# Patient Record
Sex: FEMALE | Race: WHITE | NOT HISPANIC OR LATINO | Employment: OTHER | URBAN - METROPOLITAN AREA
[De-identification: names, ages, dates, MRNs, and addresses within clinical notes are randomized per-mention and may not be internally consistent; named-entity substitution may affect disease eponyms.]

---

## 2016-09-09 LAB
LEFT EYE DIABETIC RETINOPATHY: NORMAL
RIGHT EYE DIABETIC RETINOPATHY: NORMAL

## 2017-01-25 ENCOUNTER — APPOINTMENT (OUTPATIENT)
Dept: LAB | Facility: CLINIC | Age: 67
End: 2017-01-25
Payer: MEDICARE

## 2017-01-25 DIAGNOSIS — E66.9 OBESITY: ICD-10-CM

## 2017-01-25 DIAGNOSIS — E13.59 OTHER SPECIFIED DIABETES MELLITUS WITH OTHER CIRCULATORY COMPLICATIONS (HCC): ICD-10-CM

## 2017-01-25 DIAGNOSIS — M79.10 MYALGIA: ICD-10-CM

## 2017-01-25 DIAGNOSIS — E11.9 TYPE 2 DIABETES MELLITUS WITHOUT COMPLICATIONS (HCC): ICD-10-CM

## 2017-01-25 LAB
25(OH)D3 SERPL-MCNC: 27.1 NG/ML (ref 30–100)
ALBUMIN SERPL BCP-MCNC: 3.7 G/DL (ref 3.5–5)
ALP SERPL-CCNC: 113 U/L (ref 46–116)
ALT SERPL W P-5'-P-CCNC: 41 U/L (ref 12–78)
ANION GAP SERPL CALCULATED.3IONS-SCNC: 8 MMOL/L (ref 4–13)
AST SERPL W P-5'-P-CCNC: 24 U/L (ref 5–45)
BILIRUB SERPL-MCNC: 0.7 MG/DL (ref 0.2–1)
BUN SERPL-MCNC: 15 MG/DL (ref 5–25)
CALCIUM SERPL-MCNC: 9.8 MG/DL (ref 8.3–10.1)
CHLORIDE SERPL-SCNC: 98 MMOL/L (ref 100–108)
CHOLEST SERPL-MCNC: 138 MG/DL (ref 50–200)
CO2 SERPL-SCNC: 32 MMOL/L (ref 21–32)
CREAT SERPL-MCNC: 0.78 MG/DL (ref 0.6–1.3)
CREAT UR-MCNC: 135 MG/DL
EST. AVERAGE GLUCOSE BLD GHB EST-MCNC: 126 MG/DL
GFR SERPL CREATININE-BSD FRML MDRD: >60 ML/MIN/1.73SQ M
GLUCOSE SERPL-MCNC: 114 MG/DL (ref 65–140)
HBA1C MFR BLD: 6 % (ref 4.2–6.3)
HDLC SERPL-MCNC: 43 MG/DL (ref 40–60)
LDLC SERPL CALC-MCNC: 61 MG/DL (ref 0–100)
MICROALBUMIN UR-MCNC: 19.8 MG/L (ref 0–20)
MICROALBUMIN/CREAT 24H UR: 15 MG/G CREATININE (ref 0–30)
POTASSIUM SERPL-SCNC: 4.3 MMOL/L (ref 3.5–5.3)
PROT SERPL-MCNC: 7.9 G/DL (ref 6.4–8.2)
SODIUM SERPL-SCNC: 138 MMOL/L (ref 136–145)
TRIGL SERPL-MCNC: 169 MG/DL
TSH SERPL DL<=0.05 MIU/L-ACNC: 2.41 UIU/ML (ref 0.36–3.74)

## 2017-01-25 PROCEDURE — 36415 COLL VENOUS BLD VENIPUNCTURE: CPT

## 2017-01-25 PROCEDURE — 82570 ASSAY OF URINE CREATININE: CPT

## 2017-01-25 PROCEDURE — 80061 LIPID PANEL: CPT

## 2017-01-25 PROCEDURE — 80053 COMPREHEN METABOLIC PANEL: CPT

## 2017-01-25 PROCEDURE — 83036 HEMOGLOBIN GLYCOSYLATED A1C: CPT

## 2017-01-25 PROCEDURE — 82306 VITAMIN D 25 HYDROXY: CPT

## 2017-01-25 PROCEDURE — 82043 UR ALBUMIN QUANTITATIVE: CPT

## 2017-01-25 PROCEDURE — 84443 ASSAY THYROID STIM HORMONE: CPT

## 2017-02-14 DIAGNOSIS — M81.0 AGE-RELATED OSTEOPOROSIS WITHOUT CURRENT PATHOLOGICAL FRACTURE: ICD-10-CM

## 2017-02-15 ENCOUNTER — ALLSCRIPTS OFFICE VISIT (OUTPATIENT)
Dept: OTHER | Facility: OTHER | Age: 67
End: 2017-02-15

## 2017-03-08 ENCOUNTER — HOSPITAL ENCOUNTER (OUTPATIENT)
Dept: MAMMOGRAPHY | Facility: CLINIC | Age: 67
Discharge: HOME | End: 2017-03-08
Payer: MEDICARE

## 2017-03-08 DIAGNOSIS — Z12.31 ENCOUNTER FOR SCREENING MAMMOGRAM FOR MALIGNANT NEOPLASM OF BREAST: ICD-10-CM

## 2017-03-08 DIAGNOSIS — M81.0 AGE-RELATED OSTEOPOROSIS WITHOUT CURRENT PATHOLOGICAL FRACTURE: ICD-10-CM

## 2017-03-08 PROCEDURE — 77080 DXA BONE DENSITY AXIAL: CPT

## 2017-03-08 PROCEDURE — G0202 SCR MAMMO BI INCL CAD: HCPCS

## 2017-06-21 ENCOUNTER — ALLSCRIPTS OFFICE VISIT (OUTPATIENT)
Dept: OTHER | Facility: OTHER | Age: 67
End: 2017-06-21

## 2017-06-21 DIAGNOSIS — E11.9 TYPE 2 DIABETES MELLITUS WITHOUT COMPLICATIONS (HCC): ICD-10-CM

## 2017-09-29 ENCOUNTER — ALLSCRIPTS OFFICE VISIT (OUTPATIENT)
Dept: OTHER | Facility: OTHER | Age: 67
End: 2017-09-29

## 2017-09-29 LAB
BILIRUB UR QL STRIP: NORMAL
CLARITY UR: NORMAL
COLOR UR: NORMAL
GLUCOSE (HISTORICAL): NORMAL
HGB UR QL STRIP.AUTO: NORMAL
KETONES UR STRIP-MCNC: NORMAL MG/DL
LEUKOCYTE ESTERASE UR QL STRIP: 125
NITRITE UR QL STRIP: NORMAL
PH UR STRIP.AUTO: 6.5 [PH]
PROT UR STRIP-MCNC: 2000 MG/DL
SP GR UR STRIP.AUTO: 1.01
UROBILINOGEN UR QL STRIP.AUTO: NORMAL

## 2017-10-19 ENCOUNTER — ALLSCRIPTS OFFICE VISIT (OUTPATIENT)
Dept: OTHER | Facility: OTHER | Age: 67
End: 2017-10-19

## 2017-10-20 NOTE — PROGRESS NOTES
Assessment  1  Candidal vulvovaginitis (112 1) (B37 3)    Plan  Candidal vulvovaginitis    · Nystatin-Triamcinolone 314107-3 1 UNIT/GM-% External Ointment; APPLY  SPARINGLY TO AFFECTED AREA TWICE A DAY FOR 7  TO 14 DAYS   Rx By: Isma Dang; Dispense: 7 Days ; #:1 X 30 GM Tube; Refill: 3;For: Candidal vulvovaginitis; CLEO = N; Sent To: [facility]    Discussion/Summary  Discussion Summary:   Treat as vulvar yeast infection with Mycolog II ointmenthave a yearly gyn examfollow up fo her endometrial hyperplasia  Chief Complaint  Chief Complaint Free Text Note Form: Patient presents today for a problem visit  She currently has vaginal itching that she is using clotrimazole for  History of Present Illness  HPI: is known to be diabetic      Active Problems  1  Acute urinary tract infection (599 0) (N39 0)   2  Age related osteoporosis (733 01) (M81 0)   3  Complex endometrial hyperplasia without atypia (621 32) (N85 01)   4  Controlled diabetes mellitus (250 00) (E11 9)   5  Depression screening (V79 0) (Z13 89)   6  Diabetes mellitus type 2 with neurological manifestations (250 60) (E11 49)   7  Encounter for screening mammogram for breast cancer (V76 12) (Z12 31)   8  Hyperlipidemia (272 4) (E78 5)   9  Hypertension (401 9) (I10)   10  Myalgia (729 1) (M79 1)   11  Obesity (278 00) (E66 9)   12  Post-menopausal bleeding (627 1) (N95 0)   13  Post-menopausal osteoporosis (733 01) (M81 0)    Past Medical History  1  History of Carpal tunnel syndrome, unspecified laterality (354 0) (G56 00)   2  History of Diabetes mellitus of other type with circulatory complication, without long-term   current use of insulin (250 70) (E13 59)   3  History of acute sinusitis (V12 69) (Z87 09)   4  Need for pneumococcal vaccination (V03 82) (Z23)   5  Normal delivery (650) (O80,Z37 9)   6  History of Postmenopausal bleeding (627 1) (N95 0)    Surgical History  1   History of Biopsy Endometrial, Without Cervical Dilation   2  History of Biopsy Endometrial, Without Cervical Dilation   3  History of Cholecystectomy   4  History of Complete Colonoscopy   5  History of Dilation And Curettage   6  History of Hysteroscopy   7  History of Neuroplasty Decompression Median Nerve At Carpal Tunnel   8  History of Neuroplasty Decompression Median Nerve At Carpal Tunnel   9  History of Tubal Ligation    Family History  Child    1  Family history of Living and Healthy  Family History    2  Family history of Arthritis (V17 7)   3  Family history of Bladder Cancer (V16 52)   4  Family history of cardiac disorder (V17 49) (Z82 49)   5  Family history of diabetes mellitus (V18 0) (Z83 3)   6  Family history of hypertension (V17 49) (Z82 49)   7  Family history of valvular heart disease (V17 49) (Z82 49)    Social History   · Being A Social Drinker   · Daily Coffee Consumption (2  Cups/Day)   ·    · Never A Smoker   · Occupation:    Current Meds   1  Accu-Chek Soft Touch Lancets Miscellaneous; Therapy: 41XPM0103 to (Last Rx:41Tsu4726)  Requested for: 68Vba6022 Ordered   2  Allegra Allergy 180 MG Oral Tablet; Therapy: (Recorded:13Dwk0925) to Recorded   3  AmLODIPine Besylate 5 MG Oral Tablet; TAKE 1 TABLET DAILY; Therapy: 80KYK1093 to (Evaluate:61Zcp3119)  Requested for: 58Mcs3231; Last   Rx:34Aot6013 Ordered   4  Aspirin 81 MG TABS; Therapy: (Sarabjitie Ni) to Recorded   5  Atorvastatin Calcium 10 MG Oral Tablet; Take 1 tablet daily; Therapy: 05Cwm6391 to (Evaluate:51Ags4000); Last Rx:03Sif5211 Ordered   6  BD Pen Needle Mini U/F 31G X 5 MM Miscellaneous; USE AS DIRECTED; Therapy: 68UZZ9755 to (Evaluate:53Geg1359)  Requested for: 15SNX4626; Last   Rx:52Pwd4346 Ordered   7  Clotrimazole-Betamethasone 1-0 05 % External Cream Recorded   8  Cranberry TABS; Therapy: (Recorded:05Dqx8689) to Recorded   9  Famotidine 20 MG Oral Tablet; TAKE 1 TABLET DAILY AS DIRECTED; Last   Rx:97Fpo3875 Ordered   10   HydroCHLOROthiazide 25 MG Oral Tablet; TAKE 1 TABLET DAILY; Therapy: 03Rkh5674 to (Evaluate:14Lrr3252); Last Rx:39Xjy2717 Ordered   11  MetFORMIN HCl - 500 MG Oral Tablet; take 1 tablet by mouth twice a day; Therapy: 14KPI9544 to (Evaluate:82Yry7242)  Requested for: 03Edf9835; Last    Rx:34Tgc1848 Ordered   12  Metoprolol Succinate ER 50 MG Oral Tablet Extended Release 24 Hour; Take 1 tablet by    mouth  daily; Therapy: 49IPW2358 to (Jimena Quinones)  Requested for: 43Iyg5901; Last    Rx:80Ktg4594 Ordered   13  OneTouch Ultra Blue In Vitro Strip; TEST TWICE DAILY; Therapy: 46WOR8535 to (Evaluate:23Iyc4057)  Requested for: 94JSX7401; Last    Rx:02Bmi9460 Ordered   14  Victoza 18 MG/3ML Subcutaneous Solution Pen-injector; INJECT 1 2 MCG Daily; Therapy: 79Gsr2120 to (Last Rx:86Zpj2939) Ordered    Allergies  1  ACE Inhibitors   2  Codeine Sulfate TABS    Vitals  Vital Signs    Recorded: 29DAH5898 00:40VE   Systolic 673   Diastolic 88   Height 5 ft 6 in   Weight 198 lb 2 08 oz   BMI Calculated 31 98   BSA Calculated 2     Physical Exam    Genitourinary   External genitalia: Normal and no lesions appreciated  Vagina: Normal, no lesions or dryness appreciated  Vagina: no discharge  Urethra: Normal     Urethral meatus: Normal     Bladder: Normal, soft, non-tender and no prolapse or masses appreciated  Cervix: Normal, no palpable masses  Examination of the cervix revealed normal findings  A Pap smear was not performed  Uterus: Normal, non-tender, not enlarged, and no palpable masses  Adnexa/parametria: Normal, non-tender and no fullness or masses appreciated  Future Appointments    Date/Time Provider Specialty Site   11/03/2017 10:00 AM TRICE Ricketts   Internal Medicine [facility]     Signatures   Electronically signed by : TRICE Caputo ; Oct 19 2017 12:15PM EST                       (Author)

## 2017-10-24 ENCOUNTER — APPOINTMENT (OUTPATIENT)
Dept: LAB | Facility: CLINIC | Age: 67
End: 2017-10-24
Payer: MEDICARE

## 2017-10-24 DIAGNOSIS — E11.9 TYPE 2 DIABETES MELLITUS WITHOUT COMPLICATIONS (HCC): ICD-10-CM

## 2017-10-24 LAB
ANION GAP SERPL CALCULATED.3IONS-SCNC: 5 MMOL/L (ref 4–13)
BUN SERPL-MCNC: 15 MG/DL (ref 5–25)
CALCIUM SERPL-MCNC: 9.9 MG/DL (ref 8.3–10.1)
CHLORIDE SERPL-SCNC: 98 MMOL/L (ref 100–108)
CO2 SERPL-SCNC: 34 MMOL/L (ref 21–32)
CREAT SERPL-MCNC: 0.78 MG/DL (ref 0.6–1.3)
GFR SERPL CREATININE-BSD FRML MDRD: 78 ML/MIN/1.73SQ M
GLUCOSE P FAST SERPL-MCNC: 85 MG/DL (ref 65–99)
POTASSIUM SERPL-SCNC: 4.2 MMOL/L (ref 3.5–5.3)
SODIUM SERPL-SCNC: 137 MMOL/L (ref 136–145)

## 2017-10-24 PROCEDURE — 83036 HEMOGLOBIN GLYCOSYLATED A1C: CPT

## 2017-10-24 PROCEDURE — 36415 COLL VENOUS BLD VENIPUNCTURE: CPT

## 2017-10-24 PROCEDURE — 80048 BASIC METABOLIC PNL TOTAL CA: CPT

## 2017-10-25 LAB
EST. AVERAGE GLUCOSE BLD GHB EST-MCNC: 128 MG/DL
HBA1C MFR BLD: 6.1 % (ref 4.2–6.3)

## 2017-11-03 ENCOUNTER — ALLSCRIPTS OFFICE VISIT (OUTPATIENT)
Dept: OTHER | Facility: OTHER | Age: 67
End: 2017-11-03

## 2017-11-03 DIAGNOSIS — E66.9 OBESITY: ICD-10-CM

## 2017-11-03 DIAGNOSIS — E11.9 TYPE 2 DIABETES MELLITUS WITHOUT COMPLICATIONS (HCC): ICD-10-CM

## 2017-11-04 NOTE — PROGRESS NOTES
Assessment  1  Controlled diabetes mellitus (250 00) (E11 9)   2  Hypertension (401 9) (I10)   3  Obesity (278 00) (E66 9)   4  Diabetes mellitus type 2 with neurological manifestations (250 60) (E11 49)    Plan  Controlled diabetes mellitus, Obesity    · (1) HEMOGLOBIN A1C; Status:Active; Requested ION:51QVM0056;   Need for influenza vaccination    · Fluzone High-Dose 0 5 ML Intramuscular Suspension Prefilled Syringe  Obesity    · (1) COMPREHENSIVE METABOLIC PANEL; Status:Active; Requested FQZ:35JWO6088;    · (1) LIPID PANEL, FASTING; Status:Active; Requested for:03Nov2017;    · (1) MICROALBUMIN CREATININE RATIO, RANDOM URINE; Status:Active; Requested TIA:50OVH5119;    · Follow-up visit in 2 months Evaluation and Treatment  Follow-up  Status: Hold For - Scheduling   Requested for: 84CIQ5625   · Diets that are low in carbohydrates and high in protein are very popular for weight loss ;  Status:Complete;   Done: 62OHB8886 12:02PM   · Eat a low fat and low cholesterol diet ; Status:Complete;   Done: 23STE7224 12:02PM   · Keep a diary of when and what you eat ; Status:Complete;   Done: 90AQL2904 12:02PM   · Some eating tips that can help you lose weight ; Status:Complete;   Done: 38VLH6003 12:02PM   · We encourage all of our patients to exercise regularly  30 minutes of exercise or physical activity  five or more days a week is recommended for children and adults ; Status:Complete;   Done:  77WBW4033 12:02PM   · We recommend that you bring your body mass index down to 26 ; Status:Complete;   Done:  50QDX8354 12:02PM    Discussion/Summary  Counseling Documentation With Imm: The patient was counseled regarding diagnostic results,-- instructions for management,-- risk factor reductions,-- prognosis,-- patient and family education,-- impressions,-- risks and benefits of treatment options  Medication SE Review and Pt Understands Tx: Possible side effects of new medications were reviewed with the patient/guardian today  The treatment plan was reviewed with the patient/guardian  The patient/guardian understands and agrees with the treatment plan      Chief Complaint  Chief Complaint Free Text Note Form: Pt here for check up / flu shot      History of Present Illness  HPI: feels OK   Obesity (Follow-Up): The patient is being seen for follow-up of obesity  The patient reports a weight loss of 4 pounds  Interval Events: slow but steady decline in wt   Interval symptoms:  stable poor eating habits,-- denies depressed mood-- and-- stable joint pain  Medications include Victosa  Disease management:  the patient is doing well with her goals  Diet plan: low carbohydrate diet, limited portion sizes and limited junk food  Due for: diabetes screening and lipid profile  Hypertension (Follow-Up): The patient presents for follow-up of essential hypertension  The patient states she has been doing well with her blood pressure control since the last visit  She has no comorbid illnesses  Symptoms: The patient is currently asymptomatic  Home monitoring: The patient checks her blood pressure sporadically  Blood pressure control has been good  Lifestyle: Diet: She consumes a diverse and healthy diet  Weight Issues: She has weight concerns  Exercise: She does not exercise regularly  Smoking: She does not use tobacco Alcohol: She denies alcohol use  Drug Use: She denies drug use  Medications: the patient is adherent with her medication regimen  -- She denies medication side effects  Medication(s): a diuretic,-- a beta blocking agent-- and-- a calcium channel blocker  Disease Management: the patient is doing well with her blood pressure goals  The patient is due for a lipid panel-- and-- a serum creatinine  Diabetes Type II (Follow-Up): The patient states she has been doing well with her Type II Diabetes control since the last visit  Comorbid Illnesses: hypertension,-- hyperlipidemia-- and-- obesity  Complications: peripheral neuropathy  Symptoms: stable numbness of the feet,-- denies a foot ulcer,-- denies foot pain,-- denies vomiting,-- denies visual impairment-- and-- denies   Home monitoring: The patient checks her blood sugars regularly  -- Glycemic control has been good  -- the patient reports no symptomatic hypoglycemic episodes  Medications: the patient is adherent with her medication regimen  -- She denies medication side effects  Medication(s): Metformin HCl-- and-- Victosa  The patient is doing well with her diabetes goals  Review of Systems  Complete-Female:   Constitutional: as noted in HPI  Eyes: No complaints of eye pain, no red eyes, no eyesight problems, no discharge, no dry eyes, no itching of eyes  Cardiovascular: as noted in HPI  Respiratory: No complaints of shortness of breath, no wheezing, no cough, no SOB on exertion, no orthopnea, no PND  Gastrointestinal: No complaints of abdominal pain, no constipation, no nausea or vomiting, no diarrhea, no bloody stools  Genitourinary: No complaints of dysuria, no incontinence, no pelvic pain, no dysmenorrhea, no vaginal discharge or bleeding  Musculoskeletal: No complaints of arthralgias, no myalgias, no joint swelling or stiffness, no limb pain or swelling  Integumentary: No complaints of skin rash or lesions, no itching, no skin wounds, no breast pain or lump  Neurological: No complaints of headache, no confusion, no convulsions, no numbness, no dizziness or fainting, no tingling, no limb weakness, no difficulty walking  Psychiatric: Not suicidal, no sleep disturbance, no anxiety or depression, no change in personality, no emotional problems  Endocrine: as noted in HPI  Active Problems  1  Age related osteoporosis (733 01) (M81 0)   2  Candidal vulvovaginitis (112 1) (B37 3)   3  Controlled diabetes mellitus (250 00) (E11 9)   4  Depression screening (V79 0) (Z13 89)   5  Diabetes mellitus type 2 with neurological manifestations (250 60) (E11 49)   6  Encounter for screening mammogram for breast cancer (V76 12) (Z12 31)   7  Hyperlipidemia (272 4) (E78 5)   8  Hypertension (401 9) (I10)   9  Myalgia (729 1) (M79 1)   10  Obesity (278 00) (E66 9)   11  Post-menopausal osteoporosis (733 01) (M81 0)    Past Medical History  1  History of Carpal tunnel syndrome, unspecified laterality (354 0) (G56 00)   2  History of Complex endometrial hyperplasia without atypia (621 32) (N85 01)   3  History of Diabetes mellitus of other type with circulatory complication, without long-term current   use of insulin (250 70) (E13 59)   4  Normal delivery (650) (O80,Z37 9)   5  History of Postmenopausal bleeding (627 1) (N95 0)  Active Problems And Past Medical History Reviewed: The active problems and past medical history were reviewed and updated today  Surgical History  1  History of Biopsy Endometrial, Without Cervical Dilation   2  History of Cholecystectomy   3  History of Complete Colonoscopy   4  History of Dilation And Curettage   5  History of Hysteroscopy   6  History of Neuroplasty Decompression Median Nerve At Carpal Tunnel   7  History of Neuroplasty Decompression Median Nerve At Carpal Tunnel   8  History of Tubal Ligation    Family History  Child    1  Family history of Living and Healthy  Family History    2  Family history of Arthritis (V17 7)   3  Family history of Bladder Cancer (V16 52)   4  Family history of cardiac disorder (V17 49) (Z82 49)   5  Family history of diabetes mellitus (V18 0) (Z83 3)   6  Family history of hypertension (V17 49) (Z82 49)   7  Family history of valvular heart disease (V17 49) (Z82 49)    Social History   · Being A Social Drinker   · Daily Coffee Consumption (2  Cups/Day)   ·    · Never A Smoker   · Occupation:  Social History Reviewed: The social history was reviewed and updated today  The social history was reviewed and is unchanged  Current Meds   1  Accu-Chek Soft Touch Lancets Miscellaneous;    Therapy: 41ZXS3579 to (Last Rx:68Ssb7164)  Requested for: 03Zll9515 Ordered   2  Allegra Allergy 180 MG Oral Tablet; Therapy: (Recorded:10Ysb0895) to Recorded   3  AmLODIPine Besylate 5 MG Oral Tablet; TAKE 1 TABLET DAILY; Therapy: 41GQG0304 to (Evaluate:40Akk4518)  Requested for: 51Gvv4110; Last Rx:62Rrk5483   Ordered   4  Aspirin 81 MG TABS; Therapy: (Wendy Liriano) to Recorded   5  Atorvastatin Calcium 10 MG Oral Tablet; Take 1 tablet daily; Therapy: 36Sto4290 to (Evaluate:41Hqy4761); Last Rx:78Nrd3733 Ordered   6  BD Pen Needle Mini U/F 31G X 5 MM Miscellaneous; USE AS DIRECTED; Therapy: 97LYS5326 to (Evaluate:60Tro7941)  Requested for: 29XQO0051; Last Rx:54Elx1099   Ordered   7  Clotrimazole-Betamethasone 1-0 05 % External Cream Recorded   8  Cranberry TABS; Therapy: (Recorded:85Kga1578) to Recorded   9  Famotidine 20 MG Oral Tablet; TAKE 1 TABLET DAILY AS DIRECTED; Last Rx:54Omy9292 Ordered   10  HydroCHLOROthiazide 25 MG Oral Tablet; TAKE 1 TABLET DAILY; Therapy: 60Czw1700 to (Evaluate:96Wod3818); Last Rx:14Hif7122 Ordered   11  MetFORMIN HCl - 500 MG Oral Tablet; take 1 tablet by mouth twice a day; Therapy: 15ZFF0693 to (Evaluate:21Bjs7273)  Requested for: 89Kmx9624; Last Rx:73Wam2831    Ordered   12  Metoprolol Succinate ER 50 MG Oral Tablet Extended Release 24 Hour; Take 1 tablet by mouth  daily; Therapy: 68CDH0872 to (Parth Gudinompf)  Requested for: 77Kol6866; Last Rx:33Smx2290    Ordered   13  Nystatin-Triamcinolone 562168-2 1 UNIT/GM-% External Ointment; APPLY SPARINGLY TO AFFECTED    AREA TWICE A DAY FOR 7 TO 14 DAYS; Therapy: 70MKY7752 to (Evaluate:16Nov2017)  Requested for: 24MLM6772; Last Rx:16Jxa4928    Ordered   14  OneTouch Ultra Blue In Vitro Strip; TEST TWICE DAILY; Therapy: 84NWW4716 to (Evaluate:78Nsw0703)  Requested for: 89MYX5077; Last Rx:41Hga3543    Ordered   15  Victoza 18 MG/3ML Subcutaneous Solution Pen-injector; INJECT 1 2 MCG Daily;     Therapy: 71Fqe4976 to (Last Rx:73Irg3696) Ordered    Allergies  1  ACE Inhibitors   2  Codeine Sulfate TABS    Vitals  Vital Signs    Recorded: 42FNX0773 09:50AM   Temperature 98 F, Tympanic   Heart Rate 80   Systolic 970, LUE, Sitting   Diastolic 68, LUE, Sitting   Height 5 ft 6 in   Weight 194 lb    BMI Calculated 31 31   BSA Calculated 1 97     Physical Exam    Constitutional   General appearance: No acute distress, well appearing and well nourished  obese  Eyes   Conjunctiva and lids: No swelling, erythema or discharge  Pupils and irises: Equal, round and reactive to light  Pulmonary   Respiratory effort: No increased work of breathing or signs of respiratory distress  Auscultation of lungs: Clear to auscultation  Cardiovascular   Auscultation of heart: Normal rate and rhythm, normal S1 and S2, without murmurs  Examination of extremities for edema and/or varicosities: Normal     Abdomen   Abdomen: Non-tender, no masses  Liver and spleen: No hepatomegaly or splenomegaly  Lymphatic   Palpation of lymph nodes in neck: No lymphadenopathy  Musculoskeletal   Gait and station: Normal     Skin   Skin and subcutaneous tissue: Normal without rashes or lesions  Neurologic   Cranial nerves: Cranial nerves 2-12 intact  Reflexes: 2+ and symmetric  Sensation: No sensory loss  Psychiatric   Orientation to person, place, and time: Normal     Mood and affect: Normal          Future Appointments    Date/Time Provider Specialty Site   02/23/2018 11:00 AM TRICE Schmidt  Internal Medicine [facility] INTER[facility]IC[facility] [place]   11/09/2017 11:40 AM TRICE Rodríguez   Obstetrics/Gynecology [facility]     Signatures   Electronically signed by : TRICE Adams ; Nov  3 2017 12:03PM EST                       (Author)

## 2017-11-09 ENCOUNTER — GENERIC CONVERSION - ENCOUNTER (OUTPATIENT)
Dept: OTHER | Facility: OTHER | Age: 67
End: 2017-11-09

## 2017-11-09 ENCOUNTER — LAB REQUISITION (OUTPATIENT)
Dept: LAB | Facility: HOSPITAL | Age: 67
End: 2017-11-09
Payer: MEDICARE

## 2017-11-09 DIAGNOSIS — Z01.419 ENCOUNTER FOR GYNECOLOGICAL EXAMINATION WITHOUT ABNORMAL FINDING: ICD-10-CM

## 2017-11-09 PROCEDURE — 87624 HPV HI-RISK TYP POOLED RSLT: CPT | Performed by: OBSTETRICS & GYNECOLOGY

## 2017-11-09 PROCEDURE — G0145 SCR C/V CYTO,THINLAYER,RESCR: HCPCS | Performed by: OBSTETRICS & GYNECOLOGY

## 2017-11-15 LAB — HPV RRNA GENITAL QL NAA+PROBE: NORMAL

## 2017-11-16 LAB
LAB AP GYN PRIMARY INTERPRETATION: NORMAL
Lab: NORMAL

## 2018-01-13 VITALS
DIASTOLIC BLOOD PRESSURE: 64 MMHG | HEART RATE: 80 BPM | TEMPERATURE: 97 F | SYSTOLIC BLOOD PRESSURE: 130 MMHG | BODY MASS INDEX: 31.68 KG/M2 | WEIGHT: 197.13 LBS | HEIGHT: 66 IN

## 2018-01-13 VITALS
SYSTOLIC BLOOD PRESSURE: 130 MMHG | BODY MASS INDEX: 31.92 KG/M2 | WEIGHT: 198.6 LBS | DIASTOLIC BLOOD PRESSURE: 84 MMHG | TEMPERATURE: 98.5 F | HEART RATE: 76 BPM | HEIGHT: 66 IN

## 2018-01-13 VITALS
WEIGHT: 194 LBS | HEIGHT: 66 IN | DIASTOLIC BLOOD PRESSURE: 68 MMHG | SYSTOLIC BLOOD PRESSURE: 130 MMHG | TEMPERATURE: 98 F | BODY MASS INDEX: 31.18 KG/M2 | HEART RATE: 80 BPM

## 2018-01-14 VITALS
SYSTOLIC BLOOD PRESSURE: 162 MMHG | HEIGHT: 66 IN | DIASTOLIC BLOOD PRESSURE: 88 MMHG | BODY MASS INDEX: 31.84 KG/M2 | WEIGHT: 198.13 LBS

## 2018-01-14 VITALS
WEIGHT: 197.13 LBS | SYSTOLIC BLOOD PRESSURE: 138 MMHG | BODY MASS INDEX: 31.68 KG/M2 | TEMPERATURE: 97.2 F | HEART RATE: 76 BPM | DIASTOLIC BLOOD PRESSURE: 64 MMHG | HEIGHT: 66 IN

## 2018-01-22 VITALS
HEIGHT: 65 IN | BODY MASS INDEX: 32.65 KG/M2 | WEIGHT: 196 LBS | DIASTOLIC BLOOD PRESSURE: 72 MMHG | SYSTOLIC BLOOD PRESSURE: 128 MMHG

## 2018-01-30 DIAGNOSIS — E13.9 DIABETES 1.5, MANAGED AS TYPE 2 (HCC): Primary | ICD-10-CM

## 2018-02-15 ENCOUNTER — OFFICE VISIT (OUTPATIENT)
Dept: INTERNAL MEDICINE CLINIC | Age: 68
End: 2018-02-15
Payer: MEDICARE

## 2018-02-15 VITALS
WEIGHT: 191 LBS | RESPIRATION RATE: 16 BRPM | DIASTOLIC BLOOD PRESSURE: 60 MMHG | HEIGHT: 66 IN | TEMPERATURE: 97.7 F | BODY MASS INDEX: 30.7 KG/M2 | OXYGEN SATURATION: 93 % | SYSTOLIC BLOOD PRESSURE: 108 MMHG | HEART RATE: 70 BPM

## 2018-02-15 DIAGNOSIS — B37.3 YEAST VAGINITIS: ICD-10-CM

## 2018-02-15 DIAGNOSIS — E11.49 DIABETES MELLITUS TYPE 2 WITH NEUROLOGICAL MANIFESTATIONS (HCC): ICD-10-CM

## 2018-02-15 DIAGNOSIS — E11.42 CONTROLLED TYPE 2 DIABETES MELLITUS WITH DIABETIC POLYNEUROPATHY, WITHOUT LONG-TERM CURRENT USE OF INSULIN (HCC): ICD-10-CM

## 2018-02-15 DIAGNOSIS — J45.21 MILD INTERMITTENT ASTHMATIC BRONCHITIS WITH ACUTE EXACERBATION: Primary | ICD-10-CM

## 2018-02-15 PROBLEM — J45.901 ASTHMATIC BRONCHITIS WITH ACUTE EXACERBATION: Status: ACTIVE | Noted: 2018-02-15

## 2018-02-15 PROBLEM — B37.31 CANDIDAL VULVOVAGINITIS: Status: ACTIVE | Noted: 2017-10-19

## 2018-02-15 PROBLEM — M81.0 AGE RELATED OSTEOPOROSIS: Status: ACTIVE | Noted: 2017-02-14

## 2018-02-15 PROCEDURE — 99213 OFFICE O/P EST LOW 20 MIN: CPT | Performed by: INTERNAL MEDICINE

## 2018-02-15 RX ORDER — FLUCONAZOLE 150 MG/1
150 TABLET ORAL ONCE
Qty: 1 TABLET | Refills: 0 | Status: SHIPPED | OUTPATIENT
Start: 2018-02-15 | End: 2018-02-15

## 2018-02-15 RX ORDER — HYDROCHLOROTHIAZIDE 25 MG/1
1 TABLET ORAL DAILY
COMMUNITY
Start: 2016-12-21 | End: 2018-03-12 | Stop reason: SDUPTHER

## 2018-02-15 RX ORDER — METOPROLOL SUCCINATE 50 MG/1
1 TABLET, EXTENDED RELEASE ORAL DAILY
COMMUNITY
Start: 2017-01-04 | End: 2018-03-12 | Stop reason: SDUPTHER

## 2018-02-15 RX ORDER — ATORVASTATIN CALCIUM 10 MG/1
1 TABLET, FILM COATED ORAL DAILY
COMMUNITY
Start: 2016-12-21 | End: 2018-03-12 | Stop reason: SDUPTHER

## 2018-02-15 RX ORDER — METHYLPREDNISOLONE 4 MG/1
TABLET ORAL
Qty: 21 TABLET | Refills: 0 | Status: SHIPPED | OUTPATIENT
Start: 2018-02-15 | End: 2018-03-12

## 2018-02-15 RX ORDER — FEXOFENADINE HCL 180 MG/1
TABLET ORAL
COMMUNITY
End: 2019-02-13 | Stop reason: SDUPTHER

## 2018-02-15 RX ORDER — FAMOTIDINE 20 MG/1
1 TABLET, FILM COATED ORAL DAILY
COMMUNITY
End: 2018-03-12 | Stop reason: SDUPTHER

## 2018-02-15 RX ORDER — AMOXICILLIN AND CLAVULANATE POTASSIUM 875; 125 MG/1; MG/1
1 TABLET, FILM COATED ORAL EVERY 12 HOURS SCHEDULED
Qty: 20 TABLET | Refills: 0 | Status: SHIPPED | OUTPATIENT
Start: 2018-02-15 | End: 2018-02-25

## 2018-02-15 RX ORDER — LIRAGLUTIDE 6 MG/ML
INJECTION SUBCUTANEOUS
COMMUNITY
Start: 2018-01-01 | End: 2018-03-12 | Stop reason: SDUPTHER

## 2018-02-15 RX ORDER — AMLODIPINE BESYLATE 5 MG/1
1 TABLET ORAL DAILY
COMMUNITY
Start: 2011-02-07 | End: 2018-03-12 | Stop reason: SDUPTHER

## 2018-02-15 NOTE — ASSESSMENT & PLAN NOTE
Patient gets a yeast infection every time she is on antibiotics so will give her prescription for Diflucan

## 2018-02-15 NOTE — ASSESSMENT & PLAN NOTE
Patient has been sick for over a week with a moist cough and intermittent fevers exam was consistent with an asthmatic bronchitis she was given a nebulizer in the office and sent home with Medrol and Augmentin

## 2018-02-15 NOTE — PROGRESS NOTES
Assessment/Plan:    Controlled diabetes mellitus ([facility] [address] )  Her diabetes is well controlled with the last A1c of 6 1 on metformin and Victoza    Asthmatic bronchitis with acute exacerbation  Patient has been sick for over a week with a moist cough and intermittent fevers exam was consistent with an asthmatic bronchitis she was given a nebulizer in the office and sent home with Medrol and Augmentin    Hyperlipidemia  Patient gets a yeast infection every time she is on antibiotics so will give her prescription for Diflucan       Diagnoses and all orders for this visit:    Mild intermittent asthmatic bronchitis with acute exacerbation  -     amoxicillin-clavulanate (AUGMENTIN) 875-125 mg per tablet; Take 1 tablet by mouth every 12 (twelve) hours for 10 days  -     Methylprednisolone 4 MG TBPK; Use as directed on package  -     dextromethorphan-guaifenesin (MUCINEX DM)  MG per 12 hr tablet; Take 1 tablet by mouth every 12 (twelve) hours  -     Cancel: Mini neb; Future  -     Mini neb    Controlled type 2 diabetes mellitus with diabetic polyneuropathy, without long-term current use of insulin ([facility])    Yeast vaginitis  -     fluconazole (DIFLUCAN) 150 mg tablet; Take 1 tablet (150 mg total) by mouth once for 1 dose    Diabetes mellitus type 2 with neurological manifestations (HCC)  -     metFORMIN (GLUCOPHAGE) 1000 MG tablet; Take 1 tablet (1,000 mg total) by mouth 2 (two) times a day with meals    Other orders  -     Lancets (ACCU-CHEK SOFT TOUCH) lancets; by Does not apply route  -     fexofenadine (ALLEGRA) 180 MG tablet; Take by mouth  -     amLODIPine (NORVASC) 5 mg tablet; Take 1 tablet by mouth daily  -     aspirin 81 MG tablet; Take by mouth  -     atorvastatin (LIPITOR) 10 mg tablet; Take 1 tablet by mouth daily  -     Cranberry 600 MG TABS; Take by mouth  -     famotidine (PEPCID) 20 mg tablet; Take 1 tablet by mouth daily  -     hydrochlorothiazide (HYDRODIURIL) 25 mg tablet;  Take 1 tablet by mouth daily  - VICTOZA 18 MG/3ML SOPN;   -     Discontinue: glucose blood (ONE TOUCH ULTRA TEST) test strip; by In Vitro route 2 (two) times a day  -     metoprolol succinate (TOPROL-XL) 50 mg 24 hr tablet; Take 1 tablet by mouth daily  -     Insulin Pen Needle (B-D UF III MINI PEN NEEDLES) 31G X 5 MM MISC; by Does not apply route          Subjective:      Patient ID: Fabrizio Lawson is a 70 y o  female  Patient is here complaining of a moist cough with the intermittent fevers and shortness of breath for over a week  It is important to note that she gets yeast infection every time she has antibiotics and that her last A1c was 6 1      Cough   This is a new problem  The current episode started in the past 7 days  The problem has been gradually worsening  The problem occurs every few minutes  The cough is productive of sputum  Associated symptoms include chills, ear congestion, a fever, nasal congestion and shortness of breath  Pertinent negatives include no chest pain, ear pain, headaches, myalgias, postnasal drip, rash, sore throat or wheezing  The symptoms are aggravated by cold air, lying down and exercise  She has tried body position changes, OTC cough suppressant and rest for the symptoms  The treatment provided no relief  Diabetes   She presents for her follow-up diabetic visit  She has type 2 diabetes mellitus  Her disease course has been stable  There are no hypoglycemic associated symptoms  Pertinent negatives for hypoglycemia include no confusion, dizziness or headaches  Associated symptoms include fatigue and foot paresthesias  Pertinent negatives for diabetes include no chest pain, no polydipsia, no polyphagia, no polyuria and no weakness  Symptoms are stable  Diabetic complications include peripheral neuropathy  Risk factors for coronary artery disease include dyslipidemia, diabetes mellitus, hypertension, post-menopausal and obesity  Current diabetic treatment includes diet and oral agent (monotherapy)   She is compliant with treatment all of the time  Her weight is stable  She is following a diabetic, generally healthy, low fat/cholesterol and low salt diet  Meal planning includes avoidance of concentrated sweets  She rarely participates in exercise  There is no change in her home blood glucose trend  An ACE inhibitor/angiotensin II receptor blocker is contraindicated  Review of Systems   Constitutional: Positive for chills, diaphoresis, fatigue and fever  Negative for unexpected weight change  HENT: Negative for congestion, ear pain, hearing loss, postnasal drip, sinus pressure, sore throat, trouble swallowing and voice change  Eyes: Negative for visual disturbance  Respiratory: Positive for cough and shortness of breath  Negative for chest tightness and wheezing  Cardiovascular: Negative for chest pain, palpitations and leg swelling  Gastrointestinal: Negative for abdominal distention, abdominal pain, anal bleeding, blood in stool, constipation, diarrhea and nausea  Endocrine: Negative for cold intolerance, polydipsia, polyphagia and polyuria  Genitourinary: Negative for dysuria, flank pain, frequency, hematuria and urgency  Musculoskeletal: Negative for arthralgias, back pain, gait problem, joint swelling, myalgias and neck pain  Skin: Negative for rash  Allergic/Immunologic: Negative for immunocompromised state  Neurological: Positive for numbness  Negative for dizziness, syncope, facial asymmetry, weakness, light-headedness and headaches  Foot paresthesias   Hematological: Negative for adenopathy  Psychiatric/Behavioral: Negative  Negative for confusion, sleep disturbance and suicidal ideas  Objective:    Vitals:    02/15/18 1358   BP:    Pulse:    Resp:    Temp:    SpO2: 93%        Physical Exam   Constitutional: She is oriented to person, place, and time  She appears well-developed and well-nourished  HENT:   Head: Normocephalic     TMs retracted and pink, mild nasal congestion   Eyes: EOM are normal  Pupils are equal, round, and reactive to light  Neck: Neck supple  Cardiovascular: Normal rate and regular rhythm  Pulmonary/Chest:   Marked decreased breath sounds with crackles especially at the right base   Musculoskeletal: Normal range of motion  Lymphadenopathy:     She has no cervical adenopathy  Neurological: She is alert and oriented to person, place, and time  No cranial nerve deficit  Skin: Skin is warm  Psychiatric: She has a normal mood and affect  Her behavior is normal  Judgment and thought content normal           Mini neb     Date/Time 2/15/2018 3:29 PM     Performed by  Татьяна Palma by Charity Peraza       Consent: Verbal consent obtained  Risks and benefits: risks, benefits and alternatives were discussed  Consent given by: patient  Patient understanding: patient states understanding of the procedure being performed  Patient identity confirmed: verbally with patient     Sedation   Patient sedated: no      Patient tolerance: Patient tolerated the procedure well with no immediate complications      Comments: Breath sounds were much better and now you could hear wheezing and rhonchi    Patient feels better

## 2018-02-15 NOTE — PATIENT INSTRUCTIONS
Cold Symptoms, Ambulatory Care   Yariel Ritter JA: Viruses  In: Diseases: A Nursing Process Approach to Excellent Care, 4th ed  [address], [place], 2006, pp [phone]  Nadiya SALAS & Arturo Welch G: Upper Respiratory Infection  In: Toro FJ, ed  The 5-Minute Clinical Consult 2012, 20th ed  [address] [facility] [place], [place], 2012, [place] 3347-7752  © 2014 [address] is for End User's use only and may not be sold, redistributed or otherwise used for commercial purposes  All illustrations and images included in CareNotes® are the copyrighted property of [employer] , [employer]  or Armen Chopra  The above information is an  only  It is not intended as medical advice for individual conditions or treatments  Talk to your doctor, nurse or pharmacist before following any medical regimen to see if it is safe and effective for you    Patient understands her blood sugars will go with steroids

## 2018-02-18 DIAGNOSIS — Z79.4 CONTROLLED TYPE 2 DIABETES MELLITUS WITHOUT COMPLICATION, WITH LONG-TERM CURRENT USE OF INSULIN (HCC): Primary | ICD-10-CM

## 2018-02-18 DIAGNOSIS — E11.9 CONTROLLED TYPE 2 DIABETES MELLITUS WITHOUT COMPLICATION, WITH LONG-TERM CURRENT USE OF INSULIN (HCC): Primary | ICD-10-CM

## 2018-02-19 ENCOUNTER — TRANSCRIBE ORDERS (OUTPATIENT)
Dept: RADIOLOGY | Facility: CLINIC | Age: 68
End: 2018-02-19

## 2018-02-19 ENCOUNTER — APPOINTMENT (OUTPATIENT)
Dept: LAB | Facility: CLINIC | Age: 68
End: 2018-02-19
Payer: MEDICARE

## 2018-02-19 DIAGNOSIS — E66.9 OBESITY: ICD-10-CM

## 2018-02-19 DIAGNOSIS — E11.9 TYPE 2 DIABETES MELLITUS WITHOUT COMPLICATIONS (HCC): ICD-10-CM

## 2018-02-19 LAB
ALBUMIN SERPL BCP-MCNC: 3.6 G/DL (ref 3.5–5)
ALP SERPL-CCNC: 101 U/L (ref 46–116)
ALT SERPL W P-5'-P-CCNC: 62 U/L (ref 12–78)
ANION GAP SERPL CALCULATED.3IONS-SCNC: 9 MMOL/L (ref 4–13)
AST SERPL W P-5'-P-CCNC: 36 U/L (ref 5–45)
BILIRUB SERPL-MCNC: 0.72 MG/DL (ref 0.2–1)
BUN SERPL-MCNC: 18 MG/DL (ref 5–25)
CALCIUM SERPL-MCNC: 9.4 MG/DL (ref 8.3–10.1)
CHLORIDE SERPL-SCNC: 85 MMOL/L (ref 100–108)
CHOLEST SERPL-MCNC: 121 MG/DL (ref 50–200)
CO2 SERPL-SCNC: 35 MMOL/L (ref 21–32)
CREAT SERPL-MCNC: 0.77 MG/DL (ref 0.6–1.3)
CREAT UR-MCNC: 120 MG/DL
EST. AVERAGE GLUCOSE BLD GHB EST-MCNC: 126 MG/DL
GFR SERPL CREATININE-BSD FRML MDRD: 79 ML/MIN/1.73SQ M
GLUCOSE P FAST SERPL-MCNC: 88 MG/DL (ref 65–99)
HBA1C MFR BLD: 6 % (ref 4.2–6.3)
HDLC SERPL-MCNC: 40 MG/DL (ref 40–60)
LDLC SERPL CALC-MCNC: 63 MG/DL (ref 0–100)
MICROALBUMIN UR-MCNC: 12.9 MG/L (ref 0–20)
MICROALBUMIN/CREAT 24H UR: 11 MG/G CREATININE (ref 0–30)
POTASSIUM SERPL-SCNC: 4 MMOL/L (ref 3.5–5.3)
PROT SERPL-MCNC: 7.7 G/DL (ref 6.4–8.2)
SODIUM SERPL-SCNC: 129 MMOL/L (ref 136–145)
TRIGL SERPL-MCNC: 91 MG/DL

## 2018-02-19 PROCEDURE — 82043 UR ALBUMIN QUANTITATIVE: CPT

## 2018-02-19 PROCEDURE — 80061 LIPID PANEL: CPT

## 2018-02-19 PROCEDURE — 36415 COLL VENOUS BLD VENIPUNCTURE: CPT

## 2018-02-19 PROCEDURE — 80053 COMPREHEN METABOLIC PANEL: CPT

## 2018-02-19 PROCEDURE — 82570 ASSAY OF URINE CREATININE: CPT

## 2018-02-19 PROCEDURE — 83036 HEMOGLOBIN GLYCOSYLATED A1C: CPT

## 2018-02-19 RX ORDER — PEN NEEDLE, DIABETIC 31 GX5/16"
NEEDLE, DISPOSABLE MISCELLANEOUS
Qty: 100 EACH | Refills: 0 | Status: SHIPPED | OUTPATIENT
Start: 2018-02-19 | End: 2018-03-12 | Stop reason: SDUPTHER

## 2018-03-08 DIAGNOSIS — Z12.31 ENCOUNTER FOR SCREENING MAMMOGRAM FOR MALIGNANT NEOPLASM OF BREAST: ICD-10-CM

## 2018-03-12 ENCOUNTER — OFFICE VISIT (OUTPATIENT)
Dept: INTERNAL MEDICINE CLINIC | Age: 68
End: 2018-03-12
Payer: MEDICARE

## 2018-03-12 VITALS
OXYGEN SATURATION: 95 % | BODY MASS INDEX: 30.7 KG/M2 | WEIGHT: 191 LBS | SYSTOLIC BLOOD PRESSURE: 128 MMHG | HEIGHT: 66 IN | TEMPERATURE: 98 F | HEART RATE: 76 BPM | DIASTOLIC BLOOD PRESSURE: 64 MMHG

## 2018-03-12 DIAGNOSIS — I10 ESSENTIAL HYPERTENSION: ICD-10-CM

## 2018-03-12 DIAGNOSIS — E66.09 CLASS 1 OBESITY DUE TO EXCESS CALORIES WITH BODY MASS INDEX (BMI) OF 30.0 TO 30.9 IN ADULT, UNSPECIFIED WHETHER SERIOUS COMORBIDITY PRESENT: ICD-10-CM

## 2018-03-12 DIAGNOSIS — E13.9 DIABETES 1.5, MANAGED AS TYPE 2 (HCC): ICD-10-CM

## 2018-03-12 DIAGNOSIS — E11.49 DIABETES MELLITUS TYPE 2 WITH NEUROLOGICAL MANIFESTATIONS (HCC): Primary | ICD-10-CM

## 2018-03-12 DIAGNOSIS — R10.13 DYSPEPSIA: ICD-10-CM

## 2018-03-12 DIAGNOSIS — Z12.11 SCREENING FOR COLON CANCER: ICD-10-CM

## 2018-03-12 DIAGNOSIS — E78.5 HYPERLIPIDEMIA, UNSPECIFIED HYPERLIPIDEMIA TYPE: ICD-10-CM

## 2018-03-12 DIAGNOSIS — E11.40 CONTROLLED TYPE 2 DIABETES MELLITUS WITH DIABETIC NEUROPATHY, WITHOUT LONG-TERM CURRENT USE OF INSULIN (HCC): ICD-10-CM

## 2018-03-12 DIAGNOSIS — Z00.00 MEDICARE ANNUAL WELLNESS VISIT, SUBSEQUENT: ICD-10-CM

## 2018-03-12 PROBLEM — B37.31 YEAST VAGINITIS: Status: RESOLVED | Noted: 2017-10-19 | Resolved: 2018-03-12

## 2018-03-12 PROBLEM — B37.3 YEAST VAGINITIS: Status: RESOLVED | Noted: 2017-10-19 | Resolved: 2018-03-12

## 2018-03-12 PROBLEM — J45.901 ASTHMATIC BRONCHITIS WITH ACUTE EXACERBATION: Status: RESOLVED | Noted: 2018-02-15 | Resolved: 2018-03-12

## 2018-03-12 PROCEDURE — 99214 OFFICE O/P EST MOD 30 MIN: CPT | Performed by: INTERNAL MEDICINE

## 2018-03-12 PROCEDURE — G0439 PPPS, SUBSEQ VISIT: HCPCS | Performed by: INTERNAL MEDICINE

## 2018-03-12 RX ORDER — AMLODIPINE BESYLATE 5 MG/1
5 TABLET ORAL DAILY
Qty: 90 TABLET | Refills: 3 | Status: SHIPPED | OUTPATIENT
Start: 2018-03-12 | End: 2019-02-13 | Stop reason: SDUPTHER

## 2018-03-12 RX ORDER — LIRAGLUTIDE 6 MG/ML
1.2 INJECTION SUBCUTANEOUS DAILY
Qty: 6 PEN | Refills: 3 | Status: SHIPPED | OUTPATIENT
Start: 2018-03-12 | End: 2019-01-25 | Stop reason: SDUPTHER

## 2018-03-12 RX ORDER — FAMOTIDINE 20 MG/1
20 TABLET, FILM COATED ORAL 2 TIMES DAILY
Qty: 180 TABLET | Refills: 3 | Status: SHIPPED | OUTPATIENT
Start: 2018-03-12 | End: 2019-02-13 | Stop reason: SDUPTHER

## 2018-03-12 RX ORDER — HYDROCHLOROTHIAZIDE 25 MG/1
25 TABLET ORAL DAILY
Qty: 90 TABLET | Refills: 3 | Status: SHIPPED | OUTPATIENT
Start: 2018-03-12 | End: 2019-02-13 | Stop reason: SDUPTHER

## 2018-03-12 RX ORDER — METOPROLOL SUCCINATE 50 MG/1
50 TABLET, EXTENDED RELEASE ORAL DAILY
Qty: 90 TABLET | Refills: 3 | Status: SHIPPED | OUTPATIENT
Start: 2018-03-12 | End: 2019-05-06 | Stop reason: SDUPTHER

## 2018-03-12 RX ORDER — ATORVASTATIN CALCIUM 10 MG/1
10 TABLET, FILM COATED ORAL DAILY
Qty: 90 TABLET | Refills: 3 | Status: SHIPPED | OUTPATIENT
Start: 2018-03-12 | End: 2019-02-13 | Stop reason: SDUPTHER

## 2018-03-12 NOTE — ASSESSMENT & PLAN NOTE
Patient's weight is stable but she is hoping to get outside and exercise more with the dogs with improvement in whether

## 2018-03-12 NOTE — PROGRESS NOTES
Assessment/Plan:    Controlled diabetes mellitus ([facility] )  Patient continues with good control of diabetes on metformin and Victoza    Diabetes mellitus type 2 with neurological manifestations [facility])  Patient has minimal neuropathy of both lower extremities but with out ulceration    Hypertension  Blood pressure is well controlled with hydrochlorothiazide beta-blocker and amlodipine    Obesity  Patient's weight is stable but she is hoping to get outside and exercise more with the dogs with improvement in whether       Diagnoses and all orders for this visit:    Diabetes mellitus type 2 with neurological manifestations ([place] [address] )  -     metFORMIN (GLUCOPHAGE) 1000 MG tablet; Take 1 tablet (1,000 mg total) by mouth 2 (two) times a day with meals    Controlled type 2 diabetes mellitus with diabetic neuropathy, without long-term current use of insulin ([facility])  -     Insulin Pen Needle (B-D UF III MINI PEN NEEDLES) 31G X 5 MM MISC; Inject under the skin daily in the early morning Use as directed    Diabetes 1 5, managed as type 2 ([facility])  -     ONE TOUCH ULTRA TEST test strip; 1 each by Other route 2 (two) times a day Test  -     VICTOZA 18 MG/3ML SOPN; Inject 0 2 mL under the skin daily  -     Lancets (ACCU-CHEK SOFT TOUCH) lancets; by Other route 2 (two) times a day    Essential hypertension  -     amLODIPine (NORVASC) 5 mg tablet; Take 1 tablet (5 mg total) by mouth daily  -     hydrochlorothiazide (HYDRODIURIL) 25 mg tablet; Take 1 tablet (25 mg total) by mouth daily  -     metoprolol succinate (TOPROL-XL) 50 mg 24 hr tablet; Take 1 tablet (50 mg total) by mouth daily    Hyperlipidemia, unspecified hyperlipidemia type  -     atorvastatin (LIPITOR) 10 mg tablet; Take 1 tablet (10 mg total) by mouth daily    Dyspepsia  -     famotidine (PEPCID) 20 mg tablet;  Take 1 tablet (20 mg total) by mouth 2 (two) times a day    Medicare annual wellness visit, subsequent    Screening for colon cancer  -     Ambulatory referral to Gastroenterology; Future  -     Hepatitis C antibody    Class 1 obesity due to excess calories with body mass index (BMI) of 30 0 to 30 9 in adult, unspecified whether serious comorbidity present          Subjective:      Patient ID: Sudarshan Watson is a 70 y o  female  Patient's respiratory symptoms have totally resolved and she is here for regular checkup  Her weight remained stable and she does take walks with the daughter state when the weather improves she will try to be more aggressive in that matter to lose more week it control sugar      Diabetes   She presents for her follow-up diabetic visit  She has type 2 diabetes mellitus  Her disease course has been stable  There are no hypoglycemic associated symptoms  Pertinent negatives for hypoglycemia include no confusion, dizziness or headaches  Associated symptoms include foot paresthesias  Pertinent negatives for diabetes include no chest pain, no fatigue, no polydipsia, no polyphagia, no polyuria and no weakness  There are no hypoglycemic complications  Symptoms are stable  Diabetic complications include peripheral neuropathy  Pertinent negatives for diabetic complications include no CVA or retinopathy  Risk factors for coronary artery disease include diabetes mellitus, dyslipidemia, hypertension, obesity and post-menopausal  Current diabetic treatment includes diet and oral agent (monotherapy) (Victoza)  She is compliant with treatment all of the time  Her weight is stable  She is following a diabetic and low salt diet  Meal planning includes avoidance of concentrated sweets  She has had a previous visit with a dietitian  She participates in exercise daily  Her home blood glucose trend is decreasing steadily  An ACE inhibitor/angiotensin II receptor blocker is contraindicated  Eye exam is current  Hypertension   This is a chronic problem  The current episode started more than 1 year ago  The problem is controlled   Pertinent negatives include no chest pain, headaches, neck pain, palpitations, peripheral edema or shortness of breath  Past treatments include beta blockers, calcium channel blockers and diuretics  The current treatment provides significant improvement  There are no compliance problems  There is no history of kidney disease, CVA or retinopathy  Review of Systems   Constitutional: Negative for chills, fatigue, fever and unexpected weight change  HENT: Negative for congestion, ear pain, hearing loss, postnasal drip, sinus pressure, sore throat, trouble swallowing and voice change  Eyes: Negative for visual disturbance  Respiratory: Negative for cough, chest tightness, shortness of breath and wheezing  Cardiovascular: Negative for chest pain, palpitations and leg swelling  Gastrointestinal: Negative for abdominal distention, abdominal pain, anal bleeding, blood in stool, constipation, diarrhea and nausea  Endocrine: Negative for cold intolerance, polydipsia, polyphagia and polyuria  Genitourinary: Negative for dysuria, flank pain, frequency, hematuria and urgency  Musculoskeletal: Negative for arthralgias, back pain, gait problem, joint swelling, myalgias and neck pain  Skin: Negative for rash  Allergic/Immunologic: Negative for immunocompromised state  Neurological: Negative for dizziness, syncope, facial asymmetry, weakness, light-headedness, numbness and headaches  Hematological: Negative for adenopathy  Psychiatric/Behavioral: Negative for confusion, sleep disturbance and suicidal ideas  Objective:      /64 (BP Location: Left arm, Patient Position: Sitting, Cuff Size: Standard)   Pulse 76   Temp 98 °F (36 7 °C) (Tympanic)   Ht 5' 6" (1 676 m)   Wt 86 6 kg (191 lb)   SpO2 95%   BMI 30 83 kg/m²          Physical Exam   Constitutional: She is oriented to person, place, and time  She appears well-developed and well-nourished  No distress     Overweight   HENT:   Right Ear: External ear normal  Left Ear: External ear normal    Nose: Nose normal    Mouth/Throat: Oropharynx is clear and moist  No oropharyngeal exudate  Eyes: EOM are normal  Pupils are equal, round, and reactive to light  Neck: Normal range of motion  Neck supple  No JVD present  No thyromegaly present  Cardiovascular: Normal rate, regular rhythm, normal heart sounds and intact distal pulses  Exam reveals no gallop  No murmur heard  Pulmonary/Chest: Effort normal and breath sounds normal  No respiratory distress  She has no wheezes  She has no rales  Abdominal: Soft  Bowel sounds are normal  She exhibits no distension and no mass  There is no tenderness  Musculoskeletal: Normal range of motion  She exhibits no tenderness  Lymphadenopathy:     She has no cervical adenopathy  Neurological: She is alert and oriented to person, place, and time  No cranial nerve deficit  Coordination normal    Skin: No rash noted  Psychiatric: She has a normal mood and affect   Her behavior is normal  Judgment and thought content normal

## 2018-03-12 NOTE — PROGRESS NOTES
HPI:  Maritza Dahl is a 70 y o  female here for her Subsequent Wellness Visit  Patient Active Problem List   Diagnosis    Age related osteoporosis    Yeast vaginitis    Controlled diabetes mellitus ([facility] [address] )    Diabetes mellitus type 2 with neurological manifestations ([facility] [address] )    Hyperlipidemia    Hypertension    Obesity    Asthmatic bronchitis with acute exacerbation     Past Medical History:   Diagnosis Date    Carpal tunnel syndrome     Unspecified laterality   Resolved: 12/21/2016    Complex endometrial hyperplasia with atypia     Resolved: 11/03/17    Diabetes mellitus ([facility] [address] )     of other type with circulatory complication, without long-term current use of insulin    Normal delivery     1971 and 1973 sons    Post-menopausal bleeding     Resolved: 2011     Past Surgical History:   Procedure Laterality Date    CARPAL TUNNEL RELEASE      CARPAL TUNNEL RELEASE Left     CHOLECYSTECTOMY      COLONOSCOPY      Complete    DILATION AND CURETTAGE OF UTERUS      twice    ENDOMETRIAL BIOPSY      without cervical dilation    HYSTEROSCOPY      TUBAL LIGATION  1974     Family History   Problem Relation Age of Onset    No Known Problems Child     Arthritis Family     Cancer Family      bladder    Other Family      Cardiac disorder    Diabetes Family     Hypertension Family     Valvular heart disease Family      History   Smoking Status    Never Smoker   Smokeless Tobacco    Never Used     History   Alcohol Use    Yes     Comment: Social drinker      History   Drug Use No     /64 (BP Location: Left arm, Patient Position: Sitting, Cuff Size: Standard)   Pulse 76   Temp 98 °F (36 7 °C) (Tympanic)   Ht 5' 6" (1 676 m)   Wt 86 6 kg (191 lb)   SpO2 95%   BMI 30 83 kg/m²       Current Outpatient Prescriptions   Medication Sig Dispense Refill    amLODIPine (NORVASC) 5 mg tablet Take 1 tablet (5 mg total) by mouth daily 90 tablet 3    aspirin 81 MG tablet Take by mouth      atorvastatin (LIPITOR) 10 mg tablet Take 1 tablet (10 mg total) by mouth daily 90 tablet 3    Cranberry 600 MG TABS Take by mouth      famotidine (PEPCID) 20 mg tablet Take 1 tablet (20 mg total) by mouth 2 (two) times a day 180 tablet 3    fexofenadine (ALLEGRA) 180 MG tablet Take by mouth      hydrochlorothiazide (HYDRODIURIL) 25 mg tablet Take 1 tablet (25 mg total) by mouth daily 90 tablet 3    Insulin Pen Needle (B-D UF III MINI PEN NEEDLES) 31G X 5 MM MISC Inject under the skin daily in the early morning Use as directed 100 each 0    Lancets (ACCU-CHEK SOFT TOUCH) lancets by Other route 2 (two) times a day 100 each 3    metFORMIN (GLUCOPHAGE) 1000 MG tablet Take 1 tablet (1,000 mg total) by mouth 2 (two) times a day with meals 180 tablet 0    metoprolol succinate (TOPROL-XL) 50 mg 24 hr tablet Take 1 tablet (50 mg total) by mouth daily 90 tablet 3    ONE TOUCH ULTRA TEST test strip 1 each by Other route 2 (two) times a day Test 100 each 3    VICTOZA 18 MG/3ML SOPN Inject 0 2 mL under the skin daily 6 pen 3     No current facility-administered medications for this visit  Allergies   Allergen Reactions    Ace Inhibitors Shortness Of Breath     Category: Allergy;     Codeine Vomiting     Reaction Date: [id number];      Immunization History   Administered Date(s) Administered    Influenza 11/01/2011, 10/20/2015, 10/26/2016, 11/03/2017    Influenza Split High Dose Preservative Free IM 10/01/2016, 11/03/2017    Pneumococcal Conjugate 13-Valent 02/15/2017    Pneumococcal Polysaccharide PPV23 01/01/2013    Zoster 01/02/2012       Patient Care Team:  Massiel Lopez MD as PCP - General (Internal Medicine)      Medicare Screening Tests and Risk Assessments:  AWV Clinical     ISAR:   Previous hospitalizations?:  Yes       Once in a Lifetime Medicare Screening:       Medicare Screening Tests and Risk Assessment:   AAA Risk Assessment    Age over 72 (males only):  No    Osteoporosis Risk Assessment     Female:   Yes :  Yes :  No   Age over 48:  Yes Low body weight (<127lbs):  No   Tobacco use:  No Alcohol use:  No   Low calcium diet:  No PMHX of fractures:  No   FHX of fractures:  No    HIV Risk Assessment    None indicated:  Yes        Drug and Alcohol Use:   Tobacco use    Cigarettes:  never smoker    Tobacco use duration    Tobacco Cessation Readiness    Alcohol use    Alcohol use:  rare use    Alcohol Treatment Readiness   Illicit Drug Use    Drug use:  never        Diet & Exercise:   Diet   What is your diet?:  Diabetic, No Added Salt   How many servings a day of the following:   Exercise    Do you currently exercise?:  yes    Frequency:  daily    Type of exercise:  walking       Cognitive Impairment Screening:   Depression screening preformed:  No    Cognitive Impairment Screening    Do you have difficulty learning or retaining new information?:  No    Do you have difficulty with reasoning?:  No    Do you have difficulty with language?:  No        Functional Ability/Level of Safety:   Hearing    Hearing difficulties:  No    Hearing Impairment Assessment    Current Activities    Status:  unlimited ADL's, unlimited driving, limited social activities, no IADL's   Help needed with the folllowing:    Using the phone:  No Transportation:  No   Shopping:  No Preparing Meals:  No   Doing Housework:  No Doing Laundry:  No   Managing Medications:  No Managing Money:  No   ADL    Fall Risk   Have you fallen in the last 12 months?:  No    Injury History       Home Safety:   Home Safety Risk Factors       Advanced Directives:   Advanced Directives    Living Will:  No Durable POA for healthcare:  No   Patient's End of Life Decisions     Provider agrees with end of life decisions:  No       Urinary Incontinence:   Do you have urinary incontinence?:  No        Glaucoma:            Provider Screening     Preventative Screening/Counseling:   Cardiovascular Screening/Counseling:   (Labs Q5 years, EKG optional one-time) Diabetes Screening/Counseling:   (2 tests/year if Pre-Diabetes or 1 test/year if no Diabetes)         Colorectal Cancer Screening/Counseling:   (FOBT Q1 yr; Flex Sig Q4 yrs or Q10 yrs after Screening Colonoscopy; Screening Colonoscpy Q2 yrs High Risk or Q10 yrs Low Risk; Barium Enema Q2 yrs High Risk or Q4 yrs Low Risk)         Prostate Cancer Screening/Counseling:   (Annual)          Breast Cancer Screening/Counseling:   (Baseline Age 28 - 43; Annual Age 36+)         Cervical Cancer Screening/Counseling:   (Annual for High Risk or Childbearing Age with Abnormal Pap in Last 3 yrs; Every 2 all others)         Osteoporosis Screening/Counseling:   (Every 2 Yrs if at risk or more if medically necessary)         AAA Screening/Counseling:   (Once per Lifetime with risk factors)     Age over 72 (males only):  No          Glaucoma Screening/Counseling:   (Annual)         HIV Screening/Counseling:   (Voluntary; Once annually for high risk OR 3 times for Pregnancy at diagnosis of IUP; 3rd trimester; and at Labor         Hepatitis C Screening:             Immunizations: Other Preventative Couseling (Non-Medicare Wellness Visit Required):       Referrals (Non-Medicare Wellness Visit Required):       Medical Equipment/Suppliers:           No exam data present  Reviewed Updated [facility] Prior Wellness Visits:   Last Medicare wellness visit information was reviewed, patient interviewed , no change since last AWVyes  Last Medicare wellness visit information was reviewed, patient interviewed and updates made to the record today yes    Assessment and Plan:  1  Essential hypertension  amLODIPine (NORVASC) 5 mg tablet    hydrochlorothiazide (HYDRODIURIL) 25 mg tablet    metoprolol succinate (TOPROL-XL) 50 mg 24 hr tablet   2  Diabetes mellitus type 2 with neurological manifestations ([facility] [address] )  metFORMIN (GLUCOPHAGE) 1000 MG tablet   3   Controlled type 2 diabetes mellitus without complication, with long-term current use of insulin ([facility] [address] )  Insulin Pen Needle (B-D UF III MINI PEN NEEDLES) 31G X 5 MM MISC   4  Diabetes 1 5, managed as type 2 (HCC)  ONE TOUCH ULTRA TEST test strip    VICTOZA 18 MG/3ML SOPN    Lancets (ACCU-CHEK SOFT TOUCH) lancets   5  Hyperlipidemia, unspecified hyperlipidemia type  atorvastatin (LIPITOR) 10 mg tablet   6  Dyspepsia  famotidine (PEPCID) 20 mg tablet   7  Medicare annual wellness visit, subsequent     8   Screening for diabetes mellitus (DM)     9  Screening for colon cancer  Ambulatory referral to Gastroenterology    Hepatitis C antibody       Health Maintenance Due   Topic Date Due    Hepatitis C Screening  1950    COLONOSCOPY  1950    Depression Screening PHQ-9  01/08/1961    DTaP,Tdap,and Td Vaccines (1 - Tdap) 01/08/1970    GLAUCOMA SCREENING 67+ YR  01/08/2016    OPHTHALMOLOGY EXAM  09/09/2017    Diabetic Foot Exam  02/15/2018

## 2018-04-02 ENCOUNTER — HOSPITAL ENCOUNTER (OUTPATIENT)
Dept: MAMMOGRAPHY | Facility: CLINIC | Age: 68
Discharge: HOME | End: 2018-04-02
Payer: MEDICARE

## 2018-04-02 DIAGNOSIS — Z12.31 ENCOUNTER FOR SCREENING MAMMOGRAM FOR MALIGNANT NEOPLASM OF BREAST: ICD-10-CM

## 2018-04-02 PROCEDURE — 77067 SCR MAMMO BI INCL CAD: CPT

## 2018-05-07 DIAGNOSIS — E11.9 CONTROLLED TYPE 2 DIABETES MELLITUS WITHOUT COMPLICATION, WITH LONG-TERM CURRENT USE OF INSULIN (HCC): ICD-10-CM

## 2018-05-07 DIAGNOSIS — Z79.4 CONTROLLED TYPE 2 DIABETES MELLITUS WITHOUT COMPLICATION, WITH LONG-TERM CURRENT USE OF INSULIN (HCC): ICD-10-CM

## 2018-05-07 RX ORDER — PEN NEEDLE, DIABETIC 31 GX5/16"
NEEDLE, DISPOSABLE MISCELLANEOUS
Qty: 100 EACH | Refills: 0 | Status: SHIPPED | OUTPATIENT
Start: 2018-05-07 | End: 2018-06-05 | Stop reason: SDUPTHER

## 2018-06-05 DIAGNOSIS — E11.9 CONTROLLED TYPE 2 DIABETES MELLITUS WITHOUT COMPLICATION, WITH LONG-TERM CURRENT USE OF INSULIN (HCC): ICD-10-CM

## 2018-06-05 DIAGNOSIS — Z79.4 CONTROLLED TYPE 2 DIABETES MELLITUS WITHOUT COMPLICATION, WITH LONG-TERM CURRENT USE OF INSULIN (HCC): ICD-10-CM

## 2018-06-05 RX ORDER — PEN NEEDLE, DIABETIC 31 GX5/16"
NEEDLE, DISPOSABLE MISCELLANEOUS
Qty: 100 EACH | Refills: 0 | Status: SHIPPED | OUTPATIENT
Start: 2018-06-05 | End: 2018-07-03 | Stop reason: SDUPTHER

## 2018-07-03 DIAGNOSIS — E11.9 CONTROLLED TYPE 2 DIABETES MELLITUS WITHOUT COMPLICATION, WITH LONG-TERM CURRENT USE OF INSULIN (HCC): ICD-10-CM

## 2018-07-03 DIAGNOSIS — Z79.4 CONTROLLED TYPE 2 DIABETES MELLITUS WITHOUT COMPLICATION, WITH LONG-TERM CURRENT USE OF INSULIN (HCC): ICD-10-CM

## 2018-07-03 RX ORDER — PEN NEEDLE, DIABETIC 31 GX5/16"
NEEDLE, DISPOSABLE MISCELLANEOUS
Qty: 100 EACH | Refills: 0 | Status: SHIPPED | OUTPATIENT
Start: 2018-07-03 | End: 2018-07-31 | Stop reason: SDUPTHER

## 2018-07-16 ENCOUNTER — OFFICE VISIT (OUTPATIENT)
Dept: INTERNAL MEDICINE CLINIC | Age: 68
End: 2018-07-16
Payer: MEDICARE

## 2018-07-16 VITALS
HEIGHT: 66 IN | WEIGHT: 196.8 LBS | HEART RATE: 75 BPM | BODY MASS INDEX: 31.63 KG/M2 | OXYGEN SATURATION: 95 % | TEMPERATURE: 97.6 F | SYSTOLIC BLOOD PRESSURE: 120 MMHG | DIASTOLIC BLOOD PRESSURE: 70 MMHG

## 2018-07-16 DIAGNOSIS — E11.40 CONTROLLED TYPE 2 DIABETES MELLITUS WITH DIABETIC NEUROPATHY, WITHOUT LONG-TERM CURRENT USE OF INSULIN (HCC): Primary | ICD-10-CM

## 2018-07-16 DIAGNOSIS — I10 ESSENTIAL HYPERTENSION: ICD-10-CM

## 2018-07-16 DIAGNOSIS — M54.32 SCIATIC NERVE PAIN, LEFT: ICD-10-CM

## 2018-07-16 DIAGNOSIS — E66.09 CLASS 1 OBESITY DUE TO EXCESS CALORIES WITH BODY MASS INDEX (BMI) OF 31.0 TO 31.9 IN ADULT, UNSPECIFIED WHETHER SERIOUS COMORBIDITY PRESENT: ICD-10-CM

## 2018-07-16 DIAGNOSIS — E13.9 DIABETES 1.5, MANAGED AS TYPE 2 (HCC): ICD-10-CM

## 2018-07-16 PROCEDURE — 99214 OFFICE O/P EST MOD 30 MIN: CPT | Performed by: INTERNAL MEDICINE

## 2018-07-16 RX ORDER — NYSTATIN AND TRIAMCINOLONE ACETONIDE 100000; 1 [USP'U]/G; MG/G
OINTMENT TOPICAL
Refills: 3 | COMMUNITY
Start: 2018-07-05 | End: 2018-11-12 | Stop reason: SDUPTHER

## 2018-07-16 RX ORDER — BACLOFEN 10 MG/1
TABLET ORAL
Qty: 270 TABLET | Refills: 0 | Status: SHIPPED | OUTPATIENT
Start: 2018-07-16 | End: 2018-10-22

## 2018-07-16 RX ORDER — BACLOFEN 10 MG/1
10 TABLET ORAL 3 TIMES DAILY PRN
Qty: 90 TABLET | Refills: 0 | Status: SHIPPED | OUTPATIENT
Start: 2018-07-16 | End: 2018-07-16 | Stop reason: SDUPTHER

## 2018-07-16 RX ORDER — NAPROXEN 500 MG/1
500 TABLET ORAL 2 TIMES DAILY WITH MEALS
Qty: 69 TABLET | Refills: 0 | Status: SHIPPED | OUTPATIENT
Start: 2018-07-16 | End: 2018-07-16 | Stop reason: SDUPTHER

## 2018-07-16 RX ORDER — NAPROXEN 500 MG/1
TABLET ORAL
Qty: 182 TABLET | Refills: 0 | Status: SHIPPED | OUTPATIENT
Start: 2018-07-16 | End: 2018-10-22

## 2018-07-16 NOTE — ASSESSMENT & PLAN NOTE
She does complain of left-sided sciatic pain over the last several weeks  She has had this in the past and was seen by physical therapy with significant relief    Will reorder and gave prescription for an anti-inflammatory and muscle relaxer

## 2018-07-16 NOTE — ASSESSMENT & PLAN NOTE
Lab Results   Component Value Date    HGBA1C 6 0 02/19/2018       No results for input(s): POCGLU in the last 72 hours      Blood Sugar Average: Last 72 hrs:   patient has not seen a podiatrist recently but has had no ulcerations or significant numbness

## 2018-07-16 NOTE — PATIENT INSTRUCTIONS
Lower Back Exercises   AMBULATORY CARE:   Lower back exercises  help heal and strengthen your back muscles to prevent another injury  Ask your healthcare provider if you need to see a physical therapist for more advanced exercises  Seek care immediately if:   · You have severe pain that prevents you from moving  Contact your healthcare provider if:   · Your pain becomes worse  · You have new pain  · You have questions or concerns about your condition or care  Do lower back exercises safely:   · Do the exercises on a mat or firm surface  (not on a bed) to support your spine and prevent low back pain  · Move slowly and smoothly  Avoid fast or jerky motions  · Breathe normally  Do not hold your breath  · Stop if you feel pain  It is normal to feel some discomfort at first  Regular exercise will help decrease your discomfort over time  Lower back exercises: Your healthcare provider may recommend that you do back exercises 10 to 30 minutes each day  He may also recommend that you do exercises 1 to 3 times each day  Ask your healthcare provider which exercises are best for you and how often to do them  · Ankle pumps:  Lie on your back  Move your foot up (with your toes pointing toward your head)  Then, move your foot down (with your toes pointing away from you)  Repeat this exercise 10 times on each side  · Heel slides:  Lie on your back  Slowly bend one leg and then straighten it  Next, bend the other leg and then straighten it  Repeat 10 times on each side  · Pelvic tilt:  Lie on your back with your knees bent and feet flat on the floor  Place your arms in a relaxed position beside your body  Tighten the muscles of your abdomen and flatten your back against the floor  Hold for 5 seconds  Repeat 5 times  · Back stretch:  Lie on your back with your hands behind your head  Bend your knees and turn the lower half of your body to one side   Hold this position for 10 seconds  Repeat 3 times on each side  · Straight leg raises:  Lie on your back with one leg straight  Bend the other knee  Tighten your abdomen and then slowly lift the straight leg up about 6 to 12 inches off the floor  Hold for 1 to 5 seconds  Lower your leg slowly  Repeat 10 times on each leg  · Knee-to-chest:  Lie on your back with your knees bent and feet flat on the floor  Pull one of your knees toward your chest and hold it there for 5 seconds  Return your leg to the starting position  Lift the other knee toward your chest and hold for 5 seconds  Do this 5 times on each side  · Cat and camel:  Place your hands and knees on the floor  Arch your back upward toward the ceiling and lower your head  Round out your spine as much as you can  Hold for 5 seconds  Lift your head upward and push your chest downward toward the floor  Hold for 5 seconds  Do 3 sets or as directed  · Wall squats:  Stand with your back against a wall  Tighten the muscles of your abdomen  Slowly lower your body until your knees are bent at a 45 degree angle  Hold this position for 5 seconds  Slowly move back up to a standing position  Repeat 10 times  · Curl up:  Lie on your back with your knees bent and feet flat on the floor  Place your hands, palms down, underneath the curve in your lower back  Next, with your elbows on the floor, lift your shoulders and chest 2 to 3 inches  Keep your head in line with your shoulders  Hold this position for 5 seconds  When you can do this exercise without pain for 10 to 15 seconds, you may add a rotation  While your shoulders and chest are lifted off the ground, turn slightly to the left and hold  Repeat on the other side  · Bird dog:  Place your hands and knees on the floor  Keep your wrists directly below your shoulders and your knees directly below your hips  Pull your belly button in toward your spine  Do not flatten or arch your back   Tighten your abdominal muscles  Raise one arm straight out so that it is aligned with your head  Next, raise the leg opposite your arm  Hold this position for 15 seconds  Lower your arm and leg slowly and change sides  Do 5 sets  © 2017 [employer] Information is for End User's use only and may not be sold, redistributed or otherwise used for commercial purposes  All illustrations and images included in CareNotes® are the copyrighted property of [employer]  or [employer] Chopra  The above information is an  only  It is not intended as medical advice for individual conditions or treatments  Talk to your doctor, nurse or pharmacist before following any medical regimen to see if it is safe and effective for you

## 2018-07-16 NOTE — ASSESSMENT & PLAN NOTE
Lab Results   Component Value Date    HGBA1C 6 0 02/19/2018       No results for input(s): POCGLU in the last 72 hours      Blood Sugar Average: Last 72 hrs:   patient brought her blood sugar log in appears to be extremely well controlled she will go for an A1c prior to her next visit

## 2018-07-19 ENCOUNTER — EVALUATION (OUTPATIENT)
Dept: PHYSICAL THERAPY | Facility: CLINIC | Age: 68
End: 2018-07-19
Payer: MEDICARE

## 2018-07-19 DIAGNOSIS — M54.32 SCIATIC NERVE PAIN, LEFT: Primary | ICD-10-CM

## 2018-07-19 PROCEDURE — G8990 OTHER PT/OT CURRENT STATUS: HCPCS | Performed by: PHYSICAL THERAPIST

## 2018-07-19 PROCEDURE — 97162 PT EVAL MOD COMPLEX 30 MIN: CPT | Performed by: PHYSICAL THERAPIST

## 2018-07-19 PROCEDURE — 97112 NEUROMUSCULAR REEDUCATION: CPT | Performed by: PHYSICAL THERAPIST

## 2018-07-19 PROCEDURE — G8991 OTHER PT/OT GOAL STATUS: HCPCS | Performed by: PHYSICAL THERAPIST

## 2018-07-19 NOTE — PROGRESS NOTES
PT Evaluation     Today's date: 2018  Patient name: Cathi Rice  : 1950  MRN: [id number]  Referring provider: Yuniel Fiore MD  Dx:   Encounter Diagnosis     ICD-10-CM    1  Sciatic nerve pain, left M54 32 Ambulatory referral to Physical Therapy                  Assessment  Impairments: abnormal or restricted ROM, activity intolerance, impaired physical strength, lacks appropriate home exercise program and pain with function    Assessment details: Patient was provided a home exercise program and demonstrated an understanding of exercises  Patient was advised to stop performing home exercise program if symptoms increase or new complaints developed  Verbal understanding demonstrated regarding home exercise program instructions  Patient would benefit from skilled physical therapy services for prescribed exercises, manual interventions, neuromuscular re-education, education, and modalities as deemed appropriate to assist patient in achieving their maximum level of function  Understanding of Dx/Px/POC: good   Prognosis: good    Goals  STG  1  Decrease pain by at least two subjective ratings in 4 weeks  2  Increase ROM trunk to full and painfree 4 weeks  3  Improve hip AROM left to full/ painfree 4 weeks  LTG  1  Independent with HEP   2  Return to prior functional and recreational abilities  6 weeks  3  Improve motor function left hip  6 weeks           Plan  Patient would benefit from: skilled PT  Planned modality interventions: thermotherapy: hydrocollator packs  Planned therapy interventions: flexibility, graded exercise, home exercise program, therapeutic exercise, strengthening, stretching, patient education, neuromuscular re-education, manual therapy, joint mobilization, IADL retraining and postural training  Frequency: 2x week  Duration in weeks: 4  Treatment plan discussed with: patient  Plan details: Patient program will be monitored each session and progressed accordingly    Thank you for this referral          Subjective Evaluation    History of Present Illness  Mechanism of injury: Patient reports chronic history of left LS / LE pain - sciatic type pain  Her last episode was 10 years ago and started after 8 hours of sitting  At that time, she went to [place] [address] and had 2 sessions of PT which resolved her symptoms  Now, She notes that she has had left LS/ hip pain x several months  For the past 2 weeks or so, her left LS sciatic type pain returned  It is located left LS / posterior leg - occasionally to left foot, with (+) electrical type shocks  The old exercises did not resolve her symptoms and she went to her primary MD -   Medication issued have helped somewhat but her pain continues  Now, her pain is mild in nature, walking is her worst activity  She notes that her left LE feels weak, especially with ascending steps  She feels that her leg would buckle and she may fall  Pain  Current pain ratin  At best pain ratin  At worst pain ratin  Location: Left LS - le - foot  Quality: radiating, needle-like, dull ache and throbbing  Relieving factors: change in position  Aggravating factors: walking and stair climbing  Progression: improved (improved since last week, but still present)    Social Support  Lives in: multiple-level home (lives mainly on 1st floor)  Lives with: spouse    Hand dominance: right      Diagnostic Tests  No diagnostic tests performed  Treatments  Previous treatment: physical therapy and medication  Current treatment: medication  Patient Goals  Patient goals for therapy: decreased pain, independence with ADLs/IADLs and increased strength          Objective     Special Questions  Positive for disturbed sleep   Negative for bladder dysfunction, bowel dysfunction and saddle (S4) numbness    Additional Special Questions  She notes that she has an area of numbness left lateral thigh x 47 years after birth of her first and receiving an epidural      Postural Observations  Seated posture: fair  Standing posture: fair  Correction of posture: has no consistent effect    Additional Postural Observation Details  Shoulders rounded, flattened t-spine, flattened lordosis, feet in slight ER posturing  Palpation   Left   Tenderness of the erector spinae and lumbar paraspinals  Right Tenderness of the erector spinae and lumbar paraspinals  Tenderness     Lumbar Spine  No tenderness in the spinous process and facet joint  Left Hip   No tenderness in the PSIS  Right Hip   No tenderness in the PSIS  Neurological Testing     Sensation     Lumbar   Left   Intact: light touch    Right   Intact: light touch    Reflexes   Left   Patellar (L4): normal (2+)  Achilles (S1): normal (2+)    Right   Patellar (L4): normal (2+)  Achilles (S1): normal (2+)    Active Range of Motion     Additional Active Range of Motion Details  FF - Full, Painfree,  Mild discomfort produced with return to upright, NE  BB - P, NW  NENA  RLSGIS - P, W  RRSGIS - NE  RFIL - NE  REIL - Better     SLR = 90 degrees bilaterally   HS tightness only  Seated slump   R (-)  L - mild neural tension produced        Strength/Myotome Testing     Lumbar     Right   Normal strength    Left Hip   Planes of Motion   Flexion: 4  Extension: 4  Abduction: 4-  Adduction: 4+  External rotation: 4  Internal rotation: 4-    Left Knee   Flexion: 5  Extension: 5    Left Ankle/Foot   Dorsiflexion: 5    Tests     Lumbar     Left   Negative crossed SLR and passive SLR  Right   Negative crossed SLR and passive SLR       General Comments     Lumbar Comments  GAIT - slowed stephanie pre session - cautious with left le , slight decrease in stride length      Flowsheet Rows      Most Recent Value   PT/OT G-Codes   Current Score  36   Projected Score  58   FOTO information reviewed  Yes   Assessment Type  Evaluation   G code set  Other PT/OT Primary   Other PT Primary Current Status ()  CL   Other PT Primary Goal Status ()  CK          Precautions: Left sciatica     Daily Treatment Diary     Manual              Left hip AA IR             Left hip flexor/quad stretching                                           Exercise Diary  7/19            Slr, s/l abd 20x            hss 20s  x5            Glut sets                           Prone prop on elbow 3'            Prone press up 10x            Loc and sag 10x                                                                                                                                                                                         Modalities  7/19            mhp ls seated 8

## 2018-07-24 ENCOUNTER — OFFICE VISIT (OUTPATIENT)
Dept: PHYSICAL THERAPY | Facility: CLINIC | Age: 68
End: 2018-07-24
Payer: MEDICARE

## 2018-07-24 DIAGNOSIS — M54.32 SCIATIC NERVE PAIN, LEFT: Primary | ICD-10-CM

## 2018-07-24 PROCEDURE — 97140 MANUAL THERAPY 1/> REGIONS: CPT

## 2018-07-24 PROCEDURE — 97110 THERAPEUTIC EXERCISES: CPT

## 2018-07-24 PROCEDURE — 97112 NEUROMUSCULAR REEDUCATION: CPT

## 2018-07-24 NOTE — PROGRESS NOTES
Daily Note     Today's date: 2018  Patient name: Roman Johnson  : 1950  MRN: [id number]  Referring provider: Rosendo Lopez MD  Dx:   Encounter Diagnosis     ICD-10-CM    1  Sciatic nerve pain, left M54 32                   Subjective: Upon presentation SPR =4-5/10 with "pain, tingling and numbness    like an electrical shock" radiating from left lumbar/gluteal into lateral upper leg into left posterior calf  Objective: See treatment diary below    Manual                       Left hip AA IR   Manual stretch and AROM x20                   Left hip flexor/quad stretching   LA  PTA                                                                         Exercise Diary                     Slr, s/l abd 20x                     hss 20s  x5 :30  5x                   Glut sets    Prone  :05  20x                   Prone lying   3'                   Prone prop on elbow 3' 3'                   Prone press up 10x 30x2                   Loc and sag 10x 10x                   Standing back extension   10x6                   Prone B hip extension   Alt 20x each                   Prone B knee flexion    10x2  each                   Standing B hip extension   10x2 each                                                                                                                                                                                                                                                 Modalities                       mhp ls seated 8                                                                             Assessment: Tolerated treatment with reports of decreased radicular and level of intensity of pain  Patient SPR decreased to 1-2/10 post intervention and patient denied any radicular symptoms  Pain was localized to left SI juncture  Plan: Continue per plan of care

## 2018-07-26 ENCOUNTER — OFFICE VISIT (OUTPATIENT)
Dept: PHYSICAL THERAPY | Facility: CLINIC | Age: 68
End: 2018-07-26
Payer: MEDICARE

## 2018-07-26 DIAGNOSIS — M54.32 SCIATIC NERVE PAIN, LEFT: Primary | ICD-10-CM

## 2018-07-26 PROCEDURE — 97112 NEUROMUSCULAR REEDUCATION: CPT | Performed by: PHYSICAL THERAPIST

## 2018-07-26 PROCEDURE — 97140 MANUAL THERAPY 1/> REGIONS: CPT | Performed by: PHYSICAL THERAPIST

## 2018-07-26 PROCEDURE — 97110 THERAPEUTIC EXERCISES: CPT | Performed by: PHYSICAL THERAPIST

## 2018-07-26 NOTE — PROGRESS NOTES
Daily Note     Today's date: 2018  Patient name: Giorgi Brady  : 1950  MRN: [id number]  Referring provider: Estela Pino MD  Dx:   Encounter Diagnosis     ICD-10-CM    1  Sciatic nerve pain, left M54 32                   Subjective: Patient reports that she overall presents better, but has some central LBP and mild left lateral calf pain - notes post session, little - no discomfort , pleased with benefit of exercises    Objective: See treatment diary below    Manual                     Left hip AA IR   Manual stretch and AROM x20  10'                 Left hip flexor/quad stretching   LA  PTA  db                                                                       Exercise Diary                   Slr, s/l abd 20x                     hss 20s  x5 :30  5x                   Glut sets    Prone  :05  20x  prone 5s x 20                 Prone lying   3'  3'                 Prone prop on elbow 3' 3'  3'                 Prone press up 10x 30x2  30 x 2                 Loc and sag 10x 10x  10x 2                 Standing back extension   10x6  10 x 4                 Prone B hip extension   Alt 20x each  alt x 20                 Prone B knee flexion    10x2  each  20 x                 Standing B hip extension   10x2 each  20 x                                           standing L SG     10x 2                                                                                                                                                                                               Modalities                       mhp ls seated 8                                                                             Assessment: nice response to manual work  - able to abolish symptoms in session   Plan: Continue per plan of care

## 2018-07-31 ENCOUNTER — OFFICE VISIT (OUTPATIENT)
Dept: PHYSICAL THERAPY | Facility: CLINIC | Age: 68
End: 2018-07-31
Payer: MEDICARE

## 2018-07-31 DIAGNOSIS — M54.32 SCIATIC NERVE PAIN, LEFT: Primary | ICD-10-CM

## 2018-07-31 DIAGNOSIS — Z79.4 CONTROLLED TYPE 2 DIABETES MELLITUS WITHOUT COMPLICATION, WITH LONG-TERM CURRENT USE OF INSULIN (HCC): ICD-10-CM

## 2018-07-31 DIAGNOSIS — E11.9 CONTROLLED TYPE 2 DIABETES MELLITUS WITHOUT COMPLICATION, WITH LONG-TERM CURRENT USE OF INSULIN (HCC): ICD-10-CM

## 2018-07-31 PROCEDURE — 97112 NEUROMUSCULAR REEDUCATION: CPT

## 2018-07-31 PROCEDURE — 97110 THERAPEUTIC EXERCISES: CPT

## 2018-07-31 PROCEDURE — 97140 MANUAL THERAPY 1/> REGIONS: CPT

## 2018-07-31 RX ORDER — PEN NEEDLE, DIABETIC 31 GX5/16"
NEEDLE, DISPOSABLE MISCELLANEOUS
Qty: 100 EACH | Refills: 0 | Status: SHIPPED | OUTPATIENT
Start: 2018-07-31 | End: 2019-02-13 | Stop reason: SDUPTHER

## 2018-07-31 NOTE — PROGRESS NOTES
Daily Note     Today's date: 2018  Patient name: Johana Ernst  : 1950  MRN: [id number]  Referring provider: Everette Severs, MD  Dx:   Encounter Diagnosis     ICD-10-CM    1  Sciatic nerve pain, left M54 32                   Subjective: Upon presentation SPR =5/10 of lumbar region with radicular "burning" posterior/lateral thigh into lateral calf  Patient reported discomfort and radicular symptoms when walking which dissipates after 30 minutes  Objective: See treatment diary below    Manual                   Left hip AA IR   Manual stretch and AROM x20  10' LA PTA               Left hip flexor/quad stretching   LA  PTA  db LA PTA                                                                     Exercise Diary                 Slr, s/l abd 20x                     hss 20s  x5 :30  5x                   Glut sets    Prone  :05  20x  prone 5s x 20 Prone :05  20x               Prone lying   3'  3'                Prone prop on elbow 3' 3'  3' 3'               Prone press up 10x 30x2  30 x 2 15x4               Loc and sag 10x 10x  10x 2 10x2               Standing back extension   10x6  10 x 4 30x               Prone B hip extension   Alt 20x each  alt x 20 Alt 20x               Prone B knee flexion    10x2  each  20 x 20x               Standing B hip extension   10x2 each  20 x  20x                                        standing L SG     10x 2 10x2               Treadmill       NV                                                                                                                                                                     Modalities                       mhp ls seated 8                                                                             Assessment: Tolerated treatment without limitations in exercise perofrmance and denied lumbar or radicular symptoms post intervention    Patient exhibited good technique with therapeutic exercises  Advised patient to perform extension biased activities prior to ambulation to decrease initial back pain/radicularsymptoms  Plan: Continue per plan of care

## 2018-08-02 ENCOUNTER — OFFICE VISIT (OUTPATIENT)
Dept: PHYSICAL THERAPY | Facility: CLINIC | Age: 68
End: 2018-08-02
Payer: MEDICARE

## 2018-08-02 DIAGNOSIS — M54.32 SCIATIC NERVE PAIN, LEFT: Primary | ICD-10-CM

## 2018-08-02 PROCEDURE — G8991 OTHER PT/OT GOAL STATUS: HCPCS

## 2018-08-02 PROCEDURE — 97110 THERAPEUTIC EXERCISES: CPT

## 2018-08-02 PROCEDURE — 97112 NEUROMUSCULAR REEDUCATION: CPT

## 2018-08-02 PROCEDURE — G8990 OTHER PT/OT CURRENT STATUS: HCPCS

## 2018-08-02 NOTE — PROGRESS NOTES
Daily Note     Today's date: 2018  Patient name: Cindy Tuttle  : 1950  MRN: [id number]  Referring provider: Damir Sorenson MD  Dx:   Encounter Diagnosis     ICD-10-CM    1  Sciatic nerve pain, left M54 32                   Subjective: Patient reported no pain upon presentation and denied any radicular symptoms  Patient stated after performing her HEP in the AM she was able to cook, clean, perform ADL's without pain or limitations  Objective: See treatment diary below    Manual     8/2             Left hip AA IR   Manual stretch and AROM x20  10' LA PTA               Left hip flexor/quad stretching   LA  PTA  db LA PTA                                                                     Exercise Diary   8/2             Slr, s/l abd 20x                     hss 20s  x5 :30  5x                   Glut sets    Prone  :05  20x  prone 5s x 20 Prone :05  20x prone  :05  20x             Prone lying   3'  3'                 Prone prop on elbow 3' 3'  3' 3' 3'             Prone press up 10x 30x2  30 x 2 15x4 30x2             Loc and sag 10x 10x  10x 2 10x2 10x2             Standing back extension   10x6  10 x 4 30x 30x             Prone B hip extension   Alt 20x each  alt x 20 Alt 20x Alt  20x              Prone B knee flexion    10x2  each  20 x 20x 20x each             Standing B hip extension   10x2 each  20 x  20x 20x each             Standing B hip abduction         20x each             Standing L SG     10x 2 10x2 NP             Treadmill       NV Self pace  10'             Standing knee flexion                      Mini squats                                                                                                                              Modalities                       mhp ls seated 8                                                                          Assessment: Tolerated treatment well   Patient exhibited good technique with therapeutic exercises Patient presents with good self management skills with HEP performance to avoid aggravation of symptoms/control pain and radicular symptoms  SPR post intervention=0/10 without radicular symptoms  Plan: Continue per plan of care

## 2018-08-07 ENCOUNTER — OFFICE VISIT (OUTPATIENT)
Dept: PHYSICAL THERAPY | Facility: CLINIC | Age: 68
End: 2018-08-07
Payer: MEDICARE

## 2018-08-07 DIAGNOSIS — M54.32 SCIATIC NERVE PAIN, LEFT: Primary | ICD-10-CM

## 2018-08-07 PROCEDURE — 97112 NEUROMUSCULAR REEDUCATION: CPT

## 2018-08-07 PROCEDURE — 97110 THERAPEUTIC EXERCISES: CPT

## 2018-08-07 NOTE — PROGRESS NOTES
Daily Note     Today's date: 2018  Patient name: Lam Wong  : 1950  MRN: [id number]  Referring provider: Claudia Evans MD  Dx:   Encounter Diagnosis     ICD-10-CM    1  Sciatic nerve pain, left M54 32                   Subjective: Upon presentation, patient noted minimal left SI juncture pain and left calf discomfort that dissipated immediately post extension exercise performance        Objective: See treatment diary below    Manual                 Left hip AA IR   Manual stretch and AROM x20  10' LA PTA               Left hip flexor/quad stretching   LA  PTA  db LA PTA                                                                     Exercise Diary             Slr, s/l abd 20x                    hss 20s  x5 :30  5x                   Glut sets    Prone  :05  20x  prone 5s x 20 Prone :05  20x prone  :05  20x D/C HEP           Prone lying   3'  3'                 Prone prop on elbow 3' 3'  3' 3' 3'             Prone press up 10x 30x2  30 x 2 15x4 30x2 30x          Loc and sag 10x 10x  10x 2 10x2 10x2 10x           Standing back extension   10x6  10 x 4 30x 30x 30x          Prone B hip extension   Alt 20x each  alt x 20 Alt 20x Alt  20x  1#  20x each D/C to HEP         Prone B knee flexion    10x2  each  20 x 20x 20x each 1#  20x each D/C to HEP         Standing B hip extension   10x2 each  20 x  20x 20x each 1#  20x each           Standing B hip abduction         20x each 1#  20x each           Standing L SG     10x 2 10x2 NP             Treadmill       NV Self pace  10' Self pace 10'           Standing knee flexion           1#  20x each           Mini squats            10x           TB latissimus pulls, scapular rows and multifidus press           RTB  20x each           TB sidestepping           RTB  3 laps           Abd bracing with bridge and  orange PB           :05  20x           Supine PB roll out w/ B LE           10x2                 Modalities 7/19                     [facility] seated 8                                                                           Assessment: Tolerated treatment well  Patient exhibited good technique with therapeutic exercises  Patient tolerated progression of program without exacerbation of symptoms or limitations  Plan: Continue per plan of care

## 2018-08-09 ENCOUNTER — OFFICE VISIT (OUTPATIENT)
Dept: PHYSICAL THERAPY | Facility: CLINIC | Age: 68
End: 2018-08-09
Payer: MEDICARE

## 2018-08-09 DIAGNOSIS — M54.32 SCIATIC NERVE PAIN, LEFT: Primary | ICD-10-CM

## 2018-08-09 PROCEDURE — 97112 NEUROMUSCULAR REEDUCATION: CPT

## 2018-08-09 PROCEDURE — 97110 THERAPEUTIC EXERCISES: CPT

## 2018-08-09 NOTE — PROGRESS NOTES
Daily Note     Today's date: 2018  Patient name: Eloisa Dominguez  : 1950  MRN: [id number]  Referring provider: Cristóbal Schofield MD  Dx:   Encounter Diagnosis     ICD-10-CM    1  Sciatic nerve pain, left M54 32        Start Time: 1300  Stop Time: 9047  Total time in clinic (min): 65 minutes    Subjective:  Reports 1/10 pain at this time  She reports that she wakes up with 8/10 pain then as she does her exercises and increases her mobility the pain decreases        Objective: See treatment diary below    Manual                 Left hip AA IR   Manual stretch and AROM x20  10' LA PTA               Left hip flexor/quad stretching   LA  PTA  db LA PTA                                                                     Exercise Diary           Slr, s/l abd 20x                    hss 20s  x5 :30  5x                   Glut sets    Prone  :05  20x  prone 5s x 20 Prone :05  20x prone  :05  20x D/C HEP           Prone lying   3'  3'                 Prone prop on elbow 3' 3'  3' 3' 3'             Prone press up 10x 30x2  30 x 2 15x4 30x2 30x D/C         Loc and sag Prone 10x 10x  10x 2 10x2 10x2 10x  D/C         Standing back extension   10x6  10 x 4 30x 30x 30x 30 x         Prone B hip extension   Alt 20x each  alt x 20 Alt 20x Alt  20x  1#  20x each D/C to HEP         Prone B knee flexion    10x2  each  20 x 20x 20x each 1#  20x each D/C to HEP         Standing B hip extension   10x2 each  20 x  20x 20x each 1#  20x each  1# 30 x ea         Standing B hip abduction         20x each 1#  20x each  1# 30x ea         Standing L SG     10x 2 10x2 NP    -         Treadmill       NV Self pace  10' Self pace 10'  Self pace 10'         Standing knee flexion           1#  20x each  1# 20x ea         Mini squats            10x  10 x         TB latissimus pulls, scapular rows and multifidus press           RTB  20x each  RTB  20 x ea         TB sidestepping           RTB  3 laps  RTB 2 laps 56' ea         Abd bracing with bridge and  orange PB           :05  20x  :05 20 x         Supine PB roll out w/ B LE           10x2 10 x 2               Modalities  7/19                     mhp ls seated 8                                                                       Patient education:  Discussed sleeping posture modification  1  Body pillow, pillow between legs or changing mattress      Assessment:  Continues with new progression of exercises  Tolerated treatment well  Patient exhibited good technique with therapeutic exercises  Plan: Continue per plan of care

## 2018-08-14 ENCOUNTER — OFFICE VISIT (OUTPATIENT)
Dept: PHYSICAL THERAPY | Facility: CLINIC | Age: 68
End: 2018-08-14
Payer: MEDICARE

## 2018-08-14 DIAGNOSIS — M54.32 SCIATIC NERVE PAIN, LEFT: Primary | ICD-10-CM

## 2018-08-14 PROCEDURE — G8990 OTHER PT/OT CURRENT STATUS: HCPCS | Performed by: PHYSICAL THERAPIST

## 2018-08-14 PROCEDURE — G8991 OTHER PT/OT GOAL STATUS: HCPCS | Performed by: PHYSICAL THERAPIST

## 2018-08-14 PROCEDURE — 97110 THERAPEUTIC EXERCISES: CPT | Performed by: PHYSICAL THERAPIST

## 2018-08-14 PROCEDURE — 97112 NEUROMUSCULAR REEDUCATION: CPT | Performed by: PHYSICAL THERAPIST

## 2018-08-14 NOTE — PROGRESS NOTES
Daily Note     Today's date: 2018  Patient name: Cathi Rice  : 1950  MRN: [id number]  Referring provider: Yuniel Fiore MD  Dx:   Encounter Diagnosis     ICD-10-CM    1  Sciatic nerve pain, left M54 32                   Subjective:  Patient pleased with her progress - notes that the regimen of press ups and back extension helps to manage and control her pain and even abolish it if she is flared  She does report that she can still get an intense jolt of discomfort now and again depending on activity         Objective: See treatment diary below    Manual                 Left hip AA IR   Manual stretch and AROM x20  10' LA PTA               Left hip flexor/quad stretching   LA  PTA  db LA PTA                                                                     Exercise Diary         Slr, s/l abd 20x                    hss 20s  x5 :30  5x                   Glut sets    Prone  :05  20x  prone 5s x 20 Prone :05  20x prone  :05  20x D/C HEP           Prone lying   3'  3'                 Prone prop on elbow 3' 3'  3' 3' 3'      3'       Prone press up 10x 30x2  30 x 2 15x4 30x2 30x   30x       Loc and sag Prone 10x 10x  10x 2 10x2 10x2 10x    15x       Standing back extension   10x6  10 x 4 30x 30x 30x 30 x  30x        Prone B hip extension   Alt 20x each  alt x 20 Alt 20x Alt  20x  1#  20x each   1 5 # x 30       Prone B knee flexion    10x2  each  20 x 20x 20x each 1#  20x each  1 5# x 30        Standing B hip extension   10x2 each  20 x  20x 20x each 1#  20x each  1# 30 x ea  1 5# x 30        Standing B hip abduction         20x each 1#  20x each  1# 30x ea  1 5# x 30       Standing L SG     10x 2 10x2 NP    -         Treadmill       NV Self pace  10' Self pace 10'  Self pace 10'  10'       Standing knee flexion           1#  20x each  1# 20x ea  1 5 # x 20       Mini squats            10x  10 x  20x       TB latissimus pulls, scapular rows and multifidus press           RTB  20x each  RTB  20 x ea  gtb x 20       TB sidestepping           RTB  3 laps  RTB 2 laps 56' ea  gtb x 5       Abd bracing with bridge and  orange PB           :05  20x  :05 20 x         Supine PB roll out w/ B LE           10x2 10 x 2               Modalities  7/19                     mhp ls seated 8                                                                           Assessment:  Increased weight tolerated well today  Patient demonstrating good knowledge of program to date - reassess next session  Plan: Continue per plan of care

## 2018-08-16 ENCOUNTER — OFFICE VISIT (OUTPATIENT)
Dept: PHYSICAL THERAPY | Facility: CLINIC | Age: 68
End: 2018-08-16
Payer: MEDICARE

## 2018-08-16 DIAGNOSIS — M54.32 SCIATIC NERVE PAIN, LEFT: Primary | ICD-10-CM

## 2018-08-16 PROCEDURE — 97112 NEUROMUSCULAR REEDUCATION: CPT

## 2018-08-16 PROCEDURE — 97110 THERAPEUTIC EXERCISES: CPT

## 2018-08-16 NOTE — PROGRESS NOTES
Daily Note     Today's date: 2018  Patient name: Suzette Valadez  : 1950  MRN: [id number]  Referring provider: Isma Cook MD  Dx:   Encounter Diagnosis     ICD-10-CM    1  Sciatic nerve pain, left M54 32                   Subjective: Pt states LBP and radiating sx down B LEs  were much worse this morning but she performs her HEP which allows her to loosen up and helps decrease her pain enough that she is able to move and function  Overall, she feels like her symptoms are not better but she is now able to manage her pain with HEP which she is happy about         Objective: See treatment diary below      Manual                 Left hip AA IR   Manual stretch and AROM x20  10' LA PTA               Left hip flexor/quad stretching   LA  PTA  db LA PTA                                                                     ExerciseDiary   8     Slr, s/l abd 20x                     hss 20s  x5 :30  5x                   Glut sets    Prone  :05  20x  prone 5s x 20 Prone :05  20x prone  :05  20x D/C HEP           Prone lying   3'  3'                 Prone prop on elbow 3' 3'  3' 3' 3'      3'  3'     Prone press up 10x 30x2  30 x 2 15x4 30x2 30x    30x  30x     Loc and sag Prone 10x 10x  10x 2 10x2 10x2 10x     15x  15x     Standing back extension   10x6  10 x 4 30x 30x 30x 30 x  30x   30x     Prone B hip extension   Alt 20x each  alt x 20 Alt 20x Alt  20x  1#  20x each    1 5 # x 30  1 5# x30     Prone B knee flexion    10x2  each  20 x 20x 20x each 1#  20x each   1 5# x 30   1 5# x 30     Standing B hip extension   10x2 each  20 x  20x 20x each 1#  20x each  1# 30 x ea  1 5# x 30   1 5# x 30 ea      Standing B hip abduction         20x each 1#  20x each  1# 30x ea  1 5# x 30  1x5# x 30 ea     Standing L SG     10x 2 10x2 NP    -         Treadmill       NV Self pace  10' Self pace 8'  Self pace 10'  10'  TM in use - resume nv     Standing knee flexion           1#  20x each  1# 20x ea  1 5 # x 20 1x5# x 20     Mini squats            10x  10 x  20x  20x     TB latissimus pulls, scapular rows and multifidus press           RTB  20x each  RTB  20 x ea  gtb x 20  GTB x 20     TB sidestepping           RTB  3 laps  RTB 2 laps 56' ea  gtb x 5  GTB x 5 laps     Abd bracing with bridge and  orange PB           :05  20x  :05 20 x         Supine PB roll out w/ B LE           10x2 10 x 2               Modalities  7/19                     mhp ls seated 8                                                                             Assessment: Tolerated treatment well  Pt declined TM later in session stating she already took her dog for a walk prior to PT  No increased LBP noted throughout session  Patient would benefit from continued PT For improved strength, flexibility and overall function  Plan: Continue per plan of care

## 2018-08-20 ENCOUNTER — OFFICE VISIT (OUTPATIENT)
Dept: PHYSICAL THERAPY | Facility: CLINIC | Age: 68
End: 2018-08-20
Payer: MEDICARE

## 2018-08-20 DIAGNOSIS — M54.32 SCIATIC NERVE PAIN, LEFT: Primary | ICD-10-CM

## 2018-08-20 PROCEDURE — 97110 THERAPEUTIC EXERCISES: CPT | Performed by: PHYSICAL THERAPIST

## 2018-08-20 PROCEDURE — 97112 NEUROMUSCULAR REEDUCATION: CPT | Performed by: PHYSICAL THERAPIST

## 2018-08-20 NOTE — PROGRESS NOTES
Daily Note     Today's date: 2018  Patient name: Marguerite Anand  : 1950  MRN: [id number]  Referring provider: Ciara Ag MD  Dx:   Encounter Diagnosis     ICD-10-CM    1  Sciatic nerve pain, left M54 32                   Subjective: patient reports that she is "really do good"   Notes that she is essentially painfree and very happy with her progress  Reports that she got a new extra firm mattress on Friday and has been sleeping very well - not waking with pain          Objective: See treatment diary below      Manual                 Left hip AA IR   Manual stretch and AROM x20  10' LA PTA               Left hip flexor/quad stretching   LA  PTA  db LA PTA                                                                     ExerciseDiary     Slr, s/l abd 20x                     hss 20s  x5 :30  5x                   Glut sets    Prone  :05  20x  prone 5s x 20 Prone :05  20x prone  :05  20x D/C HEP           Prone lying   3'  3'                 Prone prop on elbow 3' 3'  3' 3' 3'      3'  3'  3'   Prone press up 10x 30x2  30 x 2 15x4 30x2 30x    30x  30x  30x   Loc and sag Prone 10x 10x  10x 2 10x2 10x2 10x     15x  15x  20x   Standing back extension   10x6  10 x 4 30x 30x 30x 30 x  30x   30x  30x   Prone B hip extension   Alt 20x each  alt x 20 Alt 20x Alt  20x  1#  20x each    1 5 # x 30  1 5# x30  2# x 30    Prone B knee flexion    10x2  each  20 x 20x 20x each 1#  20x each   1 5# x 30   1 5# x 30  2# x 30   Standing B hip extension   10x2 each  20 x  20x 20x each 1#  20x each  1# 30 x ea  1 5# x 30   1 5# x 30 ea   2# x 30   Standing B hip abduction         20x each 1#  20x each  1# 30x ea  1 5# x 30  1x5# x 30 ea  2# x 30   Standing L SG     10x 2 10x2 NP    -         Treadmill       NV Self pace  10' Self pace 8'  Self pace 10'  10'  TM in use - resume nv  10'   Standing knee flexion           1#  20x each  1# 20x ea  1 5 # x 20 1x5# x 20  2# x 20   Mini squats            10x  10 x  20x  20x  20x   TB latissimus pulls, scapular rows and multifidus press           RTB  20x each  RTB  20 x ea  gtb x 20  GTB x 20  gtb x 20   TB sidestepping           RTB  3 laps  RTB 2 laps 56' ea  gtb x 5  GTB x 5 laps     Abd bracing with bridge and  orange PB           :05  20x  :05 20 x      20x   Supine PB roll out w/ B LE           10x2 10 x 2      2-x         Modalities  7/19                     mhp ls seated 8                                                                             Assessment: Tolerated treatment well  Patient does with program and demonstrates competency with HEP at this time  Increased weight tolerated well today  Plan: Continue per plan of care  Tentative dc planned for end of this week

## 2018-08-23 ENCOUNTER — OFFICE VISIT (OUTPATIENT)
Dept: PHYSICAL THERAPY | Facility: CLINIC | Age: 68
End: 2018-08-23
Payer: MEDICARE

## 2018-08-23 DIAGNOSIS — M54.32 SCIATIC NERVE PAIN, LEFT: Primary | ICD-10-CM

## 2018-08-23 PROCEDURE — 97112 NEUROMUSCULAR REEDUCATION: CPT

## 2018-08-23 PROCEDURE — 97110 THERAPEUTIC EXERCISES: CPT

## 2018-08-23 NOTE — PROGRESS NOTES
Daily Note     Today's date: 2018  Patient name: Eloisa Dominguez  : 1950  MRN: [id number]  Referring provider: Cristóbal Schofield MD  Dx:   Encounter Diagnosis     ICD-10-CM    1  Sciatic nerve pain, left M54 32        Start Time: 1047          Subjective: Patient reports that she is doing good today noting that there are times where she tightens up but notes that she then performs her HEP which helps her with the symptoms         Objective: See treatment diary below      Manual                 Left hip AA IR   Manual stretch and AROM x20  10' LA PTA               Left hip flexor/quad stretching   LA  PTA  db LA PTA                                                                     ExerciseDiary   8   Slr, s/l abd 20x                      hss 20s  x5 :30  5x                    Glut sets    Prone  :05  20x  prone 5s x 20 Prone :05  20x prone  :05  20x D/C HEP            Prone lying   3'  3'                  Prone prop on elbow 3' 3'  3' 3' 3'      3'  3'  3' 3'   Prone press up 10x 30x2  30 x 2 15x4 30x2 30x    30x  30x  30x 30x   Loc and sag Prone 10x 10x  10x 2 10x2 10x2 10x     15x  15x  20x 20x   Standing back extension   10x6  10 x 4 30x 30x 30x 30 x  30x   30x  30x 30x   Prone B hip extension   Alt 20x each  alt x 20 Alt 20x Alt  20x  1#  20x each    1 5 # x 30  1 5# x30  2# x 30  2# x30   Prone B knee flexion    10x2  each  20 x 20x 20x each 1#  20x each   1 5# x 30   1 5# x 30  2# x 30 2# x30   Standing B hip extension   10x2 each  20 x  20x 20x each 1#  20x each  1# 30 x ea  1 5# x 30   1 5# x 30 ea   2# x 30 2# x30   Standing B hip abduction         20x each 1#  20x each  1# 30x ea  1 5# x 30  1x5# x 30 ea  2# x 30 2# x30   Standing L SG     10x 2 10x2 NP    -          Treadmill       NV Self pace  10' Self pace 8'  Self pace 10'  10'  TM in use - resume nv  10' 10'   Standing knee flexion           1#  20x each  1# 20x ea  1 5 # x 20 1x5# x 20  2# x 20 2# x30   Mini squats            10x  10 x  20x  20x  20x 20x   TB latissimus pulls, scapular rows and multifidus press           RTB  20x each  RTB  20 x ea  gtb x 20  GTB x 20  gtb x 20 GTB x20   TB sidestepping           RTB  3 laps  RTB 2 laps 56' ea  gtb x 5  GTB x 5 laps      Abd bracing with bridge and  orange PB           :05  20x  :05 20 x      20x 20x   Supine PB roll out w/ B LE           10x2 10 x 2      2-x10 2x10         Modalities  7/19                     mhp ls seated 8                                                                             Assessment: Tolerated treatment well  Patient continues to favor extension noting that she feels much looser throughout session  Minimal fatigue noted since less frequent rest breaks were taken this visit  Pt felt good at end of session  Plan: Continue per plan of care  Tentative dc planned for end of this week

## 2018-08-28 ENCOUNTER — OFFICE VISIT (OUTPATIENT)
Dept: PHYSICAL THERAPY | Facility: CLINIC | Age: 68
End: 2018-08-28
Payer: MEDICARE

## 2018-08-28 DIAGNOSIS — M54.32 SCIATIC NERVE PAIN, LEFT: Primary | ICD-10-CM

## 2018-08-28 PROCEDURE — 97110 THERAPEUTIC EXERCISES: CPT

## 2018-08-28 PROCEDURE — 97112 NEUROMUSCULAR REEDUCATION: CPT

## 2018-08-28 PROCEDURE — G8991 OTHER PT/OT GOAL STATUS: HCPCS

## 2018-08-28 PROCEDURE — G8992 OTHER PT/OT  D/C STATUS: HCPCS

## 2018-08-28 NOTE — PROGRESS NOTES
Daily Note     Today's date: 2018  Patient name: French Can  : 1950  MRN: [id number]  Referring provider: Shira Verma MD  Dx:   Encounter Diagnosis     ICD-10-CM    1  Sciatic nerve pain, left M54 32                   Subjective: Patient reports today is her last day  States she is doing great as long as she does her home exercises  Objective: See treatment diary below      Assessment: Good understanding of HEP  No pain with activity  Plan: To be d/c by PT      Manual                 Left hip AA IR   Manual stretch and AROM x20  10' LA PTA               Left hip flexor/quad stretching   LA  PTA  db LA PTA                                                                     ExerciseDiary     Slr, s/l abd 20x                        hss 20s  x5 :30  5x                      Glut sets    Prone  :05  20x  prone 5s x 20 Prone :05  20x prone  :05  20x D/C HEP              Prone lying   3'  3'                    Prone prop on elbow 3' 3'  3' 3' 3'      3'  3'  3' 3' 3'   Prone press up 10x 30x2  30 x 2 15x4 30x2 30x    30x  30x  30x 30x 30x   Loc and sag Prone 10x 10x  10x 2 10x2 10x2 10x     15x  15x  20x 20x 20x   Standing back extension   10x6  10 x 4 30x 30x 30x 30 x  30x   30x  30x 30x 30x   Prone B hip extension   Alt 20x each  alt x 20 Alt 20x Alt  20x  1#  20x each    1 5 # x 30  1 5# x30  2# x 30  2# x30 2# x30   Prone B knee flexion    10x2  each  20 x 20x 20x each 1#  20x each   1 5# x 30   1 5# x 30  2# x 30 2# x30 2# x30   Standing B hip extension   10x2 each  20 x  20x 20x each 1#  20x each  1# 30 x ea  1 5# x 30   1 5# x 30 ea   2# x 30 2# x30 2# x30   Standing B hip abduction         20x each 1#  20x each  1# 30x ea  1 5# x 30  1x5# x 30 ea  2# x 30 2# x30 2# x30   Standing L SG     10x 2 10x2 NP    -            Treadmill       NV Self pace  10' Self pace 8'  Self pace 10'  10'  TM in use - resume nv  10' 10' 10'   Standing knee flexion           1#  20x each  1# 20x ea  1 5 # x 20 1x5# x 20  2# x 20 2# x30 2# x30   Mini squats            10x  10 x  20x  20x  20x 20x 20x   TB latissimus pulls, scapular rows and multifidus press           RTB  20x each  RTB  20 x ea  gtb x 20  GTB x 20  gtb x 20 GTB x20 GTB x20   TB sidestepping           RTB  3 laps  RTB 2 laps 56' ea  gtb x 5  GTB x 5 laps     GTB x5   Abd bracing with bridge and  orange PB           :05  20x  :05 20 x      20x 20x 20x   Supine PB roll out w/ B LE           10x2 10 x 2      2-x10 2x10 2x10         Modalities  7/19                     mhp ls seated 8

## 2018-08-29 DIAGNOSIS — E11.49 DIABETES MELLITUS TYPE 2 WITH NEUROLOGICAL MANIFESTATIONS (HCC): ICD-10-CM

## 2018-08-30 ENCOUNTER — APPOINTMENT (OUTPATIENT)
Dept: PHYSICAL THERAPY | Facility: CLINIC | Age: 68
End: 2018-08-30
Payer: MEDICARE

## 2018-09-12 NOTE — PROGRESS NOTES
PT Discharge    Today's date: 2018  Patient name: Rosa Gómez  : 1950  MRN: [id number]  Referring provider: Noa Braden MD  Dx:   Encounter Diagnosis     ICD-10-CM    1  Sciatic nerve pain, left M54 32        Start Time: 382  Stop Time: 1103  Total time in clinic (min): 48 minutes    Assessment  Impairments: abnormal or restricted ROM, activity intolerance, impaired physical strength, lacks appropriate home exercise program and pain with function    Assessment details: Patient did not attend any further sessions after 18 thus final measurements are unavailable  Original goals - partially met  Understanding of Dx/Px/POC: good   Prognosis: good    Goals  STG  Partially MET  1  Decrease pain by at least two subjective ratings in 4 weeks  2  Increase ROM trunk to full and painfree 4 weeks  3  Improve hip AROM left to full/ painfree 4 weeks  LTG   Partially MET  1  Independent with HEP   2  Return to prior functional and recreational abilities  6 weeks  3  Improve motor function left hip  6 weeks  Plan  Planned therapy interventions: home exercise program  Plan details: Discharge skilled PT at this time    Thank you for this referral          Subjective Evaluation    Pain  Current pain ratin  At best pain ratin  At worst pain ratin  Location: Left LS - le - foot     Quality: radiating, needle-like, dull ache and throbbing  Relieving factors: change in position  Aggravating factors: walking and stair climbing  Progression: improved (improved since last week, but still present)    Social Support  Lives in: multiple-level home (lives mainly on 1st floor)  Lives with: spouse    Hand dominance: right      Diagnostic Tests  No diagnostic tests performed  Treatments  Previous treatment: physical therapy and medication  Current treatment: medication  Patient Goals  Patient goals for therapy: decreased pain, independence with ADLs/IADLs and increased strength          Objective Special Questions  Positive for disturbed sleep  Negative for bladder dysfunction, bowel dysfunction and saddle (S4) numbness    Additional Special Questions  She notes that she has an area of numbness left lateral thigh x 47 years after birth of her first and receiving an epidural      Postural Observations  Seated posture: fair  Standing posture: fair  Correction of posture: has no consistent effect    Additional Postural Observation Details  Shoulders rounded, flattened t-spine, flattened lordosis, feet in slight ER posturing  Palpation   Left   Tenderness of the erector spinae and lumbar paraspinals  Right Tenderness of the erector spinae and lumbar paraspinals  Tenderness     Lumbar Spine  No tenderness in the spinous process and facet joint  Left Hip   No tenderness in the PSIS  Right Hip   No tenderness in the PSIS  Neurological Testing     Sensation     Lumbar   Left   Intact: light touch    Right   Intact: light touch    Reflexes   Left   Patellar (L4): normal (2+)  Achilles (S1): normal (2+)    Right   Patellar (L4): normal (2+)  Achilles (S1): normal (2+)    Active Range of Motion     Additional Active Range of Motion Details  FF - Full, Painfree,  Mild discomfort produced with return to upright, NE  BB - P, NW  NENA  RLSGIS - P, W  RRSGIS - NE  RFIL - NE  REIL - Better     SLR = 90 degrees bilaterally   HS tightness only  Seated slump   R (-)  L - mild neural tension produced        Strength/Myotome Testing     Lumbar     Right   Normal strength    Left Hip   Planes of Motion   Flexion: 4  Extension: 4  Abduction: 4-  Adduction: 4+  External rotation: 4  Internal rotation: 4-    Left Knee   Flexion: 5  Extension: 5    Left Ankle/Foot   Dorsiflexion: 5    Tests     Lumbar     Left   Negative crossed SLR and passive SLR  Right   Negative crossed SLR and passive SLR       General Comments     Lumbar Comments  GAIT - slowed stephanie pre session - cautious with left le , slight decrease in stride length      Flowsheet Rows      Most Recent Value   PT/OT G-Codes   Current Score  82   Projected Score  58   Assessment Type  Discharge   G code set  Other PT/OT Primary   Other PT Primary Goal Status ()  CK   Other PT Primary Discharge Status ()  CI

## 2018-09-21 ENCOUNTER — TELEPHONE (OUTPATIENT)
Dept: OBGYN CLINIC | Facility: CLINIC | Age: 68
End: 2018-09-21

## 2018-10-02 ENCOUNTER — TELEPHONE (OUTPATIENT)
Dept: OBGYN CLINIC | Facility: CLINIC | Age: 68
End: 2018-10-02

## 2018-10-08 ENCOUNTER — APPOINTMENT (OUTPATIENT)
Dept: LAB | Facility: CLINIC | Age: 68
End: 2018-10-08
Payer: MEDICARE

## 2018-10-08 DIAGNOSIS — E11.40 CONTROLLED TYPE 2 DIABETES MELLITUS WITH DIABETIC NEUROPATHY, WITHOUT LONG-TERM CURRENT USE OF INSULIN (HCC): ICD-10-CM

## 2018-10-08 LAB
ANION GAP SERPL CALCULATED.3IONS-SCNC: 4 MMOL/L (ref 4–13)
BUN SERPL-MCNC: 12 MG/DL (ref 5–25)
CALCIUM SERPL-MCNC: 9.5 MG/DL (ref 8.3–10.1)
CHLORIDE SERPL-SCNC: 101 MMOL/L (ref 100–108)
CO2 SERPL-SCNC: 33 MMOL/L (ref 21–32)
CREAT SERPL-MCNC: 0.81 MG/DL (ref 0.6–1.3)
EST. AVERAGE GLUCOSE BLD GHB EST-MCNC: 137 MG/DL
GFR SERPL CREATININE-BSD FRML MDRD: 74 ML/MIN/1.73SQ M
GLUCOSE P FAST SERPL-MCNC: 96 MG/DL (ref 65–99)
HBA1C MFR BLD: 6.4 % (ref 4.2–6.3)
POTASSIUM SERPL-SCNC: 3.6 MMOL/L (ref 3.5–5.3)
SODIUM SERPL-SCNC: 138 MMOL/L (ref 136–145)

## 2018-10-08 PROCEDURE — 36415 COLL VENOUS BLD VENIPUNCTURE: CPT | Performed by: INTERNAL MEDICINE

## 2018-10-08 PROCEDURE — 80048 BASIC METABOLIC PNL TOTAL CA: CPT

## 2018-10-08 PROCEDURE — 86803 HEPATITIS C AB TEST: CPT | Performed by: INTERNAL MEDICINE

## 2018-10-08 PROCEDURE — 83036 HEMOGLOBIN GLYCOSYLATED A1C: CPT

## 2018-10-09 LAB — HCV AB SER QL: NORMAL

## 2018-10-22 ENCOUNTER — OFFICE VISIT (OUTPATIENT)
Dept: INTERNAL MEDICINE CLINIC | Age: 68
End: 2018-10-22
Payer: MEDICARE

## 2018-10-22 VITALS
WEIGHT: 196 LBS | BODY MASS INDEX: 31.5 KG/M2 | DIASTOLIC BLOOD PRESSURE: 50 MMHG | HEIGHT: 66 IN | TEMPERATURE: 98.1 F | OXYGEN SATURATION: 97 % | SYSTOLIC BLOOD PRESSURE: 110 MMHG | HEART RATE: 75 BPM

## 2018-10-22 DIAGNOSIS — E78.5 HYPERLIPIDEMIA, UNSPECIFIED HYPERLIPIDEMIA TYPE: ICD-10-CM

## 2018-10-22 DIAGNOSIS — E11.49 DIABETES MELLITUS TYPE 2 WITH NEUROLOGICAL MANIFESTATIONS (HCC): Primary | ICD-10-CM

## 2018-10-22 DIAGNOSIS — Z12.11 SCREEN FOR COLON CANCER: ICD-10-CM

## 2018-10-22 DIAGNOSIS — I10 ESSENTIAL HYPERTENSION: ICD-10-CM

## 2018-10-22 DIAGNOSIS — M54.32 SCIATIC NERVE PAIN, LEFT: ICD-10-CM

## 2018-10-22 DIAGNOSIS — M81.0 AGE-RELATED OSTEOPOROSIS WITHOUT CURRENT PATHOLOGICAL FRACTURE: ICD-10-CM

## 2018-10-22 PROCEDURE — 99214 OFFICE O/P EST MOD 30 MIN: CPT | Performed by: INTERNAL MEDICINE

## 2018-10-22 NOTE — ASSESSMENT & PLAN NOTE
Her blood pressure continues to be well controlled on Norvasc , HydroDIURIL and metformin  She is not on an Ace or an Arb due to some vague allergic  Reaction  Her risk factors for cardiovascular disease include her diabetes hypertension hyperlipidemia

## 2018-10-22 NOTE — PROGRESS NOTES
Assessment/Plan:    Diabetes mellitus type 2 with neurological manifestations ([facility] )  Lab Results   Component Value Date    HGBA1C 6 4 (H) 10/08/2018       No results for input(s): POCGLU in the last 72 hours  Blood Sugar Average: Last 72 hrs:   patient's blood sugar is well controlled with Victoza and metformin  She has no retinopathy or nephropathy but does have mild numbness of her feet  She has no sores    Hypertension   Her blood pressure continues to be well controlled on Norvasc , HydroDIURIL and metformin  She is not on an Ace or an Arb due to some vague allergic  reaction    Sciatic nerve pain, left   Her back pain  with sciatica has totally resolved with physical therapy       Diagnoses and all orders for this visit:    Diabetes mellitus type 2 with neurological manifestations ([facility] )  -     Comprehensive metabolic panel; Future  -     Microalbumin / creatinine urine ratio  -     Hemoglobin A1C; Future    Essential hypertension    Sciatic nerve pain, left    Age-related osteoporosis without current pathological fracture    Screen for colon cancer  -     Occult Blood, Fecal Immunochemical; Future    Hyperlipidemia, unspecified hyperlipidemia type  -     Lipid panel; Future          Subjective:      Patient ID: Sadie Jordan is a 70 y o  female  Patient feels good and both her weight and her blood sugar are stable  Review of Systems   Constitutional: Negative for chills, fatigue, fever and unexpected weight change  HENT: Negative for congestion, ear pain, hearing loss, postnasal drip, sinus pressure, sore throat, trouble swallowing and voice change  Eyes: Negative for visual disturbance  Respiratory: Negative for cough, chest tightness, shortness of breath and wheezing  Cardiovascular: Negative for chest pain, palpitations and leg swelling  Gastrointestinal: Negative for abdominal distention, abdominal pain, anal bleeding, blood in stool, constipation, diarrhea and nausea  Endocrine: Negative for cold intolerance, polydipsia, polyphagia and polyuria  Genitourinary: Negative for dysuria, flank pain, frequency, hematuria and urgency  Musculoskeletal: Negative for arthralgias, back pain, gait problem, joint swelling, myalgias and neck pain  Skin: Negative for rash  Allergic/Immunologic: Negative for immunocompromised state  Neurological: Positive for numbness  Negative for dizziness, syncope, facial asymmetry, weakness, light-headedness and headaches  Hematological: Negative for adenopathy  Psychiatric/Behavioral: Negative for confusion, sleep disturbance and suicidal ideas  Objective:      /50 (BP Location: Left arm, Patient Position: Sitting)   Pulse 75   Temp 98 1 °F (36 7 °C) (Tympanic)   Ht 5' 6" (1 676 m)   Wt 88 9 kg (196 lb)   SpO2 97%   BMI 31 64 kg/m²     Patient's shoes and socks removed  Right Foot/Ankle   Right Foot Inspection  Skin Exam: skin normal and skin intact no dry skin, no warmth, no callus, no erythema, no maceration, no abnormal color, no pre-ulcer, no ulcer and no callus                            Sensory   Vibration: diminished      Vascular    The right DP pulse is 2+  Left Foot/Ankle  Left Foot Inspection  Skin Exam: skin normal and skin intactno dry skin, no warmth, no erythema, no maceration, normal color, no pre-ulcer, no ulcer and no callus                                         Sensory   Vibration: diminished      Vascular    The left DP pulse is 2+  Assign Risk Category:  No deformity present; Loss of protective sensation; No weak pulses       Risk: 1       Physical Exam   Constitutional: She is oriented to person, place, and time  She appears well-developed and well-nourished  No distress  HENT:   Right Ear: External ear normal    Left Ear: External ear normal    Nose: Nose normal    Mouth/Throat: Oropharynx is clear and moist  No oropharyngeal exudate  Eyes: Pupils are equal, round, and reactive to light  EOM are normal    Neck: Normal range of motion  Neck supple  No JVD present  No thyromegaly present  Cardiovascular: Normal rate, regular rhythm, normal heart sounds and intact distal pulses  Exam reveals no gallop  Pulses are no weak pulses  No murmur heard  Pulses:       Dorsalis pedis pulses are 2+ on the right side, and 2+ on the left side  Pulmonary/Chest: Effort normal and breath sounds normal  No respiratory distress  She has no wheezes  She has no rales  Abdominal: Soft  Bowel sounds are normal  She exhibits no distension and no mass  There is no tenderness  Musculoskeletal: Normal range of motion  She exhibits no tenderness  Feet:   Right Foot:   Skin Integrity: Negative for ulcer, skin breakdown, erythema, warmth, callus or dry skin  Left Foot:   Skin Integrity: Negative for ulcer, skin breakdown, erythema, warmth, callus or dry skin  Lymphadenopathy:     She has no cervical adenopathy  Neurological: She is alert and oriented to person, place, and time  No cranial nerve deficit  Coordination normal    Skin: No rash noted  Psychiatric: She has a normal mood and affect   Her behavior is normal  Judgment and thought content normal

## 2018-10-22 NOTE — ASSESSMENT & PLAN NOTE
Her lipid profile has previously been controlled on statin but she is due for recheck with next blood work

## 2018-10-22 NOTE — PATIENT INSTRUCTIONS
Chronic Hypertension   American Heart Association [facility]): Managing stress to control high blood pressure  American Heart Association (AHA)  [place], [address]  Available from URL: Ulysses millan  [id number]  As accessed 2017-01-11  American Heart Association [facility]): Understanding blood pressure readings  American Heart Association (AHA)  [place], [address]  Available from URL: [facility] ee  [email or web address]  As accessed 2017-01-09  [facility] & Kamran CL: Hypertension  In: Neelima LARSON, ed  Neelima's Clinical Advisor 2017, [address] , [place], [place], 2017  American Heart Association [facility]): Health threats from high blood pressure  American Heart Association (AHA)  [place], [address]  Available from URL: Kelli carpio  [email or web address]  As accessed 2017-01-09  American Heart Association (AHA): Shaking the salt habit to lower high blood pressure  American Heart Association (AHA)  [place] [address]  Available from URL: [email or web address]  [email or web address]  As accessed 2017-01-09  American Heart Association (AHA): What is high blood pressure? Chevy Olguin American Heart Association (AHA)  [place] [address]  Available from URL: Shahram conner   [id number]  As accessed 2017-01-09  Rosita VÁSQUEZ: Systemic Hypertension  In: Jono Rodríguez , [address] [facility] JW, et al, [facility], 20th ed   Elsevier, Cris Piercema, 2016  U S  Preventive Services Task Force (USPSTF): Final recommendation statement : statin use for the primary prevention of cardiovascular disease in adults: preventive medication  [place] S  Preventive Services Task Force (USPSTF)  William Darnell MD  2016  Available from URL: [employer] pt  As accessed 2016-12-07  Marciano (FDA): High blood pressure (hypertension)  Marciano (FDA)  MD Domitila Levin  Available from URL: DeskDistributor no  As accessed 2017-01-09  [place] [facility]  [place] ([address]): Description of the DASH eating plan  National Heart, Lung, and [address] [place] [address] ([address])  MD Domitila Jansens  Available from URL: Intelliworks es  As accessed 2017-01-09  [place] [facility] [address] [place] [address] ([address]): Living with high blood pressure  National [facility], [facility], and [address] [place] [address] ([address])  Jono Martinez MD  Domitila Battles  Available from URL: http://Beat My Waste Quote/  As accessed 2017-01-09  National [facility], [facility], and [address] [place] (NHLBI): Prevention of high blood pressure  National [facility], [facility], and [address] [place] [address] ([address])  Jono Martinez MD  Domitila Battles  Available from URL: [email or web address] org/  As accessed 2017-01-09  [place] [address] [place] (NHLBI): How is high blood pressure treated?   [place]  [place] ([address])  MD Domitila Jansen Battles  Available from URL: DestructiveBlog cz  As accessed 2017-01-09  [facility] [address] [place] [address] ([address]): What are the signs, symptoms, and complications of high blood pressure  National [facility], [facility], and [address] [place] [address] ([address])  Sheryle Jungling, MD Citlalee Blower  Available from URL: http://The Bar Method info/  As accessed 2017-01-09  National [facility], [facility] [place] (NHLBI): Risk factors for high blood pressure  National [facility], and [address] [place] [address] ([address])  Sheryle Jungling, MD Citlalee Blower  Available from URL: [email or web address]://Infinite Z/  As accessed 2017-01-09  [place] [facility] and [address] [place] [address] ([address]): Causes of high blood pressure  National [facility], [facility], and [address] [place] [address] ([address])  Sheryle Jungling, MD Citlalee Blower  Available from URL: http://Infinite Z/  As accessed 2017-01-09  [place] [facility] and [address] [place] [address] ([address]): Description of high blood pressure  National [facility], [facility], and [address] [place] [address] ([address])  Sheryle Jungling, MD Citlalee Blower  Available from URL: [email or web address] Z/  As accessed 2017-01-09 © 2017 [facility] Information is for End User's use only and may not be sold, redistributed or otherwise used for commercial purposes  All illustrations and images included in CareNotes® are the copyrighted property of [facility]  or Armen Chopra  The above information is an  only  It is not intended as medical advice for individual conditions or treatments  Talk to your doctor, nurse or pharmacist before following any medical regimen to see if it is safe and effective for you

## 2018-10-22 NOTE — ASSESSMENT & PLAN NOTE
Lab Results   Component Value Date    HGBA1C 6 4 (H) 10/08/2018       No results for input(s): POCGLU in the last 72 hours  Blood Sugar Average: Last 72 hrs:   patient's blood sugar is well controlled with Victoza and metformin  She has no retinopathy or nephropathy but does have mild numbness of her feet    She has no sores

## 2018-11-12 ENCOUNTER — ANNUAL EXAM (OUTPATIENT)
Dept: OBGYN CLINIC | Age: 68
End: 2018-11-12
Payer: MEDICARE

## 2018-11-12 VITALS
BODY MASS INDEX: 31.5 KG/M2 | SYSTOLIC BLOOD PRESSURE: 140 MMHG | DIASTOLIC BLOOD PRESSURE: 72 MMHG | WEIGHT: 196 LBS | HEIGHT: 66 IN

## 2018-11-12 DIAGNOSIS — L30.9 DERMATITIS: ICD-10-CM

## 2018-11-12 DIAGNOSIS — Z12.31 ENCOUNTER FOR SCREENING MAMMOGRAM FOR MALIGNANT NEOPLASM OF BREAST: ICD-10-CM

## 2018-11-12 DIAGNOSIS — Z78.0 ASYMPTOMATIC POSTMENOPAUSAL STATUS: ICD-10-CM

## 2018-11-12 DIAGNOSIS — Z01.419 ENCOUNTER FOR GYNECOLOGICAL EXAMINATION WITHOUT ABNORMAL FINDING: Primary | ICD-10-CM

## 2018-11-12 PROCEDURE — G0101 CA SCREEN;PELVIC/BREAST EXAM: HCPCS | Performed by: NURSE PRACTITIONER

## 2018-11-12 RX ORDER — NYSTATIN AND TRIAMCINOLONE ACETONIDE 100000; 1 [USP'U]/G; MG/G
OINTMENT TOPICAL 2 TIMES DAILY
Qty: 45 G | Refills: 0 | Status: SHIPPED | OUTPATIENT
Start: 2018-11-12 | End: 2019-02-16 | Stop reason: SDUPTHER

## 2018-11-12 NOTE — PROGRESS NOTES
Assessment/Plan:    No problem-specific Assessment & Plan notes found for this encounter  Diagnoses and all orders for this visit:    Encounter for gynecological examination without abnormal finding    Asymptomatic postmenopausal status    Encounter for screening mammogram for malignant neoplasm of breast  -     Mammo screening bilateral w 3d & cad; Future          Subjective:      Patient ID: Krystal Winslow is a 70 y o  female  Pleasant 70 y o  postmenopausal female here for annual exam  She denies postmenopausal bleeding  Denies history of abnormal pap smears, last Pap NIL 2017, no further paps  Denies vaginal issues  Denies pelvic pain  Denies postmenopausal issues  Not sexually active  Colonoscopy UTD  DXA followed by PCP  Patient uses steroid/yeast topically prn for vaginal itch  States sxs are relieved by this regimen, advised office visit for vulvar biopsy if sxs worsen  The following portions of the patient's history were reviewed and updated as appropriate:   She  has a past medical history of Carpal tunnel syndrome; Complex endometrial hyperplasia with atypia; Diabetes mellitus ([facility] ); Normal delivery; and Post-menopausal bleeding  She   Patient Active Problem List    Diagnosis Date Noted    Encounter for gynecological examination without abnormal finding 11/12/2018    Asymptomatic postmenopausal status 11/12/2018    Age related osteoporosis 02/14/2017    Diabetes mellitus type 2 with neurological manifestations ([facility] ) 12/21/2016    Hyperlipidemia 08/14/2014    Obesity 08/14/2014    Controlled diabetes mellitus ([facility] ) 04/26/2013    Hypertension 04/26/2013     She  has a past surgical history that includes Endometrial biopsy; Cholecystectomy; Colonoscopy; Dilation and curettage of uterus; Hysteroscopy; Carpal tunnel release; Carpal tunnel release (Left); and Tubal ligation (1974)  Her family history includes Arthritis in her family;  Cancer in her family; Diabetes in her family; Hypertension in her family; No Known Problems in her child; Other in her family; Valvular heart disease in her family  She  reports that she has never smoked  She has never used smokeless tobacco  She reports that she drinks alcohol  She reports that she does not use drugs  Current Outpatient Prescriptions   Medication Sig Dispense Refill    amLODIPine (NORVASC) 5 mg tablet Take 1 tablet (5 mg total) by mouth daily 90 tablet 3    aspirin 81 MG tablet Take by mouth      atorvastatin (LIPITOR) 10 mg tablet Take 1 tablet (10 mg total) by mouth daily 90 tablet 3    B-D UF III MINI PEN NEEDLES 31G X 5 MM MISC USE AS DIRECTED 100 each 0    Cranberry 600 MG TABS Take by mouth      famotidine (PEPCID) 20 mg tablet Take 1 tablet (20 mg total) by mouth 2 (two) times a day 180 tablet 3    fexofenadine (ALLEGRA) 180 MG tablet Take by mouth      hydrochlorothiazide (HYDRODIURIL) 25 mg tablet Take 1 tablet (25 mg total) by mouth daily 90 tablet 3    Lancets (ACCU-CHEK SOFT TOUCH) lancets by Other route 2 (two) times a day 100 each 3    metFORMIN (GLUCOPHAGE) 1000 MG tablet TAKE 1 TABLET BY MOUTH TWO  TIMES DAILY WITH MEALS 180 tablet 1    metoprolol succinate (TOPROL-XL) 50 mg 24 hr tablet Take 1 tablet (50 mg total) by mouth daily 90 tablet 3    nystatin-triamcinolone (MYCOLOG-II) ointment APPLY SPARINGLY TO THE AFFECTED AREAS BID FOR 7 TO 14 DAYS  3    ONE TOUCH ULTRA TEST test strip 1 each by Other route 2 (two) times a day Test 100 each 3    VICTOZA 18 MG/3ML SOPN Inject 0 2 mL under the skin daily 6 pen 3     No current facility-administered medications for this visit  She is allergic to ace inhibitors and codeine    OB History    Para Term  AB Living   2 2 2     2   SAB TAB Ectopic Multiple Live Births           2      # Outcome Date GA Lbr Sudarshan/2nd Weight Sex Delivery Anes PTL Lv   2 Term            1 Term                 2 sons in their 45s, no grands    Review of Systems   Constitutional: Negative for activity change, chills, fatigue, fever and unexpected weight change  HENT: Negative for mouth sores and trouble swallowing  Respiratory: Negative for shortness of breath  Gastrointestinal: Negative for anal bleeding, blood in stool, constipation and diarrhea  Genitourinary: Negative for difficulty urinating, dysuria, genital sores and hematuria  Neurological: Negative for weakness  Psychiatric/Behavioral: Negative for confusion and self-injury  Objective:      /72 (BP Location: Right arm, Patient Position: Sitting)   Ht 5' 6" (1 676 m)   Wt 88 9 kg (196 lb)   Breastfeeding? No   BMI 31 64 kg/m²          Physical Exam   Constitutional: She appears well-developed and well-nourished  No distress  HENT:   Head: Normocephalic  Neck: Normal range of motion  Pulmonary/Chest: Effort normal  Right breast exhibits no inverted nipple, no mass, no nipple discharge, no skin change and no tenderness  Left breast exhibits no inverted nipple, no mass, no nipple discharge, no skin change and no tenderness  Breasts are symmetrical    Abdominal: Soft  Genitourinary: No breast swelling, tenderness, discharge or bleeding  Pelvic exam was performed with patient supine  There is labial fusion  There is no rash, tenderness, lesion or injury on the right labia  There is no rash, tenderness, lesion or injury on the left labia  Uterus is not deviated, not enlarged, not fixed and not tender  Cervix exhibits no motion tenderness, no discharge and no friability  Right adnexum displays no mass, no tenderness and no fullness  Left adnexum displays no mass, no tenderness and no fullness  No erythema, tenderness or bleeding in the vagina  No foreign body in the vagina  No signs of injury around the vagina  No vaginal discharge found     Genitourinary Comments: Some vaginal atrophy and possible lichenification, hypopigmentation noted bilateral external labia, +fusion at lower introitus Lymphadenopathy:        Right: No inguinal adenopathy present  Left: No inguinal adenopathy present  Vitals reviewed

## 2018-11-12 NOTE — PATIENT INSTRUCTIONS
Dermatitis   AMBULATORY CARE:   Dermatitis  is skin inflammation  You may have an itchy rash, redness, or swelling  You may also have bumps or blisters that crust over or ooze clear fluid  Call 911 if you have any of the following symptoms of anaphylaxis:   · Sudden trouble breathing    · Throat swelling and tightness    · Dizziness, lightheadedness, fainting, or confusion  Seek care immediately if:   · You develop a fever or have red streaks going up your arm or leg  · Your rash gets more swollen, red, or hot  Contact your healthcare provider if:   · Your skin blisters, oozes white or yellow pus, or has a foul-smelling discharge  · Your rash spreads or does not get better, even after treatment  · You have questions or concerns about your condition or care  Treatment for dermatitis  depends on the cause of your rash  You may need medicines to help decrease itching and inflammation or treat a bacterial infection  They may be given as a topical cream, shot, or a pill  Manage dermatitis:   · Apply a cool compress to your rash  This will help soothe your skin  · Keep your skin moist   Rub unscented cream or lotion on your skin to prevent dryness and itching  Do this right after a lukewarm bath or shower when your skin is still damp  · Avoid skin irritants  Do not use skin irritants, such as makeup, hair products, soaps, and cleansers  Use products that do not contain fragrances or dye  Follow up with your healthcare provider as directed:  Write down your questions so you remember to ask them during your visits  © 2017 [address] Information is for End User's use only and may not be sold, redistributed or otherwise used for commercial purposes  All illustrations and images included in CareNotes® are the copyrighted property of [facility] [employer] [facility] [place]  or Armen Chopra  The above information is an  only   It is not intended as medical advice for individual conditions or treatments  Talk to your doctor, nurse or pharmacist before following any medical regimen to see if it is safe and effective for you

## 2018-11-30 LAB — SEVERITY (EYE EXAM): NORMAL

## 2019-01-25 DIAGNOSIS — E13.9 DIABETES 1.5, MANAGED AS TYPE 2 (HCC): ICD-10-CM

## 2019-01-25 RX ORDER — LIRAGLUTIDE 6 MG/ML
1.2 INJECTION SUBCUTANEOUS DAILY
Qty: 2 PEN | Refills: 3 | Status: SHIPPED | OUTPATIENT
Start: 2019-01-25 | End: 2019-02-13

## 2019-01-28 ENCOUNTER — LAB (OUTPATIENT)
Dept: LAB | Facility: HOSPITAL | Age: 69
End: 2019-01-28
Attending: INTERNAL MEDICINE
Payer: MEDICARE

## 2019-01-28 DIAGNOSIS — Z12.11 SCREEN FOR COLON CANCER: ICD-10-CM

## 2019-01-28 LAB — HEMOCCULT STL QL IA: NEGATIVE

## 2019-01-28 PROCEDURE — G0328 FECAL BLOOD SCRN IMMUNOASSAY: HCPCS

## 2019-02-04 ENCOUNTER — APPOINTMENT (OUTPATIENT)
Dept: LAB | Facility: CLINIC | Age: 69
End: 2019-02-04
Payer: MEDICARE

## 2019-02-04 ENCOUNTER — HOSPITAL ENCOUNTER (EMERGENCY)
Facility: HOSPITAL | Age: 69
Discharge: HOME | End: 2019-02-04
Attending: EMERGENCY MEDICINE
Payer: MEDICARE

## 2019-02-04 ENCOUNTER — TELEPHONE (OUTPATIENT)
Dept: OTHER | Facility: OTHER | Age: 69
End: 2019-02-04

## 2019-02-04 VITALS
RESPIRATION RATE: 18 BRPM | HEART RATE: 69 BPM | OXYGEN SATURATION: 98 % | SYSTOLIC BLOOD PRESSURE: 142 MMHG | WEIGHT: 203.04 LBS | BODY MASS INDEX: 32.63 KG/M2 | TEMPERATURE: 98.1 F | DIASTOLIC BLOOD PRESSURE: 66 MMHG | HEIGHT: 66 IN

## 2019-02-04 DIAGNOSIS — E78.5 HYPERLIPIDEMIA, UNSPECIFIED HYPERLIPIDEMIA TYPE: ICD-10-CM

## 2019-02-04 DIAGNOSIS — E11.49 DIABETES MELLITUS TYPE 2 WITH NEUROLOGICAL MANIFESTATIONS (HCC): ICD-10-CM

## 2019-02-04 DIAGNOSIS — R89.9 ABNORMAL LABORATORY TEST: Primary | ICD-10-CM

## 2019-02-04 DIAGNOSIS — E87.5 HYPERKALEMIA: Primary | ICD-10-CM

## 2019-02-04 LAB
ALBUMIN SERPL BCP-MCNC: 3.7 G/DL (ref 3.5–5)
ALP SERPL-CCNC: 107 U/L (ref 46–116)
ALT SERPL W P-5'-P-CCNC: 40 U/L (ref 12–78)
ANION GAP SERPL CALCULATED.3IONS-SCNC: 11 MMOL/L (ref 4–13)
ANION GAP SERPL CALCULATED.3IONS-SCNC: 5 MMOL/L (ref 4–13)
AST SERPL W P-5'-P-CCNC: 27 U/L (ref 5–45)
BACTERIA UR QL AUTO: ABNORMAL /HPF
BASOPHILS # BLD AUTO: 0.05 THOUSANDS/ΜL (ref 0–0.1)
BASOPHILS NFR BLD AUTO: 1 % (ref 0–1)
BILIRUB SERPL-MCNC: 0.64 MG/DL (ref 0.2–1)
BILIRUB UR QL STRIP: NEGATIVE
BUN SERPL-MCNC: 18 MG/DL (ref 5–25)
BUN SERPL-MCNC: 19 MG/DL (ref 5–25)
CALCIUM SERPL-MCNC: 9.5 MG/DL (ref 8.3–10.1)
CALCIUM SERPL-MCNC: 9.6 MG/DL (ref 8.3–10.1)
CHLORIDE SERPL-SCNC: 100 MMOL/L (ref 100–108)
CHLORIDE SERPL-SCNC: 97 MMOL/L (ref 100–108)
CHOLEST SERPL-MCNC: 142 MG/DL (ref 50–200)
CLARITY UR: CLEAR
CO2 SERPL-SCNC: 30 MMOL/L (ref 21–32)
CO2 SERPL-SCNC: 31 MMOL/L (ref 21–32)
COLOR UR: YELLOW
CREAT SERPL-MCNC: 0.77 MG/DL (ref 0.6–1.3)
CREAT SERPL-MCNC: 0.9 MG/DL (ref 0.6–1.3)
CREAT UR-MCNC: 167 MG/DL
EOSINOPHIL # BLD AUTO: 0.28 THOUSAND/ΜL (ref 0–0.61)
EOSINOPHIL NFR BLD AUTO: 3 % (ref 0–6)
ERYTHROCYTE [DISTWIDTH] IN BLOOD BY AUTOMATED COUNT: 12.8 % (ref 11.6–15.1)
EST. AVERAGE GLUCOSE BLD GHB EST-MCNC: 140 MG/DL
GFR SERPL CREATININE-BSD FRML MDRD: 65 ML/MIN/1.73SQ M
GFR SERPL CREATININE-BSD FRML MDRD: 78 ML/MIN/1.73SQ M
GLUCOSE P FAST SERPL-MCNC: 95 MG/DL (ref 65–99)
GLUCOSE SERPL-MCNC: 143 MG/DL (ref 65–140)
GLUCOSE UR STRIP-MCNC: NEGATIVE MG/DL
HBA1C MFR BLD: 6.5 % (ref 4.2–6.3)
HCT VFR BLD AUTO: 37 % (ref 34.8–46.1)
HDLC SERPL-MCNC: 44 MG/DL (ref 40–60)
HGB BLD-MCNC: 12.6 G/DL (ref 11.5–15.4)
HGB UR QL STRIP.AUTO: NEGATIVE
IMM GRANULOCYTES # BLD AUTO: 0.03 THOUSAND/UL (ref 0–0.2)
IMM GRANULOCYTES NFR BLD AUTO: 0 % (ref 0–2)
KETONES UR STRIP-MCNC: NEGATIVE MG/DL
LDLC SERPL CALC-MCNC: 67 MG/DL (ref 0–100)
LEUKOCYTE ESTERASE UR QL STRIP: ABNORMAL
LYMPHOCYTES # BLD AUTO: 2.71 THOUSANDS/ΜL (ref 0.6–4.47)
LYMPHOCYTES NFR BLD AUTO: 30 % (ref 14–44)
MCH RBC QN AUTO: 30.2 PG (ref 26.8–34.3)
MCHC RBC AUTO-ENTMCNC: 34.1 G/DL (ref 31.4–37.4)
MCV RBC AUTO: 89 FL (ref 82–98)
MICROALBUMIN UR-MCNC: 21.4 MG/L (ref 0–20)
MICROALBUMIN/CREAT 24H UR: 13 MG/G CREATININE (ref 0–30)
MONOCYTES # BLD AUTO: 0.62 THOUSAND/ΜL (ref 0.17–1.22)
MONOCYTES NFR BLD AUTO: 7 % (ref 4–12)
NEUTROPHILS # BLD AUTO: 5.48 THOUSANDS/ΜL (ref 1.85–7.62)
NEUTS SEG NFR BLD AUTO: 59 % (ref 43–75)
NITRITE UR QL STRIP: NEGATIVE
NON-SQ EPI CELLS URNS QL MICRO: ABNORMAL /HPF
NONHDLC SERPL-MCNC: 98 MG/DL
NRBC BLD AUTO-RTO: 0 /100 WBCS
PH UR STRIP.AUTO: 6 [PH] (ref 4.5–8)
PLATELET # BLD AUTO: 303 THOUSANDS/UL (ref 149–390)
PMV BLD AUTO: 10.1 FL (ref 8.9–12.7)
POTASSIUM SERPL-SCNC: 3.1 MMOL/L (ref 3.5–5.3)
POTASSIUM SERPL-SCNC: 7.6 MMOL/L (ref 3.5–5.3)
PROT SERPL-MCNC: 7.4 G/DL (ref 6.4–8.2)
PROT UR STRIP-MCNC: NEGATIVE MG/DL
RBC # BLD AUTO: 4.17 MILLION/UL (ref 3.81–5.12)
RBC #/AREA URNS AUTO: ABNORMAL /HPF
SODIUM SERPL-SCNC: 135 MMOL/L (ref 136–145)
SODIUM SERPL-SCNC: 139 MMOL/L (ref 136–145)
SP GR UR STRIP.AUTO: 1.01 (ref 1–1.03)
TRIGL SERPL-MCNC: 153 MG/DL
UROBILINOGEN UR QL STRIP.AUTO: 0.2 E.U./DL
WBC # BLD AUTO: 9.17 THOUSAND/UL (ref 4.31–10.16)
WBC #/AREA URNS AUTO: ABNORMAL /HPF

## 2019-02-04 PROCEDURE — 82043 UR ALBUMIN QUANTITATIVE: CPT | Performed by: INTERNAL MEDICINE

## 2019-02-04 PROCEDURE — 93005 ELECTROCARDIOGRAM TRACING: CPT

## 2019-02-04 PROCEDURE — 83036 HEMOGLOBIN GLYCOSYLATED A1C: CPT

## 2019-02-04 PROCEDURE — 80061 LIPID PANEL: CPT

## 2019-02-04 PROCEDURE — 81001 URINALYSIS AUTO W/SCOPE: CPT | Performed by: EMERGENCY MEDICINE

## 2019-02-04 PROCEDURE — 99283 EMERGENCY DEPT VISIT LOW MDM: CPT

## 2019-02-04 PROCEDURE — 36415 COLL VENOUS BLD VENIPUNCTURE: CPT

## 2019-02-04 PROCEDURE — 80053 COMPREHEN METABOLIC PANEL: CPT

## 2019-02-04 PROCEDURE — 82570 ASSAY OF URINE CREATININE: CPT | Performed by: INTERNAL MEDICINE

## 2019-02-04 PROCEDURE — 80048 BASIC METABOLIC PNL TOTAL CA: CPT | Performed by: EMERGENCY MEDICINE

## 2019-02-04 PROCEDURE — 85025 COMPLETE CBC W/AUTO DIFF WBC: CPT | Performed by: EMERGENCY MEDICINE

## 2019-02-04 NOTE — TELEPHONE ENCOUNTER
rec'd page from [facility] regarding abnl BW results notable for K+ level of 7 6    Lab test is not hemolyzed and was repeated by  lab team to confirm critical value  Called pt's provided home/cell phone #, rec'd VM and LM on VM of cell phone that I will call back in 15 mins and on VM of home phone to go to nearest emergency room    Called pt's emergency contact listed in her chart() Jose F Nath and advised of emergent nature of my call and to contact me immediately via     ----------------------------  Called and was able to reach patient via phone and discussed BW results and need to go to ER Now for evaluation    Pt denies CP/SOB/palpitations or muscle aches/cramps      Has occ palpitations at nighttime but this is ongoing for months or more    Plan - ER now, pt is going to [facility] ER          ADT 21 order entered and called [facility] ER to notify of pt's arrival

## 2019-02-05 LAB
ATRIAL RATE: 72 BPM
P AXIS: 50 DEGREES
PR INTERVAL: 182 MS
QRS AXIS: 34 DEGREES
QRSD INTERVAL: 98 MS
QT INTERVAL: 412 MS
QTC INTERVAL: 451 MS
T WAVE AXIS: 32 DEGREES
VENTRICULAR RATE: 72 BPM

## 2019-02-05 PROCEDURE — 93010 ELECTROCARDIOGRAM REPORT: CPT | Performed by: INTERNAL MEDICINE

## 2019-02-05 NOTE — DISCHARGE INSTRUCTIONS
Serum potassium measurement   GENERAL INFORMATION:  What is this test?  This test measures the amount of potassium in blood  It is used to diagnose and manage disorders that affect the potassium balance in the body  Renal (kidney) disorders are the most common type of disorder affecting the body's potassium balance  What are other names for this test?  · Serum potassium level  Why do I need this test?  Laboratory tests may be done for many reasons  Tests are performed for routine health screenings or if a disease or toxicity is suspected  Lab tests may be used to determine if a medical condition is improving or worsening  Lab tests may also be used to measure the success or failure of a medication or treatment plan  Lab tests may be ordered for professional or legal reasons  You may need this test if you have:   · Acute coronary syndrome  · Atrial fibrillation  · Below normal body temperature  · Burn caused by heat  · CABG - Coronary artery bypass graft  · Decreased blood potassium  · Diabetes with ketoacidosis  · Diabetic hyperosmolar non-ketotic state  · Elevated blood potassium  · Heart failure  · Kidney failure  · Metabolic acidosis  · Metabolic alkalosis  · Rhabdomyolysis  · Ventricular arrhythmia  When and how often should I have this test?  When and how often laboratory tests are done may depend on many factors  The timing of laboratory tests may rely on the results or completion of other tests, procedures, or treatments  Lab tests may be performed immediately in an emergency, or tests may be delayed as a condition is treated or monitored  A test may be suggested or become necessary when certain signs or symptoms appear  Due to changes in the way your body naturally functions through the course of a day, lab tests may need to be performed at a certain time of day  If you have prepared for a test by changing your food or fluid intake, lab tests may be timed in accordance with those changes   Timing of tests may be based on increased and decreased levels of medications, drugs or other substances in the body  The age or gender of the person being tested may affect when and how often a lab test is required  Chronic or progressive conditions may need ongoing monitoring through the use of lab tests  Conditions that worsen and improve may also need frequent monitoring  Certain tests may be repeated to obtain a series of results, or tests may need to be repeated to confirm or disprove results  Timing and frequency of lab tests may vary if they are performed for professional or legal reasons  How should I get ready for the test?  Venous blood:   Before having blood collected, tell the person drawing your blood if you are allergic to latex  Tell the healthcare worker if you have a medical condition or are using a medication or supplement that causes excessive bleeding  Also tell the healthcare worker if you have felt nauseated, lightheaded, or have fainted while having blood drawn in the past   Umbilical cord blood:   Ask the healthcare worker for information about how to prepare for this test    How is the test done? A sample of venous or cord blood may be used for this test   Venous blood:   When a blood sample from a vein is needed, a vein in your arm is usually selected  A tourniquet (large rubber strap) may be secured above the vein  The skin over the vein will be cleaned, and a needle will be inserted  You will be asked to hold very still while your blood is collected  Blood will be collected into one or more tubes, and the tourniquet will be removed  When enough blood has been collected, the healthcare worker will take the needle out  A tourniquet may not be used for this test  Avoid opening and closing your fist while your blood is being collected  Umbilical cord blood:    To collect an umbilical cord blood sample after an infant is born, the healthcare worker may use a needle and syringe to draw blood from the umbilical cord while the cord is still attached to the infant  Blood samples may also be collected from the part of the umbilical cord that has been detached from the infant  After birth, an infant's body does not need the attached umbilical cord stump or its blood vessels, but they may be used temporarily for medical purposes  If the infant has a catheter inserted in a vessel of the umbilical cord, the blood sample may be collected through the existing catheter  How will the test feel? The amount of discomfort you feel will depend on many factors, including your sensitivity to pain  Communicate how you are feeling with the person doing the test  Inform the person doing the test if you feel that you cannot continue with the test   Venous blood:   During a blood draw, you may feel mild discomfort at the location where the blood sample is being collected  Umbilical cord blood: There are several different ways that a cord blood sample may be collected  Depending on the procedure used to obtain the sample, the test may be uncomfortable  Ask the healthcare worker to explain how the test may feel  What should I do after the test?  Venous blood:   After a blood sample is collected from your vein, a bandage, cotton ball, or gauze may be placed on the area where the needle was inserted  You may be asked to apply pressure to the area  Avoid strenuous exercise immediately after your blood draw  Contact your healthcare worker if you feel pain or see redness, swelling, or discharge from the puncture site  Umbilical cord blood:   Depending on the procedure used to obtain a sample of cord blood, there may be special instructions for you to follow  Ask the healthcare worker for any special instructions following this procedure  What are the risks? Blood: During a blood draw, a hematoma (blood-filled bump under the skin) or slight bleeding from the puncture site may occur   After a blood draw, a bruise or infection may occur at the puncture site  The person doing this test may need to perform it more than once  Talk to your healthcare worker if you have any concerns about the risks of this test   Umbilical cord blood: After the baby is born, umbilical cord blood may be collected in two ways  One method is to collect blood from the portion of the umbilical cord that is not attached to the baby  This method carries no risks  Umbilical cord blood may also be collected from a catheter which may be inserted into the babys umbilical artery or vein for medical purposes  Risks of collecting umbilical cord blood using this method include blood loss, infection, and air bubbles in the blood vessels  Additionally, a blood vessel spasm may occur, temporarily decreasing blood flow to a part of the babys body  The person doing this procedure may need to perform it more than once  Talk to your healthcare worker if you have any concerns about the risks of having umbilical cord blood collected using this method  What are normal results for this test?  Laboratory test results may vary depending on your age, gender, health history, the method used for the test, and many other factors  If your results are different from the results suggested below, this may not mean that you have a disease  Contact your healthcare worker if you have any questions  The following are considered to be normal results for this test:  · Adults: 3 5-5 mEq/L (3 5-5 mmol/L)  · Premature neonates, cord blood: 5-10 2 mEq/L (5-10 2 mmol/L)  · Premature neonates, 48 hours: 3-6 mEq/L (3-6 mmol/L)  · Neonates, cord blood: 5 6-12 mEq/L (5 6-12 mmol/L)  · Neonates: 3 7-5 9 mEq/L (3 7-5 9 mmol/L)  · Infants: 4 1-5 3 mEq/L (4 1-5 3 mmol/L)  · Children: 3 4-4 7 mEq/L (3 4-4 7 mmol/L)  What follow up should I do after this test?  Ask your healthcare worker how you will be informed of the test results   You may be asked to call for results, schedule an appointment to discuss results, or notified of results by mail  Follow up care varies depending on many factors related to your test  Sometimes there is no follow up after you have been notified of test results  At other times follow up may be suggested or necessary  Some examples of follow up care include changes to medication or treatment plans, referral to a specialist, more or less frequent monitoring, and additional tests or procedures  Talk with your healthcare worker about any concerns or questions you have regarding follow up care or instructions  Where can I get more information? Related Companies   ? American Heart Association - https://www strong com/  org  ? National Kidney Foundation - [employer] is  org    CARE AGREEMENT:   You have the right to help plan your care  To help with this plan, you must learn about your health condition and how it may be treated  You can then discuss treatment options with your caregivers  Work with them to decide what care may be used to treat you  You always have the right to refuse treatment  © Copyright "[employer] 2018  Information is for End User's use only and may not be sold, redistributed or otherwise used for commercial purposes  The above information is an  only  It is not intended as a substitute for medical advice for your individual conditions or treatments  Talk to your doctor, nurse or pharmacist before following any medical regimen to see if it is safe and effective for you

## 2019-02-05 NOTE — ED PROVIDER NOTES
History  Chief Complaint   Patient presents with    Abnormal Lab     hyperkalema was told by dr Cisneros Found to come to er       History provided by:  Patient  Medical Problem   Location:  Abnormal lab result   Quality:  Got a call that her potassium was high, pt has absolutely no symptoms  Severity:  Mild  Onset quality:  Gradual  Duration:  1 day  Timing:  Unable to specify  Progression:  Unable to specify  Chronicity:  New  Context:  Pt had routine outpatient blood work for her upcoming family doc appointment  Relieved by:  Nothing  Worsened by:  Nothing  Ineffective treatments:  None  Associated symptoms: no abdominal pain, no chest pain, no congestion, no diarrhea, no ear pain, no fatigue, no fever, no headaches, no loss of consciousness, no nausea, no rhinorrhea, no shortness of breath, no vomiting and no wheezing        Prior to Admission Medications   Prescriptions Last Dose Informant Patient Reported? Taking?    B-D UF III MINI PEN NEEDLES 31G X 5 MM MISC   No Yes   Sig: USE AS DIRECTED   Cranberry 600 MG TABS 2019 at Unknown time  Yes Yes   Sig: Take by mouth   Lancets (ACCU-CHEK SOFT TOUCH) lancets   No Yes   Sig: by Other route 2 (two) times a day   ONE TOUCH ULTRA TEST test strip   No Yes   Si each by Other route 2 (two) times a day Test   VICTOZA injection 2019 at Unknown time  No Yes   Sig: Inject 0 2 mL (1 2 mg total) under the skin daily   amLODIPine (NORVASC) 5 mg tablet 2019 at Unknown time  No Yes   Sig: Take 1 tablet (5 mg total) by mouth daily   aspirin 81 MG tablet 2/3/2019 at Unknown time  Yes Yes   Sig: Take by mouth   atorvastatin (LIPITOR) 10 mg tablet 2/3/2019 at Unknown time  No Yes   Sig: Take 1 tablet (10 mg total) by mouth daily   famotidine (PEPCID) 20 mg tablet 2019 at Unknown time  No Yes   Sig: Take 1 tablet (20 mg total) by mouth 2 (two) times a day   fexofenadine (ALLEGRA) 180 MG tablet 2019 at Unknown time  Yes Yes   Sig: Take by mouth   hydrochlorothiazide (HYDRODIURIL) 25 mg tablet 2/4/2019 at Unknown time  No Yes   Sig: Take 1 tablet (25 mg total) by mouth daily   metFORMIN (GLUCOPHAGE) 1000 MG tablet 2/4/2019 at Unknown time  No Yes   Sig: TAKE 1 TABLET BY MOUTH TWO  TIMES DAILY WITH MEALS   metoprolol succinate (TOPROL-XL) 50 mg 24 hr tablet 2/4/2019 at Unknown time  No Yes   Sig: Take 1 tablet (50 mg total) by mouth daily   nystatin-triamcinolone (MYCOLOG-II) ointment   No No   Sig: Apply topically 2 (two) times a day for 30 days prn      Facility-Administered Medications: None       Past Medical History:   Diagnosis Date    Carpal tunnel syndrome     Unspecified laterality  Resolved: 12/21/2016    Complex endometrial hyperplasia with atypia     Resolved: 11/03/17    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, without long-term current use of insulin    Normal delivery     1971 and 1973 sons    Post-menopausal bleeding     Resolved: 2011       Past Surgical History:   Procedure Laterality Date    CARPAL TUNNEL RELEASE      CARPAL TUNNEL RELEASE Left     CHOLECYSTECTOMY      COLONOSCOPY      Complete    DILATION AND CURETTAGE OF UTERUS      twice    ENDOMETRIAL BIOPSY      without cervical dilation    HYSTEROSCOPY      TUBAL LIGATION  1974       Family History   Problem Relation Age of Onset    No Known Problems Child     Arthritis Family     Cancer Family         bladder    Other Family         Cardiac disorder    Diabetes Family     Hypertension Family     Valvular heart disease Family     Breast cancer Neg Hx     Colon cancer Neg Hx     Ovarian cancer Neg Hx     Uterine cancer Neg Hx     Cervical cancer Neg Hx      I have reviewed and agree with the history as documented  Social History   Substance Use Topics    Smoking status: Never Smoker    Smokeless tobacco: Never Used    Alcohol use Yes      Comment: Social drinker        Review of Systems   Constitutional: Negative for fatigue and fever     HENT: Negative for congestion, ear pain and rhinorrhea  Respiratory: Negative for shortness of breath and wheezing  Cardiovascular: Negative for chest pain  Gastrointestinal: Negative for abdominal pain, diarrhea, nausea and vomiting  Neurological: Negative for loss of consciousness and headaches  All other systems reviewed and are negative  Physical Exam  Physical Exam   Constitutional: She is oriented to person, place, and time  She appears well-developed and well-nourished  No distress  HENT:   Head: Normocephalic and atraumatic  Nose: Nose normal    Mouth/Throat: Oropharynx is clear and moist    Eyes: Pupils are equal, round, and reactive to light  Conjunctivae and EOM are normal    Neck: Normal range of motion  Neck supple  Cardiovascular: Normal rate, regular rhythm, normal heart sounds and intact distal pulses  Pulmonary/Chest: Effort normal and breath sounds normal  No respiratory distress  She has no wheezes  Abdominal: Soft  Bowel sounds are normal  She exhibits no distension  There is no tenderness  Musculoskeletal: Normal range of motion  Neurological: She is alert and oriented to person, place, and time  No cranial nerve deficit  She exhibits normal muscle tone  Coordination normal    Skin: Skin is warm and dry  She is not diaphoretic  Psychiatric: She has a normal mood and affect  Nursing note and vitals reviewed        Vital Signs  ED Triage Vitals [02/04/19 1910]   Temperature Pulse Respirations Blood Pressure SpO2   98 °F (36 7 °C) 78 20 133/86 99 %      Temp Source Heart Rate Source Patient Position - Orthostatic VS BP Location FiO2 (%)   Oral Monitor -- Right arm --      Pain Score       --           Vitals:    02/04/19 1910   BP: 133/86   Pulse: 78       Visual Acuity      ED Medications  Medications - No data to display    Diagnostic Studies  Results Reviewed     Procedure Component Value Units Date/Time    Urine Microscopic [[id number]]  (Abnormal) Collected: 02/04/19 2007    Lab Status:  Final result Specimen:  Urine from Urine, Clean Catch Updated:  02/04/19 2025     RBC, UA None Seen /hpf      WBC, UA 2-4 (A) /hpf      Epithelial Cells Occasional /hpf      Bacteria, UA Occasional /hpf     UA w Reflex to Microscopic [[id number]]  (Abnormal) Collected:  02/04/19 2007    Lab Status:  Final result Specimen:  Urine from Urine, Clean Catch Updated:  02/04/19 2016     Color, UA Yellow     Clarity, UA Clear     Specific Gravity, UA 1 010     pH, UA 6 0     Leukocytes, UA Trace (A)     Nitrite, UA Negative     Protein, UA Negative mg/dl      Glucose, UA Negative mg/dl      Ketones, UA Negative mg/dl      Urobilinogen, UA 0 2 E U /dl      Bilirubin, UA Negative     Blood, UA Negative    Basic metabolic panel [[id number]]  (Abnormal) Collected:  02/04/19 1958    Lab Status:  Final result Specimen:  Blood from Arm, Right Updated:  02/04/19 2015     Sodium 139 mmol/L      Potassium 3 1 (L) mmol/L      Chloride 97 (L) mmol/L      CO2 31 mmol/L      ANION GAP 11 mmol/L      BUN 19 mg/dL      Creatinine 0 90 mg/dL      Glucose 143 (H) mg/dL      Calcium 9 5 mg/dL      eGFR 65 ml/min/1 73sq m     Narrative:         National Kidney Disease Education Program recommendations are as follows:  GFR calculation is accurate only with a steady state creatinine  Chronic Kidney disease less than 60 ml/min/1 73 sq  meters  Kidney failure less than 15 ml/min/1 73 sq  meters      CBC and differential [[id number]] Collected:  02/04/19 1958    Lab Status:  Final result Specimen:  Blood from Arm, Right Updated:  02/04/19 2009     WBC 9 17 Thousand/uL      RBC 4 17 Million/uL      Hemoglobin 12 6 g/dL      Hematocrit 37 0 %      MCV 89 fL      MCH 30 2 pg      MCHC 34 1 g/dL      RDW 12 8 %      MPV 10 1 fL      Platelets 942 Thousands/uL      nRBC 0 /100 WBCs      Neutrophils Relative 59 %      Immat GRANS % 0 %      Lymphocytes Relative 30 %      Monocytes Relative 7 %      Eosinophils Relative 3 % Basophils Relative 1 %      Neutrophils Absolute 5 48 Thousands/µL      Immature Grans Absolute 0 03 Thousand/uL      Lymphocytes Absolute 2 71 Thousands/µL      Monocytes Absolute 0 62 Thousand/µL      Eosinophils Absolute 0 28 Thousand/µL      Basophils Absolute 0 05 Thousands/µL                  No orders to display              Procedures  ECG 12 Lead Documentation  Date/Time: 2/4/2019 8:45 PM  Performed by: Edwar Schultz  Authorized by: Edwar Schultz     Indications / Diagnosis:  Palpitations   ECG reviewed by me, the ED Provider: yes    Patient location:  ED  Rate:     ECG rate:  72    ECG rate assessment: normal    Rhythm:     Rhythm: sinus rhythm    Conduction:     Conduction: normal    ST segments:     ST segments:  Normal  T waves:     T waves: non-specific             Phone Contacts  ED Phone Contact    ED Course                               MDM  Number of Diagnoses or Management Options  Abnormal laboratory test: new and requires workup     Amount and/or Complexity of Data Reviewed  Clinical lab tests: ordered and reviewed  Review and summarize past medical records: yes  Independent visualization of images, tracings, or specimens: yes        Disposition  Final diagnoses:   Abnormal laboratory test     Time reflects when diagnosis was documented in both MDM as applicable and the Disposition within this note     Time User Action Codes Description Comment    2/4/2019  8:47 PM Gayle Garcia Add [R89 9] Abnormal laboratory test       ED Disposition     ED Disposition Condition Date/Time Comment    Discharge  Mon Feb 4, 2019  8:47 PM Neel Clarke discharge to home/self care      Condition at discharge: Good        Follow-up Information     Follow up With Specialties Details Why 9[address] Luis Donahue MD Internal Medicine   [address] [place] [address] [phone]            Patient's Medications   Discharge Prescriptions    No medications on file     No discharge procedures on file     ED Provider  Electronically Signed by           Stephanie Morrison MD  02/04/19 0548

## 2019-02-13 ENCOUNTER — OFFICE VISIT (OUTPATIENT)
Dept: INTERNAL MEDICINE CLINIC | Age: 69
End: 2019-02-13
Payer: MEDICARE

## 2019-02-13 VITALS
TEMPERATURE: 97.8 F | HEART RATE: 72 BPM | HEIGHT: 66 IN | BODY MASS INDEX: 32.08 KG/M2 | DIASTOLIC BLOOD PRESSURE: 75 MMHG | SYSTOLIC BLOOD PRESSURE: 130 MMHG | WEIGHT: 199.6 LBS | OXYGEN SATURATION: 96 %

## 2019-02-13 DIAGNOSIS — L50.9 HIVES: ICD-10-CM

## 2019-02-13 DIAGNOSIS — E11.9 CONTROLLED TYPE 2 DIABETES MELLITUS WITHOUT COMPLICATION, WITH LONG-TERM CURRENT USE OF INSULIN (HCC): ICD-10-CM

## 2019-02-13 DIAGNOSIS — E78.5 HYPERLIPIDEMIA, UNSPECIFIED HYPERLIPIDEMIA TYPE: ICD-10-CM

## 2019-02-13 DIAGNOSIS — E66.9 OBESITY (BMI 30.0-34.9): ICD-10-CM

## 2019-02-13 DIAGNOSIS — Z79.4 CONTROLLED TYPE 2 DIABETES MELLITUS WITHOUT COMPLICATION, WITH LONG-TERM CURRENT USE OF INSULIN (HCC): ICD-10-CM

## 2019-02-13 DIAGNOSIS — E11.49 DIABETES MELLITUS TYPE 2 WITH NEUROLOGICAL MANIFESTATIONS (HCC): ICD-10-CM

## 2019-02-13 DIAGNOSIS — H25.9 AGE-RELATED CATARACT OF BOTH EYES, UNSPECIFIED AGE-RELATED CATARACT TYPE: ICD-10-CM

## 2019-02-13 DIAGNOSIS — I10 ESSENTIAL HYPERTENSION: ICD-10-CM

## 2019-02-13 DIAGNOSIS — E13.9 DIABETES 1.5, MANAGED AS TYPE 2 (HCC): ICD-10-CM

## 2019-02-13 DIAGNOSIS — Z01.818 PRE-OPERATIVE EXAMINATION: Primary | ICD-10-CM

## 2019-02-13 DIAGNOSIS — R10.13 DYSPEPSIA: ICD-10-CM

## 2019-02-13 PROCEDURE — 99214 OFFICE O/P EST MOD 30 MIN: CPT | Performed by: INTERNAL MEDICINE

## 2019-02-13 RX ORDER — HYDROCHLOROTHIAZIDE 25 MG/1
25 TABLET ORAL DAILY
Qty: 90 TABLET | Refills: 3 | Status: SHIPPED | OUTPATIENT
Start: 2019-02-13 | End: 2019-12-17 | Stop reason: SDUPTHER

## 2019-02-13 RX ORDER — AMLODIPINE BESYLATE 5 MG/1
5 TABLET ORAL DAILY
Qty: 90 TABLET | Refills: 3 | Status: SHIPPED | OUTPATIENT
Start: 2019-02-13 | End: 2019-12-17 | Stop reason: SDUPTHER

## 2019-02-13 RX ORDER — FAMOTIDINE 20 MG/1
20 TABLET, FILM COATED ORAL 2 TIMES DAILY
Qty: 180 TABLET | Refills: 3 | Status: SHIPPED | OUTPATIENT
Start: 2019-02-13 | End: 2019-12-17 | Stop reason: SDUPTHER

## 2019-02-13 RX ORDER — FEXOFENADINE HCL 180 MG/1
180 TABLET ORAL DAILY
Qty: 1 TABLET | Refills: 3 | Status: SHIPPED | OUTPATIENT
Start: 2019-02-13 | End: 2019-12-17 | Stop reason: SDUPTHER

## 2019-02-13 RX ORDER — ATORVASTATIN CALCIUM 10 MG/1
10 TABLET, FILM COATED ORAL DAILY
Qty: 90 TABLET | Refills: 3 | Status: SHIPPED | OUTPATIENT
Start: 2019-02-13 | End: 2019-12-17 | Stop reason: SDUPTHER

## 2019-02-13 NOTE — ASSESSMENT & PLAN NOTE
Is here for preop exam for cataract surgery on the left to be done 03/06/2019 by [facility]

## 2019-02-13 NOTE — ASSESSMENT & PLAN NOTE
Lab Results   Component Value Date    HGBA1C 6 5 (H) 02/04/2019       No results for input(s): POCGLU in the last 72 hours      Blood Sugar Average: Last 72 hrs:   she continues to have mild numbness of her feet but no sores

## 2019-02-13 NOTE — ASSESSMENT & PLAN NOTE
She has gained some weight since fall and notes that she is not as physically active as she usually is winter    She was counseled on diet and exercise

## 2019-02-13 NOTE — PROGRESS NOTES
Assessment/Plan:    Pre-operative examination   Is here for preop exam for cataract surgery on the left to be done 03/06/2019 by Miguel Garcia    Hypertension  Patient's blood pressure is well controlled with Norvasc 5, hydrochlorothiazide and metoprolol 50    Controlled diabetes mellitus ([facility] [place] [address] )  Lab Results   Component Value Date    HGBA1C 6 5 (H) 02/04/2019       No results for input(s): POCGLU in the last 72 hours  Blood Sugar Average: Last 72 hrs:   her blood sugar also remains well controlled although she has noticed over the winter that it is drifting up a little  And now on her insurance will not pay for Byetta but will pay for trulicity so will start on an equivalent dose    Diabetes mellitus type 2 with neurological manifestations ([facility] [place] [address] )  Lab Results   Component Value Date    HGBA1C 6 5 (H) 02/04/2019       No results for input(s): POCGLU in the last 72 hours  Blood Sugar Average: Last 72 hrs:   she continues to have mild numbness of her feet but no sores    Hyperlipidemia  Her last LDL was 67 on a statin    Class 1 obesity due to excess calories with serious comorbidity and body mass index (BMI) of 32 0 to 32 9 in adult  She has gained some weight since fall and notes that she is not as physically active as she usually is winter  She was counseled on diet and exercise       Diagnoses and all orders for this visit:    Pre-operative examination  -     Pulse oximetry, spot; Future    Age-related cataract of both eyes, unspecified age-related cataract type    Diabetes mellitus type 2 with neurological manifestations (HCC)  -     Dulaglutide 1 5 MG/0 5ML SOPN; Inject 0 5 mL (1 5 mg total) under the skin once a week    Essential hypertension  -     amLODIPine (NORVASC) 5 mg tablet; Take 1 tablet (5 mg total) by mouth daily  -     hydrochlorothiazide (HYDRODIURIL) 25 mg tablet;  Take 1 tablet (25 mg total) by mouth daily    Hyperlipidemia, unspecified hyperlipidemia type  -     atorvastatin (LIPITOR) 10 mg tablet; Take 1 tablet (10 mg total) by mouth daily    Dyspepsia  -     famotidine (PEPCID) 20 mg tablet; Take 1 tablet (20 mg total) by mouth 2 (two) times a day    Diabetes 1 5, managed as type 2 (HCC)  -     ONE TOUCH ULTRA TEST test strip; 1 each by Other route 2 (two) times a day Test    Controlled type 2 diabetes mellitus without complication, with long-term current use of insulin (HCC)  -     Insulin Pen Needle (B-D UF III MINI PEN NEEDLES) 31G X 5 MM MISC; by Other route once a week Use as directed    Hives  -     fexofenadine (ALLEGRA) 180 MG tablet; Take 1 tablet (180 mg total) by mouth daily    Obesity (BMI 30 0-34  9)          Subjective:      Patient ID: Phoebe Salazar is a 78 y o  female  Patient is here for age-related cataract surgery clearance and medically cleared for same  However she is also due for a well checkup    Diabetes   She presents for her follow-up diabetic visit  She has type 2 diabetes mellitus  No MedicAlert identification noted  Her disease course has been stable  There are no hypoglycemic associated symptoms  Pertinent negatives for hypoglycemia include no headaches  Associated symptoms include blurred vision and foot paresthesias  Pertinent negatives for diabetes include no chest pain, no foot ulcerations, no polydipsia, no polyphagia and no weight loss  Symptoms are stable  Pertinent negatives for diabetic complications include no CVA or PVD  Risk factors for coronary artery disease include diabetes mellitus, dyslipidemia, family history, hypertension, obesity and post-menopausal  Current diabetic treatment includes diet and oral agent (monotherapy) (Injectable GP L)  She is compliant with treatment most of the time  Her weight is stable  She is following a diabetic, generally healthy, low salt and low fat/cholesterol diet  Meal planning includes avoidance of concentrated sweets  She has had a previous visit with a dietitian  She participates in exercise intermittently   Her home blood glucose trend is fluctuating minimally  An ACE inhibitor/angiotensin II receptor blocker is not being taken  She sees a podiatrist Eye exam is current  Hypertension   This is a chronic problem  The current episode started more than 1 year ago  The problem is controlled  Associated symptoms include blurred vision  Pertinent negatives include no anxiety, chest pain, headaches, malaise/fatigue, orthopnea, peripheral edema, PND or shortness of breath  There are no associated agents to hypertension  Past treatments include calcium channel blockers, beta blockers, diuretics and lifestyle changes  The current treatment provides significant improvement  Compliance problems include exercise  There is no history of kidney disease, CAD/MI, CVA or PVD  Review of Systems   Constitutional: Negative for malaise/fatigue and weight loss  Eyes: Positive for blurred vision  Respiratory: Negative for shortness of breath  Cardiovascular: Negative for chest pain, orthopnea and PND  Endocrine: Negative for polydipsia and polyphagia  Neurological: Negative for headaches  Objective:      /75   Pulse 72   Temp 97 8 °F (36 6 °C) (Tympanic)   Ht 5' 6" (1 676 m)   Wt 90 5 kg (199 lb 9 6 oz)   SpO2 96%   BMI 32 22 kg/m²          Physical Exam  BMI Counseling: Body mass index is 32 22 kg/m²  Discussed the patient's BMI with her  The BMI is above average  BMI counseling and education was provided to the patient  Exercise recommendations include moderate aerobic physical activity for 150 minutes/week  BMI Counseling: Body mass index is 32 22 kg/m²  Discussed the patient's BMI with her  The BMI is above average  BMI counseling and education was provided to the patient  Exercise recommendations include moderate aerobic physical activity for 150 minutes/week

## 2019-02-13 NOTE — ASSESSMENT & PLAN NOTE
Lab Results   Component Value Date    HGBA1C 6 5 (H) 02/04/2019       No results for input(s): POCGLU in the last 72 hours  Blood Sugar Average: Last 72 hrs:   her blood sugar also remains well controlled although she has noticed over the winter that it is drifting up a little    And now on her insurance will not pay for Byetta but will pay for trulicity so will start on an equivalent dose

## 2019-02-13 NOTE — PATIENT INSTRUCTIONS
Obesity   AMBULATORY CARE:   Obesity  is when your body mass index (BMI) is greater than 30  Your healthcare provider will use your height and weight to measure your BMI  The risks of obesity include  many health problems, such as injuries or physical disability  You may need tests to check for the following:  · Diabetes     · High blood pressure or high cholesterol     · Heart disease     · Gallbladder or liver disease     · Cancer of the colon, breast, prostate, liver, or kidney     · Sleep apnea     · Arthritis or gout  Seek care immediately if:   · You have a severe headache, confusion, or difficulty speaking  · You have weakness on one side of your body  · You have chest pain, sweating, or shortness of breath  Contact your healthcare provider if:   · You have symptoms of gallbladder or liver disease, such as pain in your upper abdomen  · You have knee or hip pain and discomfort while walking  · You have symptoms of diabetes, such as intense hunger and thirst, and frequent urination  · You have symptoms of sleep apnea, such as snoring or daytime sleepiness  · You have questions or concerns about your condition or care  Treatment for obesity  focuses on helping you lose weight to improve your health  Even a small decrease in BMI can reduce the risk for many health problems  Your healthcare provider will help you set a weight-loss goal   · Lifestyle changes  are the first step in treating obesity  These include making healthy food choices and getting regular physical activity  Your healthcare provider may suggest a weight-loss program that involves coaching, education, and therapy  · Medicine  may help you lose weight when it is used with a healthy diet and physical activity  · Surgery  can help you lose weight if you are very obese and have other health problems  There are several types of weight-loss surgery  Ask your healthcare provider for more information    Be successful losing weight:   · Set small, realistic goals  An example of a small goal is to walk for 20 minutes 5 days a week  [place]ther goal is to lose 5% of your body weight  · Tell friends, family members, and coworkers about your goals  and ask for their support  Ask a friend to lose weight with you, or join a weight-loss support group  · Identify foods or triggers that may cause you to overeat , and find ways to avoid them  Remove tempting high-calorie foods from your home and workplace  Place a bowl of fresh fruit on your kitchen counter  If stress causes you to eat, then find other ways to cope with stress  · Keep a diary to track what you eat and drink  Also write down how many minutes of physical activity you do each day  Weigh yourself once a week and record it in your diary  Eating changes: You will need to eat 500 to 1,000 fewer calories each day than you currently eat to lose 1 to 2 pounds a week  The following changes will help you cut calories:  · Eat smaller portions  Use small plates, no larger than 9 inches in diameter  Fill your plate half full of fruits and vegetables  Measure your food using measuring cups until you know what a serving size looks like  · Eat 3 meals and 1 or 2 snacks each day  Plan your meals in advance  Cory Huston and eat at home most of the time  Eat slowly  · Eat fruits and vegetables at every meal   They are low in calories and high in fiber, which makes you feel full  Do not add butter, margarine, or cream sauce to vegetables  Use herbs to season steamed vegetables  · Eat less fat and fewer fried foods  Eat more baked or grilled chicken and fish  These protein sources are lower in calories and fat than red meat  Limit fast food  Dress your salads with olive oil and vinegar instead of bottled dressing  · Limit the amount of sugar you eat  Do not drink sugary beverages  Limit alcohol  Activity changes:  Physical activity is good for your body in many ways   It helps you burn calories and build strong muscles  It decreases stress and depression, and improves your mood  It can also help you sleep better  Talk to your healthcare provider before you begin an exercise program   · Exercise for at least 30 minutes 5 days a week  Start slowly  Set aside time each day for physical activity that you enjoy and that is convenient for you  It is best to do both weight training and an activity that increases your heart rate, such as walking, bicycling, or swimming  · Find ways to be more active  Do yard work and housecleaning  Walk up the stairs instead of using elevators  Spend your leisure time going to events that require walking, such as outdoor festivals or fairs  This extra physical activity can help you lose weight and keep it off  Follow up with your healthcare provider as directed: You may need to meet with a dietitian  Write down your questions so you remember to ask them during your visits  © 2017 [address] Information is for End User's use only and may not be sold, redistributed or otherwise used for commercial purposes  All illustrations and images included in CareNotes® are the copyrighted property of [employer]  or Armen Chopra  The above information is an  only  It is not intended as medical advice for individual conditions or treatments  Talk to your doctor, nurse or pharmacist before following any medical regimen to see if it is safe and effective for you  Heart Healthy Diet   AMBULATORY CARE:   A heart healthy diet  is an eating plan low in total fat, unhealthy fats, and sodium (salt)  A heart healthy diet helps decrease your risk for heart disease and stroke  Limit the amount of fat you eat to 25% to 35% of your total daily calories  Limit sodium to less than 2,300 mg each day  Healthy fats:  Healthy fats can help improve cholesterol levels   The risk for heart disease is decreased when cholesterol levels are normal  Choose healthy fats, such as the following:  · Unsaturated fat  is found in foods such as soybean, canola, olive, corn, and safflower oils  It is also found in soft tub margarine that is made with liquid vegetable oil  · Omega-3 fat  is found in certain fish, such as salmon, tuna, and trout, and in walnuts and flaxseed  Unhealthy fats:  Unhealthy fats can cause unhealthy cholesterol levels in your blood and increase your risk of heart disease  Limit unhealthy fats, such as the following:  · Cholesterol  is found in animal foods, such as eggs and lobster, and in dairy products made from whole milk  Limit cholesterol to less than 300 milligrams (mg) each day  You may need to limit cholesterol to 200 mg each day if you have heart disease  · Saturated fat  is found in meats, such as riggins and hamburger  It is also found in chicken or turkey skin, whole milk, and butter  Limit saturated fat to less than 7% of your total daily calories  Limit saturated fat to less than 6% if you have heart disease or are at increased risk for it  · Trans fat  is found in packaged foods, such as potato chips and cookies  It is also in hard margarine, some fried foods, and shortening  Avoid trans fats as much as possible    Heart healthy foods and drinks to include:  Ask your dietitian or healthcare provider how many servings to have from each of the following food groups:  · Grains:      ¨ Whole-wheat breads, cereals, and pastas, and brown rice    ¨ Low-fat, low-sodium crackers and chips    · Vegetables:      ¨ Broccoli, green beans, green peas, and spinach    ¨ Collards, kale, and lima beans    ¨ Carrots, sweet potatoes, tomatoes, and peppers    ¨ Canned vegetables with no salt added    · Fruits:      ¨ Bananas, peaches, pears, and pineapple    ¨ Grapes, raisins, and dates    ¨ Oranges, tangerines, grapefruit, orange juice, and grapefruit juice    ¨ Apricots, mangoes, melons, and papaya    ¨ Raspberries and strawberries    ¨ Canned fruit with no added sugar    · Low-fat dairy products:      ¨ Nonfat (skim) milk, 1% milk, and low-fat almond, cashew, or soy milks fortified with calcium    ¨ Low-fat cheese, regular or frozen yogurt, and cottage cheese    · Meats and proteins , such as lean cuts of beef and pork (loin, leg, round), skinless chicken and turkey, legumes, soy products, egg whites, and nuts  Foods and drinks to limit or avoid:  Ask your dietitian or healthcare provider about these and other foods that are high in unhealthy fat, sodium, and sugar:  · Snack or packaged foods , such as frozen dinners, cookies, macaroni and cheese, and cereals with more than 300 mg of sodium per serving    · Canned or dry mixes  for cakes, soups, sauces, or gravies    · Vegetables with added sodium , such as instant potatoes, vegetables with added sauces, or regular canned vegetables    · Other foods high in sodium , such as ketchup, barbecue sauce, salad dressing, pickles, olives, soy sauce, and miso    · High-fat dairy foods  such as whole or 2% milk, cream cheese, or sour cream, and cheeses     · High-fat protein foods  such as high-fat cuts of beef (T-bone steaks, ribs), chicken or turkey with skin, and organ meats, such as liver    · Cured or smoked meats , such as hot dogs, riggins, and sausage    · Unhealthy fats and oils , such as butter, stick margarine, shortening, and cooking oils such as coconut or palm oil    · Food and drinks high in sugar , such as soft drinks (soda), sports drinks, sweetened tea, candy, cake, cookies, pies, and doughnuts  Other diet guidelines to follow:   · Eat more foods containing omega-3 fats  Eat fish high in omega-3 fats at least 2 times a week  · Limit alcohol  Too much alcohol can damage your heart and raise your blood pressure  Women should limit alcohol to 1 drink a day  Men should limit alcohol to 2 drinks a day   A drink of alcohol is 12 ounces of beer, 5 ounces of wine, or 1½ ounces of liquor  · Choose low-sodium foods  High-sodium foods can lead to high blood pressure  Add little or no salt to food you prepare  Use herbs and spices in place of salt  · Eat more fiber  to help lower cholesterol levels  Eat at least 5 servings of fruits and vegetables each day  Eat 3 ounces of whole-grain foods each day  Legumes (beans) are also a good source of fiber  Lifestyle guidelines:   · Do not smoke  Nicotine and other chemicals in cigarettes and cigars can cause lung and heart damage  Ask your healthcare provider for information if you currently smoke and need help to quit  E-cigarettes or smokeless tobacco still contain nicotine  Talk to your healthcare provider before you use these products  · Exercise regularly  to help you maintain a healthy weight and improve your blood pressure and cholesterol levels  Ask your healthcare provider about the best exercise plan for you  Do not start an exercise program without asking your healthcare provider  Follow up with your healthcare provider as directed:  Write down your questions so you remember to ask them during your visits  © 2017 [facility] [facility] Information is for End User's use only and may not be sold, redistributed or otherwise used for commercial purposes  All illustrations and images included in CareNotes® are the copyrighted property of A [employer] M [employer]  or Armen Chopra  The above information is an  only  It is not intended as medical advice for individual conditions or treatments  Talk to your doctor, nurse or pharmacist before following any medical regimen to see if it is safe and effective for you  Obesity   AMBULATORY CARE:   Obesity  is when your body mass index (BMI) is greater than 30  Your healthcare provider will use your height and weight to measure your BMI  The risks of obesity include  many health problems, such as injuries or physical disability   You may need tests to check for the following:  · Diabetes     · High blood pressure or high cholesterol     · Heart disease     · Gallbladder or liver disease     · Cancer of the colon, breast, prostate, liver, or kidney     · Sleep apnea     · Arthritis or gout  Seek care immediately if:   · You have a severe headache, confusion, or difficulty speaking  · You have weakness on one side of your body  · You have chest pain, sweating, or shortness of breath  Contact your healthcare provider if:   · You have symptoms of gallbladder or liver disease, such as pain in your upper abdomen  · You have knee or hip pain and discomfort while walking  · You have symptoms of diabetes, such as intense hunger and thirst, and frequent urination  · You have symptoms of sleep apnea, such as snoring or daytime sleepiness  · You have questions or concerns about your condition or care  Treatment for obesity  focuses on helping you lose weight to improve your health  Even a small decrease in BMI can reduce the risk for many health problems  Your healthcare provider will help you set a weight-loss goal   · Lifestyle changes  are the first step in treating obesity  These include making healthy food choices and getting regular physical activity  Your healthcare provider may suggest a weight-loss program that involves coaching, education, and therapy  · Medicine  may help you lose weight when it is used with a healthy diet and physical activity  · Surgery  can help you lose weight if you are very obese and have other health problems  There are several types of weight-loss surgery  Ask your healthcare provider for more information  Be successful losing weight:   · Set small, realistic goals  An example of a small goal is to walk for 20 minutes 5 days a week  [place] goal is to lose 5% of your body weight  · Tell friends, family members, and coworkers about your goals  and ask for their support   Ask a friend to lose weight with you, or join a weight-loss support group  · Identify foods or triggers that may cause you to overeat , and find ways to avoid them  Remove tempting high-calorie foods from your home and workplace  Place a bowl of fresh fruit on your kitchen counter  If stress causes you to eat, then find other ways to cope with stress  · Keep a diary to track what you eat and drink  Also write down how many minutes of physical activity you do each day  Weigh yourself once a week and record it in your diary  Eating changes: You will need to eat 500 to 1,000 fewer calories each day than you currently eat to lose 1 to 2 pounds a week  The following changes will help you cut calories:  · Eat smaller portions  Use small plates, no larger than 9 inches in diameter  Fill your plate half full of fruits and vegetables  Measure your food using measuring cups until you know what a serving size looks like  · Eat 3 meals and 1 or 2 snacks each day  Plan your meals in advance  Karrie Dumont and eat at home most of the time  Eat slowly  · Eat fruits and vegetables at every meal   They are low in calories and high in fiber, which makes you feel full  Do not add butter, margarine, or cream sauce to vegetables  Use herbs to season steamed vegetables  · Eat less fat and fewer fried foods  Eat more baked or grilled chicken and fish  These protein sources are lower in calories and fat than red meat  Limit fast food  Dress your salads with olive oil and vinegar instead of bottled dressing  · Limit the amount of sugar you eat  Do not drink sugary beverages  Limit alcohol  Activity changes:  Physical activity is good for your body in many ways  It helps you burn calories and build strong muscles  It decreases stress and depression, and improves your mood  It can also help you sleep better  Talk to your healthcare provider before you begin an exercise program   · Exercise for at least 30 minutes 5 days a week  Start slowly   Set aside time each day for physical activity that you enjoy and that is convenient for you  It is best to do both weight training and an activity that increases your heart rate, such as walking, bicycling, or swimming  · Find ways to be more active  Do yard work and housecleaning  Walk up the stairs instead of using elevators  Spend your leisure time going to events that require walking, such as outdoor festivals or fairs  This extra physical activity can help you lose weight and keep it off  Follow up with your healthcare provider as directed: You may need to meet with a dietitian  Write down your questions so you remember to ask them during your visits  © 2017 [address] Information is for End User's use only and may not be sold, redistributed or otherwise used for commercial purposes  All illustrations and images included in CareNotes® are the copyrighted property of [employer]  or Armen Chopra  The above information is an  only  It is not intended as medical advice for individual conditions or treatments  Talk to your doctor, nurse or pharmacist before following any medical regimen to see if it is safe and effective for you  Weight Management   AMBULATORY CARE:   Why it is important to manage your weight:  Being overweight increases your risk of health conditions such as heart disease, high blood pressure, type 2 diabetes, and certain types of cancer  It can also increase your risk for osteoarthritis, sleep apnea, and other respiratory problems  Aim for a slow, steady weight loss  Even a small amount of weight loss can lower your risk of health problems  How to lose weight safely:  A safe and healthy way to lose weight is to eat fewer calories and get regular exercise  You can lose up about 1 pound a week by decreasing the number of calories you eat by 500 calories each day   You can decrease calories by eating smaller portion sizes or by cutting out high-calorie foods  Read labels to find out how many calories are in the foods you eat  You can also burn calories with exercise such as walking, swimming, or biking  You will be more likely to keep weight off if you make these changes part of your lifestyle  Healthy meal plan for weight management:  A healthy meal plan includes a variety of foods, contains fewer calories, and helps you stay healthy  A healthy meal plan includes the following:  · Eat whole-grain foods more often  A healthy meal plan should contain fiber  Fiber is the part of grains, fruits, and vegetables that is not broken down by your body  Whole-grain foods are healthy and provide extra fiber in your diet  Some examples of whole-grain foods are whole-wheat breads and pastas, oatmeal, brown rice, and bulgur  · Eat a variety of vegetables every day  Include dark, leafy greens such as spinach, kale, prem greens, and mustard greens  Eat yellow and orange vegetables such as carrots, sweet potatoes, and winter squash  · Eat a variety of fruits every day  Choose fresh or canned fruit (canned in its own juice or light syrup) instead of juice  Fruit juice has very little or no fiber  · Eat low-fat dairy foods  Drink fat-free (skim) milk or 1% milk  Eat fat-free yogurt and low-fat cottage cheese  Try low-fat cheeses such as mozzarella and other reduced-fat cheeses  · Choose meat and other protein foods that are low in fat  Choose beans or other legumes such as split peas or lentils  Choose fish, skinless poultry (chicken or turkey), or lean cuts of red meat (beef or pork)  Before you cook meat or poultry, cut off any visible fat  · Use less fat and oil  Try baking foods instead of frying them  Add less fat, such as margarine, sour cream, regular salad dressing and mayonnaise to foods  Eat fewer high-fat foods  Some examples of high-fat foods include french fries, doughnuts, ice cream, and cakes  · Eat fewer sweets    Limit foods and drinks that are high in sugar  This includes candy, cookies, regular soda, and sweetened drinks  Ways to decrease calories:   · Eat smaller portions  ¨ Use a small plate with smaller servings  ¨ Do not eat second helpings  ¨ When you eat at a restaurant, ask for a box and place half of your meal in the box before you eat  ¨ Share an entrée with someone else  · Replace high-calorie snacks with healthy, low-calorie snacks  ¨ Choose fresh fruit, vegetables, fat-free rice cakes, or air-popped popcorn instead of potato chips, nuts, or chocolate  ¨ Choose water or calorie-free drinks instead of soda or sweetened drinks  · Eat regular meals  Skipping meals can lead to overeating later in the day  Eat a healthy snack in place of a meal if you do not have time to eat a regular meal      · Do not shop for groceries when you are hungry  You may be more likely to make unhealthy food choices  Take a grocery list of healthy foods and shop after you have eaten  Exercise:  Exercise at least 30 minutes per day on most days of the week  Some examples of exercise include walking, biking, dancing, and swimming  You can also fit in more physical activity by taking the stairs instead of the elevator or parking farther away from stores  Ask your healthcare provider about the best exercise plan for you  Other things to consider as you try to lose weight:   · Be aware of situations that may give you the urge to overeat, such as eating while watching television  Find ways to avoid these situations  For example, read a book, go for a walk, or do crafts  · Meet with a weight loss support group or friends who are also trying to lose weight  This may help you stay motivated to continue working on your weight loss goals  © 2017 [address] Information is for End User's use only and may not be sold, redistributed or otherwise used for commercial purposes   All illustrations and images included in CareNotes® are the copyrighted property of [employer]  or Armen Chopra  The above information is an  only  It is not intended as medical advice for individual conditions or treatments  Talk to your doctor, nurse or pharmacist before following any medical regimen to see if it is safe and effective for you  Heart Healthy Diet   AMBULATORY CARE:   A heart healthy diet  is an eating plan low in total fat, unhealthy fats, and sodium (salt)  A heart healthy diet helps decrease your risk for heart disease and stroke  Limit the amount of fat you eat to 25% to 35% of your total daily calories  Limit sodium to less than 2,300 mg each day  Healthy fats:  Healthy fats can help improve cholesterol levels  The risk for heart disease is decreased when cholesterol levels are normal  Choose healthy fats, such as the following:  · Unsaturated fat  is found in foods such as soybean, canola, olive, corn, and safflower oils  It is also found in soft tub margarine that is made with liquid vegetable oil  · Omega-3 fat  is found in certain fish, such as salmon, tuna, and trout, and in walnuts and flaxseed  Unhealthy fats:  Unhealthy fats can cause unhealthy cholesterol levels in your blood and increase your risk of heart disease  Limit unhealthy fats, such as the following:  · Cholesterol  is found in animal foods, such as eggs and lobster, and in dairy products made from whole milk  Limit cholesterol to less than 300 milligrams (mg) each day  You may need to limit cholesterol to 200 mg each day if you have heart disease  · Saturated fat  is found in meats, such as riggins and hamburger  It is also found in chicken or turkey skin, whole milk, and butter  Limit saturated fat to less than 7% of your total daily calories  Limit saturated fat to less than 6% if you have heart disease or are at increased risk for it  · Trans fat  is found in packaged foods, such as potato chips and cookies   It is also in hard margarine, some fried foods, and shortening  Avoid trans fats as much as possible    Heart healthy foods and drinks to include:  Ask your dietitian or healthcare provider how many servings to have from each of the following food groups:  · Grains:      ¨ Whole-wheat breads, cereals, and pastas, and brown rice    ¨ Low-fat, low-sodium crackers and chips    · Vegetables:      ¨ Broccoli, green beans, green peas, and spinach    ¨ Collards, kale, and lima beans    ¨ Carrots, sweet potatoes, tomatoes, and peppers    ¨ Canned vegetables with no salt added    · Fruits:      ¨ Bananas, peaches, pears, and pineapple    ¨ Grapes, raisins, and dates    ¨ Oranges, tangerines, grapefruit, orange juice, and grapefruit juice    ¨ Apricots, mangoes, melons, and papaya    ¨ Raspberries and strawberries    ¨ Canned fruit with no added sugar    · Low-fat dairy products:      ¨ Nonfat (skim) milk, 1% milk, and low-fat almond, cashew, or soy milks fortified with calcium    ¨ Low-fat cheese, regular or frozen yogurt, and cottage cheese    · Meats and proteins , such as lean cuts of beef and pork (loin, leg, round), skinless chicken and turkey, legumes, soy products, egg whites, and nuts  Foods and drinks to limit or avoid:  Ask your dietitian or healthcare provider about these and other foods that are high in unhealthy fat, sodium, and sugar:  · Snack or packaged foods , such as frozen dinners, cookies, macaroni and cheese, and cereals with more than 300 mg of sodium per serving    · Canned or dry mixes  for cakes, soups, sauces, or gravies    · Vegetables with added sodium , such as instant potatoes, vegetables with added sauces, or regular canned vegetables    · Other foods high in sodium , such as ketchup, barbecue sauce, salad dressing, pickles, olives, soy sauce, and miso    · High-fat dairy foods  such as whole or 2% milk, cream cheese, or sour cream, and cheeses     · High-fat protein foods  such as high-fat cuts of beef (T-bone steaks, ribs), chicken or turkey with skin, and organ meats, such as liver    · Cured or smoked meats , such as hot dogs, riggins, and sausage    · Unhealthy fats and oils , such as butter, stick margarine, shortening, and cooking oils such as coconut or palm oil    · Food and drinks high in sugar , such as soft drinks (soda), sports drinks, sweetened tea, candy, cake, cookies, pies, and doughnuts  Other diet guidelines to follow:   · Eat more foods containing omega-3 fats  Eat fish high in omega-3 fats at least 2 times a week  · Limit alcohol  Too much alcohol can damage your heart and raise your blood pressure  Women should limit alcohol to 1 drink a day  Men should limit alcohol to 2 drinks a day  A drink of alcohol is 12 ounces of beer, 5 ounces of wine, or 1½ ounces of liquor  · Choose low-sodium foods  High-sodium foods can lead to high blood pressure  Add little or no salt to food you prepare  Use herbs and spices in place of salt  · Eat more fiber  to help lower cholesterol levels  Eat at least 5 servings of fruits and vegetables each day  Eat 3 ounces of whole-grain foods each day  Legumes (beans) are also a good source of fiber  Lifestyle guidelines:   · Do not smoke  Nicotine and other chemicals in cigarettes and cigars can cause lung and heart damage  Ask your healthcare provider for information if you currently smoke and need help to quit  E-cigarettes or smokeless tobacco still contain nicotine  Talk to your healthcare provider before you use these products  · Exercise regularly  to help you maintain a healthy weight and improve your blood pressure and cholesterol levels  Ask your healthcare provider about the best exercise plan for you  Do not start an exercise program without asking your healthcare provider  Follow up with your healthcare provider as directed:  Write down your questions so you remember to ask them during your visits     © 2017 [employer] Information is for End User's use only and may not be sold, redistributed or otherwise used for commercial purposes  All illustrations and images included in CareNotes® are the copyrighted property of A D A M [employer]  or [employer]  The above information is an  only  It is not intended as medical advice for individual conditions or treatments  Talk to your doctor, nurse or pharmacist before following any medical regimen to see if it is safe and effective for you

## 2019-02-16 DIAGNOSIS — L30.9 DERMATITIS: ICD-10-CM

## 2019-02-20 RX ORDER — NYSTATIN AND TRIAMCINOLONE ACETONIDE 100000; 1 [USP'U]/G; MG/G
OINTMENT TOPICAL
Qty: 45 G | Refills: 1 | Status: SHIPPED | OUTPATIENT
Start: 2019-02-20 | End: 2019-06-20

## 2019-02-22 DIAGNOSIS — E13.9 DIABETES 1.5, MANAGED AS TYPE 2 (HCC): ICD-10-CM

## 2019-04-12 DIAGNOSIS — L30.9 DERMATITIS: ICD-10-CM

## 2019-04-12 RX ORDER — NYSTATIN AND TRIAMCINOLONE ACETONIDE 100000; 1 [USP'U]/G; MG/G
OINTMENT TOPICAL
Qty: 45 G | Refills: 0 | Status: SHIPPED | OUTPATIENT
Start: 2019-04-12 | End: 2020-11-23

## 2019-04-30 DIAGNOSIS — E11.49 DIABETES MELLITUS TYPE 2 WITH NEUROLOGICAL MANIFESTATIONS (HCC): ICD-10-CM

## 2019-05-06 DIAGNOSIS — E11.49 DIABETES MELLITUS TYPE 2 WITH NEUROLOGICAL MANIFESTATIONS (HCC): ICD-10-CM

## 2019-05-06 DIAGNOSIS — I10 ESSENTIAL HYPERTENSION: ICD-10-CM

## 2019-05-06 RX ORDER — METOPROLOL SUCCINATE 50 MG/1
50 TABLET, EXTENDED RELEASE ORAL DAILY
Qty: 90 TABLET | Refills: 3 | Status: SHIPPED | OUTPATIENT
Start: 2019-05-06 | End: 2019-12-17 | Stop reason: SDUPTHER

## 2019-05-20 ENCOUNTER — OFFICE VISIT (OUTPATIENT)
Dept: BARIATRICS | Facility: CLINIC | Age: 69
End: 2019-05-20
Payer: MEDICARE

## 2019-05-20 VITALS
HEART RATE: 80 BPM | WEIGHT: 206.2 LBS | BODY MASS INDEX: 34.35 KG/M2 | DIASTOLIC BLOOD PRESSURE: 80 MMHG | SYSTOLIC BLOOD PRESSURE: 140 MMHG | HEIGHT: 65 IN | TEMPERATURE: 97.9 F

## 2019-05-20 DIAGNOSIS — E78.5 HYPERLIPIDEMIA, UNSPECIFIED HYPERLIPIDEMIA TYPE: ICD-10-CM

## 2019-05-20 DIAGNOSIS — Z91.89 AT RISK FOR SLEEP APNEA: ICD-10-CM

## 2019-05-20 DIAGNOSIS — R63.5 ABNORMAL WEIGHT GAIN: ICD-10-CM

## 2019-05-20 DIAGNOSIS — E11.40 CONTROLLED TYPE 2 DIABETES MELLITUS WITH DIABETIC NEUROPATHY, WITHOUT LONG-TERM CURRENT USE OF INSULIN (HCC): ICD-10-CM

## 2019-05-20 DIAGNOSIS — E66.9 OBESITY (BMI 30.0-34.9): Primary | ICD-10-CM

## 2019-05-20 DIAGNOSIS — I10 ESSENTIAL HYPERTENSION: ICD-10-CM

## 2019-05-20 PROCEDURE — 99204 OFFICE O/P NEW MOD 45 MIN: CPT | Performed by: PHYSICIAN ASSISTANT

## 2019-05-20 RX ORDER — TRIPROLIDINE/PSEUDOEPHEDRINE 2.5MG-60MG
TABLET ORAL
Refills: 2 | COMMUNITY
Start: 2019-03-29 | End: 2019-05-20 | Stop reason: ALTCHOICE

## 2019-05-20 RX ORDER — GATIFLOXACIN 5 MG/ML
SOLUTION/ DROPS OPHTHALMIC
Refills: 0 | COMMUNITY
Start: 2019-04-03 | End: 2019-05-20 | Stop reason: ALTCHOICE

## 2019-05-20 RX ORDER — KETOROLAC TROMETHAMINE 5 MG/ML
SOLUTION OPHTHALMIC
Refills: 0 | COMMUNITY
Start: 2019-04-02 | End: 2019-05-20 | Stop reason: ALTCHOICE

## 2019-05-28 ENCOUNTER — APPOINTMENT (OUTPATIENT)
Dept: LAB | Facility: CLINIC | Age: 69
End: 2019-05-28
Payer: MEDICARE

## 2019-05-28 DIAGNOSIS — E66.9 OBESITY (BMI 30.0-34.9): ICD-10-CM

## 2019-05-28 DIAGNOSIS — R63.5 ABNORMAL WEIGHT GAIN: ICD-10-CM

## 2019-05-28 LAB — TSH SERPL DL<=0.05 MIU/L-ACNC: 2.26 UIU/ML (ref 0.36–3.74)

## 2019-05-28 PROCEDURE — 36415 COLL VENOUS BLD VENIPUNCTURE: CPT

## 2019-05-28 PROCEDURE — 84443 ASSAY THYROID STIM HORMONE: CPT

## 2019-05-29 ENCOUNTER — TELEPHONE (OUTPATIENT)
Dept: BARIATRICS | Facility: CLINIC | Age: 69
End: 2019-05-29

## 2019-05-29 ENCOUNTER — OFFICE VISIT (OUTPATIENT)
Dept: BARIATRICS | Facility: CLINIC | Age: 69
End: 2019-05-29

## 2019-05-29 VITALS — HEIGHT: 65 IN | WEIGHT: 203.8 LBS | BODY MASS INDEX: 33.95 KG/M2

## 2019-05-29 DIAGNOSIS — R63.5 ABNORMAL WEIGHT GAIN: ICD-10-CM

## 2019-05-29 PROCEDURE — WMPRO12

## 2019-05-29 PROCEDURE — RECHECK

## 2019-06-05 ENCOUNTER — CLINICAL SUPPORT (OUTPATIENT)
Dept: BARIATRICS | Facility: CLINIC | Age: 69
End: 2019-06-05

## 2019-06-05 VITALS — HEIGHT: 65 IN | WEIGHT: 201.8 LBS | BODY MASS INDEX: 33.62 KG/M2

## 2019-06-05 DIAGNOSIS — Z12.11 SCREENING FOR COLON CANCER: ICD-10-CM

## 2019-06-05 DIAGNOSIS — R63.5 ABNORMAL WEIGHT GAIN: Primary | ICD-10-CM

## 2019-06-05 PROCEDURE — RECHECK

## 2019-06-12 ENCOUNTER — CLINICAL SUPPORT (OUTPATIENT)
Dept: BARIATRICS | Facility: CLINIC | Age: 69
End: 2019-06-12

## 2019-06-12 ENCOUNTER — OFFICE VISIT (OUTPATIENT)
Dept: BARIATRICS | Facility: CLINIC | Age: 69
End: 2019-06-12

## 2019-06-12 VITALS — BODY MASS INDEX: 33.12 KG/M2 | HEIGHT: 65 IN | WEIGHT: 198.8 LBS

## 2019-06-12 VITALS — HEIGHT: 65 IN | BODY MASS INDEX: 33.62 KG/M2 | WEIGHT: 201.8 LBS

## 2019-06-12 DIAGNOSIS — R63.5 ABNORMAL WEIGHT GAIN: Primary | ICD-10-CM

## 2019-06-12 PROCEDURE — RECHECK

## 2019-06-19 ENCOUNTER — CLINICAL SUPPORT (OUTPATIENT)
Dept: BARIATRICS | Facility: CLINIC | Age: 69
End: 2019-06-19

## 2019-06-19 VITALS — BODY MASS INDEX: 32.99 KG/M2 | HEIGHT: 65 IN | WEIGHT: 198 LBS

## 2019-06-19 DIAGNOSIS — R63.5 ABNORMAL WEIGHT GAIN: Primary | ICD-10-CM

## 2019-06-19 PROCEDURE — RECHECK

## 2019-06-20 ENCOUNTER — OFFICE VISIT (OUTPATIENT)
Dept: INTERNAL MEDICINE CLINIC | Age: 69
End: 2019-06-20
Payer: MEDICARE

## 2019-06-20 VITALS
OXYGEN SATURATION: 96 % | HEIGHT: 65 IN | SYSTOLIC BLOOD PRESSURE: 126 MMHG | TEMPERATURE: 98 F | DIASTOLIC BLOOD PRESSURE: 86 MMHG | WEIGHT: 196.6 LBS | BODY MASS INDEX: 32.76 KG/M2 | HEART RATE: 60 BPM

## 2019-06-20 DIAGNOSIS — Z78.0 POSTMENOPAUSAL: Primary | ICD-10-CM

## 2019-06-20 DIAGNOSIS — Z13.820 SCREENING FOR OSTEOPOROSIS: ICD-10-CM

## 2019-06-20 DIAGNOSIS — Z00.00 MEDICARE ANNUAL WELLNESS VISIT, SUBSEQUENT: ICD-10-CM

## 2019-06-20 DIAGNOSIS — E11.40 CONTROLLED TYPE 2 DIABETES MELLITUS WITH DIABETIC NEUROPATHY, WITHOUT LONG-TERM CURRENT USE OF INSULIN (HCC): ICD-10-CM

## 2019-06-20 PROBLEM — H25.9 AGE-RELATED CATARACT: Status: RESOLVED | Noted: 2019-02-13 | Resolved: 2019-06-20

## 2019-06-20 PROCEDURE — 99214 OFFICE O/P EST MOD 30 MIN: CPT | Performed by: INTERNAL MEDICINE

## 2019-06-20 PROCEDURE — G0439 PPPS, SUBSEQ VISIT: HCPCS | Performed by: INTERNAL MEDICINE

## 2019-06-26 ENCOUNTER — CLINICAL SUPPORT (OUTPATIENT)
Dept: BARIATRICS | Facility: CLINIC | Age: 69
End: 2019-06-26

## 2019-06-26 VITALS — BODY MASS INDEX: 32.59 KG/M2 | HEIGHT: 65 IN | WEIGHT: 195.6 LBS

## 2019-06-26 DIAGNOSIS — R63.5 ABNORMAL WEIGHT GAIN: Primary | ICD-10-CM

## 2019-06-26 PROCEDURE — RECHECK

## 2019-07-01 ENCOUNTER — OFFICE VISIT (OUTPATIENT)
Dept: BARIATRICS | Facility: CLINIC | Age: 69
End: 2019-07-01

## 2019-07-01 DIAGNOSIS — R63.5 ABNORMAL WEIGHT GAIN: Primary | ICD-10-CM

## 2019-07-01 PROCEDURE — RECHECK

## 2019-07-01 NOTE — PROGRESS NOTES
Weight Management Medical Nutrition Assessment  Scott Layne was here today for her 2 month follow-up in the [facility]  Maricarmen Agrawal she weighs 195 4 lbs giving her a loss of 3 4 lbs in the last 4 weeks  She has been tracking her food every day since our last visit and has been limiting her carb portions as well  She has been drinking more water and has been close to 60 oz most days  She has mary ann walking a few days a week  She has been practicing mindful eating and making healthier food choices  Anthropometric Measurements  Start Weight (lbs): 203 8 lbs  Current Weight (lbs): 195 4 lbs  TBW % Change from start weight:5%  Ideal Body Weight (lbs): 125 lbs  Goal Weight (lbs): 145 lbs     Weight Loss History  Previous weight loss attempts: Commercial Programs (Weight Watchers, [employer], etc )     Food and Nutrition Related History     Food Recall  Breakfast: half cup oatmeal          Snack: bar  Lunch:  skipped because she was having dinner at 4pm  Snack:none  Dinner: pasta, meatballs, spumoni (small piece)   Snack:         Beverages: water, crystal lite  Volume of beverage intake: 32 oz of water per day - has been increasing daily     Weekends: Same  Cravings: chips (salty)  Trouble area of day: evening     Frequency of Eating out: since starting program she has been out to eat 2 times  Food restrictions: none  Cooking: self   Food Shopping: self     Physical Activity Intake  Activity: walking   Frequency: 3 -4 times per week  Physical limitations/barriers to exercise: none     Estimated Needs  Energy  [employer] [place] [employer] Needs: BMR : 4633 5-4# loss weekly sedentary:  1207         1-2# loss weekly lightly active: 956-1456  Protein: 68 - 85      (1 2-1 5g/kg IBW)  Fluid: 66 oz     (35mL/kg IBW)     Nutrition Diagnosis  Yes;    Overweight/obesity  related to Excess energy intake as evidenced by  BMI more than normative standard for age and sex (obesity-grade I 30-34  9)     Nutrition Intervention     Nutrition Prescription  Calories: 1000 calories  Protein:75 gr  Fluid: 64 oz        Nutrition Education:    [facility] Manual  Calorie controlled menu  Lean protein food choices  Healthy snack options  Food journaling tips        Nutrition Counseling:  Strategies: meal planning, portion sizes, healthy snack choices, hydration, fiber intake, protein intake, exercise, food journal        Monitoring and Evaluation:  Evaluation criteria:  Energy Intake  Meet protein needs  Maintain adequate hydration  Monitor weekly weight  Meal planning/preparation  Food journal   Decreased portions at mealtimes and snacks  Physical activity      Barriers to learning:none  Readiness to change: Action  Comprehension: good  Expected Compliance: good

## 2019-07-03 ENCOUNTER — CLINICAL SUPPORT (OUTPATIENT)
Dept: BARIATRICS | Facility: CLINIC | Age: 69
End: 2019-07-03

## 2019-07-03 VITALS — HEIGHT: 65 IN | BODY MASS INDEX: 32.69 KG/M2 | WEIGHT: 196.2 LBS

## 2019-07-03 DIAGNOSIS — R63.5 ABNORMAL WEIGHT GAIN: Primary | ICD-10-CM

## 2019-07-03 PROCEDURE — RECHECK

## 2019-07-17 ENCOUNTER — CLINICAL SUPPORT (OUTPATIENT)
Dept: BARIATRICS | Facility: CLINIC | Age: 69
End: 2019-07-17

## 2019-07-17 VITALS — WEIGHT: 192.4 LBS | BODY MASS INDEX: 32.06 KG/M2 | HEIGHT: 65 IN

## 2019-07-17 DIAGNOSIS — R63.5 ABNORMAL WEIGHT GAIN: Primary | ICD-10-CM

## 2019-07-17 PROCEDURE — RECHECK

## 2019-07-24 ENCOUNTER — CLINICAL SUPPORT (OUTPATIENT)
Dept: BARIATRICS | Facility: CLINIC | Age: 69
End: 2019-07-24

## 2019-07-24 VITALS — WEIGHT: 189.8 LBS | HEIGHT: 65 IN | BODY MASS INDEX: 31.62 KG/M2

## 2019-07-24 DIAGNOSIS — R63.5 ABNORMAL WEIGHT GAIN: Primary | ICD-10-CM

## 2019-07-24 PROCEDURE — RECHECK

## 2019-07-29 ENCOUNTER — CONSULT (OUTPATIENT)
Dept: PULMONOLOGY | Facility: CLINIC | Age: 69
End: 2019-07-29
Payer: MEDICARE

## 2019-07-29 VITALS
OXYGEN SATURATION: 99 % | BODY MASS INDEX: 30.05 KG/M2 | WEIGHT: 187 LBS | SYSTOLIC BLOOD PRESSURE: 130 MMHG | HEART RATE: 76 BPM | DIASTOLIC BLOOD PRESSURE: 68 MMHG | HEIGHT: 66 IN

## 2019-07-29 DIAGNOSIS — E66.9 OBESITY (BMI 30.0-34.9): ICD-10-CM

## 2019-07-29 DIAGNOSIS — R63.5 ABNORMAL WEIGHT GAIN: ICD-10-CM

## 2019-07-29 DIAGNOSIS — E78.5 HYPERLIPIDEMIA, UNSPECIFIED HYPERLIPIDEMIA TYPE: ICD-10-CM

## 2019-07-29 DIAGNOSIS — G47.33 OSA (OBSTRUCTIVE SLEEP APNEA): Primary | ICD-10-CM

## 2019-07-29 DIAGNOSIS — I10 ESSENTIAL HYPERTENSION: ICD-10-CM

## 2019-07-29 DIAGNOSIS — Z91.89 AT RISK FOR SLEEP APNEA: ICD-10-CM

## 2019-07-29 DIAGNOSIS — E11.40 CONTROLLED TYPE 2 DIABETES MELLITUS WITH DIABETIC NEUROPATHY, WITHOUT LONG-TERM CURRENT USE OF INSULIN (HCC): ICD-10-CM

## 2019-07-29 PROCEDURE — 99204 OFFICE O/P NEW MOD 45 MIN: CPT | Performed by: INTERNAL MEDICINE

## 2019-07-30 NOTE — PROGRESS NOTES
Assessment/Plan:     Diagnoses and all orders for this visit:    NICKY (obstructive sleep apnea)  -     Diagnostic Sleep Study; Future    At risk for sleep apnea  -     Ambulatory referral to Sleep Medicine  -     Diagnostic Sleep Study; Future    Obesity (BMI 30 0-34 9)  -     Ambulatory referral to Sleep Medicine    Hyperlipidemia, unspecified hyperlipidemia type  -     Ambulatory referral to Sleep Medicine    Controlled type 2 diabetes mellitus with diabetic neuropathy, without long-term current use of insulin ([facility] [address] )  -     Ambulatory referral to Sleep Medicine    Essential hypertension  -     Ambulatory referral to Sleep Medicine    Abnormal weight gain  -     Ambulatory referral to Sleep Medicine        Etiology pathogenesis of obstructive sleep apnea discussed in detail  Testing procedure was discussed in detail  Consequences of untreated sleep apnea including excessive daytime sleepiness, increased risk for myocardial infarction stroke atrial fibrillation discussed  She will undergo an all-night polysomnogram and if there is evidence of abnormal nocturnal breathing she will undergo a CPAP titration study for maximal benefit  Recommend weight loss, she is following up with medical weight loss management program at [facility]'s  Follow-up after the about testing  Or p r n  Earlier as needed  Return in about 3 months (around 10/29/2019)  All questions are answered to the patient's satisfaction and understanding  She verbalizes understanding  She is encouraged to call with any further questions or concerns  Portions of the record may have been created with voice recognition software  Occasional wrong word or "sound a like" substitutions may have occurred due to the inherent limitations of voice recognition software  Read the chart carefully and recognize, using context, where substitutions have occurred  a    Electronically Signed by Miguel Wright MD    ______________________________________________________________________    Chief Complaint: No chief complaint on file  Patient ID: Scott Layne is a 78 y o  y o  female has a past medical history of Carpal tunnel syndrome, Cataract, Complex endometrial hyperplasia with atypia, Diabetes mellitus ([address] ), Hypertension, Normal delivery, and Post-menopausal bleeding  7/29/2019  Patient presents today for initial visit  Scott Layne is here for evaluation for obstructive sleep apnea  Referred by weight loss management program   She does have significant history of snoring, witnessed apneic spells choking and gasping for air at night  No history of any early morning headaches, no symptoms related to restless legs does complain of occasional heartburn  No symptoms related to anxiety with depression, and no symptoms related to lack of concentration or short-term memory loss  She states she does not have any daytime sleepiness, does not take a nap during the daytime  She goes to bed at around 10:30 p m  Falls asleep in less than half an hour, out of bed at 6:00 a m  [facility] Does have 1-2 nocturnal awakenings for nocturia can and can't fall back asleep right away, and when she is out of the bed in the morning she does feel well refreshed  There is history of recent weight gain, history of diabetes mellitus type 2  Occupational/Exposure history:  Pets/Enviroment:  Travel history:  Review of Systems   Constitutional: Positive for fatigue and unexpected weight change  Negative for appetite change, chills, diaphoresis and fever  HENT: Negative for congestion, ear discharge, ear pain, nosebleeds, postnasal drip, rhinorrhea, sinus pain, sore throat and voice change  Eyes: Negative for pain, discharge and visual disturbance  Respiratory: Negative for apnea, cough, choking, chest tightness, shortness of breath, wheezing and stridor  History of snoring, witnessed apneic spells choking and gasping for air at night  Cardiovascular: Negative for chest pain, palpitations and leg swelling  Gastrointestinal: Negative for abdominal pain, blood in stool, constipation, diarrhea and vomiting  Endocrine: Negative for cold intolerance, heat intolerance, polydipsia, polyphagia and polyuria  Genitourinary: Negative for difficulty urinating and dysuria  Musculoskeletal: Negative for arthralgias and neck pain  Skin: Negative for pallor and rash  Allergic/Immunologic: Negative for environmental allergies and food allergies  Neurological: Negative for dizziness, speech difficulty, weakness and light-headedness  Hematological: Negative for adenopathy  Does not bruise/bleed easily  Psychiatric/Behavioral: Negative for agitation, confusion and sleep disturbance  The patient is not nervous/anxious  Social history: She reports that she has never smoked  She has never used smokeless tobacco  She reports that she drinks alcohol  She reports that she does not use drugs      Past surgical history:   Past Surgical History:   Procedure Laterality Date    CARPAL TUNNEL RELEASE      CARPAL TUNNEL RELEASE Left     CATARACT EXTRACTION      CHOLECYSTECTOMY      COLONOSCOPY      Complete    DILATION AND CURETTAGE OF UTERUS      twice    ENDOMETRIAL BIOPSY      without cervical dilation    HYSTEROSCOPY      TUBAL LIGATION  1974     Family history:   Family History   Problem Relation Age of Onset    No Known Problems Child     Arthritis Family     Cancer Family         bladder    Other Family         Cardiac disorder    Diabetes Family     Hypertension Family     Valvular heart disease Family     Hypertension Mother     Heart disease Mother     Heart attack Mother     Hypertension Father     Cancer Father         bladder    Hypertension Brother     Heart disease Brother     Diabetes Maternal Grandmother     Stroke Maternal Grandmother     Diabetes Paternal Grandfather     Breast cancer Neg Hx     Colon cancer Neg Hx     Ovarian cancer Neg Hx     Uterine cancer Neg Hx     Cervical cancer Neg Hx     Thyroid disease Neg Hx        Immunization History   Administered Date(s) Administered    INFLUENZA 11/01/2011, 10/20/2015, 10/26/2016, 11/03/2017, 09/19/2018    Influenza Split High Dose Preservative Free IM 10/01/2016, 11/03/2017    Pneumococcal Conjugate 13-Valent 02/15/2017    Pneumococcal Polysaccharide PPV23 01/01/2013    Zoster 01/02/2012     Current Outpatient Medications   Medication Sig Dispense Refill    amLODIPine (NORVASC) 5 mg tablet Take 1 tablet (5 mg total) by mouth daily 90 tablet 3    aspirin 81 MG tablet Take by mouth      atorvastatin (LIPITOR) 10 mg tablet Take 1 tablet (10 mg total) by mouth daily 90 tablet 3    Cranberry 600 MG TABS Take by mouth      Dulaglutide 1 5 MG/0 5ML SOPN Inject 0 5 mL (1 5 mg total) under the skin once a week 1 pen 0    famotidine (PEPCID) 20 mg tablet Take 1 tablet (20 mg total) by mouth 2 (two) times a day 180 tablet 3    fexofenadine (ALLEGRA) 180 MG tablet Take 1 tablet (180 mg total) by mouth daily 1 tablet 3    hydrochlorothiazide (HYDRODIURIL) 25 mg tablet Take 1 tablet (25 mg total) by mouth daily 90 tablet 3    Insulin Pen Needle (B-D UF III MINI PEN NEEDLES) 31G X 5 MM MISC by Other route once a week Use as directed 100 each 0    Lancets (ACCU-CHEK SOFT TOUCH) lancets by Other route 2 (two) times a day 100 each 3    metFORMIN (GLUCOPHAGE) 1000 MG tablet Take 1 tablet (1,000 mg total) by mouth 2 (two) times a day with meals 180 tablet 3    metoprolol succinate (TOPROL-XL) 50 mg 24 hr tablet Take 1 tablet (50 mg total) by mouth daily 90 tablet 3    ONE TOUCH ULTRA TEST test strip TEST TWICE DAILY 100 each 3    nystatin-triamcinolone (MYCOLOG-II) ointment APPLY TO THE AFFECTED AREAS TWICE DAILY AS NEEDED (Patient not taking: Reported on 5/20/2019) 45 g 0     No current facility-administered medications for this visit        Allergies: Ace inhibitors and Codeine    Objective:  Vitals:    07/29/19 0924   BP: 130/68   Pulse: 76   SpO2: 99%   Weight: 84 8 kg (187 lb)   Height: 5' 6" (1 676 m)   Oxygen Therapy  SpO2: 99 %    Wt Readings from Last 3 Encounters:   07/29/19 84 8 kg (187 lb)   07/24/19 86 1 kg (189 lb 12 8 oz)   07/17/19 87 3 kg (192 lb 6 4 oz)     Body mass index is 30 18 kg/m²  Physical Exam   Constitutional: She is oriented to person, place, and time  She appears well-developed and well-nourished  HENT:   Head: Normocephalic and atraumatic  Crowded oropharyngeal airways, Mallampati score 3   Eyes: Pupils are equal, round, and reactive to light  EOM are normal    Neck: Normal range of motion  Neck supple  Short and wide neck   Cardiovascular: Normal rate, regular rhythm and normal heart sounds  Pulmonary/Chest: Effort normal and breath sounds normal    Abdominal: Soft  Bowel sounds are normal    Musculoskeletal: Normal range of motion  Neurological: She is alert and oriented to person, place, and time  Skin: Skin is warm and dry  Psychiatric: She has a normal mood and affect   Her behavior is normal             ESS: Total score: 0

## 2019-07-31 ENCOUNTER — CLINICAL SUPPORT (OUTPATIENT)
Dept: BARIATRICS | Facility: CLINIC | Age: 69
End: 2019-07-31

## 2019-07-31 VITALS — WEIGHT: 188.6 LBS | BODY MASS INDEX: 37.03 KG/M2 | HEIGHT: 60 IN

## 2019-07-31 DIAGNOSIS — R63.5 ABNORMAL WEIGHT GAIN: Primary | ICD-10-CM

## 2019-07-31 PROCEDURE — RECHECK

## 2019-08-01 ENCOUNTER — OFFICE VISIT (OUTPATIENT)
Dept: BARIATRICS | Facility: CLINIC | Age: 69
End: 2019-08-01

## 2019-08-01 VITALS — WEIGHT: 187.4 LBS | BODY MASS INDEX: 36.79 KG/M2 | HEIGHT: 60 IN

## 2019-08-01 DIAGNOSIS — R63.5 ABNORMAL WEIGHT GAIN: Primary | ICD-10-CM

## 2019-08-01 PROCEDURE — RECHECK

## 2019-08-01 NOTE — PROGRESS NOTES
Weight Management Medical Nutrition Assessment  Musa Richards was here today for her 2 month follow-up in the [facility]  Dayron Batres she weighs 187 4 lbs giving her a loss of 8 lbs in the last 4 weeks  She continues to track and measure her food  She is also walking almost everyday with her dog  She is very conscious of what she eats and is practicing mindful eating  She is keep on track with her weight loss goal and has definitely made some healthy lifestyle changes  Anthropometric Measurements  Start Weight (lbs): 203 8 lbs  Current Weight (lbs): 187 4 lbs  TBW % Change from start weight:8%  Ideal Body Weight (lbs): 125 lbs  Goal Weight (lbs): 145 lbs     Weight Loss History  Previous weight loss attempts: Commercial Programs (Weight Watchers, Elita Hamman, etc )     Food and Nutrition Related History     Food Recall  Breakfast: half cup oatmeal          Snack: bar  Lunch:  fruit and yogurt  Snack:none  Dinner: steamed fish with mixed vegetables  Snack:         Beverages: water, crystal lite  Volume of beverage intake: 32 oz of water per day - has been increasing daily     Weekends: Same  Cravings: chips (salty)  Trouble area of day: evening     Frequency of Eating out: since starting program she has been out to eat 2 times  Food restrictions: none  Cooking: self   Food Shopping: self     Physical Activity Intake  Activity: walking   Frequency: 3 -4 times per week  Physical limitations/barriers to exercise: none     Estimated Needs  Energy  [employer] [place] Energy Needs: BMR : 1292 1# loss weekly sedentary:  1050      1# loss weekly lightly active:  1276  Protein: 68 - 85      (1 2-1 5g/kg IBW)  Fluid: 66 oz     (35mL/kg IBW)     Nutrition Diagnosis  Yes;    Overweight/obesity  related to Excess energy intake as evidenced by  BMI more than normative standard for age and sex (obesity-grade I 30-34  9)     Nutrition Intervention     Nutrition Prescription  Calories: 1000 calories  Protein:75 gr  Fluid: 64 oz        Nutrition Education:    [facility] Manual  Calorie controlled menu  Lean protein food choices  Healthy snack options  Food journaling tips        Nutrition Counseling:  Strategies: meal planning, portion sizes, healthy snack choices, hydration, fiber intake, protein intake, exercise, food journal        Monitoring and Evaluation:  Evaluation criteria:  Energy Intake  Meet protein needs  Maintain adequate hydration  Monitor weekly weight  Meal planning/preparation  Food journal   Decreased portions at mealtimes and snacks  Physical activity      Barriers to learning:none  Readiness to change: Action  Comprehension: good  Expected Compliance: good

## 2019-08-07 ENCOUNTER — CLINICAL SUPPORT (OUTPATIENT)
Dept: BARIATRICS | Facility: CLINIC | Age: 69
End: 2019-08-07

## 2019-08-07 VITALS — BODY MASS INDEX: 31.25 KG/M2 | HEIGHT: 65 IN | WEIGHT: 187.6 LBS

## 2019-08-07 DIAGNOSIS — R63.5 ABNORMAL WEIGHT GAIN: Primary | ICD-10-CM

## 2019-08-07 PROCEDURE — RECHECK

## 2019-08-12 ENCOUNTER — HOSPITAL ENCOUNTER (OUTPATIENT)
Dept: MAMMOGRAPHY | Facility: CLINIC | Age: 69
Discharge: HOME | End: 2019-08-12
Payer: MEDICARE

## 2019-08-12 VITALS — WEIGHT: 187 LBS | BODY MASS INDEX: 31.92 KG/M2 | HEIGHT: 64 IN

## 2019-08-12 DIAGNOSIS — Z12.31 ENCOUNTER FOR SCREENING MAMMOGRAM FOR MALIGNANT NEOPLASM OF BREAST: ICD-10-CM

## 2019-08-12 DIAGNOSIS — E13.9 DIABETES 1.5, MANAGED AS TYPE 2 (HCC): ICD-10-CM

## 2019-08-12 DIAGNOSIS — Z78.0 POSTMENOPAUSAL: ICD-10-CM

## 2019-08-12 DIAGNOSIS — Z13.820 SCREENING FOR OSTEOPOROSIS: ICD-10-CM

## 2019-08-12 PROCEDURE — 77067 SCR MAMMO BI INCL CAD: CPT

## 2019-08-12 PROCEDURE — 77080 DXA BONE DENSITY AXIAL: CPT

## 2019-08-12 PROCEDURE — 77063 BREAST TOMOSYNTHESIS BI: CPT

## 2019-08-14 ENCOUNTER — CLINICAL SUPPORT (OUTPATIENT)
Dept: BARIATRICS | Facility: CLINIC | Age: 69
End: 2019-08-14

## 2019-08-14 VITALS — BODY MASS INDEX: 30.96 KG/M2 | WEIGHT: 185.8 LBS | HEIGHT: 65 IN

## 2019-08-14 DIAGNOSIS — R63.5 ABNORMAL WEIGHT GAIN: Primary | ICD-10-CM

## 2019-08-14 PROCEDURE — RECHECK

## 2019-08-21 ENCOUNTER — CLINICAL SUPPORT (OUTPATIENT)
Dept: BARIATRICS | Facility: CLINIC | Age: 69
End: 2019-08-21

## 2019-08-21 VITALS — WEIGHT: 184.4 LBS | HEIGHT: 65 IN | BODY MASS INDEX: 30.72 KG/M2

## 2019-08-21 DIAGNOSIS — R63.5 ABNORMAL WEIGHT GAIN: Primary | ICD-10-CM

## 2019-08-21 PROCEDURE — RECHECK

## 2019-08-28 ENCOUNTER — CLINICAL SUPPORT (OUTPATIENT)
Dept: BARIATRICS | Facility: CLINIC | Age: 69
End: 2019-08-28

## 2019-08-28 VITALS — WEIGHT: 183 LBS | BODY MASS INDEX: 30.49 KG/M2 | HEIGHT: 65 IN

## 2019-08-28 DIAGNOSIS — R63.5 ABNORMAL WEIGHT GAIN: Primary | ICD-10-CM

## 2019-08-28 PROCEDURE — RECHECK

## 2019-09-04 ENCOUNTER — CLINICAL SUPPORT (OUTPATIENT)
Dept: BARIATRICS | Facility: CLINIC | Age: 69
End: 2019-09-04

## 2019-09-04 VITALS — BODY MASS INDEX: 30.22 KG/M2 | WEIGHT: 181.4 LBS | HEIGHT: 65 IN

## 2019-09-04 DIAGNOSIS — R63.5 ABNORMAL WEIGHT GAIN: Primary | ICD-10-CM

## 2019-09-04 PROCEDURE — RECHECK

## 2019-09-09 NOTE — PROGRESS NOTES
Weight Management Medical Nutrition Assessment  Kelle Guillermo was here today for her 3 month follow-up and REE in the [facility]  Melida Lloyd she weighs 182 6  lbs giving her a loss of 4 8 lbs in the last 4 weeks  Reevue indirect calorimter revealed REE is (+12%)  compared to the predictive normal for someone her same age, height, and gender  She admits that within the last week she has eaten out more than normal and she has not been as consistent with tracking, but she will get back on track again this week  Overall, her blood sugars have improved and now range from 105 - 120 whereas before they would be over 180       Anthropometric Measurements  Start Weight (lbs): 203 8 lbs  Current Weight (lbs): 182 6 lbs  TBW % Change from start weight:10%  Ideal Body Weight (lbs): 125 lbs  Goal Weight (lbs): 160 lbs     Weight Loss History  Previous weight loss attempts: Commercial Programs (Weight Watchers, [employer], etc )     Food and Nutrition Related History     Food Recall  Breakfast: half cup oatmeal          Snack: bar  Lunch:  fruit and yogurt  Snack:none  Dinner: steamed fish with mixed vegetables  Snack:         Beverages: water, crystal lite  Volume of beverage intake: 60oz of water per day - has been increasing daily from 30 oz     Weekends: Same  Cravings: chips (salty)  Trouble area of day: evening     Frequency of Eating out: since starting program she has been out to eat 2 times  Food restrictions: none  Cooking: self   Food Shopping: self     Physical Activity Intake  Activity: walking   Frequency: 3 -4 times per week  Physical limitations/barriers to exercise: none     Estimated Needs  Energy  [employer] [place] Energy Needs:  BMR : 6059 5# loss weekly sedentary:  1116      1# loss weekly lightly active:  1352  Protein: 68 - 85      (1 2-1 5g/kg IBW)  Fluid: 66 oz     (35mL/kg IBW)     Nutrition Diagnosis  Yes;    Overweight/obesity  related to Excess energy intake as evidenced by  BMI more than normative standard for age and sex (obesity-grade I 26-30  9)     Nutrition Intervention     Nutrition Prescription  Calories: 1000 calories  Protein:75 gr  Fluid: 64 oz        Nutrition Education:    [facility] Manual  Calorie controlled menu  Lean protein food choices  Healthy snack options  Food journaling tips        Nutrition Counseling:  Strategies: meal planning, portion sizes, healthy snack choices, hydration, fiber intake, protein intake, exercise, food journal        Monitoring and Evaluation:  Evaluation criteria:  Energy Intake  Meet protein needs  Maintain adequate hydration  Monitor weekly weight  Meal planning/preparation  Food journal   Decreased portions at mealtimes and snacks  Physical activity      Barriers to learning:none  Readiness to change: Action  Comprehension: good  Expected Compliance: good

## 2019-09-10 ENCOUNTER — OFFICE VISIT (OUTPATIENT)
Dept: BARIATRICS | Facility: CLINIC | Age: 69
End: 2019-09-10

## 2019-09-10 VITALS — HEIGHT: 65 IN | WEIGHT: 182.8 LBS | BODY MASS INDEX: 30.46 KG/M2

## 2019-09-10 DIAGNOSIS — R63.5 ABNORMAL WEIGHT GAIN: Primary | ICD-10-CM

## 2019-09-10 PROCEDURE — RECHECK

## 2019-09-18 ENCOUNTER — CLINICAL SUPPORT (OUTPATIENT)
Dept: BARIATRICS | Facility: CLINIC | Age: 69
End: 2019-09-18

## 2019-09-18 VITALS — WEIGHT: 184.4 LBS | BODY MASS INDEX: 30.72 KG/M2 | HEIGHT: 65 IN

## 2019-09-18 DIAGNOSIS — R63.5 ABNORMAL WEIGHT GAIN: Primary | ICD-10-CM

## 2019-09-18 PROCEDURE — RECHECK

## 2019-09-19 RX ORDER — OMEPRAZOLE 20 MG/1
1 CAPSULE, DELAYED RELEASE ORAL DAILY
COMMUNITY
Start: 2013-04-26 | End: 2019-09-25 | Stop reason: ALTCHOICE

## 2019-09-25 ENCOUNTER — OFFICE VISIT (OUTPATIENT)
Dept: BARIATRICS | Facility: CLINIC | Age: 69
End: 2019-09-25
Payer: MEDICARE

## 2019-09-25 VITALS
HEART RATE: 64 BPM | SYSTOLIC BLOOD PRESSURE: 130 MMHG | HEIGHT: 65 IN | DIASTOLIC BLOOD PRESSURE: 84 MMHG | BODY MASS INDEX: 30.04 KG/M2 | TEMPERATURE: 98 F | WEIGHT: 180.3 LBS

## 2019-09-25 DIAGNOSIS — E66.9 OBESITY (BMI 30.0-34.9): Primary | ICD-10-CM

## 2019-09-25 DIAGNOSIS — E11.40 CONTROLLED TYPE 2 DIABETES MELLITUS WITH DIABETIC NEUROPATHY, WITHOUT LONG-TERM CURRENT USE OF INSULIN (HCC): ICD-10-CM

## 2019-09-25 DIAGNOSIS — I10 ESSENTIAL HYPERTENSION: ICD-10-CM

## 2019-09-25 DIAGNOSIS — Z91.89 AT RISK FOR SLEEP APNEA: ICD-10-CM

## 2019-09-25 DIAGNOSIS — E78.5 HYPERLIPIDEMIA, UNSPECIFIED HYPERLIPIDEMIA TYPE: ICD-10-CM

## 2019-09-25 PROCEDURE — 99213 OFFICE O/P EST LOW 20 MIN: CPT | Performed by: PHYSICIAN ASSISTANT

## 2019-09-25 NOTE — ASSESSMENT & PLAN NOTE
-Patient is pursuing [facility] Intervention Program  -Initial weight loss goal of 5-10% weight loss for improved health    Initial: 206 2 lbs   Current: 180 3 lbs   Change:-25 9 lbs

## 2019-09-25 NOTE — ASSESSMENT & PLAN NOTE
Lab Results   Component Value Date    HGBA1C 6 5 (H) 02/04/2019       Taking metformin and dulaglutide  -should improve with weight loss, dietary, and lifestyle changes  -continue management with prescribing provider

## 2019-09-25 NOTE — PROGRESS NOTES
Assessment/Plan:    Obesity (BMI 30 0-34 9)  -Patient is pursuing [facility] Program  -Initial weight loss goal of 5-10% weight loss for improved health    Initial: 206 2 lbs   Current: 180 3 lbs   Change:-25 9 lbs     At risk for sleep apnea  Has appt on 11/13/19 for sleep study    Hyperlipidemia  Taking lipitor  -should improve with weight loss, dietary, and lifestyle changes  -continue management with prescribing provider    Hypertension  Taking norvasc, hctz,toprol-xl  -should improve with weight loss, dietary, and lifestyle changes  -continue management with prescribing provider    Controlled diabetes mellitus ([facility] )  Lab Results   Component Value Date    HGBA1C 6 5 (H) 02/04/2019       Taking metformin and dulaglutide  -should improve with weight loss, dietary, and lifestyle changes  -continue management with prescribing provider        Follow up for healthy ways    Diagnoses and all orders for this visit:    Obesity (BMI 30 0-34  9)    At risk for sleep apnea    Hyperlipidemia, unspecified hyperlipidemia type    Essential hypertension    Controlled type 2 diabetes mellitus with diabetic neuropathy, without long-term current use of insulin (HCC)          Subjective:   Chief Complaint   Patient presents with    Follow-up     Patient here for [facility] wrap-up with PA  Patient ID: Roman Johnson  is a 78 y o  female with excess weight/obesity here to pursue weight managment  Patient is pursuing [facility] Program      HPI  Patient enjoyed the classes     Food logging: yes and staying around 1000   Increased appetite/cravings: denies but on dulaglutide   Fruit/Vegetable servings: 4-5 servings  Exercise: walk daily for 3 -1 mile walks per day   Hydration: 48-64 oz of water     Wants to continue with healthy ways     The following portions of the patient's history were reviewed and updated as appropriate: allergies, current medications, past family history, past medical history, past social history, past surgical history and problem list     Review of Systems   HENT: Negative for sore throat  Respiratory: Negative for cough and shortness of breath  Cardiovascular: Negative for chest pain and palpitations  Gastrointestinal: Positive for constipation  Negative for abdominal pain, diarrhea, nausea and vomiting  Denies GERD   Skin: Negative for rash  Psychiatric/Behavioral: Negative for suicidal ideas (denies HI)  Denies depression and anxiety       Objective:    /84 (BP Location: Left arm, Patient Position: Sitting, Cuff Size: Large)   Pulse 64   Temp 98 °F (36 7 °C) (Tympanic)   Ht 5' 4 8" (1 646 m)   Wt 81 8 kg (180 lb 4 8 oz)   BMI 30 19 kg/m²      Physical Exam   Nursing note and vitals reviewed  Constitutional   General appearance: Abnormal   well developed and obese  Eyes No conjunctival pallor  Ears, Nose, Mouth, and Throat Oral mucosa moist    Pulmonary   Respiratory effort: No increased work of breathing or signs of respiratory distress  Auscultation of lungs: Clear to auscultation, equal breath sounds bilaterally, no wheezes, no rales, no rhonci  Cardiovascular   Auscultation of heart: Normal rate and rhythm, normal S1 and S2, without murmurs  Examination of extremities for edema and/or varicosities: Normal   no edema  Abdomen   Abdomen: Abnormal   The abdomen was obese  Bowel sounds were normal  The abdomen was soft and nontender     Musculoskeletal   Gait and station: Normal     Psychiatric   Orientation to person, place and time: Normal     Affect: appropriate

## 2019-09-25 NOTE — ASSESSMENT & PLAN NOTE
Taking norvasc, hctz,toprol-xl  -should improve with weight loss, dietary, and lifestyle changes  -continue management with prescribing provider

## 2019-09-25 NOTE — ASSESSMENT & PLAN NOTE
Taking lipitor  -should improve with weight loss, dietary, and lifestyle changes  -continue management with prescribing provider

## 2019-10-02 ENCOUNTER — CLINICAL SUPPORT (OUTPATIENT)
Dept: BARIATRICS | Facility: CLINIC | Age: 69
End: 2019-10-02

## 2019-10-02 VITALS — WEIGHT: 180.8 LBS | HEIGHT: 65 IN | BODY MASS INDEX: 30.12 KG/M2

## 2019-10-02 DIAGNOSIS — R63.5 ABNORMAL WEIGHT GAIN: Primary | ICD-10-CM

## 2019-10-02 PROCEDURE — RECHECK

## 2019-10-07 ENCOUNTER — OFFICE VISIT (OUTPATIENT)
Dept: BARIATRICS | Facility: CLINIC | Age: 69
End: 2019-10-07

## 2019-10-07 VITALS — WEIGHT: 181.6 LBS | HEIGHT: 65 IN | BODY MASS INDEX: 30.26 KG/M2

## 2019-10-07 DIAGNOSIS — R63.5 ABNORMAL WEIGHT GAIN: Primary | ICD-10-CM

## 2019-10-07 PROCEDURE — RECHECK

## 2019-10-07 NOTE — PROGRESS NOTES
Weight Management Medical Nutrition Assessment  Marlee Penaloza was here today for her RD follow-up in the [employer] [facility]  Abelardo Thomas she weighs 181 6 lbs giving her a loss of 1 lbs in the last 4 weeks  She admits that she have gotten a little off track and has gone 200 - 400 calories over several times in the last 2 weeks and realizes through tracking that she has not been eating enough during the day,and then has started grazing in the afternoon and evening  Suggested eating a little more at lunch, and always having a snack mid afternoon so that she is ravenous around 4 pm  She has gotten away from walking every afternoon as well She is very aware of what she needs to do to get back on track  Anthropometric Measurements  Start Weight (lbs): 203 8 lbs  Current Weight (lbs): 181 6 lbs  TBW % Change from start weight:10%  Ideal Body Weight (lbs): 125 lbs  Goal Weight (lbs): 160 lbs     Weight Loss History  Previous weight loss attempts: Commercial Programs (Weight Watchers, [facility], etc )     Food and Nutrition Related History     Food Recall  Breakfast: shake/pudding         Snack:   Lunch:  apple with PB2, and bar or tuna sandwich  Snack: chips/nuts (graizes sometimes)  Dinner: steamed fish with mixed vegetables  Snack: popcorn (movie theatre )        Beverages: water, crystal lite  Volume of beverage intake: 60oz of water per day - has been increasing daily from 30 oz     Weekends: Same  Cravings: chips (salty)  Trouble area of day: evening     Frequency of Eating out: since starting program she has been out to eat 2 times  Food restrictions: none  Cooking: self   Food Shopping: self     Physical Activity Intake  Activity: walking   Frequency: 3 -4 times per week  Physical limitations/barriers to exercise: none     Estimated Needs  Energy  [employer] [place] Energy Needs:  BMR : 5476 6# loss weekly sedentary:  1110     1# loss weekly lightly active:  9328  Protein: 68 - 85      (1 2-1 5g/kg IBW)  Fluid: 66 oz     (35mL/kg IBW)     Nutrition Diagnosis  Yes;    Overweight/obesity  related to Excess energy intake as evidenced by  BMI more than normative standard for age and sex (obesity-grade I 26-30  9)     Nutrition Intervention     Nutrition Prescription  Calories: 1000 calories  Protein:75 gr  Fluid: 64 oz        Nutrition Education:    Healthy Core Manual  Calorie controlled menu  Lean protein food choices  Healthy snack options  Food journaling tips        Nutrition Counseling:  Strategies: meal planning, portion sizes, healthy snack choices, hydration, fiber intake, protein intake, exercise, food journal        Monitoring and Evaluation:  Evaluation criteria:  Energy Intake  Meet protein needs  Maintain adequate hydration  Monitor weekly weight  Meal planning/preparation  Food journal   Decreased portions at mealtimes and snacks  Physical activity      Barriers to learning:none  Readiness to change: Action  Comprehension: good  Expected Compliance: good

## 2019-10-08 ENCOUNTER — APPOINTMENT (OUTPATIENT)
Dept: LAB | Facility: CLINIC | Age: 69
End: 2019-10-08
Payer: MEDICARE

## 2019-10-08 DIAGNOSIS — E11.40 CONTROLLED TYPE 2 DIABETES MELLITUS WITH DIABETIC NEUROPATHY, WITHOUT LONG-TERM CURRENT USE OF INSULIN (HCC): ICD-10-CM

## 2019-10-08 LAB
ALBUMIN SERPL BCP-MCNC: 3.9 G/DL (ref 3.5–5)
ALP SERPL-CCNC: 122 U/L (ref 46–116)
ALT SERPL W P-5'-P-CCNC: 31 U/L (ref 12–78)
ANION GAP SERPL CALCULATED.3IONS-SCNC: 8 MMOL/L (ref 4–13)
AST SERPL W P-5'-P-CCNC: 20 U/L (ref 5–45)
BILIRUB SERPL-MCNC: 0.71 MG/DL (ref 0.2–1)
BUN SERPL-MCNC: 14 MG/DL (ref 5–25)
CALCIUM SERPL-MCNC: 10.1 MG/DL (ref 8.3–10.1)
CHLORIDE SERPL-SCNC: 98 MMOL/L (ref 100–108)
CO2 SERPL-SCNC: 32 MMOL/L (ref 21–32)
CREAT SERPL-MCNC: 0.84 MG/DL (ref 0.6–1.3)
EST. AVERAGE GLUCOSE BLD GHB EST-MCNC: 114 MG/DL
GFR SERPL CREATININE-BSD FRML MDRD: 71 ML/MIN/1.73SQ M
GLUCOSE P FAST SERPL-MCNC: 89 MG/DL (ref 65–99)
HBA1C MFR BLD: 5.6 % (ref 4.2–6.3)
POTASSIUM SERPL-SCNC: 3.5 MMOL/L (ref 3.5–5.3)
PROT SERPL-MCNC: 7.5 G/DL (ref 6.4–8.2)
SODIUM SERPL-SCNC: 138 MMOL/L (ref 136–145)

## 2019-10-08 PROCEDURE — 83036 HEMOGLOBIN GLYCOSYLATED A1C: CPT

## 2019-10-08 PROCEDURE — 36415 COLL VENOUS BLD VENIPUNCTURE: CPT

## 2019-10-08 PROCEDURE — 80053 COMPREHEN METABOLIC PANEL: CPT

## 2019-10-16 ENCOUNTER — CLINICAL SUPPORT (OUTPATIENT)
Dept: BARIATRICS | Facility: CLINIC | Age: 69
End: 2019-10-16

## 2019-10-16 VITALS — WEIGHT: 181 LBS | HEIGHT: 65 IN | BODY MASS INDEX: 30.16 KG/M2

## 2019-10-16 DIAGNOSIS — R63.5 ABNORMAL WEIGHT GAIN: Primary | ICD-10-CM

## 2019-10-16 PROCEDURE — RECHECK

## 2019-10-18 ENCOUNTER — OFFICE VISIT (OUTPATIENT)
Dept: INTERNAL MEDICINE CLINIC | Age: 69
End: 2019-10-18
Payer: MEDICARE

## 2019-10-18 VITALS
DIASTOLIC BLOOD PRESSURE: 62 MMHG | WEIGHT: 180.8 LBS | BODY MASS INDEX: 30.12 KG/M2 | HEIGHT: 65 IN | HEART RATE: 64 BPM | SYSTOLIC BLOOD PRESSURE: 120 MMHG | OXYGEN SATURATION: 95 % | TEMPERATURE: 98.3 F

## 2019-10-18 DIAGNOSIS — I10 ESSENTIAL HYPERTENSION: ICD-10-CM

## 2019-10-18 DIAGNOSIS — E11.49 DIABETES MELLITUS TYPE 2 WITH NEUROLOGICAL MANIFESTATIONS (HCC): ICD-10-CM

## 2019-10-18 DIAGNOSIS — R80.9 PROTEINURIA, UNSPECIFIED TYPE: ICD-10-CM

## 2019-10-18 DIAGNOSIS — Z23 NEED FOR INFLUENZA VACCINATION: ICD-10-CM

## 2019-10-18 DIAGNOSIS — E11.40 CONTROLLED TYPE 2 DIABETES MELLITUS WITH DIABETIC NEUROPATHY, WITHOUT LONG-TERM CURRENT USE OF INSULIN (HCC): ICD-10-CM

## 2019-10-18 DIAGNOSIS — E66.9 OBESITY (BMI 30.0-34.9): ICD-10-CM

## 2019-10-18 DIAGNOSIS — E78.5 HYPERLIPIDEMIA, UNSPECIFIED HYPERLIPIDEMIA TYPE: ICD-10-CM

## 2019-10-18 DIAGNOSIS — Z12.11 SCREEN FOR COLON CANCER: Primary | ICD-10-CM

## 2019-10-18 PROBLEM — R63.5 ABNORMAL WEIGHT GAIN: Status: RESOLVED | Noted: 2019-05-20 | Resolved: 2019-10-18

## 2019-10-18 PROBLEM — M25.569 PAIN IN JOINT, LOWER LEG: Status: ACTIVE | Noted: 2019-10-18

## 2019-10-18 PROCEDURE — 99214 OFFICE O/P EST MOD 30 MIN: CPT | Performed by: INTERNAL MEDICINE

## 2019-10-18 PROCEDURE — 90662 IIV NO PRSV INCREASED AG IM: CPT

## 2019-10-18 PROCEDURE — G0008 ADMIN INFLUENZA VIRUS VAC: HCPCS

## 2019-10-18 NOTE — ASSESSMENT & PLAN NOTE
Her blood pressure continues to be under good control with Norvasc 5 mg, HydroDIURIL and metoprolol 50    If she continues with weight loss and starts having dizzy spells will get rid of 1 of them

## 2019-10-18 NOTE — ASSESSMENT & PLAN NOTE
She has lost 16 lb since June    She is continuing with [facility]'s weight management program and would like to lose another 20

## 2019-10-18 NOTE — ASSESSMENT & PLAN NOTE
Lab Results   Component Value Date    HGBA1C 5 6 10/08/2019    With weight loss her A1c has gone down into the normal range  Will continue the metformin for now    She is a decrease though in sensation to monofilament but no sores or abnormalities otherwise of the feet

## 2019-10-18 NOTE — PROGRESS NOTES
Assessment/Plan:    Proteinuria  She had trace micro albuminuria on last urine so will recheck now    Obesity (BMI 30 0-34  9)  She has lost 16 lb since June  She is continuing with [facility] weight management program and would like to lose another 20    Hypertension  Her blood pressure continues to be under good control with Norvasc 5 mg, HydroDIURIL and metoprolol 50  If she continues with weight loss and starts having dizzy spells will get rid of 1 of them    Hyperlipidemia  Her her lipid profile is good on Lipitor and probably will be even better with her weight loss    Diabetes mellitus type 2 with neurological manifestations ([facility] )    Lab Results   Component Value Date    HGBA1C 5 6 10/08/2019    With weight loss her A1c has gone down into the normal range  Will continue the metformin for now  She is a decrease though in sensation to monofilament but no sores or abnormalities otherwise of the feet       Diagnoses and all orders for this visit:    Screen for colon cancer  -     Occult Blood, Fecal Immunochemical; Future    Controlled type 2 diabetes mellitus with diabetic neuropathy, without long-term current use of insulin ([facility])  -     Microalbumin / creatinine urine ratio    Obesity (BMI 30 0-34  9)    Hyperlipidemia, unspecified hyperlipidemia type  -     Lipid panel; Future    Proteinuria, unspecified type    Essential hypertension    Diabetes mellitus type 2 with neurological manifestations ([facility] )    Need for influenza vaccination  -     influenza vaccine, [id number], high-dose, PF 0 5 mL (FLUZONE HIGH-DOSE)          Subjective:      Patient ID: Zane Valdivia is a 78 y o  female  She is here for a checkup after attending [address] [place] [address] weight program for several months  Her blood sugars have gone down dramatically if she has lost 16 lb and she feels well    Diabetes   She presents for her follow-up diabetic visit  She has type 2 diabetes mellitus  Her disease course has been improving   There are no hypoglycemic associated symptoms  Pertinent negatives for hypoglycemia include no confusion, dizziness or headaches  Associated symptoms include foot paresthesias and weight loss  Pertinent negatives for diabetes include no blurred vision, no chest pain, no fatigue, no foot ulcerations, no polydipsia, no polyphagia, no polyuria and no weakness  There are no hypoglycemic complications  Symptoms are improving  Diabetic complications include peripheral neuropathy  Pertinent negatives for diabetic complications include no CVA, heart disease, PVD or retinopathy  Risk factors for coronary artery disease include diabetes mellitus, dyslipidemia, post-menopausal, obesity, hypertension and family history  Current diabetic treatment includes oral agent (monotherapy) and diet  She is compliant with treatment all of the time  Her weight is decreasing rapidly  She is following a diabetic diet  Meal planning includes ADA exchanges and calorie counting  She has had a previous visit with a dietitian  She participates in exercise intermittently  Her home blood glucose trend is decreasing rapidly  An ACE inhibitor/angiotensin II receptor blocker is not being taken  She does not see a podiatrist Eye exam is current  Review of Systems   Constitutional: Positive for weight loss  Negative for chills, fatigue, fever and unexpected weight change  HENT: Negative for congestion, ear pain, hearing loss, postnasal drip, sinus pressure, sore throat, trouble swallowing and voice change  Eyes: Negative for blurred vision and visual disturbance  Respiratory: Negative for cough, chest tightness, shortness of breath and wheezing  Cardiovascular: Negative for chest pain, palpitations and leg swelling  Gastrointestinal: Negative for abdominal distention, abdominal pain, anal bleeding, blood in stool, constipation, diarrhea and nausea  Endocrine: Negative for cold intolerance, polydipsia, polyphagia and polyuria  Genitourinary: Negative for dysuria, flank pain, frequency, hematuria and urgency  Musculoskeletal: Negative for arthralgias, back pain, gait problem, joint swelling, myalgias and neck pain  Mild knee pain   Skin: Negative for rash  Allergic/Immunologic: Negative for immunocompromised state  Neurological: Negative for dizziness, syncope, facial asymmetry, weakness, light-headedness, numbness and headaches  Hematological: Negative for adenopathy  Psychiatric/Behavioral: Negative for confusion, sleep disturbance and suicidal ideas  Objective:      /62 (BP Location: Left arm, Patient Position: Sitting)   Pulse 64   Temp 98 3 °F (36 8 °C) (Tympanic)   Ht 5' 4 8" (1 646 m)   Wt 82 kg (180 lb 12 8 oz)   SpO2 95%   BMI 30 27 kg/m²          Physical Exam   Constitutional: She is oriented to person, place, and time  She appears well-developed and well-nourished  No distress  Minimally obese   HENT:   Right Ear: External ear normal    Left Ear: External ear normal    Nose: Nose normal    Mouth/Throat: Oropharynx is clear and moist  No oropharyngeal exudate  Eyes: Pupils are equal, round, and reactive to light  EOM are normal    Neck: Normal range of motion  Neck supple  No JVD present  No thyromegaly present  Cardiovascular: Normal rate, regular rhythm, normal heart sounds and intact distal pulses  Exam reveals no gallop  Pulses are no weak pulses  No murmur heard  Pulses:       Dorsalis pedis pulses are 1+ on the right side, and 1+ on the left side  Pulmonary/Chest: Effort normal and breath sounds normal  No respiratory distress  She has no wheezes  She has no rales  Abdominal: Soft  Bowel sounds are normal  She exhibits no distension and no mass  There is no tenderness  Musculoskeletal: Normal range of motion  She exhibits no tenderness  Feet:   Right Foot:   Skin Integrity: Negative for ulcer, skin breakdown, erythema, warmth, callus or dry skin     Left Foot:   Skin Integrity: Negative for ulcer, skin breakdown, erythema, warmth, callus or dry skin  Lymphadenopathy:     She has no cervical adenopathy  Neurological: She is alert and oriented to person, place, and time  A sensory deficit is present  No cranial nerve deficit  Coordination normal    Skin: No rash noted  Psychiatric: She has a normal mood and affect  Her behavior is normal  Judgment and thought content normal    Vitals reviewed  Patient's shoes and socks removed  Right Foot/Ankle   Right Foot Inspection  Skin Exam: skin normal and skin intact no dry skin, no warmth, no callus, no erythema, no maceration, no abnormal color, no pre-ulcer, no ulcer and no callus                          Toe Exam: ROM and strength within normal limits  Sensory   Vibration: intact  Proprioception: intact   Monofilament testing: diminished  Vascular  Capillary refills: < 3 seconds  The right DP pulse is 1+  Left Foot/Ankle  Left Foot Inspection  Skin Exam: skin normal and skin intactno dry skin, no warmth, no erythema, no maceration, normal color, no pre-ulcer, no ulcer and no callus                         Toe Exam: ROM and strength within normal limits                   Sensory   Vibration: intact  Proprioception: intact  Monofilament: diminished  Vascular  Capillary refills: < 3 seconds  The left DP pulse is 1+  Assign Risk Category:  ; Loss of protective sensation;  No weak pulses       Risk: 1

## 2019-10-18 NOTE — PATIENT INSTRUCTIONS
Foot Care for People with Diabetes   AMBULATORY CARE:   What you need to know about foot care:   · Foot care helps protect your feet and prevent foot ulcers or sores  Long-term high blood sugar levels can damage the blood vessels and nerves in your legs and feet  This damage makes it hard to feel pressure, pain, temperature, and touch  You may not be able to feel a cut or sore, or shoes that are too tight  Foot care is needed to prevent serious problems, such as an infection or amputation  · Diabetes may cause your toes to become crooked or curved under  These changes may affect the way you walk and can lead to increased pressure on your foot  The pressure can decrease blood flow to your feet  Lack of blood flow increases your risk for a foot ulcer  Do not ignore small problems, such as dry skin or small wounds  These can become life-threatening over time without proper care  Contact your healthcare provider if:   · Your feet become numb, weak, or hard to move  · You have pus draining from a sore on your foot  · You have a wound on your foot that gets bigger, deeper, or does not heal      · You see blisters, cuts, scratches, calluses, or sores on your foot  · You have a fever, and your feet become red, warm, and swollen  · Your toenails become thick, curled, or yellow  · You find it hard to check your feet because your vision is poor  · You have questions or concerns about your condition or care  How to care for your feet:   · Check your feet each day  Look at your whole foot, including the bottom, and between and under your toes  Check for wounds, corns, and calluses  Use a mirror to see the bottom of your feet  The skin on your feet may be shiny, tight, or darker than normal  Your feet may also be cold and pale  Feel your feet by running your hands along the tops, bottoms, sides, and between your toes   Redness, swelling, and warmth are signs of blood flow problems that can lead to a foot ulcer  Do not try to remove corns or calluses yourself  · Wash your feet each day with soap and warm water  Do not use hot water, because this can injure your foot  Dry your feet gently with a towel after you wash them  Dry between and under your toes  · Apply lotion or a moisturizer on your dry feet  Ask your healthcare provider what lotions are best to use  Do not put lotion or moisturizer between your toes  · Cut your toenails correctly  File or cut your toenails straight across  Use a soft brush to clean around your toenails  If your toenails are very thick, you may need to have a healthcare provider or specialist cut them  · Protect your feet  Do not walk barefoot or wear your shoes without socks  Check your shoes for rocks or other objects that can hurt your feet  Wear cotton socks to help keep your feet dry  Wear socks without toe seams, or wear them with the seams inside out  Change your socks each day  Do not wear socks that are dirty or damp  · Wear shoes that fit well  Wear shoes that do not rub against any area of your feet  Your shoes should be ½ to ¾ inch (1 to 2 centimeters) longer than your feet  Your shoes should also have extra space around the widest part of your feet  Walking or athletic shoes with laces or straps that adjust are best  Ask your healthcare provider for help to choose shoes that fit you best  Ask him if you need to wear an insert, orthotic, or bandage on your feet  · Go to your follow-up visits  Your healthcare provider will do a foot exam at least once a year  You may need a foot exam more often if you have nerve damage, foot deformities, or ulcers  He will check for nerve damage and how well you can feel your feet  He will check your shoes to see if they fit well  Follow up with your healthcare provider or foot specialist as directed: You will need to have your feet checked at least once a year   You may need a foot exam more often if you have nerve damage, foot deformities, or ulcers  Write down your questions so you remember to ask them during your visits  © 2017 [employer] Information is for End User's use only and may not be sold, redistributed or otherwise used for commercial purposes  All illustrations and images included in CareNotes® are the copyrighted property of A [employer]  or Armen Chopra  The above information is an  only  It is not intended as medical advice for individual conditions or treatments  Talk to your doctor, nurse or pharmacist before following any medical regimen to see if it is safe and effective for you

## 2019-10-23 ENCOUNTER — CLINICAL SUPPORT (OUTPATIENT)
Dept: BARIATRICS | Facility: CLINIC | Age: 69
End: 2019-10-23

## 2019-10-23 VITALS — BODY MASS INDEX: 29.96 KG/M2 | HEIGHT: 65 IN | WEIGHT: 179.8 LBS

## 2019-10-23 DIAGNOSIS — R63.5 ABNORMAL WEIGHT GAIN: Primary | ICD-10-CM

## 2019-10-23 PROCEDURE — RECHECK

## 2019-11-06 ENCOUNTER — CLINICAL SUPPORT (OUTPATIENT)
Dept: BARIATRICS | Facility: CLINIC | Age: 69
End: 2019-11-06

## 2019-11-06 VITALS — HEIGHT: 65 IN | WEIGHT: 177.4 LBS | BODY MASS INDEX: 29.56 KG/M2

## 2019-11-06 DIAGNOSIS — R63.5 ABNORMAL WEIGHT GAIN: Primary | ICD-10-CM

## 2019-11-06 PROCEDURE — RECHECK

## 2019-11-13 ENCOUNTER — HOSPITAL ENCOUNTER (OUTPATIENT)
Dept: SLEEP CENTER | Facility: CLINIC | Age: 69
Discharge: HOME | End: 2019-11-13
Payer: MEDICARE

## 2019-11-13 ENCOUNTER — OFFICE VISIT (OUTPATIENT)
Dept: BARIATRICS | Facility: CLINIC | Age: 69
End: 2019-11-13

## 2019-11-13 DIAGNOSIS — R63.5 ABNORMAL WEIGHT GAIN: Primary | ICD-10-CM

## 2019-11-13 DIAGNOSIS — Z91.89 AT RISK FOR SLEEP APNEA: ICD-10-CM

## 2019-11-13 DIAGNOSIS — G47.33 OSA (OBSTRUCTIVE SLEEP APNEA): ICD-10-CM

## 2019-11-13 PROCEDURE — 95810 POLYSOM 6/> YRS 4/> PARAM: CPT | Performed by: INTERNAL MEDICINE

## 2019-11-13 PROCEDURE — 95810 POLYSOM 6/> YRS 4/> PARAM: CPT

## 2019-11-13 PROCEDURE — RECHECK

## 2019-11-13 NOTE — PROGRESS NOTES
Weight Management Medical Nutrition Assessment  Mack Correa was here today for her RD follow-up in the [facility]  [place] Marleen she weighs 176 2 lbs giving her a loss of 5 4 lbs in the last 4 weeks  She is back to tracking every thing she eats and drinks  She is also weighing and measuring her food  She is back to walking most days, and on off days she has been raking leaves  She is practicing mindful eating especially when she goes to a restaurant       Anthropometric Measurements  Start Weight (lbs): 203 8 lbs  Current Weight (lbs): 176 2 lbs  TBW % Change from start weight:14%  Ideal Body Weight (lbs): 125 lbs  Goal Weight (lbs): 160 lbs     Weight Loss History  Previous weight loss attempts: Commercial Programs (Weight Watchers, [facility] Crisayne, etc )     Food and Nutrition Related History     Food Recall  Breakfast: fiber oatmeal, 1 egg  or egg beaters    Snack:   Lunch:  apple with PB2 with cheddar stick  Snack: nuts (measured)  Dinner: chicken breast with vegetables, 1/2 cup rice (measured)  Snack: 1 cup popcorn        Beverages: water, crystal lite  Volume of beverage intake: 60oz of water per day - has been increasing daily from 30 oz     Weekends: Same  Cravings: chips (salty)  Trouble area of day: evening     Frequency of Eating out: since starting program she has been out to eat 2 times  Food restrictions: none  Cooking: self   Food Shopping: self     Physical Activity Intake  Activity: walking and raking leaves  Frequency: 3 -4 times per week  Physical limitations/barriers to exercise: none     Estimated Needs  Energy  [employer] [place] Energy Needs: BMR : 8863  1# loss weekly sedentary:  1080     1# loss weekly lightly active:  1311  Protein: 68 - 85      (1 2-1 5g/kg IBW)  Fluid: 66 oz     (35mL/kg IBW)     Nutrition Diagnosis  Yes;    Overweight/obesity  related to Excess energy intake as evidenced by  BMI more than normative standard for age and sex (obesity-grade I 30-34  9)     Nutrition Intervention     Nutrition Prescription  Calories: 1000 calories  Protein:75 gr  Fluid: 64 oz        Nutrition Education:    Healthy Core Manual  Calorie controlled menu  Lean protein food choices  Healthy snack options  Food journaling tips        Nutrition Counseling:  Strategies: meal planning, portion sizes, healthy snack choices, hydration, fiber intake, protein intake, exercise, food journal        Monitoring and Evaluation:  Evaluation criteria:  Energy Intake  Meet protein needs  Maintain adequate hydration  Monitor weekly weight  Meal planning/preparation  Food journal   Decreased portions at mealtimes and snacks  Physical activity      Barriers to learning:none  Readiness to change: Action  Comprehension: good  Expected Compliance: good

## 2019-11-14 NOTE — PROGRESS NOTES
Sleep Study Documentation    Pre-Sleep Study       Sleep testing procedure explained to patient:YES    Patient napped prior to study:NO    [address] [place] worker after 12PM   Caffeine use:NO    Alcohol:Dayshift workers after 5PM: Alcohol use:NO    Typical day for patient:YES       Study Documentation    Sleep Study Indications: snoring and nocturnal choking    Sleep Study: Diagnostic   Snore: Moderate  Supplemental O2: no    O2 flow rate (L/min) range n/a  O2 flow rate (L/min) final n/a  Minimum SaO2   Baseline SaO2 96%        Mode of Therapy: n/a    EKG abnormalities: no    EEG abnormalities: no    Sleep Study Recorded < 2 hours: N/A    Sleep Study Recorded > 2 hours but incomplete study: N/A    Sleep Study Recorded 6 hours but no sleep obtained: NO    Patient classification: retired       Post-Sleep Study    Medication used at bedtime or during sleep study:NO    Patient reports time it took to fall asleep:greater than 60 minutes    Patient reports waking up during study:1 to 2 times  Patient reports returning to sleep in greater than 30 minutes  Patient reports sleeping 2 to 4 hours without dreaming  Patient reports sleep during study:typical    Patient rated sleepiness: Not sleepy or tired    PAP treatment:no

## 2019-11-19 ENCOUNTER — OFFICE VISIT (OUTPATIENT)
Dept: PULMONOLOGY | Facility: CLINIC | Age: 69
End: 2019-11-19
Payer: MEDICARE

## 2019-11-19 VITALS
OXYGEN SATURATION: 95 % | DIASTOLIC BLOOD PRESSURE: 80 MMHG | HEART RATE: 65 BPM | SYSTOLIC BLOOD PRESSURE: 124 MMHG | HEIGHT: 65 IN | BODY MASS INDEX: 30.16 KG/M2 | WEIGHT: 181 LBS

## 2019-11-19 DIAGNOSIS — E66.9 OBESITY (BMI 30.0-34.9): ICD-10-CM

## 2019-11-19 DIAGNOSIS — G47.33 OSA (OBSTRUCTIVE SLEEP APNEA): Primary | ICD-10-CM

## 2019-11-19 PROCEDURE — 99214 OFFICE O/P EST MOD 30 MIN: CPT | Performed by: INTERNAL MEDICINE

## 2019-11-19 NOTE — PROGRESS NOTES
Assessment/Plan:   Diagnoses and all orders for this visit:    NICKY (obstructive sleep apnea)  -     Mandible Advancing Appliance    Obesity (BMI 30 0-34 9)      moderate obstructive sleep apnea with an AHI of 19 1 associated with events related hypoxemia  Etiology pathogenesis of obstructive sleep apnea discussed in detail  Various treatment options discussed with weight loss, mandibular advancing appliance uvula pharyngoplasty and CPAP titration discussed  She would like to go in for a mandible advancing appliance she states  Also discussed she needs to get a repeat diagnostic study with the mandible advancing appliance understands and verbalizes  Also consequences of untreated sleep apnea discussed with excessive daytime sleepiness, increased risk for myocardial infarction stroke atrial fibrillation  Recommend weight loss  Follow-up in 3 months p r n  earlier as needed  Return in about 3 months (around 2/19/2020)  All questions are answered to the patient's satisfaction and understanding  She verbalizes understanding  She is encouraged to call with any further questions or concerns  Portions of the record may have been created with voice recognition software  Occasional wrong word or "sound a like" substitutions may have occurred due to the inherent limitations of voice recognition software  Read the chart carefully and recognize, using context, where substitutions have occurred  Electronically Signed by Sarbjit Aviles MD    ______________________________________________________________________    Chief Complaint:   Chief Complaint   Patient presents with    Follow-up       Patient ID: Calvin Strauss is a 78 y o  y o  female has a past medical history of Carpal tunnel syndrome, Cataract, Complex endometrial hyperplasia with atypia, Diabetes mellitus ([address] ), Hypertension, Normal delivery, Obesity, and Post-menopausal bleeding  11/19/2019  Patient presents today for follow-up visit    Patient is here for follow-up of the sleep study results  Her daily sleep schedule has not changed since her last visit  No recent hospitalizations or change of list of medications since last visit  Review of Systems   Constitutional: Positive for fatigue  Negative for appetite change, chills, diaphoresis, fever and unexpected weight change  HENT: Negative for congestion, ear discharge, ear pain, nosebleeds, postnasal drip, rhinorrhea, sinus pain, sore throat and voice change  Eyes: Negative for pain, discharge and visual disturbance  Respiratory: Negative for apnea, cough, choking, chest tightness, shortness of breath, wheezing and stridor  Cardiovascular: Negative for chest pain, palpitations and leg swelling  Gastrointestinal: Negative for abdominal pain, blood in stool, constipation, diarrhea and vomiting  Endocrine: Negative for cold intolerance, heat intolerance, polydipsia, polyphagia and polyuria  Genitourinary: Negative for difficulty urinating and dysuria  Musculoskeletal: Negative for arthralgias and neck pain  Skin: Negative for pallor and rash  Allergic/Immunologic: Negative for environmental allergies and food allergies  Neurological: Negative for dizziness, speech difficulty, weakness and light-headedness  Hematological: Negative for adenopathy  Does not bruise/bleed easily  Psychiatric/Behavioral: Negative for agitation, confusion and sleep disturbance  The patient is not nervous/anxious  Smoking history: She reports that she has never smoked   She has never used smokeless tobacco     The following portions of the patient's history were reviewed and updated as appropriate: allergies, current medications, past family history, past medical history, past social history, past surgical history and problem list     Immunization History   Administered Date(s) Administered    INFLUENZA 11/01/2011, 10/20/2015, 10/26/2016, 11/03/2017, 09/19/2018    Influenza Split High Dose Preservative Free IM 10/01/2016, 11/03/2017    Influenza, high dose seasonal 0 5 mL 10/18/2019    Pneumococcal Conjugate 13-Valent 02/15/2017    Pneumococcal Polysaccharide PPV23 01/01/2013    Zoster 01/02/2012     Current Outpatient Medications   Medication Sig Dispense Refill    amLODIPine (NORVASC) 5 mg tablet Take 1 tablet (5 mg total) by mouth daily 90 tablet 3    aspirin 81 MG tablet Take by mouth      atorvastatin (LIPITOR) 10 mg tablet Take 1 tablet (10 mg total) by mouth daily 90 tablet 3    Cranberry 600 MG TABS Take by mouth      Dulaglutide 1 5 MG/0 5ML SOPN Inject 0 5 mL (1 5 mg total) under the skin once a week 1 pen 0    famotidine (PEPCID) 20 mg tablet Take 1 tablet (20 mg total) by mouth 2 (two) times a day 180 tablet 3    fexofenadine (ALLEGRA) 180 MG tablet Take 1 tablet (180 mg total) by mouth daily 1 tablet 3    hydrochlorothiazide (HYDRODIURIL) 25 mg tablet Take 1 tablet (25 mg total) by mouth daily 90 tablet 3    Insulin Pen Needle (B-D UF III MINI PEN NEEDLES) 31G X 5 MM MISC by Other route once a week Use as directed 100 each 0    Lancets (ACCU-CHEK SOFT TOUCH) lancets by Other route 2 (two) times a day 100 each 3    metFORMIN (GLUCOPHAGE) 1000 MG tablet Take 1 tablet (1,000 mg total) by mouth 2 (two) times a day with meals 180 tablet 3    metoprolol succinate (TOPROL-XL) 50 mg 24 hr tablet Take 1 tablet (50 mg total) by mouth daily 90 tablet 3    nystatin-triamcinolone (MYCOLOG-II) ointment APPLY TO THE AFFECTED AREAS TWICE DAILY AS NEEDED 45 g 0    ONE TOUCH ULTRA TEST test strip 1 each by Other route 2 (two) times a day Test Blood Sugar 2X daily E11 9 100 each 3     No current facility-administered medications for this visit  Allergies: Ace inhibitors; Codeine; Hydrochlorothiazide; and Irbesartan    Objective:  Vitals:    11/19/19 1103   BP: 124/80   Pulse: 65   SpO2: 95%   Weight: 82 1 kg (181 lb)   Height: 5' 4 8" (1 646 m)   Oxygen Therapy  SpO2: 95 %      Wt Readings from Last 3 Encounters:   11/19/19 82 1 kg (181 lb)   11/06/19 80 5 kg (177 lb 6 4 oz)   10/23/19 81 6 kg (179 lb 12 8 oz)     Body mass index is 30 31 kg/m²  Physical Exam   Constitutional: She is oriented to person, place, and time  She appears well-developed and well-nourished  HENT:   Head: Normocephalic and atraumatic  Crowded oropharyngeal airways, Mallampati score 3   Eyes: Pupils are equal, round, and reactive to light  EOM are normal    Neck: Normal range of motion  Neck supple  Short and wide neck   Cardiovascular: Normal rate, regular rhythm and normal heart sounds  Pulmonary/Chest: Effort normal and breath sounds normal    Abdominal: Soft  Bowel sounds are normal    Musculoskeletal: Normal range of motion  Neurological: She is alert and oriented to person, place, and time  Skin: Skin is warm and dry  Psychiatric: She has a normal mood and affect   Her behavior is normal

## 2019-11-20 ENCOUNTER — CLINICAL SUPPORT (OUTPATIENT)
Dept: BARIATRICS | Facility: CLINIC | Age: 69
End: 2019-11-20

## 2019-11-20 VITALS — HEIGHT: 65 IN | BODY MASS INDEX: 29.59 KG/M2 | WEIGHT: 177.6 LBS

## 2019-11-20 DIAGNOSIS — R63.5 ABNORMAL WEIGHT GAIN: Primary | ICD-10-CM

## 2019-11-20 PROCEDURE — RECHECK

## 2019-11-27 ENCOUNTER — CLINICAL SUPPORT (OUTPATIENT)
Dept: BARIATRICS | Facility: CLINIC | Age: 69
End: 2019-11-27

## 2019-11-27 VITALS — WEIGHT: 178.4 LBS | BODY MASS INDEX: 29.72 KG/M2 | HEIGHT: 65 IN

## 2019-11-27 DIAGNOSIS — R63.5 ABNORMAL WEIGHT GAIN: Primary | ICD-10-CM

## 2019-11-27 PROCEDURE — RECHECK

## 2019-12-11 ENCOUNTER — CLINICAL SUPPORT (OUTPATIENT)
Dept: BARIATRICS | Facility: CLINIC | Age: 69
End: 2019-12-11

## 2019-12-11 VITALS — BODY MASS INDEX: 29.62 KG/M2 | HEIGHT: 65 IN | WEIGHT: 177.8 LBS

## 2019-12-11 DIAGNOSIS — R63.5 ABNORMAL WEIGHT GAIN: Primary | ICD-10-CM

## 2019-12-11 PROCEDURE — RECHECK

## 2019-12-17 DIAGNOSIS — E78.5 HYPERLIPIDEMIA, UNSPECIFIED HYPERLIPIDEMIA TYPE: ICD-10-CM

## 2019-12-17 DIAGNOSIS — I10 ESSENTIAL HYPERTENSION: ICD-10-CM

## 2019-12-17 DIAGNOSIS — L50.9 HIVES: ICD-10-CM

## 2019-12-17 DIAGNOSIS — E11.49 DIABETES MELLITUS TYPE 2 WITH NEUROLOGICAL MANIFESTATIONS (HCC): ICD-10-CM

## 2019-12-17 DIAGNOSIS — R10.13 DYSPEPSIA: ICD-10-CM

## 2019-12-18 ENCOUNTER — CLINICAL SUPPORT (OUTPATIENT)
Dept: BARIATRICS | Facility: CLINIC | Age: 69
End: 2019-12-18

## 2019-12-18 VITALS — BODY MASS INDEX: 29.39 KG/M2 | HEIGHT: 65 IN | WEIGHT: 176.4 LBS

## 2019-12-18 DIAGNOSIS — R63.5 ABNORMAL WEIGHT GAIN: Primary | ICD-10-CM

## 2019-12-18 PROCEDURE — RECHECK

## 2019-12-19 RX ORDER — AMLODIPINE BESYLATE 5 MG/1
5 TABLET ORAL DAILY
Qty: 90 TABLET | Refills: 3 | Status: SHIPPED | OUTPATIENT
Start: 2019-12-19 | End: 2020-10-27

## 2019-12-19 RX ORDER — METOPROLOL SUCCINATE 50 MG/1
50 TABLET, EXTENDED RELEASE ORAL DAILY
Qty: 90 TABLET | Refills: 3 | Status: SHIPPED | OUTPATIENT
Start: 2019-12-19 | End: 2020-10-27

## 2019-12-19 RX ORDER — ATORVASTATIN CALCIUM 10 MG/1
10 TABLET, FILM COATED ORAL DAILY
Qty: 90 TABLET | Refills: 3 | Status: SHIPPED | OUTPATIENT
Start: 2019-12-19 | End: 2020-10-27

## 2019-12-19 RX ORDER — FAMOTIDINE 20 MG/1
20 TABLET, FILM COATED ORAL 2 TIMES DAILY
Qty: 180 TABLET | Refills: 3 | Status: SHIPPED | OUTPATIENT
Start: 2019-12-19 | End: 2020-10-27

## 2019-12-19 RX ORDER — HYDROCHLOROTHIAZIDE 25 MG/1
25 TABLET ORAL DAILY
Qty: 90 TABLET | Refills: 3 | Status: SHIPPED | OUTPATIENT
Start: 2019-12-19 | End: 2020-10-27

## 2019-12-19 RX ORDER — FEXOFENADINE HCL 180 MG/1
180 TABLET ORAL DAILY
Qty: 90 TABLET | Refills: 3 | Status: SHIPPED | OUTPATIENT
Start: 2019-12-19

## 2019-12-30 ENCOUNTER — OFFICE VISIT (OUTPATIENT)
Dept: BARIATRICS | Facility: CLINIC | Age: 69
End: 2019-12-30
Payer: MEDICARE

## 2019-12-30 VITALS
SYSTOLIC BLOOD PRESSURE: 128 MMHG | HEART RATE: 89 BPM | TEMPERATURE: 97.2 F | BODY MASS INDEX: 29.69 KG/M2 | HEIGHT: 65 IN | DIASTOLIC BLOOD PRESSURE: 76 MMHG | WEIGHT: 178.2 LBS

## 2019-12-30 DIAGNOSIS — E11.49 DIABETES MELLITUS TYPE 2 WITH NEUROLOGICAL MANIFESTATIONS (HCC): ICD-10-CM

## 2019-12-30 DIAGNOSIS — G47.33 OSA (OBSTRUCTIVE SLEEP APNEA): ICD-10-CM

## 2019-12-30 DIAGNOSIS — E66.3 OVERWEIGHT (BMI 25.0-29.9): Primary | ICD-10-CM

## 2019-12-30 DIAGNOSIS — I10 ESSENTIAL HYPERTENSION: ICD-10-CM

## 2019-12-30 PROCEDURE — 99213 OFFICE O/P EST LOW 20 MIN: CPT | Performed by: PHYSICIAN ASSISTANT

## 2019-12-30 NOTE — ASSESSMENT & PLAN NOTE
Lab Results   Component Value Date    HGBA1C 5 6 10/08/2019     6 5%---> 5 6%  Taking metformin and dulaglutide  -should improve with weight loss, dietary, and lifestyle changes  -continue management with prescribing provider

## 2019-12-30 NOTE — PROGRESS NOTES
Assessment/Plan:    Overweight (BMI 25 0-29 9)  -Patient is pursuing [facility] after completing [facility]   -Initial weight loss goal of 5-10% weight loss for improved health    Initial: 206 2 lbs   Current: 178 2 lbs   Change:-28 lbs     Hyperlipidemia  Taking lipitor  -should improve with weight loss, dietary, and lifestyle changes  -continue management with prescribing provider    Diabetes mellitus type 2 with neurological manifestations ([facility] )  Lab Results   Component Value Date    HGBA1C 5 6 10/08/2019     6 5%---> 5 6%  Taking metformin and dulaglutide  -should improve with weight loss, dietary, and lifestyle changes  -continue management with prescribing provider    NICKY (obstructive sleep apnea)  -encouraged continued use of CPAP machine  -may improve with 20-30% weight loss      Hypertension  Taking norvasc, hctz,toprol-xl  -should improve with weight loss, dietary, and lifestyle changes  -continue management with prescribing provider        Follow up in approximately 3 months with Non-Surgical Physician/Advanced Practitioner  Diagnoses and all orders for this visit:    Overweight (BMI 25 0-29  9)    Diabetes mellitus type 2 with neurological manifestations (HCC)    NICKY (obstructive sleep apnea)    Essential hypertension          Subjective:   Chief Complaint   Patient presents with    Follow-up     Patient here for three month MWM follow-up with PA  Patient ID: Fernanda Nguyen  is a 78 y o  female with excess weight/obesity here to pursue weight managment  Patient is pursuing [facility] after completing healthy [facility]  HPI  Patient is enjoying healthy ways     Food logging:yes and staying around 1000 calories -on lose it   Increased appetite/cravings:around holidays there is more temptation-able to control    Fruit/Vegetable servings: 4-5 servings   Exercise: walks dog few times per day   Hydration: 2-3 bottles of water       The following portions of the patient's history were reviewed and updated as appropriate: allergies, current medications, past family history, past medical history, past social history, past surgical history and problem list     Review of Systems   HENT: Negative for sore throat  Respiratory: Negative for cough and shortness of breath  Cardiovascular: Negative for chest pain and palpitations  Gastrointestinal: Negative for abdominal pain, constipation, diarrhea, nausea and vomiting  Denies GERD   Skin: Negative for rash  Psychiatric/Behavioral: Negative for suicidal ideas (denies HI)  Denies depression and anxiety       Objective:    /76 (BP Location: Left arm, Patient Position: Sitting, Cuff Size: Large)   Pulse 89   Temp (!) 97 2 °F (36 2 °C) (Tympanic)   Ht 5' 4 8" (1 646 m)   Wt 80 8 kg (178 lb 3 2 oz)   BMI 29 84 kg/m²      Physical Exam   Nursing note and vitals reviewed  Constitutional   General appearance: Abnormal   well developed and overweight  Eyes No conjunctival pallor  Ears, Nose, Mouth, and Throat Oral mucosa moist    Pulmonary   Respiratory effort: No increased work of breathing or signs of respiratory distress  Auscultation of lungs: Clear to auscultation, equal breath sounds bilaterally, no wheezes, no rales, no rhonci  Cardiovascular   Auscultation of heart: Normal rate and rhythm, normal S1 and S2, without murmurs  Examination of extremities for edema and/or varicosities: Normal   no edema  Abdomen   Abdomen: Abnormal   Bowel sounds were normal  The abdomen was soft and nontender     Musculoskeletal   Gait and station: Normal     Psychiatric   Orientation to person, place and time: Normal     Affect: appropriate

## 2019-12-30 NOTE — ASSESSMENT & PLAN NOTE
-Patient is pursuing [facility] after completing [facility]   -Initial weight loss goal of 5-10% weight loss for improved health    Initial: 206 2 lbs   Current: 178 2 lbs   Change:-28 lbs

## 2020-01-08 ENCOUNTER — CLINICAL SUPPORT (OUTPATIENT)
Dept: BARIATRICS | Facility: CLINIC | Age: 70
End: 2020-01-08

## 2020-01-08 VITALS — HEIGHT: 65 IN | BODY MASS INDEX: 29.56 KG/M2 | WEIGHT: 177.4 LBS

## 2020-01-08 DIAGNOSIS — R63.5 ABNORMAL WEIGHT GAIN: Primary | ICD-10-CM

## 2020-01-08 PROCEDURE — RECHECK

## 2020-01-22 ENCOUNTER — CLINICAL SUPPORT (OUTPATIENT)
Dept: BARIATRICS | Facility: CLINIC | Age: 70
End: 2020-01-22

## 2020-01-22 VITALS — WEIGHT: 177.4 LBS | HEIGHT: 65 IN | BODY MASS INDEX: 29.56 KG/M2

## 2020-01-22 DIAGNOSIS — R63.5 ABNORMAL WEIGHT GAIN: Primary | ICD-10-CM

## 2020-01-22 PROCEDURE — RECHECK

## 2020-01-29 ENCOUNTER — CLINICAL SUPPORT (OUTPATIENT)
Dept: BARIATRICS | Facility: CLINIC | Age: 70
End: 2020-01-29

## 2020-01-29 VITALS — BODY MASS INDEX: 29.59 KG/M2 | WEIGHT: 177.6 LBS | HEIGHT: 65 IN

## 2020-01-29 DIAGNOSIS — R63.5 ABNORMAL WEIGHT GAIN: Primary | ICD-10-CM

## 2020-01-29 PROCEDURE — RECHECK

## 2020-02-12 ENCOUNTER — CLINICAL SUPPORT (OUTPATIENT)
Dept: BARIATRICS | Facility: CLINIC | Age: 70
End: 2020-02-12

## 2020-02-12 VITALS — WEIGHT: 179.2 LBS | HEIGHT: 65 IN | BODY MASS INDEX: 29.85 KG/M2

## 2020-02-12 DIAGNOSIS — R63.5 ABNORMAL WEIGHT GAIN: Primary | ICD-10-CM

## 2020-02-12 PROCEDURE — RECHECK

## 2020-02-19 ENCOUNTER — CLINICAL SUPPORT (OUTPATIENT)
Dept: BARIATRICS | Facility: CLINIC | Age: 70
End: 2020-02-19
Payer: MEDICARE

## 2020-02-19 VITALS — BODY MASS INDEX: 29.62 KG/M2 | HEIGHT: 65 IN | WEIGHT: 177.8 LBS

## 2020-02-19 DIAGNOSIS — R63.5 ABNORMAL WEIGHT GAIN: Primary | ICD-10-CM

## 2020-02-19 PROCEDURE — RECHECK

## 2020-02-20 ENCOUNTER — OFFICE VISIT (OUTPATIENT)
Dept: PULMONOLOGY | Facility: CLINIC | Age: 70
End: 2020-02-20
Payer: MEDICARE

## 2020-02-20 VITALS
DIASTOLIC BLOOD PRESSURE: 70 MMHG | WEIGHT: 180 LBS | OXYGEN SATURATION: 96 % | HEART RATE: 66 BPM | BODY MASS INDEX: 29.99 KG/M2 | HEIGHT: 65 IN | SYSTOLIC BLOOD PRESSURE: 120 MMHG

## 2020-02-20 DIAGNOSIS — E66.9 OBESITY (BMI 30.0-34.9): ICD-10-CM

## 2020-02-20 DIAGNOSIS — G47.33 OSA (OBSTRUCTIVE SLEEP APNEA): Primary | ICD-10-CM

## 2020-02-20 PROCEDURE — 4040F PNEUMOC VAC/ADMIN/RCVD: CPT | Performed by: INTERNAL MEDICINE

## 2020-02-20 PROCEDURE — 3078F DIAST BP <80 MM HG: CPT | Performed by: INTERNAL MEDICINE

## 2020-02-20 PROCEDURE — 99214 OFFICE O/P EST MOD 30 MIN: CPT | Performed by: INTERNAL MEDICINE

## 2020-02-20 PROCEDURE — 1160F RVW MEDS BY RX/DR IN RCRD: CPT | Performed by: INTERNAL MEDICINE

## 2020-02-20 PROCEDURE — 1036F TOBACCO NON-USER: CPT | Performed by: INTERNAL MEDICINE

## 2020-02-20 PROCEDURE — 3066F NEPHROPATHY DOC TX: CPT | Performed by: INTERNAL MEDICINE

## 2020-02-20 PROCEDURE — 3074F SYST BP LT 130 MM HG: CPT | Performed by: INTERNAL MEDICINE

## 2020-02-20 NOTE — PROGRESS NOTES
Assessment/Plan:   Diagnoses and all orders for this visit:    NICKY (obstructive sleep apnea)  -     Diagnostic Sleep Study; Future    Obesity (BMI 30 0-34 9)      moderate obstructive sleep apnea with an AHI of 19 1 currently on a mandible advancing appliance titrated up  Discussed with the patient regarding getting a repeat diagnostic study to evaluate for resolution of the apneas and hypopneas and to get the diagnostic sleep study with the mandible advancing appliance understands and verbalizes  Recommend weight loss   Follow-up in 3 months or p r n  earlier as needed basis  Return in about 3 months (around 5/20/2020)  All questions are answered to the patient's satisfaction and understanding  She verbalizes understanding  She is encouraged to call with any further questions or concerns  Portions of the record may have been created with voice recognition software  Occasional wrong word or "sound a like" substitutions may have occurred due to the inherent limitations of voice recognition software  Read the chart carefully and recognize, using context, where substitutions have occurred  Electronically Signed by Lorenza Mercado MD    ______________________________________________________________________    Chief Complaint:   Chief Complaint   Patient presents with    Sleep Apnea     fup       Patient ID: Emiliano Agrawal is a 69 y o  y o  female has a past medical history of Carpal tunnel syndrome, Cataract, Complex endometrial hyperplasia with atypia, Diabetes mellitus ([address] ), Hypertension, Normal delivery, Obesity, and Post-menopausal bleeding  2/20/2020  Patient presents today for follow-up visit  Patient presents today for follow-up visit  Patient is here for follow-up of the sleep study results  Her daily sleep schedule has not changed since her last visit  No recent hospitalizations or change of list of medications   Using mandible advancing appliance for her obstructive sleep apnea        Review of Systems Constitutional: Positive for fatigue  Negative for appetite change, chills, diaphoresis, fever and unexpected weight change  HENT: Positive for postnasal drip  Negative for congestion, ear discharge, ear pain, nosebleeds, rhinorrhea, sinus pain, sore throat and voice change  Eyes: Negative for pain, discharge and visual disturbance  Respiratory: Positive for shortness of breath  Negative for apnea, cough, choking, chest tightness, wheezing and stridor  Cardiovascular: Negative for chest pain, palpitations and leg swelling  Gastrointestinal: Negative for abdominal pain, blood in stool, constipation, diarrhea and vomiting  Endocrine: Negative for cold intolerance, heat intolerance, polydipsia, polyphagia and polyuria  Genitourinary: Negative for difficulty urinating and dysuria  Musculoskeletal: Negative for arthralgias and neck pain  Skin: Negative for pallor and rash  Allergic/Immunologic: Negative for environmental allergies and food allergies  Neurological: Negative for dizziness, speech difficulty, weakness and light-headedness  Hematological: Negative for adenopathy  Does not bruise/bleed easily  Psychiatric/Behavioral: Negative for agitation, confusion and sleep disturbance  The patient is not nervous/anxious  Smoking history: She reports that she has never smoked   She has never used smokeless tobacco     The following portions of the patient's history were reviewed and updated as appropriate: allergies, current medications, past family history, past medical history, past social history, past surgical history and problem list     Immunization History   Administered Date(s) Administered    INFLUENZA 11/01/2011, 10/20/2015, 10/26/2016, 11/03/2017, 09/19/2018    Influenza Split High Dose Preservative Free IM 10/01/2016, 11/03/2017    Influenza, high dose seasonal 0 5 mL 10/18/2019    Pneumococcal Conjugate 13-Valent 02/15/2017    Pneumococcal Polysaccharide PPV23 01/01/2013    Zoster 01/02/2012     Current Outpatient Medications   Medication Sig Dispense Refill    amLODIPine (NORVASC) 5 mg tablet Take 1 tablet (5 mg total) by mouth daily 90 tablet 3    aspirin 81 MG tablet Take by mouth      atorvastatin (LIPITOR) 10 mg tablet Take 1 tablet (10 mg total) by mouth daily 90 tablet 3    Cranberry 600 MG TABS Take by mouth      Dulaglutide 1 5 MG/0 5ML SOPN Inject 0 5 mL (1 5 mg total) under the skin once a week 12 pen 3    famotidine (PEPCID) 20 mg tablet Take 1 tablet (20 mg total) by mouth 2 (two) times a day 180 tablet 3    fexofenadine (ALLEGRA) 180 MG tablet Take 1 tablet (180 mg total) by mouth daily 90 tablet 3    hydrochlorothiazide (HYDRODIURIL) 25 mg tablet Take 1 tablet (25 mg total) by mouth daily 90 tablet 3    Insulin Pen Needle (B-D UF III MINI PEN NEEDLES) 31G X 5 MM MISC by Other route once a week Use as directed 100 each 0    Lancets (ACCU-CHEK SOFT TOUCH) lancets by Other route 2 (two) times a day 100 each 3    metFORMIN (GLUCOPHAGE) 1000 MG tablet Take 1 tablet (1,000 mg total) by mouth 2 (two) times a day with meals 180 tablet 3    metoprolol succinate (TOPROL-XL) 50 mg 24 hr tablet Take 1 tablet (50 mg total) by mouth daily 90 tablet 3    nystatin-triamcinolone (MYCOLOG-II) ointment APPLY TO THE AFFECTED AREAS TWICE DAILY AS NEEDED 45 g 0    ONE TOUCH ULTRA TEST test strip 1 each by Other route 2 (two) times a day Test Blood Sugar 2X daily E11 9 100 each 3     No current facility-administered medications for this visit  Allergies: Ace inhibitors; Codeine; Hydrochlorothiazide; and Irbesartan    Objective:  Vitals:    02/20/20 1047   BP: 120/70   Pulse: 66   SpO2: 96%   Weight: 81 6 kg (180 lb)   Height: 5' 4 8" (1 646 m)   Oxygen Therapy  SpO2: 96 %    Wt Readings from Last 3 Encounters:   02/26/20 81 4 kg (179 lb 6 4 oz)   02/20/20 81 6 kg (180 lb)   02/19/20 80 6 kg (177 lb 12 8 oz)     Body mass index is 30 14 kg/m²      Physical Exam Constitutional: She is oriented to person, place, and time  She appears well-developed and well-nourished  HENT:   Head: Normocephalic and atraumatic  Crowded oropharyngeal airways, Mallampati score 3   Eyes: Pupils are equal, round, and reactive to light  EOM are normal    Neck: Normal range of motion  Neck supple  Short and wide neck   Cardiovascular: Normal rate, regular rhythm and normal heart sounds  Pulmonary/Chest: Effort normal and breath sounds normal    Musculoskeletal: Normal range of motion  Neurological: She is alert and oriented to person, place, and time  Skin: Skin is warm and dry  Psychiatric: She has a normal mood and affect  Her behavior is normal            Diagnostics:  I have personally reviewed pertinent reports      ESS: Total score: 0

## 2020-02-26 ENCOUNTER — CLINICAL SUPPORT (OUTPATIENT)
Dept: BARIATRICS | Facility: CLINIC | Age: 70
End: 2020-02-26

## 2020-02-26 VITALS — HEIGHT: 65 IN | WEIGHT: 179.4 LBS | BODY MASS INDEX: 29.89 KG/M2

## 2020-02-26 DIAGNOSIS — R63.5 ABNORMAL WEIGHT GAIN: Primary | ICD-10-CM

## 2020-02-26 PROCEDURE — RECHECK

## 2020-03-04 ENCOUNTER — CLINICAL SUPPORT (OUTPATIENT)
Dept: BARIATRICS | Facility: CLINIC | Age: 70
End: 2020-03-04

## 2020-03-04 VITALS — BODY MASS INDEX: 29.85 KG/M2 | WEIGHT: 179.2 LBS | HEIGHT: 65 IN

## 2020-03-04 DIAGNOSIS — R63.5 ABNORMAL WEIGHT GAIN: Primary | ICD-10-CM

## 2020-03-04 PROCEDURE — RECHECK

## 2020-03-11 ENCOUNTER — CLINICAL SUPPORT (OUTPATIENT)
Dept: BARIATRICS | Facility: CLINIC | Age: 70
End: 2020-03-11

## 2020-03-11 VITALS — WEIGHT: 178 LBS | HEIGHT: 65 IN | BODY MASS INDEX: 29.66 KG/M2

## 2020-03-11 DIAGNOSIS — R63.5 ABNORMAL WEIGHT GAIN: Primary | ICD-10-CM

## 2020-03-11 PROCEDURE — RECHECK

## 2020-03-16 ENCOUNTER — OFFICE VISIT (OUTPATIENT)
Dept: BARIATRICS | Facility: CLINIC | Age: 70
End: 2020-03-16

## 2020-03-16 DIAGNOSIS — R63.5 ABNORMAL WEIGHT GAIN: Primary | ICD-10-CM

## 2020-03-16 PROCEDURE — RECHECK

## 2020-03-16 NOTE — PROGRESS NOTES
Weight Management Medical Nutrition Assessment  Thierry Abarca was here today for her RD follow-up in the [employer] Program   Today she weighs 178 2 lbs giving her a gain of 2 lbs in the last 4 weeks  She admits that she has gotten off track and is struggling to get back  She has not been food logging and has been snacking more  Discussed the importance to meal planning and food logging  She has not been walking or doing any type of physical activity like she was before         Anthropometric Measurements  Start Weight (lbs): 203 8 lbs  Current Weight (lbs): 178 2 lbs  TBW % Change from start weight:12%  Ideal Body Weight (lbs): 125 lbs  Goal Weight (lbs): 160 lbs     Weight Loss History  Previous weight loss attempts: Commercial Programs (Weight Watchers, Kaylie Morales, etc )     Food and Nutrition Related History     Food Recall  Breakfast: fiber oatmeal, 1 egg  or egg beaters    Snack:   Lunch:  apple with PB2 with cheddar stick  Snack: nuts (measured)  Dinner: chicken breast with vegetables, 1/2 cup rice (measured)  Snack: 1 cup popcorn        Beverages: water, crystal lite  Volume of beverage intake: 60oz of water per day - has been increasing daily from 30 oz     Weekends: Same  Cravings: chips (salty)  Trouble area of day: evening     Frequency of Eating out: since starting program she has been out to eat 2 times  Food restrictions: none  Cooking: self   Food Shopping: self     Physical Activity Intake  Activity: walking and raking leaves  Frequency: 3 -4 times per week  Physical limitations/barriers to exercise: none     Estimated Needs  Energy  Bear Rossy Energy Needs:  BMR : 1021  1# loss weekly sedentary:  1080     1# loss weekly lightly active:  1311  Protein: 68 - 85      (1 2-1 5g/kg IBW)  Fluid: 66 oz     (35mL/kg IBW)     Nutrition Diagnosis  Yes;    Overweight/obesity  related to Excess energy intake as evidenced by  BMI more than normative standard for age and sex (obesity-grade I 30-34  9)     Nutrition Intervention     Nutrition Prescription  Calories: 1000 calories  Protein:75 gr  Fluid: 64 oz        Nutrition Education:    Healthy Core Manual  Calorie controlled menu  Lean protein food choices  Healthy snack options  Food journaling tips        Nutrition Counseling:  Strategies: meal planning, portion sizes, healthy snack choices, hydration, fiber intake, protein intake, exercise, food journal        Monitoring and Evaluation:  Evaluation criteria:  Energy Intake  Meet protein needs  Maintain adequate hydration  Monitor weekly weight  Meal planning/preparation  Food journal   Decreased portions at mealtimes and snacks  Physical activity      Barriers to learning:none  Readiness to change: Action  Comprehension: good  Expected Compliance: good

## 2020-03-31 ENCOUNTER — OFFICE VISIT (OUTPATIENT)
Dept: BARIATRICS | Facility: CLINIC | Age: 70
End: 2020-03-31

## 2020-03-31 DIAGNOSIS — R63.5 ABNORMAL WEIGHT GAIN: Primary | ICD-10-CM

## 2020-03-31 PROCEDURE — RECHECK

## 2020-03-31 NOTE — PROGRESS NOTES
Name was verified by patient stating name? Yes   verified by patient stating? Yes  Verified the patient is alone? Yes  Offered patient a live visit but are now consenting to this telephone visit? Yes  Verified with the patient this visit will go towards their pre paid charges? Yes     Spoke to Jose David Lobato via phone for her [facility] follow-up/check in call  She reports that she is currently maintaining her weight  She is limiting her snacking and continues to meal plan and prep weekly  She has not been walking as much because of the rainy weather, but plans to walk more often weather permitting

## 2020-04-14 ENCOUNTER — APPOINTMENT (OUTPATIENT)
Dept: LAB | Facility: CLINIC | Age: 70
End: 2020-04-14
Payer: MEDICARE

## 2020-04-14 DIAGNOSIS — E78.5 HYPERLIPIDEMIA, UNSPECIFIED HYPERLIPIDEMIA TYPE: ICD-10-CM

## 2020-04-14 LAB
CHOLEST SERPL-MCNC: 153 MG/DL (ref 50–200)
CREAT UR-MCNC: 96.6 MG/DL
HDLC SERPL-MCNC: 49 MG/DL
LDLC SERPL CALC-MCNC: 75 MG/DL (ref 0–100)
MICROALBUMIN UR-MCNC: 7.8 MG/L (ref 0–20)
MICROALBUMIN/CREAT 24H UR: 8 MG/G CREATININE (ref 0–30)
NONHDLC SERPL-MCNC: 104 MG/DL
TRIGL SERPL-MCNC: 143 MG/DL

## 2020-04-14 PROCEDURE — 82043 UR ALBUMIN QUANTITATIVE: CPT | Performed by: INTERNAL MEDICINE

## 2020-04-14 PROCEDURE — 80061 LIPID PANEL: CPT

## 2020-04-14 PROCEDURE — 36415 COLL VENOUS BLD VENIPUNCTURE: CPT

## 2020-04-14 PROCEDURE — 82570 ASSAY OF URINE CREATININE: CPT | Performed by: INTERNAL MEDICINE

## 2020-04-15 ENCOUNTER — HOSPITAL ENCOUNTER (OUTPATIENT)
Dept: SLEEP CENTER | Facility: CLINIC | Age: 70
Discharge: HOME | End: 2020-04-15
Payer: MEDICARE

## 2020-04-15 DIAGNOSIS — G47.33 OSA (OBSTRUCTIVE SLEEP APNEA): ICD-10-CM

## 2020-04-15 PROCEDURE — 95810 POLYSOM 6/> YRS 4/> PARAM: CPT | Performed by: INTERNAL MEDICINE

## 2020-04-15 PROCEDURE — 95810 POLYSOM 6/> YRS 4/> PARAM: CPT

## 2020-04-16 DIAGNOSIS — G47.33 OSA (OBSTRUCTIVE SLEEP APNEA): Primary | ICD-10-CM

## 2020-04-22 ENCOUNTER — TELEMEDICINE (OUTPATIENT)
Dept: INTERNAL MEDICINE CLINIC | Age: 70
End: 2020-04-22
Payer: MEDICARE

## 2020-04-22 VITALS — WEIGHT: 178 LBS | BODY MASS INDEX: 29.8 KG/M2

## 2020-04-22 DIAGNOSIS — E78.5 HYPERLIPIDEMIA, UNSPECIFIED HYPERLIPIDEMIA TYPE: ICD-10-CM

## 2020-04-22 DIAGNOSIS — E11.49 DIABETES MELLITUS TYPE 2 WITH NEUROLOGICAL MANIFESTATIONS (HCC): Primary | ICD-10-CM

## 2020-04-22 DIAGNOSIS — I10 ESSENTIAL HYPERTENSION: ICD-10-CM

## 2020-04-22 DIAGNOSIS — G47.33 OSA ON CPAP: ICD-10-CM

## 2020-04-22 DIAGNOSIS — Z99.89 OSA ON CPAP: ICD-10-CM

## 2020-04-22 PROCEDURE — 99214 OFFICE O/P EST MOD 30 MIN: CPT | Performed by: INTERNAL MEDICINE

## 2020-04-28 ENCOUNTER — OFFICE VISIT (OUTPATIENT)
Dept: BARIATRICS | Facility: CLINIC | Age: 70
End: 2020-04-28

## 2020-04-28 DIAGNOSIS — R63.5 ABNORMAL WEIGHT GAIN: Primary | ICD-10-CM

## 2020-04-28 PROCEDURE — RECHECK

## 2020-04-30 ENCOUNTER — TELEPHONE (OUTPATIENT)
Dept: SLEEP CENTER | Facility: CLINIC | Age: 70
End: 2020-04-30

## 2020-05-07 ENCOUNTER — TELEMEDICINE (OUTPATIENT)
Dept: PULMONOLOGY | Facility: CLINIC | Age: 70
End: 2020-05-07
Payer: MEDICARE

## 2020-05-07 DIAGNOSIS — G47.33 OSA (OBSTRUCTIVE SLEEP APNEA): Primary | ICD-10-CM

## 2020-05-07 DIAGNOSIS — E66.9 OBESITY (BMI 30.0-34.9): ICD-10-CM

## 2020-05-07 PROCEDURE — 99213 OFFICE O/P EST LOW 20 MIN: CPT | Performed by: INTERNAL MEDICINE

## 2020-05-11 ENCOUNTER — TELEPHONE (OUTPATIENT)
Dept: OTHER | Facility: OTHER | Age: 70
End: 2020-05-11

## 2020-05-12 ENCOUNTER — OFFICE VISIT (OUTPATIENT)
Dept: BARIATRICS | Facility: CLINIC | Age: 70
End: 2020-05-12
Payer: MEDICARE

## 2020-05-12 VITALS — WEIGHT: 179 LBS | HEIGHT: 65 IN | BODY MASS INDEX: 29.82 KG/M2

## 2020-05-12 DIAGNOSIS — E78.5 HYPERLIPIDEMIA: ICD-10-CM

## 2020-05-12 DIAGNOSIS — I10 ESSENTIAL HYPERTENSION: ICD-10-CM

## 2020-05-12 DIAGNOSIS — Z99.89 OSA ON CPAP: ICD-10-CM

## 2020-05-12 DIAGNOSIS — G47.33 OSA ON CPAP: ICD-10-CM

## 2020-05-12 DIAGNOSIS — E11.9 CONTROLLED DIABETES MELLITUS (HCC): ICD-10-CM

## 2020-05-12 PROCEDURE — 99214 OFFICE O/P EST MOD 30 MIN: CPT | Performed by: PHYSICIAN ASSISTANT

## 2020-05-28 ENCOUNTER — OFFICE VISIT (OUTPATIENT)
Dept: BARIATRICS | Facility: CLINIC | Age: 70
End: 2020-05-28

## 2020-05-28 DIAGNOSIS — R63.5 ABNORMAL WEIGHT GAIN: Primary | ICD-10-CM

## 2020-05-28 PROCEDURE — RECHECK

## 2020-06-16 ENCOUNTER — APPOINTMENT (OUTPATIENT)
Dept: LAB | Facility: CLINIC | Age: 70
End: 2020-06-16
Payer: MEDICARE

## 2020-06-16 DIAGNOSIS — E11.49 DIABETES MELLITUS TYPE 2 WITH NEUROLOGICAL MANIFESTATIONS (HCC): ICD-10-CM

## 2020-06-16 LAB
ALBUMIN SERPL BCP-MCNC: 3.7 G/DL (ref 3.5–5)
ALP SERPL-CCNC: 103 U/L (ref 46–116)
ALT SERPL W P-5'-P-CCNC: 23 U/L (ref 12–78)
ANION GAP SERPL CALCULATED.3IONS-SCNC: 4 MMOL/L (ref 4–13)
AST SERPL W P-5'-P-CCNC: 18 U/L (ref 5–45)
BILIRUB SERPL-MCNC: 0.93 MG/DL (ref 0.2–1)
BUN SERPL-MCNC: 17 MG/DL (ref 5–25)
CALCIUM SERPL-MCNC: 9.7 MG/DL (ref 8.3–10.1)
CHLORIDE SERPL-SCNC: 98 MMOL/L (ref 100–108)
CO2 SERPL-SCNC: 33 MMOL/L (ref 21–32)
CREAT SERPL-MCNC: 0.8 MG/DL (ref 0.6–1.3)
EST. AVERAGE GLUCOSE BLD GHB EST-MCNC: 108 MG/DL
GFR SERPL CREATININE-BSD FRML MDRD: 74 ML/MIN/1.73SQ M
GLUCOSE P FAST SERPL-MCNC: 92 MG/DL (ref 65–99)
HBA1C MFR BLD: 5.4 %
POTASSIUM SERPL-SCNC: 4.6 MMOL/L (ref 3.5–5.3)
PROT SERPL-MCNC: 7.5 G/DL (ref 6.4–8.2)
SODIUM SERPL-SCNC: 135 MMOL/L (ref 136–145)

## 2020-06-16 PROCEDURE — 36415 COLL VENOUS BLD VENIPUNCTURE: CPT

## 2020-06-16 PROCEDURE — 80053 COMPREHEN METABOLIC PANEL: CPT

## 2020-06-16 PROCEDURE — 83036 HEMOGLOBIN GLYCOSYLATED A1C: CPT

## 2020-06-17 LAB
LEFT EYE DIABETIC RETINOPATHY: NORMAL
RIGHT EYE DIABETIC RETINOPATHY: NORMAL

## 2020-06-24 ENCOUNTER — OFFICE VISIT (OUTPATIENT)
Dept: BARIATRICS | Facility: CLINIC | Age: 70
End: 2020-06-24

## 2020-06-24 DIAGNOSIS — R63.5 ABNORMAL WEIGHT GAIN: Primary | ICD-10-CM

## 2020-06-24 PROCEDURE — RECHECK

## 2020-06-25 ENCOUNTER — OFFICE VISIT (OUTPATIENT)
Dept: INTERNAL MEDICINE CLINIC | Age: 70
End: 2020-06-25
Payer: MEDICARE

## 2020-06-25 VITALS
BODY MASS INDEX: 30.75 KG/M2 | OXYGEN SATURATION: 98 % | HEIGHT: 65 IN | SYSTOLIC BLOOD PRESSURE: 132 MMHG | HEART RATE: 74 BPM | DIASTOLIC BLOOD PRESSURE: 70 MMHG | TEMPERATURE: 98.3 F | WEIGHT: 184.6 LBS

## 2020-06-25 DIAGNOSIS — G47.33 OSA ON CPAP: ICD-10-CM

## 2020-06-25 DIAGNOSIS — Z99.89 OSA ON CPAP: ICD-10-CM

## 2020-06-25 DIAGNOSIS — I10 ESSENTIAL HYPERTENSION: ICD-10-CM

## 2020-06-25 DIAGNOSIS — M25.561 ACUTE PAIN OF RIGHT KNEE: ICD-10-CM

## 2020-06-25 DIAGNOSIS — E78.5 HYPERLIPIDEMIA, UNSPECIFIED HYPERLIPIDEMIA TYPE: ICD-10-CM

## 2020-06-25 DIAGNOSIS — Z00.00 MEDICARE ANNUAL WELLNESS VISIT, SUBSEQUENT: ICD-10-CM

## 2020-06-25 PROCEDURE — 3066F NEPHROPATHY DOC TX: CPT | Performed by: INTERNAL MEDICINE

## 2020-06-25 PROCEDURE — 1125F AMNT PAIN NOTED PAIN PRSNT: CPT | Performed by: INTERNAL MEDICINE

## 2020-06-25 PROCEDURE — 3075F SYST BP GE 130 - 139MM HG: CPT | Performed by: INTERNAL MEDICINE

## 2020-06-25 PROCEDURE — 99214 OFFICE O/P EST MOD 30 MIN: CPT | Performed by: INTERNAL MEDICINE

## 2020-06-25 PROCEDURE — 3008F BODY MASS INDEX DOCD: CPT | Performed by: INTERNAL MEDICINE

## 2020-06-25 PROCEDURE — 1036F TOBACCO NON-USER: CPT | Performed by: INTERNAL MEDICINE

## 2020-06-25 PROCEDURE — 4040F PNEUMOC VAC/ADMIN/RCVD: CPT | Performed by: INTERNAL MEDICINE

## 2020-06-25 PROCEDURE — 2022F DILAT RTA XM EVC RTNOPTHY: CPT | Performed by: INTERNAL MEDICINE

## 2020-06-25 PROCEDURE — G0439 PPPS, SUBSEQ VISIT: HCPCS | Performed by: INTERNAL MEDICINE

## 2020-06-25 PROCEDURE — 3078F DIAST BP <80 MM HG: CPT | Performed by: INTERNAL MEDICINE

## 2020-06-25 PROCEDURE — 3044F HG A1C LEVEL LT 7.0%: CPT | Performed by: INTERNAL MEDICINE

## 2020-06-25 PROCEDURE — 1170F FXNL STATUS ASSESSED: CPT | Performed by: INTERNAL MEDICINE

## 2020-06-25 PROCEDURE — 1160F RVW MEDS BY RX/DR IN RCRD: CPT | Performed by: INTERNAL MEDICINE

## 2020-06-25 RX ORDER — MELOXICAM 7.5 MG/1
TABLET ORAL
Qty: 90 TABLET | Refills: 1 | Status: SHIPPED | OUTPATIENT
Start: 2020-06-25 | End: 2020-09-16 | Stop reason: SDUPTHER

## 2020-06-25 RX ORDER — MELOXICAM 7.5 MG/1
7.5 TABLET ORAL DAILY
Qty: 30 TABLET | Refills: 0 | Status: SHIPPED | OUTPATIENT
Start: 2020-06-25 | End: 2020-06-25

## 2020-07-29 ENCOUNTER — TELEPHONE (OUTPATIENT)
Dept: BARIATRICS | Facility: CLINIC | Age: 70
End: 2020-07-29

## 2020-07-29 NOTE — TELEPHONE ENCOUNTER
Spoke to patient about coming back to [facility] in person classes starting Aug 5  Also offered virtual classes as well  She would like to be on hold at this time  She will let us know when she is ready to come back

## 2020-08-24 ENCOUNTER — TELEPHONE (OUTPATIENT)
Dept: BARIATRICS | Facility: CLINIC | Age: 70
End: 2020-08-24

## 2020-08-24 NOTE — TELEPHONE ENCOUNTER
COVID Pre-Visit Screening     1  Is this a family member screening? no  2  Have you traveled outside of your state in the past 2 weeks? no  3  Do you presently have a fever or flu-like symptoms?no  4  Do you have symptoms of an upper respiratory infection like runny nose, sore throat, or cough? no  5  Are you suffering from new headache that you have not had in the past?  no  6  Do you have/have you experienced any new shortness of breath recently? no  7  Do you have any new diarrhea, nausea or vomiting? no  8  Have you been in contact with anyone who has been sick or diagnosed with COVID-19? no  9  Do you have any new loss of taste or smell? no  10  Are you able to wear a mask without a valve for the entire visit? yes

## 2020-08-26 ENCOUNTER — CLINICAL SUPPORT (OUTPATIENT)
Dept: BARIATRICS | Facility: CLINIC | Age: 70
End: 2020-08-26

## 2020-08-26 VITALS — WEIGHT: 186 LBS | HEIGHT: 65 IN | TEMPERATURE: 97.7 F | BODY MASS INDEX: 30.99 KG/M2

## 2020-08-26 DIAGNOSIS — R63.5 ABNORMAL WEIGHT GAIN: Primary | ICD-10-CM

## 2020-08-26 PROCEDURE — RECHECK: Performed by: DIETITIAN, REGISTERED

## 2020-08-31 ENCOUNTER — TELEPHONE (OUTPATIENT)
Dept: BARIATRICS | Facility: CLINIC | Age: 70
End: 2020-08-31

## 2020-08-31 NOTE — TELEPHONE ENCOUNTER
COVID Pre-Visit Screening     1  Is this a family member screening? no  2  Have you traveled outside of your state in the past 2 weeks?no  3  Do you presently have a fever or flu-like symptoms? no  4  Do you have symptoms of an upper respiratory infection like runny nose, sore throat, or cough? no  5  Are you suffering from new headache that you have not had in the past?  no  6  Do you have/have you experienced any new shortness of breath recently? no  7  Do you have any new diarrhea, nausea or vomiting? no  8  Have you been in contact with anyone who has been sick or diagnosed with COVID-19? no  9  Do you have any new loss of taste or smell? no  10  Are you able to wear a mask without a valve for the entire visit?  yes

## 2020-09-02 ENCOUNTER — CLINICAL SUPPORT (OUTPATIENT)
Dept: BARIATRICS | Facility: CLINIC | Age: 70
End: 2020-09-02

## 2020-09-02 VITALS — HEIGHT: 65 IN | WEIGHT: 183.2 LBS | BODY MASS INDEX: 30.52 KG/M2 | TEMPERATURE: 97.6 F

## 2020-09-02 DIAGNOSIS — R63.5 ABNORMAL WEIGHT GAIN: Primary | ICD-10-CM

## 2020-09-02 PROCEDURE — RECHECK

## 2020-09-04 ENCOUNTER — TELEPHONE (OUTPATIENT)
Dept: BARIATRICS | Facility: CLINIC | Age: 70
End: 2020-09-04

## 2020-09-04 NOTE — TELEPHONE ENCOUNTER
COVID Pre-Visit Screening     1  Is this a family member screening? no  2  Have you traveled outside of your state in the past 2 weeks? no  3  Do you presently have a fever or flu-like symptoms? no  4  Do you have symptoms of an upper respiratory infection like runny nose, sore throat, or cough? no  5  Are you suffering from new headache that you have not had in the past?  no  6  Do you have/have you experienced any new shortness of breath recently? no  7  Do you have any new diarrhea, nausea or vomiting?no  8  Have you been in contact with anyone who has been sick or diagnosed with COVID-19? no  9  Do you have any new loss of taste or smell? no  10  Are you able to wear a mask without a valve for the entire visit?  yes

## 2020-09-08 ENCOUNTER — OFFICE VISIT (OUTPATIENT)
Dept: BARIATRICS | Facility: CLINIC | Age: 70
End: 2020-09-08

## 2020-09-08 DIAGNOSIS — R63.5 ABNORMAL WEIGHT GAIN: Primary | ICD-10-CM

## 2020-09-08 PROCEDURE — RECHECK

## 2020-09-08 NOTE — PROGRESS NOTES
Weight Management Medical Nutrition Assessment  Axel Frivitaliy was here today for her RD follow-up in the [employer] Program   Today she weighs 178 2 lbs giving her a gain of 2 lbs in the last 4 weeks  She admits that she has gotten off track and is struggling to get back  She has not been food logging and has been snacking more  Discussed the importance to meal planning and food logging  She has not been walking or doing any type of physical activity like she was before  Had fries and hamburger for holiday  Homemade icecream   Walking  Still struggles at night sometimes with pretizes       Anthropometric Measurements  Start Weight (lbs): 203 8 lbs  Current Weight (lbs): 178 2 lbs  TBW % Change from start weight:12%  Ideal Body Weight (lbs): 125 lbs  Goal Weight (lbs): 160 lbs     Weight Loss History  Previous weight loss attempts: Commercial Programs (Weight Watchers, [employer] Turk, etc )     Food and Nutrition Related History     Food Recall  Breakfast: pure protein bar or egg beaters with 2 slices of toast  Snack:   Lunch:  apple with PB2 with cheddar stick or cottage cheese with fruit  Snack: new direction bar  Dinner: chicken breast or shrimp with vegetables, 1/2 cup rice (measured)  Snack: 1 cup popcorn        Beverages: water, crystal lite  Volume of beverage intake: 60oz of water per day - has been increasing daily from 30 oz     Weekends: Same  Cravings: chips (salty)  Trouble area of day: evening     Frequency of Eating out: since starting program she has been out to eat 2 times  Food restrictions: none  Cooking: self   Food Shopping: self     Physical Activity Intake  Activity: walking and raking leaves  Frequency: 3 -4 times per week  Physical limitations/barriers to exercise: none     Estimated Needs  Energy  [employer] [place] Energy Needs:  BMR : 2871  7# loss weekly sedentary:  1080     1# loss weekly lightly active:  1311  Protein: 68 - 85      (1 2-1 5g/kg IBW)  Fluid: 66 oz     (35mL/kg IBW)     Nutrition Diagnosis  Yes;    Overweight/obesity  related to Excess energy intake as evidenced by  BMI more than normative standard for age and sex (obesity-grade I 26-30  9)     Nutrition Intervention     Nutrition Prescription  Calories: 1000 calories  Protein:75 gr  Fluid: 64 oz        Nutrition Education:    Calorie controlled menu  Lean protein food choices  Healthy snack options  Food journaling tips        Nutrition Counseling:  Strategies: meal planning, portion sizes, healthy snack choices, hydration, fiber intake, protein intake, exercise, food journal        Monitoring and Evaluation:  Evaluation criteria:  Energy Intake  Meet protein needs  Maintain adequate hydration  Monitor weekly weight  Meal planning/preparation  Food journal   Decreased portions at mealtimes and snacks  Physical activity      Barriers to learning:none  Readiness to change: Action  Comprehension: good  Expected Compliance: good

## 2020-09-15 ENCOUNTER — TELEPHONE (OUTPATIENT)
Dept: BARIATRICS | Facility: CLINIC | Age: 70
End: 2020-09-15

## 2020-09-16 DIAGNOSIS — M25.561 ACUTE PAIN OF RIGHT KNEE: ICD-10-CM

## 2020-09-16 RX ORDER — MELOXICAM 7.5 MG/1
7.5 TABLET ORAL DAILY
Qty: 90 TABLET | Refills: 1 | Status: SHIPPED | OUTPATIENT
Start: 2020-09-16 | End: 2021-01-27

## 2020-09-21 ENCOUNTER — TELEPHONE (OUTPATIENT)
Dept: BARIATRICS | Facility: CLINIC | Age: 70
End: 2020-09-21

## 2020-09-21 NOTE — TELEPHONE ENCOUNTER
COVID Pre-Visit Screening     1  Is this a family member screening? NO:[id number]}  2  Have you traveled outside of your state in the past 2 weeks? RJ:15048}  3  Do you presently have a fever or flu-like symptoms? NO:66721}  4  Do you have symptoms of an upper respiratory infection like runny nose, sore throat, or cough? NO:11596}  5  Are you suffering from new headache that you have not had in the past?  NO:19959}  6  Do you have/have you experienced any new shortness of breath recently? NO:43848}  7  Do you have any new diarrhea, nausea or vomiting? NO:35065}  8  Have you been in contact with anyone who has been sick or diagnosed with COVID-19? NO:38091}  9  Do you have any new loss of taste or smell? NO:66642}  10  Are you able to wear a mask without a valve for the entire visit?  YES

## 2020-09-23 ENCOUNTER — CLINICAL SUPPORT (OUTPATIENT)
Dept: BARIATRICS | Facility: CLINIC | Age: 70
End: 2020-09-23

## 2020-09-23 VITALS — BODY MASS INDEX: 30.52 KG/M2 | HEIGHT: 65 IN | WEIGHT: 183.2 LBS

## 2020-09-23 DIAGNOSIS — R63.5 ABNORMAL WEIGHT GAIN: Primary | ICD-10-CM

## 2020-09-23 PROCEDURE — RECHECK

## 2020-09-28 ENCOUNTER — TELEPHONE (OUTPATIENT)
Dept: BARIATRICS | Facility: CLINIC | Age: 70
End: 2020-09-28

## 2020-09-28 NOTE — TELEPHONE ENCOUNTER
COVID Pre-Visit Screening     1  Is this a family member screening? NO:36710}  2  Have you traveled outside of your state in the past 2 weeks? VN:63315}  3  Do you presently have a fever or flu-like symptoms? NO:60836}  4  Do you have symptoms of an upper respiratory infection like runny nose, sore throat, or cough? NO:88053}  5  Are you suffering from new headache that you have not had in the past?  NO:20028}  6  Do you have/have you experienced any new shortness of breath recently? NO:26692}  7  Do you have any new diarrhea, nausea or vomiting? NO:99751}  8  Have you been in contact with anyone who has been sick or diagnosed with COVID-19? NO:60886}  9  Do you have any new loss of taste or smell? NO:11179}  10  Are you able to wear a mask without a valve for the entire visit?  YES

## 2020-09-29 ENCOUNTER — OFFICE VISIT (OUTPATIENT)
Dept: BARIATRICS | Facility: CLINIC | Age: 70
End: 2020-09-29

## 2020-09-29 DIAGNOSIS — R63.5 ABNORMAL WEIGHT GAIN: Primary | ICD-10-CM

## 2020-09-29 PROCEDURE — RECHECK

## 2020-09-29 NOTE — PROGRESS NOTES
Weight Management Medical Nutrition Assessment  Ashutosh Ferguson was here today for her RD follow-up in the [employer] [facility]   Today she weighs 178 2 lbs giving her a gain of 2 lbs in the last [place] Roberto admits that she has gotten off track and is struggling to get back  Ally Zaldivar has not been food logging and has been snacking more   Discussed the importance to meal planning and food logging  Ally Zaldivar has not been walking or doing any type of physical activity like she was before  Had fries and hamburger for holiday  Homemade icecream   Walking  Still struggles at night sometimes with pretizes       Anthropometric Measurements  Start Weight (lbs): 203 8 lbs  Current Weight (lbs): 178 2 lbs  TBW % Change from start weight:12%  Ideal Body Weight (lbs): 125 lbs  Goal Weight (lbs): 160 lbs     Weight Loss History  Previous weight loss attempts: Commercial Programs (Weight Watchers, Jessenia Mini, etc )     Food and Nutrition Related History     Food Recall  Breakfast: pure protein bar or egg beaters with 2 slices of toast  Snack:   Lunch:  apple with PB2 with cheddar stick or cottage cheese with fruit  Snack: new direction bar  Dinner: chicken breast or shrimp with vegetables, 1/2 cup rice (measured)  Snack: 1 cup popcorn        Beverages: water, crystal lite  Volume of beverage intake: 60oz of water per day - has been increasing daily from 30 oz     Weekends: Same  Cravings: chips (salty)  Trouble area of day: evening     Frequency of Eating out: since starting program she has been out to eat 2 times  Food restrictions: none  Cooking: self   Food Shopping: self     Physical Activity Intake  Activity: walking and raking leaves  Frequency: 3 -4 times per week  Physical limitations/barriers to exercise: none     Estimated Needs  Energy  [employer] [place] Energy Needs:  BMR : 6526  4# loss weekly sedentary:  1080     1# loss weekly lightly active:  1311  Protein: 68 - 85      (1 2-1 5g/kg IBW)  Fluid: 66 oz     (35mL/kg IBW)     Nutrition Diagnosis  Yes;    Overweight/obesity  related to Excess energy intake as evidenced by  BMI more than normative standard for age and sex (obesity-grade I 26-30  9)     Nutrition Intervention     Nutrition Prescription  Calories: 1000 calories  Protein:75 gr  Fluid: 64 oz        Nutrition Education:    Calorie controlled menu  Lean protein food choices  Healthy snack options  Food journaling tips        Nutrition Counseling:  Strategies: meal planning, portion sizes, healthy snack choices, hydration, fiber intake, protein intake, exercise, food journal        Monitoring and Evaluation:  Evaluation criteria:  Energy Intake  Meet protein needs  Maintain adequate hydration  Monitor weekly weight  Meal planning/preparation  Food journal   Decreased portions at mealtimes and snacks  Physical activity      Barriers to learning:none  Readiness to change: Action  Comprehension: good  Expected Compliance: good

## 2020-09-30 ENCOUNTER — OFFICE VISIT (OUTPATIENT)
Dept: INTERNAL MEDICINE CLINIC | Age: 70
End: 2020-09-30
Payer: MEDICARE

## 2020-09-30 VITALS
HEART RATE: 68 BPM | HEIGHT: 65 IN | DIASTOLIC BLOOD PRESSURE: 70 MMHG | SYSTOLIC BLOOD PRESSURE: 132 MMHG | BODY MASS INDEX: 30.69 KG/M2 | OXYGEN SATURATION: 96 % | WEIGHT: 184.2 LBS | TEMPERATURE: 97.5 F

## 2020-09-30 DIAGNOSIS — M25.561 ACUTE PAIN OF RIGHT KNEE: ICD-10-CM

## 2020-09-30 DIAGNOSIS — I10 ESSENTIAL HYPERTENSION: ICD-10-CM

## 2020-09-30 DIAGNOSIS — Z12.11 SCREEN FOR COLON CANCER: Primary | ICD-10-CM

## 2020-09-30 DIAGNOSIS — E11.40 CONTROLLED TYPE 2 DIABETES MELLITUS WITH DIABETIC NEUROPATHY, WITHOUT LONG-TERM CURRENT USE OF INSULIN (HCC): ICD-10-CM

## 2020-09-30 PROBLEM — R80.9 PROTEINURIA: Status: RESOLVED | Noted: 2019-10-18 | Resolved: 2020-09-30

## 2020-09-30 PROCEDURE — 99214 OFFICE O/P EST MOD 30 MIN: CPT | Performed by: INTERNAL MEDICINE

## 2020-09-30 NOTE — PROGRESS NOTES
Assessment/Plan:    Controlled diabetes mellitus ([facility] [address] )    Lab Results   Component Value Date    HGBA1C 5 4 06/16/2020   She has great control of her diabetes with Trulicity    Acute pain of right knee  Over the last several months patient has complained of increasing pain in her right knee after starting a walking program   She did try meloxicam and originally helped but not anymore  Will check an x-ray and refer to Ortho since now it it also is occasionally feel like it will give out    BMI 29 0-29 9,adult  Despite increasingly exercising with walking she has actually gained a lb    Essential hypertension  She also has great control of her blood pressure with amlodipine metoprolol and hydrochlorothiazide       Diagnoses and all orders for this visit:    Screen for colon cancer  -     Cologuard; Future    Acute pain of right knee  -     XR knee 3 vw right non injury; Future  -     Ambulatory referral to Orthopedic Surgery; Future    Controlled type 2 diabetes mellitus with diabetic neuropathy, without long-term current use of insulin ([facility])  -     Comprehensive metabolic panel; Future  -     Hemoglobin A1C; Future    Essential hypertension    BMI 29 0-29 9,adult          Subjective:      Patient ID: Angelina Alan is a 69 y o  female  Patient started a walking program and then started to notice that her knee was hurting and gradually got worse  She now also notices that occasionally it feels like it will give out    Knee Pain    The incident occurred more than 1 week ago  The incident occurred at home  There was no injury mechanism  The pain is present in the right knee  The quality of the pain is described as aching  The pain is at a severity of 6/10  The pain is moderate  The pain has been worsening since onset  Pertinent negatives include no numbness  The symptoms are aggravated by weight bearing and movement  She has tried ice, acetaminophen, NSAIDs and rest for the symptoms   The treatment provided mild relief  Review of Systems   Constitutional: Negative for chills, fatigue, fever and unexpected weight change  HENT: Negative for congestion, ear pain, hearing loss, postnasal drip, sinus pressure, sore throat, trouble swallowing and voice change  Eyes: Negative for visual disturbance  Respiratory: Negative for cough, chest tightness, shortness of breath and wheezing  Cardiovascular: Negative for chest pain, palpitations and leg swelling  Gastrointestinal: Negative for abdominal distention, abdominal pain, anal bleeding, blood in stool, constipation, diarrhea and nausea  Endocrine: Negative for cold intolerance, polydipsia, polyphagia and polyuria  Genitourinary: Negative for dysuria, flank pain, frequency, hematuria and urgency  Musculoskeletal: Negative for arthralgias, back pain, gait problem, joint swelling, myalgias and neck pain  Right knee pain   Skin: Negative for rash  Allergic/Immunologic: Negative for immunocompromised state  Neurological: Negative for dizziness, syncope, facial asymmetry, weakness, light-headedness, numbness and headaches  Hematological: Negative for adenopathy  Psychiatric/Behavioral: Negative for confusion, sleep disturbance and suicidal ideas  Objective:      /70 (BP Location: Left arm, Patient Position: Sitting)   Pulse 68   Temp 97 5 °F (36 4 °C) (Tympanic)   Ht 5' 4 8" (1 646 m)   Wt 83 6 kg (184 lb 3 2 oz)   SpO2 96%   BMI 30 84 kg/m²          Physical Exam  Vitals signs reviewed  Constitutional:       General: She is not in acute distress  Appearance: She is well-developed  HENT:      Right Ear: External ear normal       Left Ear: External ear normal       Nose: Nose normal       Mouth/Throat:      Pharynx: No oropharyngeal exudate  Eyes:      Pupils: Pupils are equal, round, and reactive to light  Neck:      Musculoskeletal: Normal range of motion and neck supple  Thyroid: No thyromegaly  Vascular: No JVD  Cardiovascular:      Rate and Rhythm: Normal rate and regular rhythm  Heart sounds: Normal heart sounds  No murmur  No gallop  Pulmonary:      Effort: Pulmonary effort is normal  No respiratory distress  Breath sounds: Normal breath sounds  No wheezing or rales  Abdominal:      General: Bowel sounds are normal  There is no distension  Palpations: Abdomen is soft  There is no mass  Tenderness: There is no abdominal tenderness  Musculoskeletal: Normal range of motion  General: No tenderness  Comments: Mild crepitation right knee   Lymphadenopathy:      Cervical: No cervical adenopathy  Skin:     Findings: No rash  Neurological:      Mental Status: She is alert and oriented to person, place, and time  Cranial Nerves: No cranial nerve deficit  Coordination: Coordination normal    Psychiatric:         Behavior: Behavior normal          Thought Content:  Thought content normal          Judgment: Judgment normal

## 2020-09-30 NOTE — ASSESSMENT & PLAN NOTE
Lab Results   Component Value Date    HGBA1C 5 4 06/16/2020   She has great control of her diabetes with Trulicity

## 2020-09-30 NOTE — PATIENT INSTRUCTIONS
Knee Exercises   WHAT YOU NEED TO KNOW:   What do I need to know about knee exercises? Knee exercises help strengthen the muscles around your knee  Strong muscles can help reduce pain and decrease your risk of future injury  Knee exercises also help you heal after an injury or surgery  · Start slow  These are beginning exercises  Ask your healthcare provider if you need to see a physical therapist for more advanced exercises  As you get stronger, you may be able to do more sets of each exercise or add weights  · Stop if you feel pain  It is normal to feel some discomfort at first  Regular exercise will help decrease your discomfort over time  · Do the exercises on both legs  Do this so both knees remain strong  · Warm up before you do knee exercises  Walk or ride a stationary bike for 5 or 10 minutes to warm your muscles  How do I perform knee stretches safely? Always stretch before you do strengthening exercises  Do these stretching exercises again after you do the strengthening exercises  Do these stretches 4 or 5 days a week, or as directed  · Standing calf stretch: Face a wall and place both palms flat on the wall, or hold the back of a chair for balance  Keep a slight bend in your knees  Take a big step backward with one leg  Keep your other leg directly under you  Keep both heels flat and press your hips forward  Hold the stretch for 30 seconds, and then relax for 30 seconds  Switch legs  Repeat 2 or 3 times on each leg  · Standing quadriceps stretch:  Stand and place one hand against a wall or hold the back of a chair for balance  With your weight on one leg, bend your other leg and grab your ankle  Bring your heel toward your buttocks  Hold the stretch for 30 to 60 seconds  Switch legs  Repeat 2 or 3 times on each leg  · Sitting hamstring stretch:  Sit with both legs straight in front of you  Do not point or flex your toes   Place your palms on the floor and slide your hands forward until you feel the stretch  Do not round your back  Hold the stretch for 30 seconds  Repeat 2 or 3 times  How do I perform knee strengthening exercises safely? Do these exercises 4 or 5 days a week, or as directed  · Standing half squats:  Stand with your feet shoulder-width apart  Lean your back against a wall or hold the back of a chair for balance, if needed  Slowly sit down about 10 inches, as if you are going to sit in a chair  Your body weight should be mostly over your heels  Hold the squat for 5 seconds, then rise to a standing position  Do 3 sets of 10 squats to strengthen your buttocks and thighs  · Standing hamstring curls: Face a wall and place both palms flat on the wall, or hold the back of a chair for balance  With your weight on one leg, lift your other foot as close to your buttocks as you can  Hold for 5 seconds and then lower your leg  Do 2 sets of 10 curls on each leg  This exercise strengthens the muscles in the back of your thigh  · Standing calf raises:  Face a wall and place both palms flat on the wall, or hold the back of a chair for balance  Stand up straight, and do not lean  Place all your weight on one leg by lifting the other foot off the floor  Raise the heel of the foot that is on the floor as high as you can and then lower it  Do 2 sets of 10 calf raises on each leg to strengthen your calf muscles  · Straight leg lifts:  Lie on your stomach with straight legs  Fold your arms in front of you and rest your head in your arms  Tighten your leg muscles and raise one leg as high as you can  Hold for 5 seconds, then lower your leg  Do 2 sets of 10 lifts on each leg to strengthen your buttocks  · Sitting leg lifts:  Sit in a chair  Slowly straighten and raise one leg  Squeeze your thigh muscles and hold for 5 seconds  Relax and return your foot to the floor  Do 2 sets of 10 lifts on each leg   This helps strengthen the muscles in the front of your thigh  When should I contact my healthcare provider? · You have new pain or your pain becomes worse  · You have questions or concerns about your condition or care  CARE AGREEMENT:   You have the right to help plan your care  Learn about your health condition and how it may be treated  Discuss treatment options with your caregivers to decide what care you want to receive  You always have the right to refuse treatment  The above information is an  only  It is not intended as medical advice for individual conditions or treatments  Talk to your doctor, nurse or pharmacist before following any medical regimen to see if it is safe and effective for you  © 2017 [address] [facility] Information is for End User's use only and may not be sold, redistributed or otherwise used for commercial purposes  All illustrations and images included in CareNotes® are the copyrighted property of A D A M , Inc  or Armen Chopra

## 2020-09-30 NOTE — ASSESSMENT & PLAN NOTE
Over the last several months patient has complained of increasing pain in her right knee after starting a walking program   She did try meloxicam and originally helped but not anymore    Will check an x-ray and refer to Ortho since now it it also is occasionally feel like it will give out

## 2020-10-02 ENCOUNTER — APPOINTMENT (OUTPATIENT)
Dept: RADIOLOGY | Facility: CLINIC | Age: 70
End: 2020-10-02
Payer: MEDICARE

## 2020-10-02 DIAGNOSIS — M25.561 ACUTE PAIN OF RIGHT KNEE: ICD-10-CM

## 2020-10-02 PROCEDURE — 73562 X-RAY EXAM OF KNEE 3: CPT

## 2020-10-05 ENCOUNTER — TELEPHONE (OUTPATIENT)
Dept: BARIATRICS | Facility: CLINIC | Age: 70
End: 2020-10-05

## 2020-10-07 ENCOUNTER — CLINICAL SUPPORT (OUTPATIENT)
Dept: BARIATRICS | Facility: CLINIC | Age: 70
End: 2020-10-07

## 2020-10-07 VITALS — WEIGHT: 183.6 LBS | TEMPERATURE: 97.5 F | HEIGHT: 65 IN | BODY MASS INDEX: 30.59 KG/M2

## 2020-10-07 DIAGNOSIS — R63.5 ABNORMAL WEIGHT GAIN: Primary | ICD-10-CM

## 2020-10-07 PROCEDURE — RECHECK

## 2020-10-13 ENCOUNTER — TELEPHONE (OUTPATIENT)
Dept: BARIATRICS | Facility: CLINIC | Age: 70
End: 2020-10-13

## 2020-10-14 ENCOUNTER — CLINICAL SUPPORT (OUTPATIENT)
Dept: BARIATRICS | Facility: CLINIC | Age: 70
End: 2020-10-14

## 2020-10-14 VITALS — BODY MASS INDEX: 30.26 KG/M2 | TEMPERATURE: 97.3 F | HEIGHT: 65 IN | WEIGHT: 181.6 LBS

## 2020-10-14 DIAGNOSIS — R63.5 ABNORMAL WEIGHT GAIN: Primary | ICD-10-CM

## 2020-10-14 PROCEDURE — RECHECK

## 2020-10-23 DIAGNOSIS — E11.49 DIABETES MELLITUS TYPE 2 WITH NEUROLOGICAL MANIFESTATIONS (HCC): ICD-10-CM

## 2020-10-23 RX ORDER — DULAGLUTIDE 1.5 MG/.5ML
INJECTION, SOLUTION SUBCUTANEOUS
Qty: 6 ML | Refills: 5 | Status: SHIPPED | OUTPATIENT
Start: 2020-10-23 | End: 2021-10-15

## 2020-10-27 DIAGNOSIS — E11.49 DIABETES MELLITUS TYPE 2 WITH NEUROLOGICAL MANIFESTATIONS (HCC): ICD-10-CM

## 2020-10-27 DIAGNOSIS — R10.13 DYSPEPSIA: ICD-10-CM

## 2020-10-27 DIAGNOSIS — E78.5 HYPERLIPIDEMIA, UNSPECIFIED HYPERLIPIDEMIA TYPE: ICD-10-CM

## 2020-10-27 DIAGNOSIS — I10 ESSENTIAL HYPERTENSION: ICD-10-CM

## 2020-10-27 RX ORDER — METOPROLOL SUCCINATE 50 MG/1
TABLET, EXTENDED RELEASE ORAL
Qty: 90 TABLET | Refills: 3 | Status: SHIPPED | OUTPATIENT
Start: 2020-10-27 | End: 2021-12-20 | Stop reason: SDUPTHER

## 2020-10-27 RX ORDER — ATORVASTATIN CALCIUM 10 MG/1
TABLET, FILM COATED ORAL
Qty: 90 TABLET | Refills: 3 | Status: SHIPPED | OUTPATIENT
Start: 2020-10-27 | End: 2021-12-20 | Stop reason: SDUPTHER

## 2020-10-27 RX ORDER — AMLODIPINE BESYLATE 5 MG/1
TABLET ORAL
Qty: 90 TABLET | Refills: 3 | Status: SHIPPED | OUTPATIENT
Start: 2020-10-27 | End: 2021-12-20 | Stop reason: SDUPTHER

## 2020-10-27 RX ORDER — HYDROCHLOROTHIAZIDE 25 MG/1
TABLET ORAL
Qty: 90 TABLET | Refills: 3 | Status: SHIPPED | OUTPATIENT
Start: 2020-10-27 | End: 2021-01-07 | Stop reason: ALTCHOICE

## 2020-10-27 RX ORDER — FAMOTIDINE 20 MG/1
TABLET, FILM COATED ORAL
Qty: 180 TABLET | Refills: 3 | Status: SHIPPED | OUTPATIENT
Start: 2020-10-27 | End: 2022-01-17

## 2020-10-30 ENCOUNTER — APPOINTMENT (OUTPATIENT)
Dept: RADIOLOGY | Facility: MEDICAL CENTER | Age: 70
End: 2020-10-30
Payer: MEDICARE

## 2020-10-30 ENCOUNTER — CONSULT (OUTPATIENT)
Dept: OBGYN CLINIC | Facility: MEDICAL CENTER | Age: 70
End: 2020-10-30
Payer: MEDICARE

## 2020-10-30 VITALS
HEIGHT: 65 IN | SYSTOLIC BLOOD PRESSURE: 122 MMHG | WEIGHT: 179 LBS | DIASTOLIC BLOOD PRESSURE: 80 MMHG | TEMPERATURE: 97.8 F | BODY MASS INDEX: 29.82 KG/M2 | HEART RATE: 77 BPM

## 2020-10-30 DIAGNOSIS — M25.552 PAIN IN LEFT HIP: Primary | ICD-10-CM

## 2020-10-30 DIAGNOSIS — M16.12 PRIMARY OSTEOARTHRITIS OF ONE HIP, LEFT: ICD-10-CM

## 2020-10-30 DIAGNOSIS — M25.552 PAIN IN LEFT HIP: ICD-10-CM

## 2020-10-30 DIAGNOSIS — M25.561 ACUTE PAIN OF RIGHT KNEE: ICD-10-CM

## 2020-10-30 DIAGNOSIS — M17.11 PRIMARY OSTEOARTHRITIS OF RIGHT KNEE: ICD-10-CM

## 2020-10-30 DIAGNOSIS — M70.62 TROCHANTERIC BURSITIS OF LEFT HIP: ICD-10-CM

## 2020-10-30 DIAGNOSIS — M54.16 LUMBAR RADICULOPATHY: ICD-10-CM

## 2020-10-30 PROCEDURE — 20610 DRAIN/INJ JOINT/BURSA W/O US: CPT | Performed by: ORTHOPAEDIC SURGERY

## 2020-10-30 PROCEDURE — 99214 OFFICE O/P EST MOD 30 MIN: CPT | Performed by: ORTHOPAEDIC SURGERY

## 2020-10-30 PROCEDURE — 73502 X-RAY EXAM HIP UNI 2-3 VIEWS: CPT

## 2020-10-30 RX ORDER — BUPIVACAINE HYDROCHLORIDE 2.5 MG/ML
2 INJECTION, SOLUTION INFILTRATION; PERINEURAL
Status: COMPLETED | OUTPATIENT
Start: 2020-10-30 | End: 2020-10-30

## 2020-10-30 RX ORDER — BETAMETHASONE SODIUM PHOSPHATE AND BETAMETHASONE ACETATE 3; 3 MG/ML; MG/ML
6 INJECTION, SUSPENSION INTRA-ARTICULAR; INTRALESIONAL; INTRAMUSCULAR; SOFT TISSUE
Status: COMPLETED | OUTPATIENT
Start: 2020-10-30 | End: 2020-10-30

## 2020-10-30 RX ORDER — LIDOCAINE HYDROCHLORIDE 5 MG/ML
2 INJECTION, SOLUTION INFILTRATION; PERINEURAL
Status: COMPLETED | OUTPATIENT
Start: 2020-10-30 | End: 2020-10-30

## 2020-10-30 RX ADMIN — BETAMETHASONE SODIUM PHOSPHATE AND BETAMETHASONE ACETATE 6 MG: 3; 3 INJECTION, SUSPENSION INTRA-ARTICULAR; INTRALESIONAL; INTRAMUSCULAR; SOFT TISSUE at 10:20

## 2020-10-30 RX ADMIN — BUPIVACAINE HYDROCHLORIDE 2 ML: 2.5 INJECTION, SOLUTION INFILTRATION; PERINEURAL at 10:20

## 2020-10-30 RX ADMIN — LIDOCAINE HYDROCHLORIDE 2 ML: 5 INJECTION, SOLUTION INFILTRATION; PERINEURAL at 10:20

## 2020-11-09 ENCOUNTER — OFFICE VISIT (OUTPATIENT)
Dept: INTERNAL MEDICINE CLINIC | Age: 70
End: 2020-11-09
Payer: MEDICARE

## 2020-11-09 VITALS
HEART RATE: 84 BPM | BODY MASS INDEX: 29.46 KG/M2 | SYSTOLIC BLOOD PRESSURE: 150 MMHG | DIASTOLIC BLOOD PRESSURE: 80 MMHG | WEIGHT: 176.8 LBS | HEIGHT: 65 IN | TEMPERATURE: 97.7 F | OXYGEN SATURATION: 98 %

## 2020-11-09 DIAGNOSIS — M54.32 BACK PAIN WITH LEFT-SIDED SCIATICA: Primary | ICD-10-CM

## 2020-11-09 PROCEDURE — 99213 OFFICE O/P EST LOW 20 MIN: CPT | Performed by: INTERNAL MEDICINE

## 2020-11-09 RX ORDER — CYCLOBENZAPRINE HCL 10 MG
10 TABLET ORAL 3 TIMES DAILY PRN
Qty: 30 TABLET | Refills: 0 | Status: SHIPPED | OUTPATIENT
Start: 2020-11-09 | End: 2020-11-23 | Stop reason: SDUPTHER

## 2020-11-09 RX ORDER — NAPROXEN 500 MG/1
500 TABLET ORAL 2 TIMES DAILY WITH MEALS
Qty: 60 TABLET | Refills: 5 | Status: SHIPPED | OUTPATIENT
Start: 2020-11-09 | End: 2021-10-15

## 2020-11-09 RX ORDER — METHYLPREDNISOLONE 4 MG/1
TABLET ORAL
Qty: 21 EACH | Refills: 0 | Status: SHIPPED | OUTPATIENT
Start: 2020-11-09 | End: 2020-11-23

## 2020-11-11 ENCOUNTER — APPOINTMENT (OUTPATIENT)
Dept: RADIOLOGY | Facility: CLINIC | Age: 70
End: 2020-11-11
Payer: MEDICARE

## 2020-11-11 DIAGNOSIS — M54.32 BACK PAIN WITH LEFT-SIDED SCIATICA: ICD-10-CM

## 2020-11-11 PROCEDURE — 72110 X-RAY EXAM L-2 SPINE 4/>VWS: CPT

## 2020-11-23 ENCOUNTER — TELEPHONE (OUTPATIENT)
Dept: INTERNAL MEDICINE CLINIC | Age: 70
End: 2020-11-23

## 2020-11-23 DIAGNOSIS — M54.32 BACK PAIN WITH LEFT-SIDED SCIATICA: Primary | ICD-10-CM

## 2020-11-23 DIAGNOSIS — M54.32 BACK PAIN WITH LEFT-SIDED SCIATICA: ICD-10-CM

## 2020-11-23 RX ORDER — CYCLOBENZAPRINE HCL 10 MG
10 TABLET ORAL 3 TIMES DAILY PRN
Qty: 30 TABLET | Refills: 0 | Status: SHIPPED | OUTPATIENT
Start: 2020-11-23 | End: 2021-02-26 | Stop reason: SDUPTHER

## 2020-11-23 RX ORDER — PREDNISONE 20 MG/1
TABLET ORAL
Qty: 18 TABLET | Refills: 0 | Status: SHIPPED | OUTPATIENT
Start: 2020-11-23 | End: 2020-11-23

## 2020-11-23 RX ORDER — PREDNISONE 20 MG/1
TABLET ORAL
Qty: 18 TABLET | Refills: 0 | Status: SHIPPED | OUTPATIENT
Start: 2020-11-23 | End: 2020-12-14

## 2020-11-23 RX ORDER — CYCLOBENZAPRINE HCL 10 MG
10 TABLET ORAL 3 TIMES DAILY PRN
Qty: 30 TABLET | Refills: 0 | Status: CANCELLED | OUTPATIENT
Start: 2020-11-23

## 2020-11-25 DIAGNOSIS — R19.5 POSITIVE COLORECTAL CANCER SCREENING USING COLOGUARD TEST: Primary | ICD-10-CM

## 2020-12-07 ENCOUNTER — OFFICE VISIT (OUTPATIENT)
Dept: INTERNAL MEDICINE CLINIC | Age: 70
End: 2020-12-07

## 2020-12-07 VITALS — TEMPERATURE: 99.7 F

## 2020-12-07 DIAGNOSIS — R53.83 FATIGUE, UNSPECIFIED TYPE: ICD-10-CM

## 2020-12-07 DIAGNOSIS — R50.9 FEVER, UNSPECIFIED FEVER CAUSE: Primary | ICD-10-CM

## 2020-12-07 DIAGNOSIS — B34.9 VIRAL INFECTION, UNSPECIFIED: ICD-10-CM

## 2020-12-07 DIAGNOSIS — R21 RASH: ICD-10-CM

## 2020-12-07 DIAGNOSIS — R50.9 FEVER, UNSPECIFIED FEVER CAUSE: ICD-10-CM

## 2020-12-07 PROCEDURE — NC001 PR NO CHARGE: Performed by: INTERNAL MEDICINE

## 2020-12-07 PROCEDURE — U0003 INFECTIOUS AGENT DETECTION BY NUCLEIC ACID (DNA OR RNA); SEVERE ACUTE RESPIRATORY SYNDROME CORONAVIRUS 2 (SARS-COV-2) (CORONAVIRUS DISEASE [COVID-19]), AMPLIFIED PROBE TECHNIQUE, MAKING USE OF HIGH THROUGHPUT TECHNOLOGIES AS DESCRIBED BY CMS-2020-01-R: HCPCS | Performed by: INTERNAL MEDICINE

## 2020-12-08 LAB — SARS-COV-2 RNA SPEC QL NAA+PROBE: NOT DETECTED

## 2020-12-10 ENCOUNTER — TELEPHONE (OUTPATIENT)
Dept: INTERNAL MEDICINE CLINIC | Age: 70
End: 2020-12-10

## 2020-12-10 ENCOUNTER — HOSPITAL ENCOUNTER (EMERGENCY)
Facility: HOSPITAL | Age: 70
Discharge: HOME | End: 2020-12-10
Attending: EMERGENCY MEDICINE | Admitting: EMERGENCY MEDICINE
Payer: MEDICARE

## 2020-12-10 ENCOUNTER — APPOINTMENT (EMERGENCY)
Dept: RADIOLOGY | Facility: HOSPITAL | Age: 70
End: 2020-12-10
Payer: MEDICARE

## 2020-12-10 VITALS
SYSTOLIC BLOOD PRESSURE: 112 MMHG | OXYGEN SATURATION: 98 % | DIASTOLIC BLOOD PRESSURE: 64 MMHG | RESPIRATION RATE: 18 BRPM | TEMPERATURE: 97.5 F | HEART RATE: 73 BPM

## 2020-12-10 DIAGNOSIS — N39.0 URINARY TRACT INFECTION WITHOUT HEMATURIA, SITE UNSPECIFIED: ICD-10-CM

## 2020-12-10 DIAGNOSIS — N17.9 ACUTE KIDNEY INJURY (HCC): Primary | ICD-10-CM

## 2020-12-10 DIAGNOSIS — E87.1 HYPONATREMIA: ICD-10-CM

## 2020-12-10 DIAGNOSIS — E87.6 HYPOKALEMIA: ICD-10-CM

## 2020-12-10 LAB
ALBUMIN SERPL BCP-MCNC: 3 G/DL (ref 3.5–5)
ALP SERPL-CCNC: 216 U/L (ref 46–116)
ALT SERPL W P-5'-P-CCNC: 56 U/L (ref 12–78)
ANION GAP SERPL CALCULATED.3IONS-SCNC: 7 MMOL/L (ref 4–13)
AST SERPL W P-5'-P-CCNC: 27 U/L (ref 5–45)
ATRIAL RATE: 74 BPM
BACTERIA UR QL AUTO: ABNORMAL /HPF
BASOPHILS # BLD AUTO: 0.03 THOUSANDS/ΜL (ref 0–0.1)
BASOPHILS NFR BLD AUTO: 0 % (ref 0–1)
BILIRUB SERPL-MCNC: 1.4 MG/DL (ref 0.2–1)
BILIRUB UR QL STRIP: ABNORMAL
BUN SERPL-MCNC: 30 MG/DL (ref 5–25)
CALCIUM ALBUM COR SERPL-MCNC: 10 MG/DL (ref 8.3–10.1)
CALCIUM SERPL-MCNC: 9.2 MG/DL (ref 8.3–10.1)
CHLORIDE SERPL-SCNC: 88 MMOL/L (ref 100–108)
CLARITY UR: ABNORMAL
CO2 SERPL-SCNC: 32 MMOL/L (ref 21–32)
COLOR UR: YELLOW
CREAT SERPL-MCNC: 1.77 MG/DL (ref 0.6–1.3)
EOSINOPHIL # BLD AUTO: 0.34 THOUSAND/ΜL (ref 0–0.61)
EOSINOPHIL NFR BLD AUTO: 4 % (ref 0–6)
ERYTHROCYTE [DISTWIDTH] IN BLOOD BY AUTOMATED COUNT: 13.3 % (ref 11.6–15.1)
GFR SERPL CREATININE-BSD FRML MDRD: 28 ML/MIN/1.73SQ M
GLUCOSE SERPL-MCNC: 128 MG/DL (ref 65–140)
GLUCOSE UR STRIP-MCNC: NEGATIVE MG/DL
HCT VFR BLD AUTO: 34.1 % (ref 34.8–46.1)
HGB BLD-MCNC: 11.8 G/DL (ref 11.5–15.4)
HGB UR QL STRIP.AUTO: NEGATIVE
IMM GRANULOCYTES # BLD AUTO: 0.03 THOUSAND/UL (ref 0–0.2)
IMM GRANULOCYTES NFR BLD AUTO: 0 % (ref 0–2)
KETONES UR STRIP-MCNC: ABNORMAL MG/DL
LEUKOCYTE ESTERASE UR QL STRIP: ABNORMAL
LYMPHOCYTES # BLD AUTO: 0.86 THOUSANDS/ΜL (ref 0.6–4.47)
LYMPHOCYTES NFR BLD AUTO: 11 % (ref 14–44)
MAGNESIUM SERPL-MCNC: 1.6 MG/DL (ref 1.6–2.6)
MCH RBC QN AUTO: 30.2 PG (ref 26.8–34.3)
MCHC RBC AUTO-ENTMCNC: 34.6 G/DL (ref 31.4–37.4)
MCV RBC AUTO: 87 FL (ref 82–98)
MONOCYTES # BLD AUTO: 0.55 THOUSAND/ΜL (ref 0.17–1.22)
MONOCYTES NFR BLD AUTO: 7 % (ref 4–12)
NEUTROPHILS # BLD AUTO: 6.09 THOUSANDS/ΜL (ref 1.85–7.62)
NEUTS SEG NFR BLD AUTO: 78 % (ref 43–75)
NITRITE UR QL STRIP: NEGATIVE
NON-SQ EPI CELLS URNS QL MICRO: ABNORMAL /HPF
NRBC BLD AUTO-RTO: 0 /100 WBCS
P AXIS: 52 DEGREES
PH UR STRIP.AUTO: 5 [PH]
PLATELET # BLD AUTO: 289 THOUSANDS/UL (ref 149–390)
PMV BLD AUTO: 9.6 FL (ref 8.9–12.7)
POTASSIUM SERPL-SCNC: 3 MMOL/L (ref 3.5–5.3)
PR INTERVAL: 172 MS
PROT SERPL-MCNC: 7.3 G/DL (ref 6.4–8.2)
PROT UR STRIP-MCNC: NEGATIVE MG/DL
QRS AXIS: 57 DEGREES
QRSD INTERVAL: 94 MS
QT INTERVAL: 432 MS
QTC INTERVAL: 479 MS
RBC # BLD AUTO: 3.91 MILLION/UL (ref 3.81–5.12)
RBC #/AREA URNS AUTO: ABNORMAL /HPF
SODIUM SERPL-SCNC: 127 MMOL/L (ref 136–145)
SP GR UR STRIP.AUTO: 1.02 (ref 1–1.03)
T WAVE AXIS: 93 DEGREES
TROPONIN I SERPL-MCNC: <0.02 NG/ML
UROBILINOGEN UR QL STRIP.AUTO: 0.2 E.U./DL
VENTRICULAR RATE: 74 BPM
WBC # BLD AUTO: 7.9 THOUSAND/UL (ref 4.31–10.16)
WBC #/AREA URNS AUTO: ABNORMAL /HPF

## 2020-12-10 PROCEDURE — 93005 ELECTROCARDIOGRAM TRACING: CPT

## 2020-12-10 PROCEDURE — 93010 ELECTROCARDIOGRAM REPORT: CPT | Performed by: INTERNAL MEDICINE

## 2020-12-10 PROCEDURE — 87086 URINE CULTURE/COLONY COUNT: CPT | Performed by: EMERGENCY MEDICINE

## 2020-12-10 PROCEDURE — 87077 CULTURE AEROBIC IDENTIFY: CPT | Performed by: EMERGENCY MEDICINE

## 2020-12-10 PROCEDURE — 96367 TX/PROPH/DG ADDL SEQ IV INF: CPT

## 2020-12-10 PROCEDURE — 81001 URINALYSIS AUTO W/SCOPE: CPT | Performed by: EMERGENCY MEDICINE

## 2020-12-10 PROCEDURE — 80053 COMPREHEN METABOLIC PANEL: CPT | Performed by: EMERGENCY MEDICINE

## 2020-12-10 PROCEDURE — 96361 HYDRATE IV INFUSION ADD-ON: CPT

## 2020-12-10 PROCEDURE — 71045 X-RAY EXAM CHEST 1 VIEW: CPT

## 2020-12-10 PROCEDURE — 99285 EMERGENCY DEPT VISIT HI MDM: CPT

## 2020-12-10 PROCEDURE — 85025 COMPLETE CBC W/AUTO DIFF WBC: CPT | Performed by: EMERGENCY MEDICINE

## 2020-12-10 PROCEDURE — 99285 EMERGENCY DEPT VISIT HI MDM: CPT | Performed by: EMERGENCY MEDICINE

## 2020-12-10 PROCEDURE — 84484 ASSAY OF TROPONIN QUANT: CPT | Performed by: EMERGENCY MEDICINE

## 2020-12-10 PROCEDURE — 96365 THER/PROPH/DIAG IV INF INIT: CPT

## 2020-12-10 PROCEDURE — 87186 SC STD MICRODIL/AGAR DIL: CPT | Performed by: EMERGENCY MEDICINE

## 2020-12-10 PROCEDURE — 36415 COLL VENOUS BLD VENIPUNCTURE: CPT

## 2020-12-10 PROCEDURE — 83735 ASSAY OF MAGNESIUM: CPT | Performed by: EMERGENCY MEDICINE

## 2020-12-10 PROCEDURE — 87147 CULTURE TYPE IMMUNOLOGIC: CPT | Performed by: EMERGENCY MEDICINE

## 2020-12-10 RX ORDER — POTASSIUM CHLORIDE 20 MEQ/1
40 TABLET, EXTENDED RELEASE ORAL ONCE
Status: COMPLETED | OUTPATIENT
Start: 2020-12-10 | End: 2020-12-10

## 2020-12-10 RX ORDER — CEPHALEXIN 500 MG/1
500 CAPSULE ORAL EVERY 12 HOURS SCHEDULED
Qty: 12 CAPSULE | Refills: 0 | Status: SHIPPED | OUTPATIENT
Start: 2020-12-10 | End: 2020-12-16

## 2020-12-10 RX ADMIN — CEFTRIAXONE SODIUM 1000 MG: 10 INJECTION, POWDER, FOR SOLUTION INTRAVENOUS at 19:19

## 2020-12-10 RX ADMIN — SODIUM CHLORIDE 1000 ML: 0.9 INJECTION, SOLUTION INTRAVENOUS at 19:40

## 2020-12-10 RX ADMIN — POTASSIUM CHLORIDE 10 MEQ: 2 INJECTION, SOLUTION, CONCENTRATE INTRAVENOUS at 19:41

## 2020-12-10 RX ADMIN — POTASSIUM CHLORIDE 40 MEQ: 1500 TABLET, EXTENDED RELEASE ORAL at 18:19

## 2020-12-13 LAB
BACTERIA UR CULT: ABNORMAL

## 2020-12-14 ENCOUNTER — OFFICE VISIT (OUTPATIENT)
Dept: INTERNAL MEDICINE CLINIC | Age: 70
End: 2020-12-14
Payer: MEDICARE

## 2020-12-14 VITALS
OXYGEN SATURATION: 97 % | TEMPERATURE: 97.5 F | SYSTOLIC BLOOD PRESSURE: 122 MMHG | BODY MASS INDEX: 30.56 KG/M2 | HEIGHT: 65 IN | HEART RATE: 72 BPM | DIASTOLIC BLOOD PRESSURE: 60 MMHG | WEIGHT: 183.4 LBS

## 2020-12-14 DIAGNOSIS — M70.62 TROCHANTERIC BURSITIS OF LEFT HIP: ICD-10-CM

## 2020-12-14 DIAGNOSIS — B34.9 VIRAL SYNDROME: ICD-10-CM

## 2020-12-14 DIAGNOSIS — N17.9 ACUTE RENAL FAILURE, UNSPECIFIED ACUTE RENAL FAILURE TYPE (HCC): Primary | ICD-10-CM

## 2020-12-14 PROBLEM — N30.00 ACUTE CYSTITIS WITHOUT HEMATURIA: Status: ACTIVE | Noted: 2020-12-14

## 2020-12-14 PROCEDURE — 99214 OFFICE O/P EST MOD 30 MIN: CPT | Performed by: INTERNAL MEDICINE

## 2020-12-22 ENCOUNTER — LAB (OUTPATIENT)
Dept: LAB | Facility: CLINIC | Age: 70
End: 2020-12-22
Payer: MEDICARE

## 2020-12-22 DIAGNOSIS — E11.40 CONTROLLED TYPE 2 DIABETES MELLITUS WITH DIABETIC NEUROPATHY, WITHOUT LONG-TERM CURRENT USE OF INSULIN (HCC): ICD-10-CM

## 2020-12-22 DIAGNOSIS — N17.9 ACUTE RENAL FAILURE, UNSPECIFIED ACUTE RENAL FAILURE TYPE (HCC): ICD-10-CM

## 2020-12-22 LAB
ALBUMIN SERPL BCP-MCNC: 3.7 G/DL (ref 3.5–5)
ALP SERPL-CCNC: 116 U/L (ref 46–116)
ALT SERPL W P-5'-P-CCNC: 25 U/L (ref 12–78)
ANION GAP SERPL CALCULATED.3IONS-SCNC: 6 MMOL/L (ref 4–13)
AST SERPL W P-5'-P-CCNC: 16 U/L (ref 5–45)
BILIRUB SERPL-MCNC: 0.66 MG/DL (ref 0.2–1)
BUN SERPL-MCNC: 13 MG/DL (ref 5–25)
CALCIUM SERPL-MCNC: 9.8 MG/DL (ref 8.3–10.1)
CHLORIDE SERPL-SCNC: 104 MMOL/L (ref 100–108)
CO2 SERPL-SCNC: 30 MMOL/L (ref 21–32)
CREAT SERPL-MCNC: 0.8 MG/DL (ref 0.6–1.3)
EST. AVERAGE GLUCOSE BLD GHB EST-MCNC: 126 MG/DL
GFR SERPL CREATININE-BSD FRML MDRD: 74 ML/MIN/1.73SQ M
GLUCOSE P FAST SERPL-MCNC: 116 MG/DL (ref 65–99)
HBA1C MFR BLD: 6 %
POTASSIUM SERPL-SCNC: 4.7 MMOL/L (ref 3.5–5.3)
PROT SERPL-MCNC: 7.4 G/DL (ref 6.4–8.2)
SODIUM SERPL-SCNC: 140 MMOL/L (ref 136–145)

## 2020-12-22 PROCEDURE — 80053 COMPREHEN METABOLIC PANEL: CPT

## 2020-12-22 PROCEDURE — 36415 COLL VENOUS BLD VENIPUNCTURE: CPT

## 2020-12-22 PROCEDURE — 83036 HEMOGLOBIN GLYCOSYLATED A1C: CPT

## 2020-12-24 ENCOUNTER — ANESTHESIA EVENT (OUTPATIENT)
Dept: GASTROENTEROLOGY | Facility: AMBULARY SURGERY CENTER | Age: 70
End: 2020-12-24

## 2020-12-30 ENCOUNTER — OFFICE VISIT (OUTPATIENT)
Dept: OBGYN CLINIC | Facility: HOSPITAL | Age: 70
End: 2020-12-30
Payer: MEDICARE

## 2020-12-30 VITALS
BODY MASS INDEX: 30.32 KG/M2 | HEART RATE: 85 BPM | HEIGHT: 65 IN | SYSTOLIC BLOOD PRESSURE: 121 MMHG | WEIGHT: 182 LBS | DIASTOLIC BLOOD PRESSURE: 68 MMHG

## 2020-12-30 DIAGNOSIS — M70.62 TROCHANTERIC BURSITIS OF LEFT HIP: ICD-10-CM

## 2020-12-30 PROCEDURE — 20610 DRAIN/INJ JOINT/BURSA W/O US: CPT | Performed by: ORTHOPAEDIC SURGERY

## 2020-12-30 PROCEDURE — 99213 OFFICE O/P EST LOW 20 MIN: CPT | Performed by: ORTHOPAEDIC SURGERY

## 2020-12-30 RX ORDER — METHYLPREDNISOLONE ACETATE 40 MG/ML
2 INJECTION, SUSPENSION INTRA-ARTICULAR; INTRALESIONAL; INTRAMUSCULAR; SOFT TISSUE
Status: COMPLETED | OUTPATIENT
Start: 2020-12-30 | End: 2020-12-30

## 2020-12-30 RX ORDER — BUPIVACAINE HYDROCHLORIDE 2.5 MG/ML
2 INJECTION, SOLUTION INFILTRATION; PERINEURAL
Status: COMPLETED | OUTPATIENT
Start: 2020-12-30 | End: 2020-12-30

## 2020-12-30 RX ORDER — LIDOCAINE HYDROCHLORIDE 10 MG/ML
2 INJECTION, SOLUTION INFILTRATION; PERINEURAL
Status: COMPLETED | OUTPATIENT
Start: 2020-12-30 | End: 2020-12-30

## 2020-12-30 RX ADMIN — METHYLPREDNISOLONE ACETATE 2 ML: 40 INJECTION, SUSPENSION INTRA-ARTICULAR; INTRALESIONAL; INTRAMUSCULAR; SOFT TISSUE at 15:30

## 2020-12-30 RX ADMIN — BUPIVACAINE HYDROCHLORIDE 2 ML: 2.5 INJECTION, SOLUTION INFILTRATION; PERINEURAL at 15:30

## 2020-12-30 RX ADMIN — LIDOCAINE HYDROCHLORIDE 2 ML: 10 INJECTION, SOLUTION INFILTRATION; PERINEURAL at 15:30

## 2021-01-07 ENCOUNTER — HOSPITAL ENCOUNTER (OUTPATIENT)
Dept: GASTROENTEROLOGY | Facility: AMBULARY SURGERY CENTER | Age: 71
Setting detail: OUTPATIENT SURGERY
Discharge: HOME | End: 2021-01-07
Attending: SURGERY
Payer: MEDICARE

## 2021-01-07 ENCOUNTER — ANESTHESIA (OUTPATIENT)
Dept: GASTROENTEROLOGY | Facility: AMBULARY SURGERY CENTER | Age: 71
End: 2021-01-07

## 2021-01-07 VITALS
RESPIRATION RATE: 20 BRPM | OXYGEN SATURATION: 98 % | SYSTOLIC BLOOD PRESSURE: 126 MMHG | HEART RATE: 73 BPM | BODY MASS INDEX: 29.66 KG/M2 | TEMPERATURE: 97.3 F | HEIGHT: 65 IN | WEIGHT: 178 LBS | DIASTOLIC BLOOD PRESSURE: 64 MMHG

## 2021-01-07 VITALS — HEART RATE: 74 BPM

## 2021-01-07 DIAGNOSIS — Z12.11 ENCOUNTER FOR SCREENING FOR MALIGNANT NEOPLASM OF COLON: ICD-10-CM

## 2021-01-07 RX ORDER — LIDOCAINE HYDROCHLORIDE 10 MG/ML
INJECTION, SOLUTION EPIDURAL; INFILTRATION; INTRACAUDAL; PERINEURAL AS NEEDED
Status: DISCONTINUED | OUTPATIENT
Start: 2021-01-07 | End: 2021-01-07

## 2021-01-07 RX ORDER — PROPOFOL 10 MG/ML
INJECTION, EMULSION INTRAVENOUS AS NEEDED
Status: DISCONTINUED | OUTPATIENT
Start: 2021-01-07 | End: 2021-01-07

## 2021-01-07 RX ORDER — SODIUM CHLORIDE, SODIUM LACTATE, POTASSIUM CHLORIDE, CALCIUM CHLORIDE 600; 310; 30; 20 MG/100ML; MG/100ML; MG/100ML; MG/100ML
INJECTION, SOLUTION INTRAVENOUS CONTINUOUS PRN
Status: DISCONTINUED | OUTPATIENT
Start: 2021-01-07 | End: 2021-01-07

## 2021-01-07 RX ADMIN — SODIUM CHLORIDE, SODIUM LACTATE, POTASSIUM CHLORIDE, AND CALCIUM CHLORIDE: .6; .31; .03; .02 INJECTION, SOLUTION INTRAVENOUS at 14:20

## 2021-01-07 RX ADMIN — LIDOCAINE HYDROCHLORIDE 50 MG: 10 INJECTION, SOLUTION EPIDURAL; INFILTRATION; INTRACAUDAL at 14:33

## 2021-01-07 RX ADMIN — PROPOFOL 100 MG: 10 INJECTION, EMULSION INTRAVENOUS at 14:33

## 2021-01-07 RX ADMIN — PROPOFOL 50 MG: 10 INJECTION, EMULSION INTRAVENOUS at 14:39

## 2021-01-07 RX ADMIN — PROPOFOL 50 MG: 10 INJECTION, EMULSION INTRAVENOUS at 14:35

## 2021-01-07 NOTE — H&P
H&P EXAM - Outpatient Endoscopy   Rocky Choi 69 y o  female MRN: [id number]    [address]   Encounter: [id number]        Impression: colonoscopy  Plan: colonoscopy    Chief Complaint:  screening    Physical Exam: normal Chest: cta   Heart: s1s2

## 2021-01-07 NOTE — INTERVAL H&P NOTE
H&P reviewed  After examining the patient I find no changes in the patients condition since the H&P had been written      Vitals:    01/07/21 1311   BP: 141/78   Pulse: 85   Resp: 18   Temp: (!) 96 9 °F (36 1 °C)   SpO2: 99%

## 2021-01-07 NOTE — ANESTHESIA POSTPROCEDURE EVALUATION
Post-Op Assessment Note    CV Status:  Stable  Pain Score: 0    Pain management: adequate     Mental Status:  Alert and awake   Hydration Status:  Euvolemic and stable   PONV Controlled:  None   Airway Patency:  Patent      Post Op Vitals Reviewed: Yes      Staff: CRNA         No complications documented  /64 (01/07/21 1446)    Temp (!) 97 3 °F (36 3 °C) (01/07/21 1446)    Pulse 78(Simultaneous filing  User may not have seen previous data ) (01/07/21 1446)   Resp 19(Simultaneous filing  User may not have seen previous data ) (01/07/21 1446)    SpO2 98 %(Simultaneous filing   User may not have seen previous data ) (01/07/21 1446)

## 2021-01-08 ENCOUNTER — OFFICE VISIT (OUTPATIENT)
Dept: INTERNAL MEDICINE CLINIC | Age: 71
End: 2021-01-08
Payer: MEDICARE

## 2021-01-08 VITALS
WEIGHT: 183.2 LBS | TEMPERATURE: 97.7 F | HEIGHT: 65 IN | SYSTOLIC BLOOD PRESSURE: 136 MMHG | BODY MASS INDEX: 30.52 KG/M2 | DIASTOLIC BLOOD PRESSURE: 64 MMHG | OXYGEN SATURATION: 98 % | HEART RATE: 80 BPM

## 2021-01-08 DIAGNOSIS — N39.0 RECURRENT UTI: ICD-10-CM

## 2021-01-08 DIAGNOSIS — R30.0 BURNING WITH URINATION: Primary | ICD-10-CM

## 2021-01-08 DIAGNOSIS — N30.00 ACUTE CYSTITIS WITHOUT HEMATURIA: ICD-10-CM

## 2021-01-08 PROBLEM — B34.9 VIRAL SYNDROME: Status: RESOLVED | Noted: 2020-12-07 | Resolved: 2021-01-08

## 2021-01-08 PROCEDURE — 87186 SC STD MICRODIL/AGAR DIL: CPT | Performed by: INTERNAL MEDICINE

## 2021-01-08 PROCEDURE — 99214 OFFICE O/P EST MOD 30 MIN: CPT | Performed by: INTERNAL MEDICINE

## 2021-01-08 PROCEDURE — 87086 URINE CULTURE/COLONY COUNT: CPT | Performed by: INTERNAL MEDICINE

## 2021-01-08 PROCEDURE — 87077 CULTURE AEROBIC IDENTIFY: CPT | Performed by: INTERNAL MEDICINE

## 2021-01-08 RX ORDER — CIPROFLOXACIN 500 MG/1
500 TABLET, FILM COATED ORAL EVERY 12 HOURS SCHEDULED
Qty: 14 TABLET | Refills: 0 | Status: SHIPPED | OUTPATIENT
Start: 2021-01-08 | End: 2021-01-15

## 2021-01-08 NOTE — ASSESSMENT & PLAN NOTE
Lab Results   Component Value Date    HGBA1C 6 0 (H) 12/22/2020   Her most recent A1c continues to show good control despite having elevated blood sugars in the hospital

## 2021-01-08 NOTE — ASSESSMENT & PLAN NOTE
A month ago she was admitted to the hospital with acute renal failure secondary to urosepsis  She was treated with Keflex and appear to improve however after the last several days she has had increasing symptoms again  She once again has hematuria and dysuria  Her urine was sent for culture and she was given Cipro    In view of recurrent nature I will send her also to Urology

## 2021-01-08 NOTE — PROGRESS NOTES
Assessment/Plan:    No problem-specific Assessment & Plan notes found for this encounter  Diagnoses and all orders for this visit:    Burning with urination  -     Urine culture  -     ciprofloxacin (CIPRO) 500 mg tablet; Take 1 tablet (500 mg total) by mouth every 12 (twelve) hours for 7 days    Recurrent UTI  -     ciprofloxacin (CIPRO) 500 mg tablet; Take 1 tablet (500 mg total) by mouth every 12 (twelve) hours for 7 days  -     Ambulatory referral to Urology; Future    Acute cystitis without hematuria          Subjective:      Patient ID: Brian Dias is a 66 y o  female  Urinary Tract Infection   This is a recurrent problem  The current episode started yesterday  The problem occurs every urination  The problem has been gradually worsening  The pain is moderate  There has been no fever  She is sexually active  There is no history of pyelonephritis  Associated symptoms include frequency, hematuria and urgency  Pertinent negatives include no chills, flank pain or nausea  She has tried increased fluids and home medications for the symptoms  The treatment provided no relief  Her past medical history is significant for recurrent UTIs  Review of Systems   Constitutional: Negative for chills, fatigue, fever and unexpected weight change  HENT: Negative for congestion, ear pain, hearing loss, postnasal drip, sinus pressure, sore throat, trouble swallowing and voice change  Eyes: Negative for visual disturbance  Respiratory: Negative for cough, chest tightness, shortness of breath and wheezing  Cardiovascular: Negative for chest pain, palpitations and leg swelling  Gastrointestinal: Negative for abdominal distention, abdominal pain, anal bleeding, blood in stool, constipation, diarrhea and nausea  Endocrine: Negative for cold intolerance, polydipsia, polyphagia and polyuria  Genitourinary: Positive for frequency, hematuria and urgency  Negative for dysuria and flank pain  Musculoskeletal: Negative for arthralgias, back pain, gait problem, joint swelling, myalgias and neck pain  Skin: Negative for rash  Allergic/Immunologic: Negative for immunocompromised state  Neurological: Negative for dizziness, syncope, facial asymmetry, weakness, light-headedness, numbness and headaches  Hematological: Negative for adenopathy  Psychiatric/Behavioral: Negative for confusion, sleep disturbance and suicidal ideas  Objective:      /64 (BP Location: Left arm, Patient Position: Sitting, Cuff Size: Standard)   Pulse 80   Temp 97 7 °F (36 5 °C) (Tympanic)   Ht 5' 5" (1 651 m)   Wt 83 1 kg (183 lb 3 2 oz)   SpO2 98%   BMI 30 49 kg/m²          Physical Exam  Constitutional:       General: She is not in acute distress  Appearance: She is well-developed  She is obese  HENT:      Right Ear: External ear normal       Left Ear: External ear normal       Nose: Nose normal       Mouth/Throat:      Pharynx: No oropharyngeal exudate  Eyes:      Pupils: Pupils are equal, round, and reactive to light  Neck:      Musculoskeletal: Normal range of motion and neck supple  Thyroid: No thyromegaly  Vascular: No JVD  Cardiovascular:      Rate and Rhythm: Normal rate and regular rhythm  Heart sounds: Normal heart sounds  No murmur  No gallop  Pulmonary:      Effort: Pulmonary effort is normal  No respiratory distress  Breath sounds: Normal breath sounds  No wheezing or rales  Abdominal:      General: Bowel sounds are normal  There is no distension  Palpations: Abdomen is soft  There is no mass  Tenderness: There is no abdominal tenderness  Musculoskeletal: Normal range of motion  General: No tenderness  Lymphadenopathy:      Cervical: No cervical adenopathy  Skin:     Findings: No rash  Neurological:      Mental Status: She is alert and oriented to person, place, and time  Cranial Nerves: No cranial nerve deficit  Coordination: Coordination normal    Psychiatric:         Behavior: Behavior normal          Thought Content:  Thought content normal          Judgment: Judgment normal

## 2021-01-08 NOTE — PATIENT INSTRUCTIONS
Urinary Tract Infection in Older Adults   AMBULATORY CARE:   A urinary tract infection  (UTI) is caused by bacteria that get inside your urinary tract  Your urinary tract includes your kidneys, ureters, bladder, and urethra  Urine is made in your kidneys, and it flows from the ureters to the bladder  Urine leaves the bladder through the urethra  A UTI is more common in your lower urinary tract, which includes your bladder and urethra  Common signs and symptoms include the following:   · Fever and chills    · Pain or burning when you urinate    · Urine that smells bad or looks cloudy, or blood in your urine    · Urinating more often or waking from sleep to urinate    · Sudden, strong need to urinate    · Pain or pressure in your lower abdomen     · Leaking urine    · Confusion or agitation    · Fatigue, shakiness, and weakness    Seek care immediately if:   · You are urinating very little or not at all  · You are vomiting  · You have a high fever with shaking chills  · You have side or back pain that gets worse  Call your doctor if:   · You have a fever  · You are a woman and you have increased white or yellow discharge from your vagina  · You do not feel better after 2 days of taking antibiotics  · You have questions or concerns about your condition or care  Treatment:  Medicines treat the bacterial infection or decrease pain and burning when you urinate  You may also need medicines to decrease the urge to urinate often  If you have UTIs often (called recurrent UTIs), you may be given antibiotics to take regularly  You will be given directions for when and how to use antibiotics  The goal is to prevent UTIs but not cause antibiotic resistance by using antibiotics too often  Self-care:   · Drink liquids as directed  Liquids can help flush bacteria from your urinary tract  Ask how much liquid to drink each day and which liquids are best for you   You may need to drink more liquids than usual to help flush out the bacteria  Do not drink alcohol, caffeine, and citrus juices  These can irritate your bladder and increase your symptoms  · Apply heat  on your abdomen for 20 to 30 minutes every 2 hours for as many days as directed  Heat helps decrease discomfort and pressure in your bladder  Prevent a UTI:   · Urinate when you feel the urge  Do not hold your urine  Bacteria can grow if urine stays in the bladder too long  It may be helpful to urinate at least every 3 to 4 hours  · Urinate after you have sex  to flush away bacteria that can enter your urinary tract during sex  · Wear cotton underwear and clothes that are loose  Tight pants and nylon underwear can trap moisture and cause bacteria to grow  · Cranberry juice or cranberry supplements  may help prevent UTIs  Your healthcare provider can recommend the right juice or supplement for you  · Women should wipe front to back  after urinating or having a bowel movement  This may prevent germs from getting into the urinary tract  Do not douche or use feminine deodorants  These can change the chemical balance in your vagina  You may also be given vaginal estrogen medicine  This medicine helps prevent recurrent UTIs in women who have gone through menopause or are in [place]-menopause  Follow up with your healthcare provider as directed:  Write down your questions so you remember to ask them during your visits  © Copyright [address] Information is for End User's use only and may not be sold, redistributed or otherwise used for commercial purposes  All illustrations and images included in CareNotes® are the copyrighted property of [employer]  or [address] [place] [address] Cindy   The above information is an  only  It is not intended as medical advice for individual conditions or treatments  Talk to your doctor, nurse or pharmacist before following any medical regimen to see if it is safe and effective for you

## 2021-01-10 LAB — BACTERIA UR CULT: ABNORMAL

## 2021-01-14 ENCOUNTER — OFFICE VISIT (OUTPATIENT)
Dept: OBGYN CLINIC | Facility: HOSPITAL | Age: 71
End: 2021-01-14
Payer: MEDICARE

## 2021-01-14 VITALS
BODY MASS INDEX: 30.32 KG/M2 | DIASTOLIC BLOOD PRESSURE: 72 MMHG | WEIGHT: 182 LBS | HEIGHT: 65 IN | HEART RATE: 80 BPM | SYSTOLIC BLOOD PRESSURE: 146 MMHG

## 2021-01-14 DIAGNOSIS — M54.32 BACK PAIN WITH LEFT-SIDED SCIATICA: ICD-10-CM

## 2021-01-14 DIAGNOSIS — M70.62 TROCHANTERIC BURSITIS OF LEFT HIP: ICD-10-CM

## 2021-01-14 DIAGNOSIS — M54.16 LUMBAR RADICULOPATHY: Primary | ICD-10-CM

## 2021-01-14 PROCEDURE — 99214 OFFICE O/P EST MOD 30 MIN: CPT | Performed by: ORTHOPAEDIC SURGERY

## 2021-01-14 RX ORDER — METHYLPREDNISOLONE 4 MG/1
TABLET ORAL
Qty: 1 EACH | Refills: 0 | Status: SHIPPED | OUTPATIENT
Start: 2021-01-14 | End: 2021-01-27

## 2021-01-14 NOTE — PROGRESS NOTES
Assessment  Diagnoses and all orders for this visit:    Left lumbar radiculopathy    Back pain with left-sided sciatica    Trochanteric bursitis of left hip      Discussion and Plan: Activities as tolerated  Weightbearing as tolerated bilateral lower extremities  A Toradol injection provided to the left greater trochanteric bursa today in the office (as detailed below)  A referral was placed for spine comprehensive PT program  A referral was placed for pain management  Medrol Dosepak prescription was sent to her pharmacy    Subjective:   Patient ID: Deny Tee is a 66 y o  female      41-year-old femur presents for follow-up regarding her left lower back and left hip pain  Patient was seen in the office 2 weeks ago for this complains and she received a left greater trochanteric bursa injection  She states that she has had good relief in the area where she was injected  But she continues to complain of left buttock pain that radiates down to her leg as well as pain in the anterior aspect of the thigh and pain that radiates from her left hip to her left groin  She is currently taking only Tylenol, did try Mobic yesterday without much relief need to presents today for further workup for her complaints  She has been using a cane recently secondary to her pain and ambulatory dysfunction  The following portions of the patient's history were reviewed and updated as appropriate: allergies, current medications, past family history, past medical history, past social history, past surgical history and problem list     Review of Systems   Constitutional: Negative for appetite change and fever  HENT: Negative for sore throat  Eyes: Negative for visual disturbance  Respiratory: Negative for shortness of breath  Cardiovascular: Negative for chest pain  Gastrointestinal: Negative for nausea and vomiting  Musculoskeletal: Positive for arthralgias and myalgias     Skin: Negative for rash and wound        Objective:  /72   Pulse 80   Ht 5' 5" (1 651 m)   Wt 82 6 kg (182 lb)   BMI 30 29 kg/m²       Left Hip Exam     Comments:  Tender to palpation over left SI joint  No erythema or redness around the hip area  Positive Stinchfield test  GERSON test reproduces pain in her SI joint  Pain shoots down her leg when she is supine and her hip is flexed greater than 45°  Moderate TTP greater trochanter  4+/5 strength Left knee extension  +2 DTRs            Physical Exam  Vitals signs and nursing note reviewed  Constitutional:       Appearance: Normal appearance  HENT:      Head: Normocephalic and atraumatic  Nose: Nose normal       Mouth/Throat:      Mouth: Mucous membranes are moist    Eyes:      Extraocular Movements: Extraocular movements intact  Conjunctiva/sclera: Conjunctivae normal    Neck:      Musculoskeletal: Normal range of motion  Cardiovascular:      Rate and Rhythm: Normal rate  Pulses: Normal pulses  Pulmonary:      Effort: Pulmonary effort is normal       Breath sounds: Normal breath sounds  Abdominal:      Palpations: Abdomen is soft  Musculoskeletal:      Comments: See Ortho Exam   Skin:     General: Skin is warm  Capillary Refill: Capillary refill takes less than 2 seconds  Neurological:      General: No focal deficit present  Mental Status: She is alert and oriented to person, place, and time  Mental status is at baseline  Psychiatric:         Mood and Affect: Mood normal          Behavior: Behavior normal        Procedure: left hip bursa injection    After sterile preparation of the skin overlying left greater trochanter, a 22g 3inch spinal needle was introduced down to bone  Needle was then pulled back 2mm and a mixture of 2cc 1% lidocaine, 2cc   5% Marcaine, 2cc Toradol was introduced into the greater trochanter bursa  Sterile dressing was then applied  Pt tolerated procedure well        I have personally reviewed pertinent films in PACS and my interpretation is as follows      X-rays of the left hip from 10/30/2020 show moderate degenerative changes in the hip joint since it by joint space narrowing and osteophyte formation  X-rays of the lumbar spine from 11/11/2020 show moderate disc space narrowing with osteophyte formation in the lumbar spine mostly pronounced on the L5-S1 level

## 2021-01-25 ENCOUNTER — TELEPHONE (OUTPATIENT)
Dept: PHYSICAL THERAPY | Facility: OTHER | Age: 71
End: 2021-01-25

## 2021-01-25 NOTE — TELEPHONE ENCOUNTER
Call placed to the patient per [facility] referral     Voice message left for patient to call back  Phone number and hours of business provided  Patient informed that we are currently working remotely and that calls from us will be coming through as [address] phone numbers  Referral closed per protocol

## 2021-01-27 ENCOUNTER — CONSULT (OUTPATIENT)
Dept: PAIN MEDICINE | Facility: CLINIC | Age: 71
End: 2021-01-27
Payer: MEDICARE

## 2021-01-27 ENCOUNTER — EVALUATION (OUTPATIENT)
Dept: PHYSICAL THERAPY | Facility: CLINIC | Age: 71
End: 2021-01-27
Payer: MEDICARE

## 2021-01-27 VITALS
BODY MASS INDEX: 30.12 KG/M2 | SYSTOLIC BLOOD PRESSURE: 155 MMHG | HEIGHT: 65 IN | RESPIRATION RATE: 18 BRPM | WEIGHT: 180.8 LBS | HEART RATE: 113 BPM | DIASTOLIC BLOOD PRESSURE: 92 MMHG

## 2021-01-27 DIAGNOSIS — M54.32 BACK PAIN WITH LEFT-SIDED SCIATICA: Primary | ICD-10-CM

## 2021-01-27 DIAGNOSIS — M54.16 LUMBAR RADICULOPATHY: ICD-10-CM

## 2021-01-27 DIAGNOSIS — G89.29 CHRONIC BILATERAL LOW BACK PAIN WITH LEFT-SIDED SCIATICA: Primary | ICD-10-CM

## 2021-01-27 DIAGNOSIS — M54.32 BACK PAIN WITH LEFT-SIDED SCIATICA: ICD-10-CM

## 2021-01-27 DIAGNOSIS — M46.1 SACROILIITIS (HCC): ICD-10-CM

## 2021-01-27 DIAGNOSIS — M54.42 CHRONIC BILATERAL LOW BACK PAIN WITH LEFT-SIDED SCIATICA: Primary | ICD-10-CM

## 2021-01-27 DIAGNOSIS — M70.62 TROCHANTERIC BURSITIS OF LEFT HIP: ICD-10-CM

## 2021-01-27 PROCEDURE — 97110 THERAPEUTIC EXERCISES: CPT | Performed by: PHYSICAL THERAPIST

## 2021-01-27 PROCEDURE — 97162 PT EVAL MOD COMPLEX 30 MIN: CPT | Performed by: PHYSICAL THERAPIST

## 2021-01-27 PROCEDURE — 99204 OFFICE O/P NEW MOD 45 MIN: CPT | Performed by: STUDENT IN AN ORGANIZED HEALTH CARE EDUCATION/TRAINING PROGRAM

## 2021-01-27 RX ORDER — GABAPENTIN 300 MG/1
300 CAPSULE ORAL 3 TIMES DAILY
Qty: 90 CAPSULE | Refills: 0 | Status: SHIPPED | OUTPATIENT
Start: 2021-01-27 | End: 2021-02-26 | Stop reason: SDUPTHER

## 2021-01-27 NOTE — PROGRESS NOTES
PT Evaluation     Today's date: 2021  Patient name: Myrna Coffey  : 1950  MRN: [id number]  Referring provider: Chet Brownlee MD  Dx:   Encounter Diagnosis     ICD-10-CM    1  Back pain with left-sided sciatica  M54 [id number] Ambulatory referral to Comprehensive [facility]       Start Time: 7670  Stop Time: 1415  Total time in clinic (min): 36 minutes    Assessment  Assessment details: Pt is a 67 y/o female presenting to physical therapy with chief complaint of L sided LBP with LLE radicular symptoms  In standing, pt presents with a R lateral shift which is why extension and L SB were most limited and painful  Pt unable to tolerate manual shift correct in standing secondary to pain  Improvement in symptoms with L S/L and then immediate shift correction upon standing but shift returned when standing and walking  Pt given L S/L and standing L SG coupled with flexion to help with shift correct while keeping pain levels low  Pt would benefit from physical therapy in order to correct shift and subsequently improve pain and function to return to PLOF  Impairments: abnormal gait, abnormal or restricted ROM, activity intolerance, impaired balance, impaired physical strength, lacks appropriate home exercise program, pain with function and poor posture   Functional limitations: standing, walking  Symptom irritability: highUnderstanding of Dx/Px/POC: good   Prognosis: good    Goals  STG: 3 weeks  1  Pt will demonstrate independence with HEP  2  Pt will improve lumbar AROM by 10%  3  Pt will improve BLE strength by at least 1/2 grade  4  Pt will report pain no more than 5/10  5  Pt will generate proper TA contraction without cuing to show improved NM control    LT weeks  1  Pt will improve lumbar AROM to at least 80% in all directions  2  Pt will report pain no more than 2/10  3  Pt will be able to stand for at least 30 minutes without pain to return to PLOF    4  Pt will be able to lift at least 10lbs from floor to waist without pain  Plan  Patient would benefit from: skilled physical therapy  Planned modality interventions: cryotherapy and thermotherapy: hydrocollator packs  Planned therapy interventions: therapeutic exercise, therapeutic activities, stretching, strengthening, patient education, neuromuscular re-education, massage, manual therapy, balance, gait training and home exercise program  Frequency: 2x week  Duration in weeks: 6  Treatment plan discussed with: patient        Subjective Evaluation    History of Present Illness  Mechanism of injury: Pt reports she developed R knee pain over this past summer (approx 6-7 months ago), when she was doing a lot of walking  Because of this she took a break from walking and took up quilting, where she was sitting 5-6 hours a day  She got a shot in the R knee which really help, but did develop L sided LBP, and also got a shot in the L hip  This worked for maybe 2 weeks  After this wore off, her PCP put her on a steroid, which did help but then it wore off  About 2 months ago the LBP was "really bad", but the naproxen seemed to help  But since then it has gotten worse  She reports she is sitting more now  She reports as soon as she stands her LBP comes back and she gets N/T all the way down her leg  She reports as she is standing/walking it gets worse             Recurrent probem    Quality of life: fair    Pain  Current pain ratin  At best pain ratin  At worst pain rating: 10  Quality: radiating, burning and sharp  Relieving factors: medications and heat  Aggravating factors: standing, walking, running and lifting  Progression: worsening    Treatments  Previous treatment: injection treatment  Patient Goals  Patient goals for therapy: decreased pain, increased motion, independence with ADLs/IADLs, return to sport/leisure activities and increased strength          Objective     Postural Observations    Additional Postural Observation Details  Standing: R lateral shift  Unable to tolerate manual lateral shift in standing secondary to pain    Active Range of Motion     Lumbar   Flexion:  Restriction level: minimal  Extension:  with pain Restriction level: moderate  Left lateral flexion:  with pain Restriction level: maximal  Right lateral flexion:  Restriction level: moderate    General Comments:      Lumbar Comments  Radicular symptoms still present in prone lying with 1 pillow under abdomen, no change when pillow taken away and pt positioned into prone lying   Improvement in radicular symptoms in L S/L with pillow under hips   Upon standing from this position, lateral shift was significantly decreased, but it returned during gait  Pain with L SG in standing, but decreased when flexion added to this      Flowsheet Rows      Most Recent Value   PT/OT G-Codes   Current Score  32   Projected Score  51             Precautions: HTN, HLD, DM      Manuals 1/27                                                                Neuro Re-Ed             TA cont             DLS ball press                                                                              Ther Ex             L S/L HEP            L sideglides stand c flexion HEP            Prone c HOC to R                                                                              Ther Activity                                       Gait Training                                       Modalities

## 2021-01-27 NOTE — PATIENT INSTRUCTIONS
Gabapentin (By mouth)   Gabapentin (pooja-a-PEN-tin)  Treats seizures and pain caused by shingles  Brand Name(s): FusePaq Fanatrex, Gralise, Neurontin   There may be other brand names for this medicine  When This Medicine Should Not Be Used: This medicine is not right for everyone  Do not use it if you had an allergic reaction to gabapentin  How to Use This Medicine:   Capsule, Liquid, Tablet  · Take your medicine as directed  Your dose may need to be changed several times to find what works best for you  If you have epilepsy, do not allow more than 12 hours to pass between doses  · Capsule: Swallow the capsule whole with plenty of water  Do not open, crush, or chew it  · Gralise® tablet: Swallow the tablet whole   Do not crush, break, or chew it  · Neurontin® tablet: If you break a tablet into 2 pieces, use the second half as your next dose  Do not use the half-tablet if the whole tablet has been cut or broken after 28 days  · Oral liquid: Measure the oral liquid medicine with a marked measuring spoon, oral syringe, or medicine cup  · This medicine should come with a Medication Guide  Ask your pharmacist for a copy if you do not have one  · Missed dose: Take a dose as soon as you remember  If it is almost time for your next dose, wait until then and take a regular dose  Do not take extra medicine to make up for a missed dose  · Store the medicine in a closed container at room temperature, away from heat, moisture, and direct light  Store the Neurontin® oral liquid in the refrigerator  Do not freeze  Drugs and Foods to Avoid:   Ask your doctor or pharmacist before using any other medicine, including over-the-counter medicines, vitamins, and herbal products  · Some medicines can affect how gabapentin works  Tell your doctor if you also using hydrocodone or morphine  · If you take an antacid, wait at least 2 hours before you take gabapentin    · Do not drink alcohol while you are using this medicine  · Tell your doctor if you use anything else that makes you sleepy  Some examples are allergy medicine, narcotic pain medicine, and alcohol  Tell your doctor if you are also using lorazepam, oxycodone, or zolpidem  Warnings While Using This Medicine:   · Tell your doctor if you are pregnant or breastfeeding, or if you have kidney problems (including patients receiving dialysis) or lung problems  Tell your doctor if you have a history of depression or mental health problems  · This medicine may cause the following problems:  ? Drug reaction with eosinophilia and systemic symptoms (DRESS) or multiorgan hypersensitivity, which may damage the liver, kidney, blood, heart, or muscles  ? Changes in mood or behavior, including suicidal thoughts or behavior  ? Respiratory depression (serious breathing problem that can be life-threatening), when used with narcotic pain medicines  · Do not stop using this medicine suddenly  Your doctor will need to slowly decrease your dose before you stop it completely  · This medicine may make you dizzy or drowsy  Do not drive or do anything else that could be dangerous until you know how this medicine affects you  · Tell any doctor or dentist who treats you that you are using this medicine  This medicine may affect certain medical test results  · Your doctor will check your progress and the effects of this medicine at regular visits  Keep all appointments  · Keep all medicine out of the reach of children  Never share your medicine with anyone    Possible Side Effects While Using This Medicine:   Call your doctor right away if you notice any of these side effects:  · Allergic reaction: Itching or hives, swelling in your face or hands, swelling or tingling in your mouth or throat, chest tightness, trouble breathing  · Behavior problems, aggression, restlessness, trouble concentrating, moodiness (especially in children)  · Blistering, peeling, red skin rash  · Blue lips, fingernails, or skin, chest pain, fast heartbeat, trouble breathing  · Change in how much or how often you urinate, bloody or cloudy urine  · Dark urine or pale stools, nausea, vomiting, loss of appetite, stomach pain, yellow skin or eyes  · Fever, chills, cough, sore throat, body aches  · Problems with coordination, shakiness, unsteadiness, unusual eye movement  · Rapid weight gain, swelling in your hands, ankles, or feet  · Rash, swollen or tender glands in the neck, armpit, or groin  · Unusual moods or behaviors, thoughts of hurting yourself, feeling depressed  If you notice these less serious side effects, talk with your doctor:   · Dizziness, drowsiness, sleepiness, tiredness  If you notice other side effects that you think are caused by this medicine, tell your doctor  Call your doctor for medical advice about side effects  You may report side effects to FDA at 3-084-FDA-2447  © Copyright [facility] [address] Information is for End User's use only and may not be sold, redistributed or otherwise used for commercial purposes  The above information is an  only  It is not intended as medical advice for individual conditions or treatments  Talk to your doctor, nurse or pharmacist before following any medical regimen to see if it is safe and effective for you

## 2021-01-27 NOTE — PROGRESS NOTES
Assessment  1  Chronic bilateral low back pain with left-sided sciatica    2  Lumbar radiculopathy    3  Trochanteric bursitis of left hip    4  Back pain with left-sided sciatica    5  Sacroiliitis ([address] )        Plan  This is a 65-year-old female who presents to our office with chief complaint of bilateral low back pain, left greater than right with left lower extremity radiculopathy in what appears to be in L5/S1 dermatomal distribution  Notable findings on physical exam include slight weakness with left toe dorsiflexion  She also has pain limited weakness with left knee flexion/extension and left hip flexion  She has significant pain with lumbar facet loading on the left and has reproduction of radiculopathy symptoms with this maneuver, which makes me consider possible foraminal stenosis  She does also have tenderness to palpation over the left sacroiliac joint with pain on provocative maneuvers suggestive of that is a source of her symptoms as well  Regards to pharmacological therapy, I will start her on gabapentin 300 mg t i d  The patient was cautioned about possible side effects of gabapentin to include sedation, swelling, mood alterations and dizziness  Start Gabapentin 300 mg at bedtime for 3-5 days, then take 300 mg in the afternoon and 300 mg at bedtime for 3-5 days, then take 300 mg in morning, afternoon, and bedtime and stay at dose  She also continues on Flexeril 10 mg t i d  p r n  and naproxen 500 mg b i d  P r n  With limited relief  Given patient's radiculopathy symptoms and weakness noted on physical exam, I will order MRI of the lumbar spine to evaluate for possible disc herniation or stenosis causing her radiculopathy symptoms  Also discussed was physical therapy and patient will be undergoing initial consultation today  Discussed with patient that possible future procedures include LESI and/or Left SI injection   Will reach out to patient with results from imaging  [place] Prescription Drug Monitoring Program report was reviewed and was appropriate     My impressions and treatment recommendations were discussed in detail with the patient who verbalized understanding and had no further questions  Discharge instructions were provided  I personally saw and examined the patient and I agree with the above discussed plan of care  Orders Placed This Encounter   Procedures    MRI lumbar spine without contrast     Standing Status:   Future     Standing Expiration Date:   1/27/2025     Scheduling Instructions: There is no preparation for this test  Please leave your jewelry and valuables at home, wedding rings are the exception  Magnetic nail polish must be removed prior to arrival for your test  Please bring your insurance cards, a form of photo ID and a list of your medications with you  Arrive 15 minutes prior to your appointment time in order to register  Please bring any prior CT or MRI studies of this area that were not performed at a [facility]  To schedule this appointment, please contact Central Scheduling at [phone]  Prior to your appointment, please make sure you complete the MRI Screening Form when you e-Check in for your appointment  This will be available starting 7 days before your appointment in [address]  You may receive an e-mail with an activation code if you do not have a [employer] account  If you do not have access to a device, we will complete your screening at your appointment  Order Specific Question:   What is the patient's sedation requirement? Answer:   No Sedation     Order Specific Question:   Release to patient through [employer]     Answer:   Immediate     Order Specific Question:   Is order priority selected as STAT?      Answer:   No     Order Specific Question:   Reason for Exam (FREE TEXT)     Answer:   Lumbar radiculopathy symptoms on exam and history     New Medications Ordered This Visit   Medications    gabapentin (NEURONTIN) 300 mg capsule     Sig: Take 1 capsule (300 mg total) by mouth 3 (three) times a day Start Gabapentin 300 mg at bedtime for 3-5 days, then take 300 mg in the afternoon and 300 mg at bedtime for 3-5 days, then take 300 mg in morning, afternoon, and bedtime and stay at dose  Dispense:  90 capsule     Refill:  0       History of Present Illness    Referring Provider: Johana Sevilla MD    Rocky Choi is a 66 y o  female who presents with a chief complaint of bilateral low back pain, left greater than right  Patient reports having back pain for years which has traditionally done well with physical therapy, however since September she has been noticing more left leg symptoms  The pain starts in the left side of her low back and radiates down the left lower extremity what appears to be in L5/S1 dermatomal distribution  In addition to this she complains of left hip and groin pain at times as well  She does see Dr Usha Edwards and has gotten periodic left greater trochanteric bursa injections with short duration of relief  She also has mild-to-moderate degenerative joint disease of the left hip as well  Patient reports the intensity of the pain is severe  Current pain is 10/10  Pain is constant  The pain is most notable in the morning  Pain is burning, cramping, sharp, and pins and needles in nature  She also endorses subjective weakness of the left lower extremity but denies bowel bladder incontinence or saddle anesthesia  She has recently started ambulating with a cane  Symptoms are increased with exercising and walking, going from sitting to standing  There is no change in coughing, sneezing, bending, sitting  There is decrease in pain with lying down  She does have recent x-ray of the lumbar spine demonstrating bilateral facet degenerative changes throughout the lumbar spine as well as disc space narrowing      Past medical history includes diabetes, GERD, hypertension, and osteoarthritis  Previous treatments include physical therapy with moderate relief in the past   She reports no relief with heat/ice therapy  She does not smoke tobacco or marijuana  She drinks alcohol infrequently  She is not on blood thinners  She has tried multiple NSAIDs is currently on naproxen with mild-to-moderate relief  She is also on Flexeril with mild-to-moderate relief  She has also been prescribed oral steroids which have provided relief in the past     I have personally reviewed and/or updated the patient's past medical history, past surgical history, family history, social history, current medications, allergies, and vital signs today  Review of Systems   Constitutional: Negative for fever and unexpected weight change  HENT: Positive for hearing loss  Negative for trouble swallowing  Eyes: Negative for visual disturbance  Respiratory: Negative for shortness of breath and wheezing  Cardiovascular: Negative for chest pain and palpitations  Gastrointestinal: Positive for abdominal pain  Negative for constipation, diarrhea, nausea and vomiting  Endocrine: Negative for cold intolerance, heat intolerance and polydipsia  Genitourinary: Negative for difficulty urinating and frequency  Musculoskeletal: Positive for arthralgias and myalgias  Negative for gait problem and joint swelling  Skin: Negative for rash  Neurological: Negative for dizziness, seizures, syncope, weakness and headaches  Hematological: Does not bruise/bleed easily  Psychiatric/Behavioral: Negative for dysphoric mood  All other systems reviewed and are negative        Patient Active Problem List   Diagnosis    Age related osteoporosis    Controlled diabetes mellitus [facility] [address] [facility]    Hyperlipidemia    Essential hypertension    BMI 29 0-29 9,adult    Encounter for gynecological examination without abnormal finding    Asymptomatic postmenopausal status    Acute pain of right knee    NICKY on CPAP    PLMD (periodic limb movement disorder)    Pain in left hip    Primary osteoarthritis of right knee    Primary osteoarthritis of one hip, left    Trochanteric bursitis of left hip    Lumbar radiculopathy    Back pain with left-sided sciatica    Acute cystitis without hematuria    [facility]acute renal failure) [facility])       Past Medical History:   Diagnosis Date    Arthritis 2018    PT 2018    Carpal tunnel syndrome     Unspecified laterality   Resolved: 2016    Cataract     Chronic kidney disease     Complex endometrial hyperplasia with atypia     Resolved: 17    Diabetes mellitus ([facility] [address] )     of other type with circulatory complication, without long-term current use of insulin    Hyperlipidemia     Hypertension     Normal delivery      and  sons    Obesity     NICKY (obstructive sleep apnea)     Post-menopausal bleeding     Resolved:     Seasonal allergies     UTI (urinary tract infection)        Past Surgical History:   Procedure Laterality Date    CARPAL TUNNEL RELEASE      CARPAL TUNNEL RELEASE Left     CATARACT EXTRACTION      CHOLECYSTECTOMY      COLONOSCOPY      Complete    DILATION AND CURETTAGE OF UTERUS      twice    ENDOMETRIAL BIOPSY      without cervical dilation    HYSTEROSCOPY      TUBAL LIGATION         Family History   Problem Relation Age of Onset    No Known Problems Child     Arthritis Family     Cancer Family         bladder    Other Family         Cardiac disorder    Diabetes Family     Hypertension Family     Valvular heart disease Family     Hypertension Mother     Heart disease Mother     Heart attack Mother     Hypertension Father     Cancer Father              Prostate cancer Father 80    Arthritis Father              Hypertension Brother     Heart disease Brother     Prostate cancer Brother 61    Diabetes Maternal Grandmother     Stroke Maternal Grandmother     Diabetes Paternal Grandfather     Breast cancer Neg Hx     Colon cancer Neg Hx     Ovarian cancer Neg Hx     Uterine cancer Neg Hx     Cervical cancer Neg Hx     Thyroid disease Neg Hx        Social History     Occupational History    Occupation: High School Adminstrator      Employer: [employer]   Tobacco Use    Smoking status: Never Smoker    Smokeless tobacco: Never Used   Substance and Sexual Activity    Alcohol use:  Yes     Alcohol/week: 2 0 standard drinks     Types: 2 Glasses of wine per week     Frequency: Monthly or less     Drinks per session: 1 or 2     Binge frequency: Never     Comment: less than one glass of wine per week    Drug use: No    Sexual activity: Not Currently     Partners: Male     Birth control/protection: Post-menopausal       Current Outpatient Medications on File Prior to Visit   Medication Sig    amLODIPine (NORVASC) 5 mg tablet TAKE 1 TABLET BY MOUTH  DAILY    aspirin 81 MG tablet Take by mouth    atorvastatin (LIPITOR) 10 mg tablet TAKE 1 TABLET BY MOUTH  DAILY    Cranberry 600 MG TABS Take by mouth    cyclobenzaprine (FLEXERIL) 10 mg tablet Take 1 tablet (10 mg total) by mouth 3 (three) times a day as needed for muscle spasms    famotidine (PEPCID) 20 mg tablet TAKE 1 TABLET BY MOUTH TWO  TIMES DAILY    fexofenadine (ALLEGRA) 180 MG tablet Take 1 tablet (180 mg total) by mouth daily    Lancets (ACCU-CHEK SOFT TOUCH) lancets by Other route 2 (two) times a day    metFORMIN (GLUCOPHAGE) 1000 MG tablet TAKE 1 TABLET BY MOUTH TWO  TIMES DAILY WITH MEALS    metoprolol succinate (TOPROL-XL) 50 mg 24 hr tablet TAKE 1 TABLET BY MOUTH  DAILY    naproxen (NAPROSYN) 500 mg tablet Take 1 tablet (500 mg total) by mouth 2 (two) times a day with meals    ONE TOUCH ULTRA TEST test strip 1 each by Other route 2 (two) times a day Test Blood Sugar 2X daily I28 8    Trulicity 1 5 WD/3 9HD SOPN INJECT 0 5 ML (1 5 MG  TOTAL) SUBCUTANEOUSLY ONCE  A WEEK    [DISCONTINUED] meloxicam (MOBIC) 7 5 mg tablet Take 1 tablet (7 5 mg total) by mouth daily    [DISCONTINUED] methylPREDNISolone 4 MG tablet therapy pack Use as directed on package    [DISCONTINUED] Insulin Pen Needle (B-D UF III MINI PEN NEEDLES) 31G X 5 MM MISC by Other route once a week Use as directed     No current facility-administered medications on file prior to visit  Allergies   Allergen Reactions    Ace Inhibitors Shortness Of Breath     Category: Allergy;     Codeine Vomiting     Reaction Date: [id number];     Irbesartan      Other reaction(s): Unknown       Physical Exam    /92   Pulse (!) 113   Resp 18   Ht 5' 5" (1 651 m)   Wt 82 kg (180 lb 12 8 oz)   BMI 30 09 kg/m²     Constitutional: normal, well developed, well nourished, alert, in no distress and non-toxic and no overt pain behavior    Eyes: anicteric  HEENT: grossly intact  Neck: supple, symmetric, trachea midline and no masses   Pulmonary:even and unlabored  Cardiovascular:No edema or pitting edema present  Skin:Normal without rashes or lesions and well hydrated  Psychiatric:Mood and affect appropriate  Neurologic:Cranial Nerves II-XII grossly intact  Musculoskeletal:normal     Lumbar Spine Exam    Appearance:  Normal lordosis  Palpation/Tenderness:  left sacroiliac joint tenderness  Sensory:  no sensory deficits noted except: decreased sensation to light touch over lateral left thigh  Range of Motion:  Flexion:  No limitation  without pain  Extension:  Moderately limited  without pain  Lateral Flexion - Left:  Severely limited  with pain  Lateral Flexion - Right:  No limitation  without pain  Rotation - Left:  No limitation  without pain  Rotation - Right:  No limitation  without pain  Motor Strength:  Left hip flexion:  4/5  Left hip extension:  4/5  Right hip flexion:  5/5  Right hip extension:  5/5  Left knee flexion:  4/5  Left knee extension:  5/5  Right knee flexion:  5/5  Right knee extension:  5/5  Left foot dorsiflexion:  5/5  Left foot plantar flexion:  5/5  Right foot dorsiflexion:  5/5  Right foot plantar flexion:  5/5   Left toe dorsiflexion: 4/5  Left hip and knee exams with weakness secondayr to pain  Reflexes:  Left Patellar:  1+   Right Patellar:  2+   Left Achilles:  2+   Right Achilles:  2+   Special Tests:  Left Straight Leg Test:  positive  Right Straight Leg Test:  negative  Left Azeem's Maneuver:  positive  Right Azeem's Maneuver:  negative    DIAGNOSTIC IMAGING AND TEST RESULTS:  LUMBAR SPINE     INDICATION:   M54 32: Sciatica, left side      COMPARISON:  None     VIEWS:  XR SPINE LUMBAR MINIMUM 4 VIEWS NON INJURY        FINDINGS:     There are 5 non rib bearing lumbar vertebral bodies       There is no evidence of acute fracture or destructive osseous lesion      Alignment is anatomic  Thoracolumbar slight levoscoliosis     Bilateral posterior facet mild degenerative sclerosis  L1-L2 through L5-S1 mild/moderate disc space narrowing with mildly prominent osteophytes     The pedicles appear intact      Soft tissues are unremarkable      IMPRESSION:     Degenerative changes    LEFT HIP     INDICATION:   M25 552: Pain in left hip      COMPARISON:  None     VIEWS:  XR HIP/PELV 2-3 VWS LEFT  W PELVIS IF PERFORMED   Images: 3     FINDINGS:     There is no acute fracture or dislocation      There are bilateral mild to moderate degenerative changes in the hips      No lytic or blastic osseous lesion      Soft tissues are unremarkable      Degenerative changes visualized lower lumbar spine      IMPRESSION:     No acute osseous abnormality      Degenerative changes as described

## 2021-02-01 ENCOUNTER — APPOINTMENT (OUTPATIENT)
Dept: PHYSICAL THERAPY | Facility: CLINIC | Age: 71
End: 2021-02-01
Payer: MEDICARE

## 2021-02-03 ENCOUNTER — APPOINTMENT (OUTPATIENT)
Dept: PHYSICAL THERAPY | Facility: CLINIC | Age: 71
End: 2021-02-03
Payer: MEDICARE

## 2021-02-05 ENCOUNTER — OFFICE VISIT (OUTPATIENT)
Dept: PHYSICAL THERAPY | Facility: CLINIC | Age: 71
End: 2021-02-05
Payer: MEDICARE

## 2021-02-05 DIAGNOSIS — M54.32 BACK PAIN WITH LEFT-SIDED SCIATICA: Primary | ICD-10-CM

## 2021-02-05 PROCEDURE — 97110 THERAPEUTIC EXERCISES: CPT | Performed by: PHYSICAL THERAPIST

## 2021-02-05 PROCEDURE — 97140 MANUAL THERAPY 1/> REGIONS: CPT | Performed by: PHYSICAL THERAPIST

## 2021-02-05 NOTE — PROGRESS NOTES
Daily Note     Today's date: 2021  Patient name: Isai Ortega  : 1950  MRN: [id number]  Referring provider: No Michaud MD  Dx:   Encounter Diagnosis     ICD-10-CM    1  Back pain with left-sided sciatica  M54 32        Start Time: 3608  Stop Time: 1052  Total time in clinic (min): 40 minutes    Subjective: Pt reports her symptoms are the same as at last visit  Objective: See treatment diary below      Assessment: Pt had R lateral shift upon arrival, but improved tolerance to manual lateral shift in standing but it did not improve pain  Following progression of prone lying - DILLON - DILLON press ups - PPU all with HOC to the R helped to significantly improve pain, as it alleviated pain in the LLE and decreased it in the buttock  Symptoms were further improved and near eliminated following L5 P-A mobs with HOC to the R       Plan: Progress as tolerated       Precautions: HTN, HLD, DM      Manuals            Manual lateral shift correct  8'           P-A lumbar mobs  8'                                     Neuro Re-Ed             TA cont             DLS ball press                                                                              Ther Ex             L S/L HEP            L sideglides stand c flexion HEP            Prone c HOC to R  1', 3'           DILLON HOC to R  3', 1', 1'           PPU HOC to R on elbows  2x10           PPU HOC to R  3x10                                     Ther Activity                                       Gait Training                                       Modalities

## 2021-02-08 ENCOUNTER — OFFICE VISIT (OUTPATIENT)
Dept: PHYSICAL THERAPY | Facility: CLINIC | Age: 71
End: 2021-02-08
Payer: MEDICARE

## 2021-02-08 DIAGNOSIS — M54.32 BACK PAIN WITH LEFT-SIDED SCIATICA: Primary | ICD-10-CM

## 2021-02-08 PROCEDURE — 97112 NEUROMUSCULAR REEDUCATION: CPT | Performed by: PHYSICAL THERAPIST

## 2021-02-08 PROCEDURE — 97110 THERAPEUTIC EXERCISES: CPT | Performed by: PHYSICAL THERAPIST

## 2021-02-08 PROCEDURE — 97140 MANUAL THERAPY 1/> REGIONS: CPT | Performed by: PHYSICAL THERAPIST

## 2021-02-08 NOTE — PROGRESS NOTES
Daily Note     Today's date: 2021  Patient name: Kita Rossi  : 1950  MRN: [id number]  Referring provider: Sudarshan Rojo MD  Dx:   Encounter Diagnosis     ICD-10-CM    1  Back pain with left-sided sciatica  M54 32        Start Time: 1016  Stop Time: 1055  Total time in clinic (min): 39 minutes    Subjective: Pt reports she felt pretty good the rest of the day after therapy last week and the next day she was able to do more  But then yesterday she woke up with the pain and she did the exercises, but her pain was increased following them  Objective: See treatment diary below      Assessment: Pt able to progress through prone progression faster than previous visit  Lumbar P-A mobs alleviated radicular symptoms, after PPU with HOC to the R helped to decrease the symptoms  Pt able to tolerate core stabilization exercises without an increase in LBP or radicular symptoms  Plan: Progress as tolerated        Precautions: HTN, HLD, DM      Manuals           Manual lateral shift correct  8'           P-A lumbar mobs  8' 10'                                    Neuro Re-Ed             TA cont   c cuff 10x          TA cont c breathing   c cuff 10x          DLS ball press   7"X82                                                                           Ther Ex             L S/L HEP            L sideglides stand c flexion HEP            Prone c HOC to R  1', 3' 1'          DILLON HOC to R  3', 1', 1' 3', 3'          PPU HOC to R on elbows  2x10           PPU HOC to R  3x10 10x          PPU HOC to R with PT OP   10x          PPU HOC to R c pt OP   10x          Ther Activity                                       Gait Training                                       Modalities

## 2021-02-09 ENCOUNTER — HOSPITAL ENCOUNTER (OUTPATIENT)
Dept: MRI IMAGING | Facility: CLINIC | Age: 71
Discharge: HOME | End: 2021-02-09
Payer: MEDICARE

## 2021-02-09 DIAGNOSIS — M54.16 LUMBAR RADICULOPATHY: ICD-10-CM

## 2021-02-09 DIAGNOSIS — M54.42 CHRONIC BILATERAL LOW BACK PAIN WITH LEFT-SIDED SCIATICA: ICD-10-CM

## 2021-02-09 DIAGNOSIS — G89.29 CHRONIC BILATERAL LOW BACK PAIN WITH LEFT-SIDED SCIATICA: ICD-10-CM

## 2021-02-09 PROCEDURE — 72148 MRI LUMBAR SPINE W/O DYE: CPT

## 2021-02-09 PROCEDURE — G1004 CDSM NDSC: HCPCS

## 2021-02-10 ENCOUNTER — OFFICE VISIT (OUTPATIENT)
Dept: PHYSICAL THERAPY | Facility: CLINIC | Age: 71
End: 2021-02-10
Payer: MEDICARE

## 2021-02-10 DIAGNOSIS — M54.32 BACK PAIN WITH LEFT-SIDED SCIATICA: Primary | ICD-10-CM

## 2021-02-10 PROCEDURE — 97110 THERAPEUTIC EXERCISES: CPT

## 2021-02-10 PROCEDURE — 97112 NEUROMUSCULAR REEDUCATION: CPT

## 2021-02-10 NOTE — PROGRESS NOTES
Daily Note     Today's date: 2/10/2021  Patient name: Tracie Olguin  : 1950  MRN: [id number]  Referring provider: Manolo Sandoval MD  Dx:   Encounter Diagnosis     ICD-10-CM    1  Back pain with left-sided sciatica  M54 32                   Subjective: Patient reports she felt much better following last session for approximately 1 day when sx returned yesterday  Patient states she performed HEP at home but has not experienced significant results  Upon presentation, pt c/o L calf pain, "burning" in thigh, and glut/LBP  Objective: See treatment diary below      Assessment: No change in sx with prone progression until lumbar P-A mobs performed  Abolished sx with manual therapy  Patient was able to tolerate 1/4 prone press up without discomfort  Patient was able to perform core stab today without return of sx  Patient instructed in PPU c belt fixation at home  Plan: Continue per plan of care        Precautions: HTN, HLD, DM      Manuals 1/27 2/5 2/8 2/10         Manual lateral shift correct  8'           P-A lumbar mobs  8' 10' EN, PT                                   Neuro Re-Ed             TA cont   c cuff 10x Cuff 3"x10         TA cont c breathing   c cuff 10x Cuff x10         DLS ball press   9"G47 5"x20                                                                          Ther Ex             L S/L HEP            L sideglides stand c flexion HEP            Prone c HOC to R  1', 3' 1' 2'         DILLON HOC to R  3', 1', 1' 3', 3' 3'         PPU HOC to R on elbows  2x10           PPU HOC to R  3x10 10x 2x10         PPU HOC to R with PT OP   10x          PPU HOC to R c pt OP   10x          PPU HOC to R c belt fixation    4x10         Ther Activity                                       Gait Training                                       Modalities

## 2021-02-15 ENCOUNTER — OFFICE VISIT (OUTPATIENT)
Dept: PHYSICAL THERAPY | Facility: CLINIC | Age: 71
End: 2021-02-15
Payer: MEDICARE

## 2021-02-15 DIAGNOSIS — M54.32 BACK PAIN WITH LEFT-SIDED SCIATICA: Primary | ICD-10-CM

## 2021-02-15 PROCEDURE — 97110 THERAPEUTIC EXERCISES: CPT

## 2021-02-15 NOTE — PROGRESS NOTES
Daily Note     Today's date: 2/15/2021  Patient name: Tal Zapata  : 1950  MRN: [id number]  Referring provider: Adolfo Hayward MD  Dx:   Encounter Diagnosis     ICD-10-CM    1  Back pain with left-sided sciatica  M54 32                   Subjective: Patient reports she did not have long lasting relief following last session  Pt reports she is currently experiencing L thigh, glut, and low back pain  Objective: See treatment diary below      Assessment: No significant resolution of sx with prone progression with HOC to R  Patient did report an improvement in sx following lumbar mobilizations and only reported sx in L glut and lumbar spine only  No core stab performed today and pt instructed to stick with PPU at home  Plan: Continue per plan of care        Precautions: HTN, HLD, DM      Manuals 1/27 2/5 2/8 2/10 2/15        Manual lateral shift correct  8'           P-A lumbar mobs  8' 10' EN, PT KB, PT                                  Neuro Re-Ed             TA cont   c cuff 10x Cuff 3"x10         TA cont c breathing   c cuff 10x Cuff x10         DLS ball press   2"N86 5"x20                                                                          Ther Ex             L S/L HEP            L sideglides stand c flexion HEP            Prone c HOC to R  1', 3' 1' 2'         DILLON HOC to R  3', 1', 1' 3', 3' 3' 5' x2        PPU HOC to R on elbows  2x10           PPU HOC to R  3x10 10x 2x10 2x10        PPU HOC to R with PT OP   10x          PPU HOC to R c pt OP   10x          PPU HOC to R c belt fixation    4x10         PPU     4x10        L SG     x10        Ther Activity                                       Gait Training                                       Modalities

## 2021-02-17 ENCOUNTER — OFFICE VISIT (OUTPATIENT)
Dept: PHYSICAL THERAPY | Facility: CLINIC | Age: 71
End: 2021-02-17
Payer: MEDICARE

## 2021-02-17 DIAGNOSIS — M54.32 BACK PAIN WITH LEFT-SIDED SCIATICA: Primary | ICD-10-CM

## 2021-02-17 PROCEDURE — 97112 NEUROMUSCULAR REEDUCATION: CPT | Performed by: PHYSICAL THERAPIST

## 2021-02-17 PROCEDURE — 97110 THERAPEUTIC EXERCISES: CPT | Performed by: PHYSICAL THERAPIST

## 2021-02-17 PROCEDURE — 97140 MANUAL THERAPY 1/> REGIONS: CPT | Performed by: PHYSICAL THERAPIST

## 2021-02-17 NOTE — PROGRESS NOTES
Daily Note     Today's date: 2021  Patient name: Brian Dias  : 1950  MRN: [id number]  Referring provider: Nichelle Green MD  Dx:   Encounter Diagnosis     ICD-10-CM    1  Back pain with left-sided sciatica  M54 32        Start Time: 1494  Stop Time: 1248  Total time in clinic (min): 43 minutes    Subjective: Pt reports she got an appointment with the doctor next Friday, it was the earliest she could get in        Objective: See treatment diary below      Assessment: Pt arrived with lateral shift and ambulating with SPC  Pt had pain and N/T into the anterior L thigh, which was improved with prone femoral nerve glides  These NG added to HEP  Pt had increased pain and symptoms with standing sideglides, improved with prone on elbows with HOC to the R        Plan: Progress as tolerated  Precautions: HTN, HLD, DM      Manuals 1/27 2/5 2/8 2/10 2/15 2/17       Manual lateral shift correct  8'           P-A lumbar mobs  8' 10' EN, PT KB, PT 8'                                 Neuro Re-Ed             TA cont   c cuff 10x Cuff 3"x10  Cuff 20x       TA cont c breathing   c cuff 10x Cuff x10  Cuff 10x       DLS ball press   8"N79 5"x20  5"x20       Prone fem NG      3x20, 20x       Prone hip ext c knee bent      10x ea       TA bridge      20x                                 Ther Ex             L S/L HEP            L sideglides stand c flexion HEP            Prone c HOC to R  1', 3' 1' 2'         DILLON HOC to R  3', 1', 1' 3', 3' 3' 5' x2 3', 3'       PPU HOC to R on elbows  2x10    2x10       PPU HOC to R  3x10 10x 2x10 2x10 2x10       PPU HOC to R with PT OP   10x          PPU HOC to R c pt OP   10x          PPU HOC to R c belt fixation    4x10         PPU     4x10        L SG     x10 10x p!        Ther Activity                                       Gait Training                                       Modalities

## 2021-02-22 ENCOUNTER — APPOINTMENT (OUTPATIENT)
Dept: PHYSICAL THERAPY | Facility: CLINIC | Age: 71
End: 2021-02-22
Payer: MEDICARE

## 2021-02-23 ENCOUNTER — OFFICE VISIT (OUTPATIENT)
Dept: PHYSICAL THERAPY | Facility: CLINIC | Age: 71
End: 2021-02-23
Payer: MEDICARE

## 2021-02-23 DIAGNOSIS — M54.32 BACK PAIN WITH LEFT-SIDED SCIATICA: Primary | ICD-10-CM

## 2021-02-23 PROCEDURE — 97110 THERAPEUTIC EXERCISES: CPT

## 2021-02-23 PROCEDURE — 97112 NEUROMUSCULAR REEDUCATION: CPT

## 2021-02-23 NOTE — PROGRESS NOTES
Daily Note     Today's date: 2021  Patient name: Pedro Ramsay  : 1950  MRN: [id number]  Referring provider: Nubia Patricio MD  Dx:   Encounter Diagnosis     ICD-10-CM    1  Back pain with left-sided sciatica  M54 [id number]                   Subjective: Pt c/o pain into L low back, glut, anterior thigh and shin  Patient states she had a "good day" the other day but today is worse  Objective: See treatment diary below      Assessment: Patient reported increased pain with prone on elbows without hips off center into L thigh  Patient experienced minimally decreased sx following prone nerve glides, but increased L thigh and hip pain with femoral nerve tensioners  Able to perform TA contractions with fair contraction  Plan: Continue per plan of care  Precautions: HTN, HLD, DM      Manuals 1/27 2/5 2/8 2/10 2/15 2/17 2/23      Manual lateral shift correct  8'           P-A lumbar mobs  8' 10' EN, PT KB, PT 8'                                 Neuro Re-Ed             TA cont   c cuff 10x Cuff 3"x10  Cuff 20x Cuff x20      TA cont c breathing   c cuff 10x Cuff x10  Cuff 10x Cuff x10      DLS ball press   5"O36 5"x20  5"x20 5"x30      Prone fem NG      3x20, 20x x20 manual      Prone hip ext c knee bent      10x ea       TA bridge      20x       Prone fem tensioners       x15 manual                   Ther Ex             L S/L HEP            L sideglides stand c flexion HEP            Prone c HOC to R  1', 3' 1' 2'         DILLON HOC to R  3', 1', 1' 3', 3' 3' 5' x2 3', 3' 6'      PPU HOC to R on elbows  2x10    2x10       PPU HOC to R  3x10 10x 2x10 2x10 2x10       PPU HOC to R with PT OP   10x          PPU HOC to R c pt OP   10x          PPU HOC to R c belt fixation    4x10         PPU     4x10        L SG     x10 10x p!        Ther Activity                                       Gait Training                                       Modalities

## 2021-02-24 ENCOUNTER — APPOINTMENT (OUTPATIENT)
Dept: PHYSICAL THERAPY | Facility: CLINIC | Age: 71
End: 2021-02-24
Payer: MEDICARE

## 2021-02-25 ENCOUNTER — OFFICE VISIT (OUTPATIENT)
Dept: INTERNAL MEDICINE CLINIC | Age: 71
End: 2021-02-25
Payer: MEDICARE

## 2021-02-25 VITALS
BODY MASS INDEX: 30.16 KG/M2 | TEMPERATURE: 97.4 F | HEART RATE: 82 BPM | RESPIRATION RATE: 18 BRPM | SYSTOLIC BLOOD PRESSURE: 136 MMHG | WEIGHT: 181 LBS | OXYGEN SATURATION: 97 % | HEIGHT: 65 IN | DIASTOLIC BLOOD PRESSURE: 68 MMHG

## 2021-02-25 DIAGNOSIS — M54.32 BACK PAIN WITH LEFT-SIDED SCIATICA: ICD-10-CM

## 2021-02-25 DIAGNOSIS — E78.5 HYPERLIPIDEMIA, UNSPECIFIED HYPERLIPIDEMIA TYPE: ICD-10-CM

## 2021-02-25 DIAGNOSIS — I10 ESSENTIAL HYPERTENSION: Primary | ICD-10-CM

## 2021-02-25 DIAGNOSIS — E11.40 CONTROLLED TYPE 2 DIABETES MELLITUS WITH DIABETIC NEUROPATHY, WITHOUT LONG-TERM CURRENT USE OF INSULIN (HCC): ICD-10-CM

## 2021-02-25 DIAGNOSIS — Z12.31 ENCOUNTER FOR SCREENING MAMMOGRAM FOR MALIGNANT NEOPLASM OF BREAST: Primary | ICD-10-CM

## 2021-02-25 PROBLEM — N30.00 ACUTE CYSTITIS WITHOUT HEMATURIA: Status: RESOLVED | Noted: 2020-12-14 | Resolved: 2021-02-25

## 2021-02-25 PROBLEM — M54.16 LUMBAR RADICULOPATHY: Status: RESOLVED | Noted: 2020-10-30 | Resolved: 2021-02-25

## 2021-02-25 PROBLEM — M25.552 PAIN IN LEFT HIP: Status: RESOLVED | Noted: 2020-10-30 | Resolved: 2021-02-25

## 2021-02-25 PROBLEM — N17.9 ARF (ACUTE RENAL FAILURE) (HCC): Status: RESOLVED | Noted: 2020-12-14 | Resolved: 2021-02-25

## 2021-02-25 PROCEDURE — 99214 OFFICE O/P EST MOD 30 MIN: CPT | Performed by: INTERNAL MEDICINE

## 2021-02-25 RX ORDER — LISINOPRIL 10 MG/1
10 TABLET ORAL DAILY
Qty: 90 TABLET | Refills: 3 | Status: SHIPPED | OUTPATIENT
Start: 2021-02-25 | End: 2021-09-09 | Stop reason: SDUPTHER

## 2021-02-25 NOTE — ASSESSMENT & PLAN NOTE
She continues to have mild fluctuation in her weight and remains mildly obese  She was again counseled on diet and exercise    However her back pain is preventing her from doing much

## 2021-02-25 NOTE — ASSESSMENT & PLAN NOTE
Her back pain is not improving despite medications and physical therapy    She has an appointment with spine and pain tomorrow

## 2021-02-25 NOTE — PROGRESS NOTES
Assessment/Plan:    NICKY on CPAP   She continues to use her CPAP faithfully    Essential hypertension   Her blood pressure remains under good control with lisinopril and metoprolol    Back pain with left-sided sciatica   Her back pain is not improving despite medications and physical therapy  She has an appointment with spine and pain tomorrow    BMI 29 0-29 9,adult   She continues to have mild fluctuation in her weight and remains mildly obese  She was again counseled on diet and exercise  However her back pain is preventing her from doing much    Hyperlipidemia   She is due for look profile but continues on atorvastatin       Diagnoses and all orders for this visit:    Essential hypertension  -     lisinopril (ZESTRIL) 10 mg tablet; Take 1 tablet (10 mg total) by mouth daily    Controlled type 2 diabetes mellitus with diabetic neuropathy, without long-term current use of insulin (HCC)  -     lisinopril (ZESTRIL) 10 mg tablet; Take 1 tablet (10 mg total) by mouth daily  -     Glucometer test strips    Back pain with left-sided sciatica    Hyperlipidemia, unspecified hyperlipidemia type  -     Lipid panel; Future    BMI 29 0-29 9,adult          Subjective:      Patient ID: Katelynn Giraldo is a 66 y o  female  Diabetes  She presents for her follow-up diabetic visit  She has type 2 diabetes mellitus  Her disease course has been stable  There are no hypoglycemic associated symptoms  Pertinent negatives for hypoglycemia include no confusion, dizziness or headaches  There are no diabetic associated symptoms  Pertinent negatives for diabetes include no chest pain, no fatigue, no polydipsia, no polyphagia, no polyuria and no weakness  Symptoms are stable  There are no diabetic complications  Risk factors for coronary artery disease include diabetes mellitus, dyslipidemia, hypertension, obesity, post-menopausal, sedentary lifestyle and family history   Current diabetic treatment includes diet and oral agent (monotherapy) (Trulicity)  She is compliant with treatment most of the time  Her weight is stable  She is following a diabetic, low salt and low fat/cholesterol diet  Meal planning includes avoidance of concentrated sweets  She has had a previous visit with a dietitian  She rarely participates in exercise  Her home blood glucose trend is fluctuating minimally  An ACE inhibitor/angiotensin II receptor blocker is being taken  She sees a podiatrist Eye exam is current  Review of Systems   Constitutional: Negative for chills, fatigue, fever and unexpected weight change  HENT: Negative for congestion, ear pain, hearing loss, postnasal drip, sinus pressure, sore throat, trouble swallowing and voice change  Eyes: Negative for visual disturbance  Respiratory: Negative for cough, chest tightness, shortness of breath and wheezing  Cardiovascular: Negative for chest pain, palpitations and leg swelling  Gastrointestinal: Negative for abdominal distention, abdominal pain, anal bleeding, blood in stool, constipation, diarrhea and nausea  Endocrine: Negative for cold intolerance, polydipsia, polyphagia and polyuria  Genitourinary: Negative for dysuria, flank pain, frequency, hematuria and urgency  Musculoskeletal: Positive for back pain  Negative for arthralgias, gait problem, joint swelling, myalgias and neck pain  Skin: Negative for rash  Allergic/Immunologic: Negative for immunocompromised state  Neurological: Negative for dizziness, syncope, facial asymmetry, weakness, light-headedness, numbness and headaches  Hematological: Negative for adenopathy  Psychiatric/Behavioral: Negative for confusion, sleep disturbance and suicidal ideas  Objective:      /68   Pulse 82   Temp (!) 97 4 °F (36 3 °C)   Resp 18   Ht 5' 5" (1 651 m)   Wt 82 1 kg (181 lb)   SpO2 97%   BMI 30 12 kg/m²          Physical Exam  Vitals signs reviewed     Constitutional:       General: She is not in acute distress  Appearance: She is well-developed  She is obese  HENT:      Right Ear: External ear normal       Left Ear: External ear normal       Nose: Nose normal       Mouth/Throat:      Pharynx: No oropharyngeal exudate  Eyes:      Pupils: Pupils are equal, round, and reactive to light  Neck:      Musculoskeletal: Normal range of motion and neck supple  Thyroid: No thyromegaly  Vascular: No JVD  Cardiovascular:      Rate and Rhythm: Normal rate and regular rhythm  Heart sounds: Normal heart sounds  No murmur  No gallop  Pulmonary:      Effort: Pulmonary effort is normal  No respiratory distress  Breath sounds: Normal breath sounds  No wheezing or rales  Abdominal:      General: Bowel sounds are normal  There is no distension  Palpations: Abdomen is soft  There is no mass  Tenderness: There is no abdominal tenderness  Musculoskeletal: Normal range of motion  General: No tenderness  Right lower leg: No edema  Left lower leg: No edema  Lymphadenopathy:      Cervical: No cervical adenopathy  Skin:     General: Skin is warm and dry  Findings: No rash  Neurological:      General: No focal deficit present  Mental Status: She is alert and oriented to person, place, and time  Cranial Nerves: No cranial nerve deficit  Coordination: Coordination normal       Gait: Gait normal    Psychiatric:         Behavior: Behavior normal          Thought Content: Thought content normal          Judgment: Judgment normal          BMI Counseling: Body mass index is 30 12 kg/m²  The BMI is above normal  Nutrition recommendations include decreasing overall calorie intake

## 2021-02-25 NOTE — PATIENT INSTRUCTIONS
Obesity   AMBULATORY CARE:   Obesity  is when your body mass index (BMI) is greater than 30  Your healthcare provider will use your height and weight to measure your BMI  The risks of obesity include  many health problems, such as injuries or physical disability  You may need tests to check for the following:  · Diabetes    · High blood pressure or high cholesterol    · Heart disease    · Gallbladder or liver disease    · Cancer of the colon, breast, prostate, liver, or kidney    · Sleep apnea    · Arthritis or gout    Seek care immediately if:   · You have a severe headache, confusion, or difficulty speaking  · You have weakness on one side of your body  · You have chest pain, sweating, or shortness of breath  Contact your healthcare provider if:   · You have symptoms of gallbladder or liver disease, such as pain in your upper abdomen  · You have knee or hip pain and discomfort while walking  · You have symptoms of diabetes, such as intense hunger and thirst, and frequent urination  · You have symptoms of sleep apnea, such as snoring or daytime sleepiness  · You have questions or concerns about your condition or care  Treatment for obesity  focuses on helping you lose weight to improve your health  Even a small decrease in BMI can reduce the risk for many health problems  Your healthcare provider will help you set a weight-loss goal   · Lifestyle changes  are the first step in treating obesity  These include making healthy food choices and getting regular physical activity  Your healthcare provider may suggest a weight-loss program that involves coaching, education, and therapy  · Medicine  may help you lose weight when it is used with a healthy diet and physical activity  · Surgery  can help you lose weight if you are very obese and have other health problems  There are several types of weight-loss surgery  Ask your healthcare provider for more information      Be successful losing weight:   · Set small, realistic goals  An example of a small goal is to walk for 20 minutes 5 days a week  Anther goal is to lose 5% of your body weight  · Tell friends, family members, and coworkers about your goals  and ask for their support  Ask a friend to lose weight with you, or join a weight-loss support group  · Identify foods or triggers that may cause you to overeat , and find ways to avoid them  Remove tempting high-calorie foods from your home and workplace  Place a bowl of fresh fruit on your kitchen counter  If stress causes you to eat, then find other ways to cope with stress  · Keep a diary to track what you eat and drink  Also write down how many minutes of physical activity you do each day  Weigh yourself once a week and record it in your diary  Eating changes: You will need to eat 500 to 1,000 fewer calories each day than you currently eat to lose 1 to 2 pounds a week  The following changes will help you cut calories:  · Eat smaller portions  Use small plates, no larger than 9 inches in diameter  Fill your plate half full of fruits and vegetables  Measure your food using measuring cups until you know what a serving size looks like  · Eat 3 meals and 1 or 2 snacks each day  Plan your meals in advance  [facility] and eat at home most of the time  Eat slowly  Do not skip meals  Skipping meals can lead to overeating later in the day  This can make it harder for you to lose weight  Talk with a dietitian to help you make a meal plan and schedule that is right for you  · Eat fruits and vegetables at every meal   They are low in calories and high in fiber, which makes you feel full  Do not add butter, margarine, or cream sauce to vegetables  Use herbs to season steamed vegetables  · Eat less fat and fewer fried foods  Eat more baked or grilled chicken and fish  These protein sources are lower in calories and fat than red meat  Limit fast food   Dress your salads with olive oil and vinegar instead of bottled dressing  · Limit the amount of sugar you eat  Do not drink sugary beverages  Limit alcohol  Activity changes:  Physical activity is good for your body in many ways  It helps you burn calories and build strong muscles  It decreases stress and depression, and improves your mood  It can also help you sleep better  Talk to your healthcare provider before you begin an exercise program   · Exercise for at least 30 minutes 5 days a week  Start slowly  Set aside time each day for physical activity that you enjoy and that is convenient for you  It is best to do both weight training and an activity that increases your heart rate, such as walking, bicycling, or swimming  · Find ways to be more active  Do yard work and housecleaning  Walk up the stairs instead of using elevators  Spend your leisure time going to events that require walking, such as outdoor festivals or fairs  This extra physical activity can help you lose weight and keep it off  Follow up with your healthcare provider as directed: You may need to meet with a dietitian  Write down your questions so you remember to ask them during your visits  © Copyright [facility] [address] Information is for End User's use only and may not be sold, redistributed or otherwise used for commercial purposes  All illustrations and images included in CareNotes® are the copyrighted property of [employer]  or [facility] [employer]   The above information is an  only  It is not intended as medical advice for individual conditions or treatments  Talk to your doctor, nurse or pharmacist before following any medical regimen to see if it is safe and effective for you

## 2021-02-25 NOTE — ASSESSMENT & PLAN NOTE
Last A1c was great with Trulicity and metformin  Lab Results   Component Value Date    HGBA1C 6 0 (H) 12/22/2020

## 2021-02-26 ENCOUNTER — OFFICE VISIT (OUTPATIENT)
Dept: PAIN MEDICINE | Facility: CLINIC | Age: 71
End: 2021-02-26
Payer: MEDICARE

## 2021-02-26 VITALS
BODY MASS INDEX: 30.52 KG/M2 | RESPIRATION RATE: 16 BRPM | DIASTOLIC BLOOD PRESSURE: 80 MMHG | SYSTOLIC BLOOD PRESSURE: 143 MMHG | HEART RATE: 82 BPM | WEIGHT: 183.2 LBS | HEIGHT: 65 IN

## 2021-02-26 DIAGNOSIS — M54.32 BACK PAIN WITH LEFT-SIDED SCIATICA: ICD-10-CM

## 2021-02-26 DIAGNOSIS — M54.42 CHRONIC BILATERAL LOW BACK PAIN WITH LEFT-SIDED SCIATICA: ICD-10-CM

## 2021-02-26 DIAGNOSIS — M54.16 LUMBAR RADICULOPATHY: ICD-10-CM

## 2021-02-26 DIAGNOSIS — G89.29 CHRONIC BILATERAL LOW BACK PAIN WITH LEFT-SIDED SCIATICA: ICD-10-CM

## 2021-02-26 PROCEDURE — 99214 OFFICE O/P EST MOD 30 MIN: CPT | Performed by: STUDENT IN AN ORGANIZED HEALTH CARE EDUCATION/TRAINING PROGRAM

## 2021-02-26 RX ORDER — CYCLOBENZAPRINE HCL 10 MG
10 TABLET ORAL
Qty: 30 TABLET | Refills: 0 | Status: SHIPPED | OUTPATIENT
Start: 2021-02-26 | End: 2021-10-15

## 2021-02-26 RX ORDER — GABAPENTIN 300 MG/1
300 CAPSULE ORAL 3 TIMES DAILY
Qty: 180 CAPSULE | Refills: 0 | Status: SHIPPED | OUTPATIENT
Start: 2021-02-26 | End: 2021-06-11

## 2021-02-26 NOTE — PROGRESS NOTES
Pain Medicine Follow-Up Note    Assessment:  1  Lumbar radiculopathy    2  Chronic bilateral low back pain with left-sided sciatica    3  Back pain with left-sided sciatica        Plan:  Orders Placed This Encounter   Procedures    FL spine and pain procedure     Standing Status:   Future     Standing Expiration Date:   2/26/2025     Order Specific Question:   Reason for Exam:     Answer:   left L4 and L5 TFESI, possible L4-5 LESI     Order Specific Question:   Anticoagulant hold needed? Answer:   No       New Medications Ordered This Visit   Medications    gabapentin (NEURONTIN) 300 mg capsule     Sig: Take 1 capsule (300 mg total) by mouth 3 (three) times a day     Dispense:  180 capsule     Refill:  0    cyclobenzaprine (FLEXERIL) 10 mg tablet     Sig: Take 1 tablet (10 mg total) by mouth daily at bedtime as needed for muscle spasms     Dispense:  30 tablet     Refill:  0       My impressions and treatment recommendations were discussed in detail with the patient who verbalized understanding and had no further questions  This is again a 50-year-old female who returns to our office with chief complaint of low back pain and left lower extremity radiculopathy which appears to be in an L4 and L5 dermatomal distribution  We discussed her MRI results today  They are notable for  Lateral recess narrowing at L4-5 with moderate to severe left foraminal narrowing as well as lateral recess stenosis at L5-S1  She also has notable  Facet arthropathy at multiple levels  Discussed multimodal treatment plan  She continues gabapentin 300 mg t i d  without any significant side effects  Patient does not report any meaningful relief right now, however discussed that I would like her to continue taking this until after epidural injection  Additionally, she continues to take Flexeril 10 mg nightly without any significant side effects        Regards interventional treatments, discussed left L4 and L5 transforaminal epidural steroid injection  She does notably have severe facet arthropathy at L4-5 along with moderate to severe left foraminal narrowing at that level  Discussed with patient the possible need for converting to left parasagittal L4-5 LESI if there is difficulty accessing the space  Patient agrees and understands  future treatments include possible MBB/RFA should back pain considerably persist after epidural     Complete risks and benefits including bleeding, infection, tissue reaction, nerve injury and allergic reaction were discussed  The approach was demonstrated using models and literature was provided  Verbal and written consent was obtained  [place] Prescription Drug Monitoring Program report was reviewed and was appropriate     Discharge instructions were provided  I personally saw and examined the patient and I agree with the above discussed plan of care  History of Present Illness:    Mignon Henderson is a 66 y o  female who presents to [facility] for interval re-evaluation of the above stated pain complaints  The patient has a past medical and chronic pain history as outlined in the assessment section  She was last seen on  01/27/2021 at which time she was started on gabapentin 300 mg t i d  and continued on Flexeril 10 mg q h s  as prescribed by her PCP  She underwent MRI of the lumbar spine which is to be discussed today  MRI notably showed lateral  Recess stenosis at L4-5 along with moderate to severe left foraminal narrowing at that level  She also has lateral recess stenosis at L5-S1 as well  Patient continues to have chief complaint of only left lower extremity radiculopathy in what appears to be in L4/L5 dermatomal distribution  Pain score is currently 8/10       she does not report significant relief from gabapentin thus far but does note some benefit from Flexeril 10 mg q h s   Denies side effects from either medication      Other than as stated above, the patient denies any interval changes in medications, medical condition, mental condition, symptoms, or allergies since the last office visit  Review of Systems:    Review of Systems   Respiratory: Negative for shortness of breath  Cardiovascular: Negative for chest pain  Gastrointestinal: Negative for constipation, diarrhea, nausea and vomiting  Musculoskeletal: Positive for gait problem  Negative for arthralgias, joint swelling and myalgias  Decreased range of motion, muscle weakness and pain in extremity  Skin: Negative for rash  Neurological: Negative for dizziness, seizures and weakness  All other systems reviewed and are negative  Patient Active Problem List   Diagnosis    Age related osteoporosis    Controlled diabetes mellitus ([facility] [address] )    Hyperlipidemia    Essential hypertension    BMI 29 0-29 9,adult    Encounter for gynecological examination without abnormal finding    Asymptomatic postmenopausal status    Acute pain of right knee    NICKY on CPAP    PLMD (periodic limb movement disorder)    Primary osteoarthritis of right knee    Primary osteoarthritis of one hip, left    Trochanteric bursitis of left hip    Back pain with left-sided sciatica       Past Medical History:   Diagnosis Date    Acute cystitis without hematuria 12/14/2020    Arthritis 7/2018    PT 7/2018    Carpal tunnel syndrome     Unspecified laterality   Resolved: 12/21/2016    Cataract     Chronic kidney disease     Complex endometrial hyperplasia with atypia     Resolved: 11/03/17    Diabetes mellitus ([facility] [address] )     of other type with circulatory complication, without long-term current use of insulin    Hyperlipidemia     Hypertension     Normal delivery     1971 and 1973 sons    Obesity     NICKY (obstructive sleep apnea)     Post-menopausal bleeding     Resolved: 2011    Seasonal allergies     UTI (urinary tract infection)        Past Surgical History:   Procedure Laterality Date    CARPAL TUNNEL RELEASE      CARPAL TUNNEL RELEASE Left     CATARACT EXTRACTION      CHOLECYSTECTOMY      COLONOSCOPY      Complete    DILATION AND CURETTAGE OF UTERUS      twice    ENDOMETRIAL BIOPSY      without cervical dilation    HYSTEROSCOPY      TUBAL LIGATION         Family History   Problem Relation Age of Onset    No Known Problems Child     Arthritis Family     Cancer Family         bladder    Other Family         Cardiac disorder    Diabetes Family     Hypertension Family     Valvular heart disease Family     Hypertension Mother     Heart disease Mother     Heart attack Mother     Hypertension Father     Cancer Father              Prostate cancer Father 80    Arthritis Father              Hypertension Brother     Heart disease Brother     Prostate cancer Brother 61    Diabetes Maternal Grandmother     Stroke Maternal Grandmother     Diabetes Paternal Grandfather     Breast cancer Neg Hx     Colon cancer Neg Hx     Ovarian cancer Neg Hx     Uterine cancer Neg Hx     Cervical cancer Neg Hx     Thyroid disease Neg Hx        Social History     Occupational History    Occupation: High School Adminstrator      Employer: [employer]   Tobacco Use    Smoking status: Never Smoker    Smokeless tobacco: Never Used   Substance and Sexual Activity    Alcohol use:  Yes     Alcohol/week: 2 0 standard drinks     Types: 2 Glasses of wine per week     Frequency: Monthly or less     Drinks per session: 1 or 2     Binge frequency: Never     Comment: less than one glass of wine per week    Drug use: Never    Sexual activity: Not Currently     Partners: Male     Birth control/protection: Post-menopausal         Current Outpatient Medications:     amLODIPine (NORVASC) 5 mg tablet, TAKE 1 TABLET BY MOUTH  DAILY, Disp: 90 tablet, Rfl: 3    aspirin 81 MG tablet, Take by mouth, Disp: , Rfl:     atorvastatin (LIPITOR) 10 mg tablet, TAKE 1 TABLET BY MOUTH  DAILY, Disp: 90 tablet, Rfl: 3    Cranberry 600 MG TABS, Take by mouth, Disp: , Rfl:     famotidine (PEPCID) 20 mg tablet, TAKE 1 TABLET BY MOUTH TWO  TIMES DAILY, Disp: 180 tablet, Rfl: 3    fexofenadine (ALLEGRA) 180 MG tablet, Take 1 tablet (180 mg total) by mouth daily, Disp: 90 tablet, Rfl: 3    gabapentin (NEURONTIN) 300 mg capsule, Take 1 capsule (300 mg total) by mouth 3 (three) times a day, Disp: 180 capsule, Rfl: 0    Lancets (ACCU-CHEK SOFT TOUCH) lancets, by Other route 2 (two) times a day, Disp: 100 each, Rfl: 3    lisinopril (ZESTRIL) 10 mg tablet, Take 1 tablet (10 mg total) by mouth daily, Disp: 90 tablet, Rfl: 3    metFORMIN (GLUCOPHAGE) 1000 MG tablet, TAKE 1 TABLET BY MOUTH TWO  TIMES DAILY WITH MEALS, Disp: 180 tablet, Rfl: 3    metoprolol succinate (TOPROL-XL) 50 mg 24 hr tablet, TAKE 1 TABLET BY MOUTH  DAILY, Disp: 90 tablet, Rfl: 3    naproxen (NAPROSYN) 500 mg tablet, Take 1 tablet (500 mg total) by mouth 2 (two) times a day with meals, Disp: 60 tablet, Rfl: 5    ONE TOUCH ULTRA TEST test strip, 1 each by Other route 2 (two) times a day Test Blood Sugar 2X daily E11 9, Disp: 100 each, Rfl: 3    Trulicity 1 5 DD/2 2LZ SOPN, INJECT 0 5 ML (1 5 MG  TOTAL) SUBCUTANEOUSLY ONCE  A WEEK, Disp: 6 mL, Rfl: 5    cyclobenzaprine (FLEXERIL) 10 mg tablet, Take 1 tablet (10 mg total) by mouth daily at bedtime as needed for muscle spasms, Disp: 30 tablet, Rfl: 0    Allergies   Allergen Reactions    Ace Inhibitors Shortness Of Breath     Category: Allergy; Physical Exam:    /80   Pulse 82   Resp 16   Ht 5' 5" (1 651 m)   Wt 83 1 kg (183 lb 3 2 oz)   BMI 30 49 kg/m²     Constitutional:normal, well developed, well nourished, alert, in no distress and non-toxic and no overt pain behavior    Eyes:anicteric  HEENT:grossly intact  Neck:supple, symmetric, trachea midline and no masses   Pulmonary:even and unlabored  Cardiovascular:No edema or pitting edema present  Skin:Normal without rashes or lesions and well hydrated  Psychiatric:Mood and affect appropriate  Neurologic:Cranial Nerves II-XII grossly intact  Musculoskeletal:normal      Imaging    MRI LUMBAR SPINE WITHOUT CONTRAST     INDICATION: M54 16: Radiculopathy, lumbar region  M54 42: Lumbago with sciatica, left side  G89 29: Other chronic pain      COMPARISON:  X-ray lumbar spine 11/11/2020     TECHNIQUE:  Sagittal T1, sagittal T2, sagittal inversion recovery, axial T1 and axial T2, coronal T2     IMAGE QUALITY:  Diagnostic     FINDINGS:     VERTEBRAL BODIES:  There are 5 lumbar type vertebral bodies  There is preserved normal lumbar lordosis  No significant spondylolisthesis  Multilevel endplates Modic type II degenerative signal changes  Scattered endplate Schmorl's nodes are noted      SACRUM:  Normal signal within the sacrum  No evidence of insufficiency or stress fracture      DISTAL CORD AND CONUS:  Normal size and signal within the distal cord and conus      PARASPINAL SOFT TISSUES:  Paraspinal soft tissues are unremarkable      LOWER THORACIC DISC SPACES:  Normal disc height and signal   No disc herniation, canal stenosis or foraminal narrowing      LUMBAR DISC SPACES:     L1-L2:  There is mild disc bulge  Mild facet arthropathy  There is minimal canal narrowing  No significant foraminal stenosis      L2-L3:  No significant disc bulge  There is mild facet arthropathy  No canal stenosis  Minimal bilateral foraminal narrowing      L3-L4:  Right eccentric disc bulge  Mild bilateral facet arthropathy  There is mild canal and mild bilateral foraminal stenosis      L4-L5:  There is disc bulge with small annular fissure  Severe left and moderate right facet arthropathy  There is mild bilateral ligamentum flavum thickening  There is lateral recesses narrowing with abutment of L5 nerve roots without apparent nerve   compression  Mild canal narrowing    There is moderate to severe left and mild right foraminal narrowing      L5-S1:  Right eccentric disc bulge  Moderate bilateral facet arthropathy  There is right greater than left lateral recesses narrowing with abutment of right S1 nerve root without apparent nerve compression  There is mild canal narrowing  Mild to   moderate right and minimal left foraminal stenosis      IMPRESSION:     1  Lateral recesses narrowing at L4-5 abutting L5 nerve roots without apparent nerve compression  No significant canal stenosis  Correlate for possible L5 radiculopathy      2  Right greater than left lateral recesses narrowing at L5-S1 with abutment of right S1 nerve root without apparent nerve compression  Correlate for right S1 radiculopathy      3    Moderate to severe left foraminal stenosis at L4-5      FL spine and pain procedure    (Results Pending)         Orders Placed This Encounter   Procedures    FL spine and pain procedure

## 2021-03-02 ENCOUNTER — TELEPHONE (OUTPATIENT)
Dept: FAMILY MEDICINE CLINIC | Facility: CLINIC | Age: 71
End: 2021-03-02

## 2021-03-05 DIAGNOSIS — Z23 ENCOUNTER FOR IMMUNIZATION: ICD-10-CM

## 2021-03-22 NOTE — PROGRESS NOTES
PT Discharge    Today's date: 3/22/2021  Patient name: José Miguel Castillo  : 1950  MRN: [id number]  Referring provider: Raheem Nunes MD  Dx:   Encounter Diagnosis     ICD-10-CM    1  Back pain with left-sided sciatica  M54 32      Assessment  Assessment details: Pt DC from skilled therapy and was referred to pain management secondary to lack of long term relief from physical therapy  Pt was in agreement with this  Subjective and objective information and goals unable to be updated at this time  Pt DC from skilled therapy  Impairments: abnormal gait, abnormal or restricted ROM, activity intolerance, impaired balance, impaired physical strength, lacks appropriate home exercise program, pain with function and poor posture   Functional limitations: standing, walking  Symptom irritability: highUnderstanding of Dx/Px/POC: good   Prognosis: good    Goals  STG: 3 weeks - unable to assess  1  Pt will demonstrate independence with HEP  2  Pt will improve lumbar AROM by 10%  3  Pt will improve BLE strength by at least 1/2 grade  4  Pt will report pain no more than 5/10  5  Pt will generate proper TA contraction without cuing to show improved NM control    LT weeks - unable to assess  1  Pt will improve lumbar AROM to at least 80% in all directions  2  Pt will report pain no more than 2/10  3  Pt will be able to stand for at least 30 minutes without pain to return to PLOF  4  Pt will be able to lift at least 10lbs from floor to waist without pain        Plan  Patient would benefit from: skilled physical therapy  Planned modality interventions: cryotherapy and thermotherapy: hydrocollator packs  Planned therapy interventions: therapeutic exercise, therapeutic activities, stretching, strengthening, patient education, neuromuscular re-education, massage, manual therapy, balance, gait training and home exercise program  Treatment plan discussed with: patient        Subjective Evaluation    History of Present Illness  Mechanism of injury: Pt reports she developed R knee pain over this past summer (approx 6-7 months ago), when she was doing a lot of walking  Because of this she took a break from walking and took up quilting, where she was sitting 5-6 hours a day  She got a shot in the R knee which really help, but did develop L sided LBP, and also got a shot in the L hip  This worked for maybe 2 weeks  After this wore off, her PCP put her on a steroid, which did help but then it wore off  About 2 months ago the LBP was "really bad", but the naproxen seemed to help  But since then it has gotten worse  She reports she is sitting more now  She reports as soon as she stands her LBP comes back and she gets N/T all the way down her leg  She reports as she is standing/walking it gets worse             Recurrent probem    Quality of life: fair    Pain  Current pain ratin  At best pain ratin  At worst pain rating: 10  Quality: radiating, burning and sharp  Relieving factors: medications and heat  Aggravating factors: standing, walking, running and lifting  Progression: worsening    Treatments  Previous treatment: injection treatment  Patient Goals  Patient goals for therapy: decreased pain, increased motion, independence with ADLs/IADLs, return to sport/leisure activities and increased strength          Objective     Postural Observations    Additional Postural Observation Details  Standing: R lateral shift  Unable to tolerate manual lateral shift in standing secondary to pain    Active Range of Motion     Lumbar   Flexion:  Restriction level: minimal  Extension:  with pain Restriction level: moderate  Left lateral flexion:  with pain Restriction level: maximal  Right lateral flexion:  Restriction level: moderate    General Comments:      Lumbar Comments  Radicular symptoms still present in prone lying with 1 pillow under abdomen, no change when pillow taken away and pt positioned into prone lying   Improvement in radicular symptoms in L S/L with pillow under hips   Upon standing from this position, lateral shift was significantly decreased, but it returned during gait  Pain with L SG in standing, but decreased when flexion added to this

## 2021-03-25 ENCOUNTER — OFFICE VISIT (OUTPATIENT)
Dept: UROLOGY | Facility: CLINIC | Age: 71
End: 2021-03-25
Payer: MEDICARE

## 2021-03-25 VITALS
DIASTOLIC BLOOD PRESSURE: 80 MMHG | HEIGHT: 65 IN | SYSTOLIC BLOOD PRESSURE: 136 MMHG | BODY MASS INDEX: 32.02 KG/M2 | HEART RATE: 75 BPM | WEIGHT: 192.2 LBS

## 2021-03-25 DIAGNOSIS — N39.0 RECURRENT UTI: Primary | ICD-10-CM

## 2021-03-25 LAB
BACTERIA UR QL AUTO: ABNORMAL /HPF
BILIRUB UR QL STRIP: NEGATIVE
CLARITY UR: ABNORMAL
COLOR UR: YELLOW
GLUCOSE UR STRIP-MCNC: NEGATIVE MG/DL
HGB UR QL STRIP.AUTO: NEGATIVE
HYALINE CASTS #/AREA URNS LPF: ABNORMAL /LPF
KETONES UR STRIP-MCNC: NEGATIVE MG/DL
LEUKOCYTE ESTERASE UR QL STRIP: ABNORMAL
NITRITE UR QL STRIP: NEGATIVE
NON-SQ EPI CELLS URNS QL MICRO: ABNORMAL /HPF
PH UR STRIP.AUTO: 6.5 [PH]
POST-VOID RESIDUAL VOLUME, ML POC: 0 ML
PROT UR STRIP-MCNC: NEGATIVE MG/DL
RBC #/AREA URNS AUTO: ABNORMAL /HPF
SL AMB  POCT GLUCOSE, UA: NORMAL
SL AMB LEUKOCYTE ESTERASE,UA: NORMAL
SL AMB POCT BILIRUBIN,UA: NORMAL
SL AMB POCT BLOOD,UA: NORMAL
SL AMB POCT CLARITY,UA: CLEAR
SL AMB POCT COLOR,UA: YELLOW
SL AMB POCT KETONES,UA: NORMAL
SL AMB POCT NITRITE,UA: NORMAL
SL AMB POCT PH,UA: 6.5
SL AMB POCT SPECIFIC GRAVITY,UA: 1.01
SL AMB POCT URINE PROTEIN: NORMAL
SL AMB POCT UROBILINOGEN: NORMAL
SP GR UR STRIP.AUTO: 1.01 (ref 1–1.03)
UROBILINOGEN UR QL STRIP.AUTO: 0.2 E.U./DL
WBC #/AREA URNS AUTO: ABNORMAL /HPF

## 2021-03-25 PROCEDURE — 87186 SC STD MICRODIL/AGAR DIL: CPT | Performed by: PHYSICIAN ASSISTANT

## 2021-03-25 PROCEDURE — 99203 OFFICE O/P NEW LOW 30 MIN: CPT | Performed by: PHYSICIAN ASSISTANT

## 2021-03-25 PROCEDURE — 87077 CULTURE AEROBIC IDENTIFY: CPT | Performed by: PHYSICIAN ASSISTANT

## 2021-03-25 PROCEDURE — 87086 URINE CULTURE/COLONY COUNT: CPT | Performed by: PHYSICIAN ASSISTANT

## 2021-03-25 PROCEDURE — 51798 US URINE CAPACITY MEASURE: CPT | Performed by: PHYSICIAN ASSISTANT

## 2021-03-25 PROCEDURE — 81002 URINALYSIS NONAUTO W/O SCOPE: CPT | Performed by: PHYSICIAN ASSISTANT

## 2021-03-25 PROCEDURE — 81001 URINALYSIS AUTO W/SCOPE: CPT | Performed by: PHYSICIAN ASSISTANT

## 2021-03-25 RX ORDER — ESTRADIOL 0.1 MG/G
2 CREAM VAGINAL 3 TIMES WEEKLY
Qty: 42.5 G | Refills: 1 | Status: SHIPPED | OUTPATIENT
Start: 2021-03-26 | End: 2021-09-02

## 2021-03-25 NOTE — PROGRESS NOTES
1  Recurrent UTI  POCT Measure PVR    POCT urine dip    Urine culture    Urine culture    estradiol (ESTRACE) 0 1 mg/g vaginal cream     Assessment and plan:       1  Recurrent urinary infection  -urine will be submitted for UA with microscopy and urine culture  Will be contacted with any abnormalities   -discussed conservative measures with hydration, avoidance of constipation, and continuing her cranberry supplementation  Encouraged continued tight glycemic control   -will start on topical estrace  Risk profile reviewed  -standing urine culture provided  -f/u 6 months reassessment     Karen Huizar PA-C      Chief Complaint     Recurrent UTI    History of Present Illness     Pedro Ramsay is a 66 y o  Female presenting today as a new patient secondary to current urinary infections  Patient was in the emergency department on 02/10/2020  Patient was having fatigue and chills  She denies any voiding symptoms at that time  Ultimately was discovered to have a urinary infection  Urine culture revealed 80,000 beta hemolytic strep and 20,000 E coli growth  She was treated on a course of antibiotics and had symptom improvement  Shortly thereafter patient began to have urinary symptoms and had a positive urine culture 01/08/2021 for Enterococcus faecalis  Again treated with a course of antibiotics with resolution of symptoms  Patient has been doing well over the past few months  She does report some increased vaginal irritation over the past week which she has tried over-the-counter antifungals for presumed yeast infection  Has not had much improvement however  Patient denies any previous history of  surgical manipulation  Denies any episodes urinary retention or Grace catheterization  Patient denies any history of breast, uterine, endometrial, cervical, nor ovarian cancer  Denies any history of VTE        Patient does endorse significant vaginal dryness and irritation ever since menopause  Urine dip in the office today is leukocyte positive, nitrite and blood negative  PVR 0mL    Laboratory     Lab Results   Component Value Date    CREATININE 0 80 12/22/2020       Review of Systems     Review of Systems   Constitutional: Negative for activity change, appetite change, chills, diaphoresis, fatigue, fever and unexpected weight change  Respiratory: Negative for chest tightness and shortness of breath  Cardiovascular: Negative for chest pain, palpitations and leg swelling  Gastrointestinal: Negative for abdominal distention, abdominal pain, constipation, diarrhea, nausea and vomiting  Genitourinary: Negative for decreased urine volume, difficulty urinating, dysuria, enuresis, flank pain, frequency, genital sores, hematuria and urgency  Musculoskeletal: Negative for back pain, gait problem and myalgias  Skin: Negative for color change, pallor, rash and wound  Psychiatric/Behavioral: Negative for behavioral problems  The patient is not nervous/anxious  Allergies     Allergies   Allergen Reactions    Ace Inhibitors Shortness Of Breath     Category: Allergy; Physical Exam     Physical Exam  Constitutional:       General: She is not in acute distress  Appearance: Normal appearance  She is normal weight  She is not ill-appearing, toxic-appearing or diaphoretic  HENT:      Head: Normocephalic and atraumatic  Eyes:      General:         Right eye: No discharge  Conjunctiva/sclera: Conjunctivae normal    Pulmonary:      Effort: Pulmonary effort is normal  No respiratory distress  Skin:     General: Skin is warm and dry  Coloration: Skin is not jaundiced or pale  Neurological:      General: No focal deficit present  Mental Status: She is alert and oriented to person, place, and time  Psychiatric:         Mood and Affect: Mood normal          Behavior: Behavior normal          Thought Content:  Thought content normal          Judgment: Judgment normal        Vital Signs     Vitals:    03/25/21 1306   BP: 136/80   Pulse: 75   Weight: 87 2 kg (192 lb 3 2 oz)   Height: 5' 5" (1 651 m)         Current Medications       Current Outpatient Medications:     amLODIPine (NORVASC) 5 mg tablet, TAKE 1 TABLET BY MOUTH  DAILY, Disp: 90 tablet, Rfl: 3    aspirin 81 MG tablet, Take by mouth, Disp: , Rfl:     atorvastatin (LIPITOR) 10 mg tablet, TAKE 1 TABLET BY MOUTH  DAILY, Disp: 90 tablet, Rfl: 3    Cranberry 600 MG TABS, Take by mouth, Disp: , Rfl:     cyclobenzaprine (FLEXERIL) 10 mg tablet, Take 1 tablet (10 mg total) by mouth daily at bedtime as needed for muscle spasms, Disp: 30 tablet, Rfl: 0    famotidine (PEPCID) 20 mg tablet, TAKE 1 TABLET BY MOUTH TWO  TIMES DAILY, Disp: 180 tablet, Rfl: 3    fexofenadine (ALLEGRA) 180 MG tablet, Take 1 tablet (180 mg total) by mouth daily, Disp: 90 tablet, Rfl: 3    gabapentin (NEURONTIN) 300 mg capsule, Take 1 capsule (300 mg total) by mouth 3 (three) times a day, Disp: 180 capsule, Rfl: 0    Lancets (ACCU-CHEK SOFT TOUCH) lancets, by Other route 2 (two) times a day, Disp: 100 each, Rfl: 3    lisinopril (ZESTRIL) 10 mg tablet, Take 1 tablet (10 mg total) by mouth daily, Disp: 90 tablet, Rfl: 3    metFORMIN (GLUCOPHAGE) 1000 MG tablet, TAKE 1 TABLET BY MOUTH TWO  TIMES DAILY WITH MEALS, Disp: 180 tablet, Rfl: 3    metoprolol succinate (TOPROL-XL) 50 mg 24 hr tablet, TAKE 1 TABLET BY MOUTH  DAILY, Disp: 90 tablet, Rfl: 3    ONE TOUCH ULTRA TEST test strip, 1 each by Other route 2 (two) times a day Test Blood Sugar 2X daily E11 9, Disp: 100 each, Rfl: 3    Trulicity 1 5 BS/4 0HQ SOPN, INJECT 0 5 ML (1 5 MG  TOTAL) SUBCUTANEOUSLY ONCE  A WEEK, Disp: 6 mL, Rfl: 5    [START ON 3/26/2021] estradiol (ESTRACE) 0 1 mg/g vaginal cream, Insert 2 g into the vagina 3 (three) times a week, Disp: 42 5 g, Rfl: 1    naproxen (NAPROSYN) 500 mg tablet, Take 1 tablet (500 mg total) by mouth 2 (two) times a day with meals (Patient not taking: Reported on 3/25/2021), Disp: 60 tablet, Rfl: 5      Active Problems     Patient Active Problem List   Diagnosis    Age related osteoporosis    [facility]    Hyperlipidemia    Essential hypertension    BMI 29 0-29 9,adult    Encounter for gynecological examination without abnormal finding    Asymptomatic postmenopausal status    Acute pain of right knee    NICKY on CPAP    PLMD (periodic limb movement disorder)    Primary osteoarthritis of right knee    Primary osteoarthritis of one hip, left    Trochanteric bursitis of left hip    Back pain with left-sided sciatica         Past Medical History     Past Medical History:   Diagnosis Date    Acute cystitis without hematuria 12/14/2020    Arthritis 7/2018    PT 7/2018    Carpal tunnel syndrome     Unspecified laterality   Resolved: 12/21/2016    Cataract     Chronic kidney disease     Complex endometrial hyperplasia with atypia     Resolved: 11/03/17    Diabetes mellitus ([facility]     of other type with circulatory complication, without long-term current use of insulin    Hyperlipidemia     Hypertension     Normal delivery     1971 and 1973 sons    Obesity     NICKY (obstructive sleep apnea)     Post-menopausal bleeding     Resolved: 2011    Seasonal allergies     UTI (urinary tract infection)          Surgical History     Past Surgical History:   Procedure Laterality Date    CARPAL TUNNEL RELEASE      CARPAL TUNNEL RELEASE Left     CATARACT EXTRACTION      CHOLECYSTECTOMY      COLONOSCOPY      Complete    DILATION AND CURETTAGE OF UTERUS      twice    ENDOMETRIAL BIOPSY      without cervical dilation    HYSTEROSCOPY      TUBAL LIGATION  1974         Family History     Family History   Problem Relation Age of Onset    No Known Problems Child     Arthritis Family     Cancer Family         bladder    Other Family         Cardiac disorder    Diabetes Family     Hypertension Family     Valvular heart disease Family     Hypertension Mother     Heart disease Mother     Heart attack Mother     Hypertension Father     Cancer Father              Prostate cancer Father 80    Arthritis Father              Hypertension Brother     Heart disease Brother     Prostate cancer Brother 61    Diabetes Maternal Grandmother     Stroke Maternal Grandmother     Diabetes Paternal Grandfather     Breast cancer Neg Hx     Colon cancer Neg Hx     Ovarian cancer Neg Hx     Uterine cancer Neg Hx     Cervical cancer Neg Hx     Thyroid disease Neg Hx          Social History     Social History       Radiology

## 2021-03-27 LAB
BACTERIA UR CULT: ABNORMAL
BACTERIA UR CULT: ABNORMAL

## 2021-03-29 ENCOUNTER — TELEPHONE (OUTPATIENT)
Dept: UROLOGY | Facility: CLINIC | Age: 71
End: 2021-03-29

## 2021-03-29 DIAGNOSIS — N39.0 RECURRENT UTI: Primary | ICD-10-CM

## 2021-03-29 RX ORDER — NITROFURANTOIN 25; 75 MG/1; MG/1
100 CAPSULE ORAL 2 TIMES DAILY
Qty: 14 CAPSULE | Refills: 0 | Status: SHIPPED | OUTPATIENT
Start: 2021-03-29 | End: 2021-04-05

## 2021-03-29 NOTE — TELEPHONE ENCOUNTER
Call placed to patient, advised of positive urine culture and order of Macrobid sent to pharmacy  Patient verbalized understanding and thanked me for the call

## 2021-03-29 NOTE — TELEPHONE ENCOUNTER
Patient called and stated she sees her culture results are in and asking if she should be taking any antibiotics?

## 2021-04-02 ENCOUNTER — OFFICE VISIT (OUTPATIENT)
Dept: OBGYN CLINIC | Facility: MEDICAL CENTER | Age: 71
End: 2021-04-02
Payer: MEDICARE

## 2021-04-02 VITALS
WEIGHT: 192 LBS | HEIGHT: 65 IN | BODY MASS INDEX: 31.99 KG/M2 | RESPIRATION RATE: 20 BRPM | DIASTOLIC BLOOD PRESSURE: 84 MMHG | HEART RATE: 80 BPM | SYSTOLIC BLOOD PRESSURE: 135 MMHG

## 2021-04-02 DIAGNOSIS — M70.62 TROCHANTERIC BURSITIS OF LEFT HIP: Primary | ICD-10-CM

## 2021-04-02 PROCEDURE — 20610 DRAIN/INJ JOINT/BURSA W/O US: CPT | Performed by: PHYSICIAN ASSISTANT

## 2021-04-02 PROCEDURE — 99212 OFFICE O/P EST SF 10 MIN: CPT | Performed by: PHYSICIAN ASSISTANT

## 2021-04-02 PROCEDURE — 1124F ACP DISCUSS-NO DSCNMKR DOCD: CPT | Performed by: PHYSICIAN ASSISTANT

## 2021-04-02 RX ORDER — BETAMETHASONE SODIUM PHOSPHATE AND BETAMETHASONE ACETATE 3; 3 MG/ML; MG/ML
6 INJECTION, SUSPENSION INTRA-ARTICULAR; INTRALESIONAL; INTRAMUSCULAR; SOFT TISSUE
Status: COMPLETED | OUTPATIENT
Start: 2021-04-02 | End: 2021-04-02

## 2021-04-02 RX ORDER — LIDOCAINE HYDROCHLORIDE 10 MG/ML
2 INJECTION, SOLUTION INFILTRATION; PERINEURAL
Status: COMPLETED | OUTPATIENT
Start: 2021-04-02 | End: 2021-04-02

## 2021-04-02 RX ADMIN — BETAMETHASONE SODIUM PHOSPHATE AND BETAMETHASONE ACETATE 6 MG: 3; 3 INJECTION, SUSPENSION INTRA-ARTICULAR; INTRALESIONAL; INTRAMUSCULAR; SOFT TISSUE at 10:33

## 2021-04-02 RX ADMIN — LIDOCAINE HYDROCHLORIDE 2 ML: 10 INJECTION, SOLUTION INFILTRATION; PERINEURAL at 10:33

## 2021-04-02 NOTE — PROGRESS NOTES
Subjective;     patient known to the practice   she has had an increase in activity lately and has a return of lateral based left hip pain  She was offered to be seen today same-day although the doctor is not knee office and accepted  she has recently had an MRI of the lumbar spine as well, for potential radicular symptomatology     she has x-rays AP pelvis and hip series that are available for review    Past Medical History:   Diagnosis Date    Acute cystitis without hematuria 2020    Arthritis 2018    PT 2018    Carpal tunnel syndrome     Unspecified laterality   Resolved: 2016    Cataract     Chronic kidney disease     Complex endometrial hyperplasia with atypia     Resolved: 17    [facility] mellitus [facility]     of other type with circulatory complication, without long-term current use of insulin    Hyperlipidemia     Hypertension     Normal delivery      and  sons    Obesity     NICKY (obstructive sleep apnea)     Post-menopausal bleeding     Resolved:     Seasonal allergies     UTI (urinary tract infection)        Past Surgical History:   Procedure Laterality Date    CARPAL TUNNEL RELEASE      CARPAL TUNNEL RELEASE Left     CATARACT EXTRACTION      CHOLECYSTECTOMY      COLONOSCOPY      Complete    DILATION AND CURETTAGE OF UTERUS      twice    ENDOMETRIAL BIOPSY      without cervical dilation    HYSTEROSCOPY      TUBAL LIGATION         Family History   Problem Relation Age of Onset    No Known Problems Child     Arthritis Family     Cancer Family         bladder    Other Family         Cardiac disorder    Diabetes Family     Hypertension Family     Valvular heart disease Family     Hypertension Mother     Heart disease Mother     Heart attack Mother     Hypertension Father     Cancer Father              Prostate cancer Father 80    Arthritis Father              Hypertension Brother     Heart disease Brother     Prostate cancer Brother 61    Diabetes Maternal Grandmother     Stroke Maternal Grandmother     Diabetes Paternal Grandfather     Breast cancer Neg Hx     Colon cancer Neg Hx     Ovarian cancer Neg Hx     Uterine cancer Neg Hx     Cervical cancer Neg Hx     Thyroid disease Neg Hx        Social History     Tobacco Use    Smoking status: Never Smoker    Smokeless tobacco: Never Used   Substance Use Topics    Alcohol use: Yes     Alcohol/week: 2 0 standard drinks     Types: 2 Glasses of wine per week     Frequency: Monthly or less     Drinks per session: 1 or 2     Binge frequency: Never     Comment: less than one glass of wine per week    Drug use: Never      exam;     individual that has hip flexion to 90° left and right and allows for internal and external rotation without significant groin  Crease pain  she does have significant point focal tenderness over the greater trochanteric eminence and the trochanteric bursa left hip only  she does not have any sensory changes or symptoms distal to the mid thigh left leg and denies symptoms in either calf for foot     motor exam hip flexors knee extensors TA EHL and peroneals intact in symmetrical   sensory exam to soft sharp touch intact L3-L4 and L5 dermatomes  x-rays; AP pelvis with degenerative changes of the hips which are age-appropriate have very little clinical relevance  MRI lumbar spine; shows significant findings at L4-5 best seen on the sagittal T2 films as well as adjacent level of tightening at L 3 -4, at this point the reader is directed to the formal radiologist interpretation  Large joint arthrocentesis: L greater trochanteric bursa  Universal Protocol:  Consent: Verbal consent obtained  Consent given by: patient  Time out: Immediately prior to procedure a "time out" was called to verify the correct patient, procedure, equipment, support staff and site/side marked as required    Procedure Details  Location: hip - L greater trochanteric bursa  Needle size: 22 G  Approach: lateral  Medications administered: 2 mL lidocaine 1 %; 6 mg betamethasone acetate-betamethasone sodium phosphate 6 (3-3) mg/mL    Patient tolerance: patient tolerated the procedure well with no immediate complications  Dressing:  Sterile dressing applied         impression;     left lateral based hip pain   left greater trochanteric hip bursitis     plan;     she was offered and accepted a well placed injection, lateral decubitus  Position,  Left hip up right hip down, and attended to at all times by female standby  she was observed for 10 minutes duration and had no subsequent adverse effects from the injection  she was then permitted exit we can see her in follow-up at her choosing in 3 months  I thank her for the privilege of assisting her in care and at again her expressed permission

## 2021-04-06 ENCOUNTER — HOSPITAL ENCOUNTER (OUTPATIENT)
Dept: RADIOLOGY | Facility: CLINIC | Age: 71
Discharge: HOME | End: 2021-04-06
Attending: STUDENT IN AN ORGANIZED HEALTH CARE EDUCATION/TRAINING PROGRAM

## 2021-04-06 DIAGNOSIS — G89.29 CHRONIC BILATERAL LOW BACK PAIN WITH LEFT-SIDED SCIATICA: ICD-10-CM

## 2021-04-06 DIAGNOSIS — M54.42 CHRONIC BILATERAL LOW BACK PAIN WITH LEFT-SIDED SCIATICA: ICD-10-CM

## 2021-04-06 DIAGNOSIS — M54.32 BACK PAIN WITH LEFT-SIDED SCIATICA: ICD-10-CM

## 2021-04-06 DIAGNOSIS — M54.16 LUMBAR RADICULOPATHY: ICD-10-CM

## 2021-04-26 ENCOUNTER — APPOINTMENT (OUTPATIENT)
Dept: LAB | Facility: CLINIC | Age: 71
End: 2021-04-26
Payer: MEDICARE

## 2021-04-26 PROCEDURE — 87077 CULTURE AEROBIC IDENTIFY: CPT | Performed by: PHYSICIAN ASSISTANT

## 2021-04-26 PROCEDURE — 87186 SC STD MICRODIL/AGAR DIL: CPT | Performed by: PHYSICIAN ASSISTANT

## 2021-04-26 PROCEDURE — 87086 URINE CULTURE/COLONY COUNT: CPT | Performed by: PHYSICIAN ASSISTANT

## 2021-04-28 ENCOUNTER — TELEPHONE (OUTPATIENT)
Dept: UROLOGY | Facility: AMBULATORY SURGERY CENTER | Age: 71
End: 2021-04-28

## 2021-04-28 DIAGNOSIS — N39.0 RECURRENT UTI: Primary | ICD-10-CM

## 2021-04-28 LAB — BACTERIA UR CULT: ABNORMAL

## 2021-04-28 RX ORDER — SULFAMETHOXAZOLE AND TRIMETHOPRIM 800; 160 MG/1; MG/1
1 TABLET ORAL 2 TIMES DAILY
Qty: 10 TABLET | Refills: 0 | Status: SHIPPED | OUTPATIENT
Start: 2021-04-28 | End: 2021-05-03

## 2021-04-28 NOTE — TELEPHONE ENCOUNTER
----- Message from Valdemar Das PA-C sent at 4/28/2021  9:11 AM EDT -----  Please let patient know that her culture is again positive  A course of Bactrim sent to pharmacy  Please evaluate if using topical estrogen cream that was prescribed at her recent appointment  Thank you  Call placed to patient, advised of above  Patient verbalized understanding  Patient states that she is using the topical estrogen cream as prescribed

## 2021-05-26 ENCOUNTER — APPOINTMENT (OUTPATIENT)
Dept: LAB | Facility: CLINIC | Age: 71
End: 2021-05-26
Payer: MEDICARE

## 2021-06-07 ENCOUNTER — APPOINTMENT (OUTPATIENT)
Dept: LAB | Facility: HOSPITAL | Age: 71
End: 2021-06-07
Payer: MEDICARE

## 2021-06-07 DIAGNOSIS — N39.0 RECURRENT UTI: ICD-10-CM

## 2021-06-07 DIAGNOSIS — E78.5 HYPERLIPIDEMIA, UNSPECIFIED HYPERLIPIDEMIA TYPE: ICD-10-CM

## 2021-06-07 LAB
CHOLEST SERPL-MCNC: 156 MG/DL (ref 50–200)
HDLC SERPL-MCNC: 47 MG/DL
LDLC SERPL CALC-MCNC: 80 MG/DL (ref 0–100)
NONHDLC SERPL-MCNC: 109 MG/DL
TRIGL SERPL-MCNC: 147 MG/DL

## 2021-06-07 PROCEDURE — 87086 URINE CULTURE/COLONY COUNT: CPT

## 2021-06-07 PROCEDURE — 36415 COLL VENOUS BLD VENIPUNCTURE: CPT

## 2021-06-07 PROCEDURE — 80061 LIPID PANEL: CPT

## 2021-06-08 LAB — BACTERIA UR CULT: NORMAL

## 2021-06-11 ENCOUNTER — OFFICE VISIT (OUTPATIENT)
Dept: INTERNAL MEDICINE CLINIC | Age: 71
End: 2021-06-11
Payer: MEDICARE

## 2021-06-11 VITALS
DIASTOLIC BLOOD PRESSURE: 64 MMHG | HEIGHT: 65 IN | WEIGHT: 186 LBS | HEART RATE: 80 BPM | SYSTOLIC BLOOD PRESSURE: 132 MMHG | TEMPERATURE: 97.9 F | BODY MASS INDEX: 30.99 KG/M2 | OXYGEN SATURATION: 97 %

## 2021-06-11 DIAGNOSIS — M54.32 BACK PAIN WITH LEFT-SIDED SCIATICA: ICD-10-CM

## 2021-06-11 DIAGNOSIS — I10 ESSENTIAL HYPERTENSION: ICD-10-CM

## 2021-06-11 DIAGNOSIS — E11.40 CONTROLLED TYPE 2 DIABETES MELLITUS WITH DIABETIC NEUROPATHY, WITHOUT LONG-TERM CURRENT USE OF INSULIN (HCC): Primary | ICD-10-CM

## 2021-06-11 PROBLEM — Z12.11 ENCOUNTER FOR SCREENING COLONOSCOPY: Status: ACTIVE | Noted: 2021-06-11

## 2021-06-11 LAB — SL AMB POCT HEMOGLOBIN AIC: 5.4 (ref ?–6.5)

## 2021-06-11 PROCEDURE — 83036 HEMOGLOBIN GLYCOSYLATED A1C: CPT | Performed by: INTERNAL MEDICINE

## 2021-06-11 PROCEDURE — 99214 OFFICE O/P EST MOD 30 MIN: CPT | Performed by: INTERNAL MEDICINE

## 2021-06-11 NOTE — ASSESSMENT & PLAN NOTE
Lab Results   Component Value Date    HGBA1C 5 4 06/11/2021    she has wonderful control her diabetes with metformin and 6 lb weight loss

## 2021-06-11 NOTE — PATIENT INSTRUCTIONS

## 2021-06-11 NOTE — PROGRESS NOTES
Assessment/Plan:    Back pain with left-sided sciatica   He has had significant improvement in her back pain since  Last seen    Controlled diabetes mellitus ([address] )    Lab Results   Component Value Date    HGBA1C 5 4 06/11/2021    she has wonderful control her diabetes with metformin and 6 lb weight loss    Essential hypertension   Her blood pressure also continues to be well controlled with Norvasc and lisinopril and metoprolol    Hyperlipidemia   Her last LDL was 80 on Lipitor 10    BMI 29 0-29 9,adult   As mentioned she has lost 6 lb since her last weigh a       Diagnoses and all orders for this visit:    Controlled type 2 diabetes mellitus with diabetic neuropathy, without long-term current use of insulin ([facility])  -     POCT hemoglobin A1c  -     glucose blood test strip; Use as instructed    Essential hypertension    Back pain with left-sided sciatica          Subjective:      Patient ID: Barry Dumont is a 66 y o  female  Diabetes  She presents for her follow-up diabetic visit  She has type 2 diabetes mellitus  Her disease course has been improving  There are no hypoglycemic associated symptoms  Pertinent negatives for hypoglycemia include no confusion, dizziness or headaches  There are no diabetic associated symptoms  Pertinent negatives for diabetes include no chest pain, no fatigue, no polydipsia, no polyphagia, no polyuria and no weakness  There are no diabetic complications  Risk factors for coronary artery disease include diabetes mellitus, dyslipidemia, hypertension, obesity, post-menopausal, sedentary lifestyle and family history  Current diabetic treatment includes diet and oral agent (monotherapy)  She is compliant with treatment all of the time  Her weight is decreasing steadily  She is following a diabetic, low salt and low fat/cholesterol diet  She has had a previous visit with a dietitian  She participates in exercise intermittently  Her home blood glucose trend is decreasing steadily   An ACE inhibitor/angiotensin II receptor blocker is being taken  She does not see a podiatrist Eye exam is current  Review of Systems   Constitutional: Negative for chills, fatigue, fever and unexpected weight change  HENT: Negative for congestion, ear pain, hearing loss, postnasal drip, sinus pressure, sore throat, trouble swallowing and voice change  Eyes: Negative for visual disturbance  Respiratory: Negative for cough, chest tightness, shortness of breath and wheezing  Cardiovascular: Negative for chest pain, palpitations and leg swelling  Gastrointestinal: Negative for abdominal distention, abdominal pain, anal bleeding, blood in stool, constipation, diarrhea and nausea  Endocrine: Negative for cold intolerance, polydipsia, polyphagia and polyuria  Genitourinary: Negative for dysuria, flank pain, frequency, hematuria and urgency  Musculoskeletal: Positive for back pain  Negative for arthralgias, gait problem, joint swelling, myalgias and neck pain  Skin: Negative for rash  Allergic/Immunologic: Negative for immunocompromised state  Neurological: Negative for dizziness, syncope, facial asymmetry, weakness, light-headedness, numbness and headaches  Hematological: Negative for adenopathy  Psychiatric/Behavioral: Negative for confusion, sleep disturbance and suicidal ideas  Objective:      /64 (BP Location: Left arm, Patient Position: Sitting, Cuff Size: Standard)   Pulse 80   Temp 97 9 °F (36 6 °C)   Ht 5' 5" (1 651 m)   Wt 84 4 kg (186 lb)   SpO2 97%   BMI 30 95 kg/m²          Physical Exam  Constitutional:       General: She is not in acute distress  Appearance: She is well-developed  She is obese  HENT:      Right Ear: External ear normal       Left Ear: External ear normal       Nose: Nose normal       Mouth/Throat:      Pharynx: No oropharyngeal exudate  Eyes:      Pupils: Pupils are equal, round, and reactive to light     Neck:      Musculoskeletal: Normal range of motion and neck supple  Thyroid: No thyromegaly  Vascular: No JVD  Cardiovascular:      Rate and Rhythm: Normal rate and regular rhythm  Pulses: no weak pulses          Dorsalis pedis pulses are 2+ on the right side and 2+ on the left side  Heart sounds: Normal heart sounds  No murmur  No gallop  Pulmonary:      Effort: Pulmonary effort is normal  No respiratory distress  Breath sounds: Normal breath sounds  No wheezing or rales  Abdominal:      General: Bowel sounds are normal  There is no distension  Palpations: Abdomen is soft  There is no mass  Tenderness: There is no abdominal tenderness  Musculoskeletal: Normal range of motion  General: No tenderness  Feet:      Right foot:      Skin integrity: No ulcer, skin breakdown, erythema, warmth, callus or dry skin  Left foot:      Skin integrity: No ulcer, skin breakdown, erythema, warmth, callus or dry skin  Lymphadenopathy:      Cervical: No cervical adenopathy  Skin:     Findings: No rash  Neurological:      Mental Status: She is alert and oriented to person, place, and time  Cranial Nerves: No cranial nerve deficit  Coordination: Coordination normal    Psychiatric:         Behavior: Behavior normal          Thought Content: Thought content normal          Judgment: Judgment normal        Patient's shoes and socks removed  Right Foot/Ankle   Right Foot Inspection  Skin Exam: skin normal and skin intact no dry skin, no warmth, no callus, no erythema, no maceration, no abnormal color, no pre-ulcer, no ulcer and no callus                          Toe Exam: ROM and strength within normal limits  Sensory   Vibration: intact    Monofilament testing: intact  Vascular    The right DP pulse is 2+       Left Foot/Ankle  Left Foot Inspection  Skin Exam: skin normal and skin intactno dry skin, no warmth, no erythema, no maceration, normal color, no pre-ulcer, no ulcer and no callus Toe Exam: ROM and strength within normal limits                   Sensory   Vibration: intact    Monofilament: intact  Vascular    The left DP pulse is 2+  Assign Risk Category:  No deformity present; No loss of protective sensation;  No weak pulses       Risk: 0

## 2021-08-04 ENCOUNTER — APPOINTMENT (OUTPATIENT)
Dept: LAB | Facility: HOSPITAL | Age: 71
End: 2021-08-04
Payer: MEDICARE

## 2021-08-09 ENCOUNTER — TELEPHONE (OUTPATIENT)
Dept: OTHER | Facility: OTHER | Age: 71
End: 2021-08-09

## 2021-08-09 DIAGNOSIS — E11.40 CONTROLLED TYPE 2 DIABETES MELLITUS WITH DIABETIC NEUROPATHY, WITHOUT LONG-TERM CURRENT USE OF INSULIN (HCC): Primary | ICD-10-CM

## 2021-08-09 DIAGNOSIS — N39.0 URINARY TRACT INFECTION WITHOUT HEMATURIA, SITE UNSPECIFIED: Primary | ICD-10-CM

## 2021-08-09 DIAGNOSIS — E13.9 DIABETES 1.5, MANAGED AS TYPE 2 (HCC): ICD-10-CM

## 2021-08-09 RX ORDER — CEPHALEXIN 500 MG/1
500 CAPSULE ORAL EVERY 12 HOURS SCHEDULED
Qty: 14 CAPSULE | Refills: 0 | Status: SHIPPED | OUTPATIENT
Start: 2021-08-09 | End: 2021-08-16

## 2021-08-09 RX ORDER — BLOOD SUGAR DIAGNOSTIC
1 STRIP MISCELLANEOUS DAILY
Qty: 50 EACH | Refills: 3 | Status: SHIPPED | OUTPATIENT
Start: 2021-08-09 | End: 2021-10-15

## 2021-08-19 ENCOUNTER — PREPPED CHART (OUTPATIENT)
Dept: URBAN - METROPOLITAN AREA CLINIC 6 | Facility: CLINIC | Age: 71
End: 2021-08-19

## 2021-08-19 ENCOUNTER — PRE-OP CATARACT MEASUREMENTS (OUTPATIENT)
Dept: URBAN - METROPOLITAN AREA CLINIC 6 | Facility: CLINIC | Age: 71
End: 2021-08-19

## 2021-08-19 DIAGNOSIS — Z96.1: ICD-10-CM

## 2021-08-19 DIAGNOSIS — E11.9: ICD-10-CM

## 2021-08-19 PROCEDURE — 92012 INTRM OPH EXAM EST PATIENT: CPT

## 2021-08-19 ASSESSMENT — VISUAL ACUITY
OD_SC: 20/80-1
OD_PH: 20/40+1
OS_SC: 20/30-2
OS_PH: 20/30-2

## 2021-08-19 ASSESSMENT — KERATOMETRY
OS_K2POWER_DIOPTERS: 43.00
OD_AXISANGLE2_DEGREES: 5
OS_AXISANGLE2_DEGREES: 171
OS_K1POWER_DIOPTERS: 41.75
OD_K2POWER_DIOPTERS: 43.50
OS_AXISANGLE_DEGREES: 81
OD_AXISANGLE_DEGREES: 95
OD_K1POWER_DIOPTERS: 41.50

## 2021-08-30 ENCOUNTER — FOLLOW UP (OUTPATIENT)
Dept: URBAN - METROPOLITAN AREA CLINIC 6 | Facility: CLINIC | Age: 71
End: 2021-08-30

## 2021-08-30 DIAGNOSIS — Z96.1: ICD-10-CM

## 2021-08-30 PROCEDURE — 92012 INTRM OPH EXAM EST PATIENT: CPT

## 2021-08-30 ASSESSMENT — TONOMETRY
OS_IOP_MMHG: 22
OD_IOP_MMHG: 19

## 2021-08-30 ASSESSMENT — VISUAL ACUITY
OD_CC: 20/200
OS_CC: 20/30

## 2021-08-31 ENCOUNTER — POST-OP CHECK (OUTPATIENT)
Dept: URBAN - METROPOLITAN AREA CLINIC 6 | Facility: CLINIC | Age: 71
End: 2021-08-31

## 2021-08-31 DIAGNOSIS — H52.211: ICD-10-CM

## 2021-08-31 PROCEDURE — 99024 POSTOP FOLLOW-UP VISIT: CPT

## 2021-08-31 ASSESSMENT — KERATOMETRY
OS_AXISANGLE_DEGREES: 81
OS_K2POWER_DIOPTERS: 43.00
OD_AXISANGLE_DEGREES: 95
OS_K1POWER_DIOPTERS: 41.75
OS_AXISANGLE2_DEGREES: 171
OD_K1POWER_DIOPTERS: 41.50
OD_AXISANGLE2_DEGREES: 5
OD_K2POWER_DIOPTERS: 43.50

## 2021-08-31 ASSESSMENT — VISUAL ACUITY
OD_SC: 20/60
OD_PH: 20/40+1
OS_SC: 20/30

## 2021-08-31 ASSESSMENT — TONOMETRY
OS_IOP_MMHG: 17
OD_IOP_MMHG: 17

## 2021-09-09 DIAGNOSIS — I10 ESSENTIAL HYPERTENSION: ICD-10-CM

## 2021-09-09 DIAGNOSIS — E11.40 CONTROLLED TYPE 2 DIABETES MELLITUS WITH DIABETIC NEUROPATHY, WITHOUT LONG-TERM CURRENT USE OF INSULIN (HCC): ICD-10-CM

## 2021-09-10 RX ORDER — LISINOPRIL 10 MG/1
10 TABLET ORAL DAILY
Qty: 90 TABLET | Refills: 3 | Status: SHIPPED | OUTPATIENT
Start: 2021-09-10 | End: 2021-10-15

## 2021-09-21 ENCOUNTER — POST-OP CHECK (OUTPATIENT)
Dept: URBAN - METROPOLITAN AREA CLINIC 6 | Facility: CLINIC | Age: 71
End: 2021-09-21

## 2021-09-21 DIAGNOSIS — H52.211: ICD-10-CM

## 2021-09-21 PROCEDURE — 99024 POSTOP FOLLOW-UP VISIT: CPT

## 2021-09-21 ASSESSMENT — TONOMETRY
OS_IOP_MMHG: 23
OS_IOP_MMHG: 24
OD_IOP_MMHG: 18
OD_IOP_MMHG: 21

## 2021-09-21 ASSESSMENT — KERATOMETRY
OD_AXISANGLE2_DEGREES: 5
OS_AXISANGLE2_DEGREES: 171
OD_K2POWER_DIOPTERS: 43.50
OS_AXISANGLE_DEGREES: 81
OD_K1POWER_DIOPTERS: 41.50
OD_AXISANGLE_DEGREES: 95
OS_K1POWER_DIOPTERS: 41.75
OS_K2POWER_DIOPTERS: 43.00

## 2021-09-21 ASSESSMENT — VISUAL ACUITY
OS_SC: 20/30-1
OD_SC: 20/70-1
OD_PH: 20/40-1
OU_CC: J2

## 2021-09-27 ENCOUNTER — OFFICE VISIT (OUTPATIENT)
Dept: UROLOGY | Facility: CLINIC | Age: 71
End: 2021-09-27
Payer: MEDICARE

## 2021-09-27 VITALS
HEART RATE: 79 BPM | DIASTOLIC BLOOD PRESSURE: 82 MMHG | HEIGHT: 65 IN | SYSTOLIC BLOOD PRESSURE: 142 MMHG | BODY MASS INDEX: 31.86 KG/M2 | WEIGHT: 191.2 LBS

## 2021-09-27 DIAGNOSIS — N39.0 RECURRENT UTI: Primary | ICD-10-CM

## 2021-09-27 LAB
SL AMB  POCT GLUCOSE, UA: NORMAL
SL AMB LEUKOCYTE ESTERASE,UA: NORMAL
SL AMB POCT BILIRUBIN,UA: NORMAL
SL AMB POCT BLOOD,UA: NORMAL
SL AMB POCT CLARITY,UA: CLEAR
SL AMB POCT COLOR,UA: YELLOW
SL AMB POCT KETONES,UA: NORMAL
SL AMB POCT NITRITE,UA: NORMAL
SL AMB POCT PH,UA: 7.5
SL AMB POCT SPECIFIC GRAVITY,UA: 1
SL AMB POCT URINE PROTEIN: NORMAL
SL AMB POCT UROBILINOGEN: 0.2

## 2021-09-27 PROCEDURE — 99213 OFFICE O/P EST LOW 20 MIN: CPT | Performed by: PHYSICIAN ASSISTANT

## 2021-09-27 PROCEDURE — 81002 URINALYSIS NONAUTO W/O SCOPE: CPT | Performed by: PHYSICIAN ASSISTANT

## 2021-09-27 RX ORDER — BLOOD SUGAR DIAGNOSTIC
STRIP MISCELLANEOUS
COMMUNITY
End: 2022-03-14

## 2021-09-27 NOTE — PROGRESS NOTES
9/27/2021      Chief Complaint   Patient presents with    Urinary Tract Infection     recurrent         Assessment and Plan  1  Recurrent UTI  - UA today negative for nitrites, leukocytes, blood  - discussed conservative measures with hydration, avoidance of constipation, and continuing her cranberry supplementation  Encouraged continued tight glycemic control  Addition of probiotic  - Was started on topical estrace at last visit, has been doing well with this  - standing urine cultures placed  Will call with results  - Will follow up in 6-8 months for symptom reassessment  - Will call with any questions or concerns in the meantime  - All questions answered; patient understands and agrees with plan      History of Present Illness  Raphael Gould is a 66 y o  female patient with history of UTI here for follow up  Patient was started on topical estrogen at last visit  States this has been going well, without side effects  Has had 2 positive urine cultures since last visit  Denies Frequency, urgency, dysuria, gross hematuria, flank pain, suprapubic pain, fevers, chills  She is drinking more water and continues with topical estrogen and cranberry  Review of Systems   Constitutional: Negative for activity change, appetite change, chills and fever  HENT: Negative for congestion and trouble swallowing  Respiratory: Negative for cough and shortness of breath  Cardiovascular: Negative for chest pain, palpitations and leg swelling  Gastrointestinal: Negative for abdominal pain, constipation, diarrhea, nausea and vomiting  Genitourinary: Negative for difficulty urinating, dysuria, flank pain, frequency, hematuria and urgency  Musculoskeletal: Negative for back pain and gait problem  Skin: Negative for wound  Allergic/Immunologic: Negative for immunocompromised state  Neurological: Negative for dizziness and syncope  Hematological: Does not bruise/bleed easily     Psychiatric/Behavioral: Negative for confusion  All other systems reviewed and are negative  Vitals  Vitals:    09/27/21 1054   BP: 142/82   Pulse: 79   Weight: 86 7 kg (191 lb 3 2 oz)   Height: 5' 5" (1 651 m)       Physical Exam  Constitutional:       Appearance: Normal appearance  HENT:      Head: Normocephalic  Pulmonary:      Effort: Pulmonary effort is normal    Musculoskeletal:      Cervical back: Normal range of motion  Skin:     General: Skin is warm and dry  Neurological:      General: No focal deficit present  Mental Status: She is alert and oriented to person, place, and time  Psychiatric:         Mood and Affect: Mood normal          Behavior: Behavior normal          Thought Content: Thought content normal          Judgment: Judgment normal            Past History  Past Medical History:   Diagnosis Date    Acute cystitis without hematuria 12/14/2020    Arthritis 7/2018    PT 7/2018    Carpal tunnel syndrome     Unspecified laterality  Resolved: 12/21/2016    Cataract     Chronic kidney disease     Complex endometrial hyperplasia with atypia     Resolved: 11/03/17    Diabetes mellitus ([facility] [address] )     of other type with circulatory complication, without long-term current use of insulin    Hyperlipidemia     Hypertension     Normal delivery     1971 and 1973 sons    Obesity     NICKY (obstructive sleep apnea)     Post-menopausal bleeding     Resolved: 2011    Seasonal allergies     UTI (urinary tract infection)      Social History     Socioeconomic History    Marital status: /Civil Union     Spouse name: None    Number of children: None    Years of education: None    Highest education level: None   Occupational History    Occupation: High School Adminstrator      Employer: [employer]   Tobacco Use    Smoking status: Never Smoker    Smokeless tobacco: Never Used   Vaping Use    Vaping Use: Never used   Substance and Sexual Activity    Alcohol use:  Yes     Alcohol/week: 2 0 standard drinks     Types: 2 Glasses of wine per week     Comment: less than one glass of wine per week    Drug use: Never    Sexual activity: Not Currently     Partners: Male     Birth control/protection: Post-menopausal   Other Topics Concern    None   Social History Narrative    Daily coffee consumption (2 cups/day)     Social Determinants of Health     Financial Resource Strain:     Difficulty of Paying Living Expenses:    Food Insecurity:     Worried About Running Out of Food in the Last Year:     Ran Out of Food in the Last Year:    Transportation Needs:     Lack of Transportation (Medical):      Lack of Transportation (Non-Medical):    Physical Activity:     Days of Exercise per Week:     Minutes of Exercise per Session:    Stress:     Feeling of Stress :    Social Connections:     Frequency of Communication with Friends and Family:     Frequency of Social Gatherings with Friends and Family:     Attends Synagogue Services:     Active Member of Clubs or Organizations:     Attends Club or Organization Meetings:     Marital Status:    Intimate Partner Violence:     Fear of Current or Ex-Partner:     Emotionally Abused:     Physically Abused:     Sexually Abused:      Social History     Tobacco Use   Smoking Status Never Smoker   Smokeless Tobacco Never Used     Family History   Problem Relation Age of Onset    No Known Problems Child     Arthritis Family     Cancer Family         bladder    Other Family         Cardiac disorder    Diabetes Family     Hypertension Family     Valvular heart disease Family     Hypertension Mother     Heart disease Mother     Heart attack Mother     Hypertension Father     Cancer Father              Prostate cancer Father 80    Arthritis Father              Hypertension Brother     Heart disease Brother     Prostate cancer Brother 61    Diabetes Maternal Grandmother     Stroke Maternal Grandmother     Diabetes Paternal Grandfather     Breast cancer Neg Hx     Colon cancer Neg Hx     Ovarian cancer Neg Hx     Uterine cancer Neg Hx     Cervical cancer Neg Hx     Thyroid disease Neg Hx        The following portions of the patient's history were reviewed and updated as appropriate: allergies, current medications, past medical history, past social history, past surgical history and problem list     Results  Recent Results (from the past 1 hour(s))   POCT urine dip    Collection Time: 09/27/21 11:04 AM   Result Value Ref Range    LEUKOCYTE ESTERASE,UA -     NITRITE,UA -     SL AMB POCT UROBILINOGEN 0 2     POCT URINE PROTEIN -      PH,UA 7 5     BLOOD,UA -     SPECIFIC GRAVITY,UA 1 005     KETONES,UA -     BILIRUBIN,UA -     GLUCOSE, UA -      COLOR,UA yellow     CLARITY,UA clear    ]  No results found for: PSA  Lab Results   Component Value Date    GLUCOSE 107 10/16/2015    CALCIUM 9 8 12/22/2020     10/16/2015    K 4 7 12/22/2020    CO2 30 12/22/2020     12/22/2020    BUN 13 12/22/2020    CREATININE 0 80 12/22/2020     Lab Results   Component Value Date    WBC 7 90 12/10/2020    HGB 11 8 12/10/2020    HCT 34 1 (L) 12/10/2020    MCV 87 12/10/2020     12/10/2020       Liborio Taylor PA-C

## 2021-10-11 ENCOUNTER — RA CDI HCC (OUTPATIENT)
Dept: OTHER | Facility: HOSPITAL | Age: 71
End: 2021-10-11

## 2021-10-15 ENCOUNTER — OFFICE VISIT (OUTPATIENT)
Dept: INTERNAL MEDICINE CLINIC | Age: 71
End: 2021-10-15
Payer: MEDICARE

## 2021-10-15 VITALS
OXYGEN SATURATION: 98 % | HEIGHT: 65 IN | WEIGHT: 190 LBS | HEART RATE: 70 BPM | TEMPERATURE: 97.5 F | SYSTOLIC BLOOD PRESSURE: 138 MMHG | DIASTOLIC BLOOD PRESSURE: 68 MMHG | BODY MASS INDEX: 31.65 KG/M2

## 2021-10-15 DIAGNOSIS — E11.40 CONTROLLED TYPE 2 DIABETES MELLITUS WITH DIABETIC NEUROPATHY, WITHOUT LONG-TERM CURRENT USE OF INSULIN (HCC): ICD-10-CM

## 2021-10-15 DIAGNOSIS — Z23 NEED FOR IMMUNIZATION AGAINST INFLUENZA: Primary | ICD-10-CM

## 2021-10-15 DIAGNOSIS — Z13.820 ENCOUNTER FOR SCREENING FOR OSTEOPOROSIS: ICD-10-CM

## 2021-10-15 DIAGNOSIS — I10 ESSENTIAL HYPERTENSION: ICD-10-CM

## 2021-10-15 DIAGNOSIS — Z00.00 MEDICARE ANNUAL WELLNESS VISIT, SUBSEQUENT: ICD-10-CM

## 2021-10-15 DIAGNOSIS — Z78.0 POST-MENOPAUSAL: ICD-10-CM

## 2021-10-15 PROCEDURE — 99214 OFFICE O/P EST MOD 30 MIN: CPT | Performed by: INTERNAL MEDICINE

## 2021-10-15 PROCEDURE — G0008 ADMIN INFLUENZA VIRUS VAC: HCPCS | Performed by: INTERNAL MEDICINE

## 2021-10-15 PROCEDURE — 90662 IIV NO PRSV INCREASED AG IM: CPT | Performed by: INTERNAL MEDICINE

## 2021-10-15 PROCEDURE — G0439 PPPS, SUBSEQ VISIT: HCPCS | Performed by: INTERNAL MEDICINE

## 2021-10-15 RX ORDER — SEMAGLUTIDE 1.34 MG/ML
0.25 INJECTION, SOLUTION SUBCUTANEOUS WEEKLY
Qty: 1.5 ML | Refills: 5 | Status: SHIPPED | OUTPATIENT
Start: 2021-10-15 | End: 2021-11-19

## 2021-10-15 RX ORDER — LOSARTAN POTASSIUM 50 MG/1
50 TABLET ORAL DAILY
Qty: 30 TABLET | Refills: 3 | Status: SHIPPED | OUTPATIENT
Start: 2021-10-15 | End: 2021-10-15

## 2021-10-15 RX ORDER — LOSARTAN POTASSIUM 50 MG/1
TABLET ORAL
Qty: 90 TABLET | Refills: 1 | Status: SHIPPED | OUTPATIENT
Start: 2021-10-15 | End: 2021-12-20 | Stop reason: SDUPTHER

## 2021-11-15 ENCOUNTER — RA CDI HCC (OUTPATIENT)
Dept: OTHER | Facility: HOSPITAL | Age: 71
End: 2021-11-15

## 2021-11-19 ENCOUNTER — OFFICE VISIT (OUTPATIENT)
Dept: INTERNAL MEDICINE CLINIC | Age: 71
End: 2021-11-19
Payer: MEDICARE

## 2021-11-19 VITALS
TEMPERATURE: 97.6 F | HEIGHT: 65 IN | HEART RATE: 72 BPM | OXYGEN SATURATION: 98 % | DIASTOLIC BLOOD PRESSURE: 64 MMHG | SYSTOLIC BLOOD PRESSURE: 142 MMHG | BODY MASS INDEX: 31.39 KG/M2 | WEIGHT: 188.4 LBS

## 2021-11-19 DIAGNOSIS — E11.40 CONTROLLED TYPE 2 DIABETES MELLITUS WITH DIABETIC NEUROPATHY, WITHOUT LONG-TERM CURRENT USE OF INSULIN (HCC): Primary | ICD-10-CM

## 2021-11-19 DIAGNOSIS — I10 ESSENTIAL HYPERTENSION: ICD-10-CM

## 2021-11-19 PROBLEM — M25.561 ACUTE PAIN OF RIGHT KNEE: Status: RESOLVED | Noted: 2019-10-18 | Resolved: 2021-11-19

## 2021-11-19 LAB — SL AMB POCT HEMOGLOBIN AIC: 5.8 (ref ?–6.5)

## 2021-11-19 PROCEDURE — 99213 OFFICE O/P EST LOW 20 MIN: CPT | Performed by: INTERNAL MEDICINE

## 2021-11-19 PROCEDURE — 83036 HEMOGLOBIN GLYCOSYLATED A1C: CPT | Performed by: INTERNAL MEDICINE

## 2021-11-19 RX ORDER — SEMAGLUTIDE 1.34 MG/ML
1 INJECTION, SOLUTION SUBCUTANEOUS WEEKLY
Qty: 0.75 ML | Refills: 3 | Status: SHIPPED | OUTPATIENT
Start: 2021-11-19 | End: 2021-12-20 | Stop reason: SDUPTHER

## 2021-12-20 ENCOUNTER — OFFICE VISIT (OUTPATIENT)
Dept: INTERNAL MEDICINE CLINIC | Age: 71
End: 2021-12-20
Payer: MEDICARE

## 2021-12-20 VITALS
SYSTOLIC BLOOD PRESSURE: 148 MMHG | HEIGHT: 65 IN | TEMPERATURE: 97.8 F | DIASTOLIC BLOOD PRESSURE: 78 MMHG | BODY MASS INDEX: 31.46 KG/M2 | OXYGEN SATURATION: 98 % | WEIGHT: 188.8 LBS | HEART RATE: 72 BPM

## 2021-12-20 DIAGNOSIS — I10 ESSENTIAL HYPERTENSION: ICD-10-CM

## 2021-12-20 DIAGNOSIS — E11.49 DIABETES MELLITUS TYPE 2 WITH NEUROLOGICAL MANIFESTATIONS (HCC): ICD-10-CM

## 2021-12-20 DIAGNOSIS — E11.40 CONTROLLED TYPE 2 DIABETES MELLITUS WITH DIABETIC NEUROPATHY, WITHOUT LONG-TERM CURRENT USE OF INSULIN (HCC): ICD-10-CM

## 2021-12-20 DIAGNOSIS — E78.5 HYPERLIPIDEMIA, UNSPECIFIED HYPERLIPIDEMIA TYPE: ICD-10-CM

## 2021-12-20 PROCEDURE — 99213 OFFICE O/P EST LOW 20 MIN: CPT | Performed by: INTERNAL MEDICINE

## 2021-12-20 RX ORDER — SEMAGLUTIDE 1.34 MG/ML
1 INJECTION, SOLUTION SUBCUTANEOUS WEEKLY
Qty: 0.75 ML | Refills: 3 | Status: SHIPPED | OUTPATIENT
Start: 2021-12-20 | End: 2022-01-17

## 2021-12-20 RX ORDER — LOSARTAN POTASSIUM 100 MG/1
50 TABLET ORAL DAILY
Qty: 90 TABLET | Refills: 1 | Status: SHIPPED | OUTPATIENT
Start: 2021-12-20 | End: 2022-02-24

## 2021-12-20 RX ORDER — METOPROLOL SUCCINATE 50 MG/1
50 TABLET, EXTENDED RELEASE ORAL DAILY
Qty: 90 TABLET | Refills: 3 | Status: SHIPPED | OUTPATIENT
Start: 2021-12-20

## 2021-12-20 RX ORDER — AMLODIPINE BESYLATE 5 MG/1
5 TABLET ORAL DAILY
Qty: 90 TABLET | Refills: 3 | Status: SHIPPED | OUTPATIENT
Start: 2021-12-20

## 2021-12-20 RX ORDER — ATORVASTATIN CALCIUM 10 MG/1
10 TABLET, FILM COATED ORAL DAILY
Qty: 90 TABLET | Refills: 3 | Status: SHIPPED | OUTPATIENT
Start: 2021-12-20

## 2022-01-15 DIAGNOSIS — R10.13 DYSPEPSIA: ICD-10-CM

## 2022-01-15 DIAGNOSIS — E11.40 CONTROLLED TYPE 2 DIABETES MELLITUS WITH DIABETIC NEUROPATHY, WITHOUT LONG-TERM CURRENT USE OF INSULIN (HCC): ICD-10-CM

## 2022-01-17 RX ORDER — FAMOTIDINE 20 MG/1
TABLET, FILM COATED ORAL
Qty: 180 TABLET | Refills: 3 | Status: SHIPPED | OUTPATIENT
Start: 2022-01-17

## 2022-01-17 RX ORDER — SEMAGLUTIDE 1.34 MG/ML
INJECTION, SOLUTION SUBCUTANEOUS
Qty: 3 ML | Refills: 1 | Status: SHIPPED | OUTPATIENT
Start: 2022-01-17 | End: 2022-01-19 | Stop reason: SDUPTHER

## 2022-01-19 DIAGNOSIS — E11.40 CONTROLLED TYPE 2 DIABETES MELLITUS WITH DIABETIC NEUROPATHY, WITHOUT LONG-TERM CURRENT USE OF INSULIN (HCC): ICD-10-CM

## 2022-01-19 RX ORDER — SEMAGLUTIDE 1.34 MG/ML
1 INJECTION, SOLUTION SUBCUTANEOUS WEEKLY
Qty: 9 ML | Refills: 2 | Status: SHIPPED | OUTPATIENT
Start: 2022-01-19

## 2022-02-16 ENCOUNTER — FOLLOW UP (OUTPATIENT)
Dept: URBAN - METROPOLITAN AREA CLINIC 6 | Facility: CLINIC | Age: 72
End: 2022-02-16

## 2022-02-16 DIAGNOSIS — Z96.1: ICD-10-CM

## 2022-02-16 DIAGNOSIS — H52.211: ICD-10-CM

## 2022-02-16 DIAGNOSIS — E11.3293: ICD-10-CM

## 2022-02-16 PROCEDURE — 92250 FUNDUS PHOTOGRAPHY W/I&R: CPT

## 2022-02-16 PROCEDURE — 92014 COMPRE OPH EXAM EST PT 1/>: CPT

## 2022-02-16 ASSESSMENT — KERATOMETRY
OS_K1POWER_DIOPTERS: 41.75
OD_AXISANGLE2_DEGREES: 5
OS_AXISANGLE2_DEGREES: 171
OD_K2POWER_DIOPTERS: 43.50
OS_K2POWER_DIOPTERS: 43.00
OD_K1POWER_DIOPTERS: 41.50
OD_AXISANGLE_DEGREES: 95
OS_AXISANGLE_DEGREES: 81

## 2022-02-16 ASSESSMENT — VISUAL ACUITY
OU_SC: J10
OS_SC: 20/30-2
OD_SC: 20/80-2
OD_PH: 20/40-2

## 2022-02-16 ASSESSMENT — TONOMETRY
OS_IOP_MMHG: 15
OD_IOP_MMHG: 15

## 2022-02-24 ENCOUNTER — OFFICE VISIT (OUTPATIENT)
Dept: INTERNAL MEDICINE CLINIC | Age: 72
End: 2022-02-24
Payer: MEDICARE

## 2022-02-24 VITALS
HEIGHT: 65 IN | BODY MASS INDEX: 32.32 KG/M2 | HEART RATE: 72 BPM | SYSTOLIC BLOOD PRESSURE: 138 MMHG | DIASTOLIC BLOOD PRESSURE: 70 MMHG | WEIGHT: 194 LBS | OXYGEN SATURATION: 98 % | TEMPERATURE: 97.8 F

## 2022-02-24 DIAGNOSIS — E11.40 CONTROLLED TYPE 2 DIABETES MELLITUS WITH DIABETIC NEUROPATHY, WITHOUT LONG-TERM CURRENT USE OF INSULIN (HCC): Primary | ICD-10-CM

## 2022-02-24 DIAGNOSIS — E78.5 HYPERLIPIDEMIA, UNSPECIFIED HYPERLIPIDEMIA TYPE: ICD-10-CM

## 2022-02-24 DIAGNOSIS — M46.1 SACROILIITIS (HCC): ICD-10-CM

## 2022-02-24 DIAGNOSIS — I10 ESSENTIAL HYPERTENSION: ICD-10-CM

## 2022-02-24 LAB — SL AMB POCT HEMOGLOBIN AIC: 5.9 (ref ?–6.5)

## 2022-02-24 PROCEDURE — 83036 HEMOGLOBIN GLYCOSYLATED A1C: CPT | Performed by: INTERNAL MEDICINE

## 2022-02-24 PROCEDURE — 99214 OFFICE O/P EST MOD 30 MIN: CPT | Performed by: INTERNAL MEDICINE

## 2022-02-24 RX ORDER — LOSARTAN POTASSIUM 100 MG/1
100 TABLET ORAL DAILY
Qty: 90 TABLET | Refills: 1 | Status: SHIPPED | OUTPATIENT
Start: 2022-02-24 | End: 2022-08-01

## 2022-02-24 NOTE — ASSESSMENT & PLAN NOTE
Lab Results   Component Value Date    HGBA1C 5 9 02/24/2022   Despite having gained a few lb she continues to have great control of her diabetes with metformin and osempic

## 2022-02-24 NOTE — ASSESSMENT & PLAN NOTE
Patient checks her blood pressure at home and keeps a log    And after recheck here in the office she was back into the normal range

## 2022-02-24 NOTE — PROGRESS NOTES
Assessment/Plan:    Controlled diabetes mellitus ([facility] [address] )    Lab Results   Component Value Date    HGBA1C 5 9 02/24/2022   Despite having gained a few lb she continues to have great control of her diabetes with metformin and osempic    NICKY on CPAP  She uses her CPAP somewhat intermittently    Essential hypertension  Patient checks her blood pressure at home and keeps a log  And after recheck here in the office she was back into the normal range    Hyperlipidemia  She continues on atorvastatin    BMI 31 0-31 9,adult  He unfortunately did gain a few lb and was counseled again on diet and exercise       Diagnoses and all orders for this visit:    Controlled type 2 diabetes mellitus with diabetic neuropathy, without long-term current use of insulin (HCC)  -     POCT hemoglobin A1c    Essential hypertension  -     losartan (COZAAR) 100 MG tablet; Take 1 tablet (100 mg total) by mouth daily    Sacroiliitis (HCC)    BMI 31 0-31 9,adult    Hyperlipidemia, unspecified hyperlipidemia type          Subjective:      Patient ID: Peg Hua is a 67 y o  female  Diabetes  She presents for her follow-up diabetic visit  She has type 2 diabetes mellitus  No MedicAlert identification noted  Her disease course has been stable  There are no hypoglycemic associated symptoms  Pertinent negatives for hypoglycemia include no confusion, dizziness or headaches  There are no diabetic associated symptoms  Pertinent negatives for diabetes include no chest pain, no fatigue, no polydipsia, no polyphagia, no polyuria and no weakness  Symptoms are stable  There are no diabetic complications  Risk factors for coronary artery disease include dyslipidemia, family history, obesity, post-menopausal, hypertension and diabetes mellitus  Current diabetic treatment includes diet and oral agent (monotherapy) (ozempic)  She is compliant with treatment most of the time  Her weight is fluctuating minimally   She is following a diabetic, low fat/cholesterol, low salt and generally healthy diet  Meal planning includes avoidance of concentrated sweets  She has had a previous visit with a dietitian  She participates in exercise intermittently  Her home blood glucose trend is fluctuating minimally  An ACE inhibitor/angiotensin II receptor blocker is being taken  She sees a podiatrist Eye exam is current  Review of Systems   Constitutional: Negative for chills, fatigue, fever and unexpected weight change  HENT: Negative for congestion, ear pain, hearing loss, postnasal drip, sinus pressure, sore throat, trouble swallowing and voice change  Eyes: Negative for visual disturbance  Respiratory: Negative for cough, chest tightness, shortness of breath and wheezing  Cardiovascular: Negative for chest pain, palpitations and leg swelling  Gastrointestinal: Negative for abdominal distention, abdominal pain, anal bleeding, blood in stool, constipation, diarrhea and nausea  Endocrine: Negative for cold intolerance, polydipsia, polyphagia and polyuria  Genitourinary: Negative for dysuria, flank pain, frequency, hematuria and urgency  Musculoskeletal: Negative for arthralgias, back pain, gait problem, joint swelling, myalgias and neck pain  Skin: Negative for rash  Allergic/Immunologic: Negative for immunocompromised state  Neurological: Negative for dizziness, syncope, facial asymmetry, weakness, light-headedness, numbness and headaches  Hematological: Negative for adenopathy  Psychiatric/Behavioral: Negative for confusion, sleep disturbance and suicidal ideas           Objective:      /70   Pulse 72   Temp 97 8 °F (36 6 °C)   Ht 5' 5" (1 651 m)   Wt 88 kg (194 lb)   SpO2 98%   BMI 32 28 kg/m²          Physical Exam

## 2022-03-12 DIAGNOSIS — E11.40 CONTROLLED TYPE 2 DIABETES MELLITUS WITH DIABETIC NEUROPATHY, WITHOUT LONG-TERM CURRENT USE OF INSULIN (HCC): Primary | ICD-10-CM

## 2022-03-14 RX ORDER — BLOOD SUGAR DIAGNOSTIC
STRIP MISCELLANEOUS
Qty: 50 STRIP | Refills: 3 | Status: SHIPPED | OUTPATIENT
Start: 2022-03-14

## 2022-03-22 ENCOUNTER — FOLLOW UP (OUTPATIENT)
Dept: URBAN - METROPOLITAN AREA CLINIC 6 | Facility: CLINIC | Age: 72
End: 2022-03-22

## 2022-03-22 DIAGNOSIS — Z96.1: ICD-10-CM

## 2022-03-22 DIAGNOSIS — E11.3293: ICD-10-CM

## 2022-03-22 DIAGNOSIS — H52.211: ICD-10-CM

## 2022-03-22 PROCEDURE — 92025 CPTRIZED CORNEAL TOPOGRAPHY: CPT

## 2022-03-22 PROCEDURE — 92012 INTRM OPH EXAM EST PATIENT: CPT | Mod: 25

## 2022-03-22 PROCEDURE — 65772 CORRECTION OF ASTIGMATISM: CPT

## 2022-03-22 ASSESSMENT — VISUAL ACUITY
OD_SC: 20/100-2
OU_CC: J2
OU_OTHER: @12""
OS_SC: 20/30+1
OD_PH: 20/40

## 2022-03-22 ASSESSMENT — KERATOMETRY
OD_AXISANGLE_DEGREES: 102
OD_AXISANGLE2_DEGREES: 12
OD_K2POWER_DIOPTERS: 43.50
OD_K1POWER_DIOPTERS: 42.00

## 2022-04-06 ENCOUNTER — POST-OP CHECK (OUTPATIENT)
Dept: URBAN - METROPOLITAN AREA CLINIC 6 | Facility: CLINIC | Age: 72
End: 2022-04-06

## 2022-04-06 DIAGNOSIS — H52.211: ICD-10-CM

## 2022-04-06 PROCEDURE — 99024 POSTOP FOLLOW-UP VISIT: CPT

## 2022-04-06 ASSESSMENT — VISUAL ACUITY
OS_SC: 20/30
OD_SC: 20/80-1
OD_PH: 20/40-2

## 2022-04-06 ASSESSMENT — TONOMETRY
OS_IOP_MMHG: 15
OD_IOP_MMHG: 15

## 2022-04-06 ASSESSMENT — KERATOMETRY
OD_K2POWER_DIOPTERS: 43.50
OD_AXISANGLE_DEGREES: 102
OD_AXISANGLE2_DEGREES: 12
OD_K1POWER_DIOPTERS: 42.00

## 2022-04-28 ENCOUNTER — OFFICE VISIT (OUTPATIENT)
Dept: UROLOGY | Facility: CLINIC | Age: 72
End: 2022-04-28
Payer: MEDICARE

## 2022-04-28 VITALS
DIASTOLIC BLOOD PRESSURE: 76 MMHG | HEIGHT: 65 IN | HEART RATE: 82 BPM | OXYGEN SATURATION: 100 % | WEIGHT: 191 LBS | SYSTOLIC BLOOD PRESSURE: 122 MMHG | BODY MASS INDEX: 31.82 KG/M2

## 2022-04-28 DIAGNOSIS — N39.0 RECURRENT UTI: Primary | ICD-10-CM

## 2022-04-28 LAB
SL AMB  POCT GLUCOSE, UA: NORMAL
SL AMB LEUKOCYTE ESTERASE,UA: NORMAL
SL AMB POCT BILIRUBIN,UA: NORMAL
SL AMB POCT BLOOD,UA: NORMAL
SL AMB POCT CLARITY,UA: CLEAR
SL AMB POCT COLOR,UA: YELLOW
SL AMB POCT KETONES,UA: NORMAL
SL AMB POCT NITRITE,UA: NORMAL
SL AMB POCT PH,UA: 6
SL AMB POCT SPECIFIC GRAVITY,UA: 1.02
SL AMB POCT URINE PROTEIN: NORMAL
SL AMB POCT UROBILINOGEN: 0.2

## 2022-04-28 PROCEDURE — 99213 OFFICE O/P EST LOW 20 MIN: CPT | Performed by: PHYSICIAN ASSISTANT

## 2022-04-28 PROCEDURE — 81002 URINALYSIS NONAUTO W/O SCOPE: CPT | Performed by: PHYSICIAN ASSISTANT

## 2022-04-28 NOTE — PROGRESS NOTES
4/28/2022      Chief Complaint   Patient presents with    Follow-up         Assessment and Plan  1  Recurrent UTI  - UA today negative for nitrites, leukocytes, blood  - discussed conservative measures with hydration, avoidance of constipation, and continuing her cranberry supplementation  Encouraged continued tight glycemic control  Continuation of probiotic  - Was started on topical estrace and has been doing well with this  - Will follow up as needed  - Will call with any questions or concerns in the meantime  - All questions answered; patient understands and agrees with plan      History of Present Illness  Meena Estes is a 67 y o  female patient with history of UTI here for follow up  Patient was started on topical estrogen  States this has been going well, without side effects  Has had 0 positive urine cultures since last visit  Denies frequency, urgency, dysuria, gross hematuria, flank pain, suprapubic pain, fevers, chills  She is drinking more water and continues with topical estrogen and cranberry  Review of Systems   Constitutional: Negative for activity change, appetite change, chills and fever  HENT: Negative for congestion and trouble swallowing  Respiratory: Negative for cough and shortness of breath  Cardiovascular: Negative for chest pain, palpitations and leg swelling  Gastrointestinal: Negative for abdominal pain, constipation, diarrhea, nausea and vomiting  Genitourinary: Negative for difficulty urinating, dysuria, flank pain, frequency, hematuria and urgency  Musculoskeletal: Negative for back pain and gait problem  Skin: Negative for wound  Allergic/Immunologic: Negative for immunocompromised state  Neurological: Negative for dizziness and syncope  Hematological: Does not bruise/bleed easily  Psychiatric/Behavioral: Negative for confusion  All other systems reviewed and are negative        Vitals  Vitals:    04/28/22 1100   BP: 122/76   Pulse: 82   SpO2: 100%   Weight: 86 6 kg (191 lb)   Height: 5' 5" (1 651 m)       Physical Exam  Constitutional:       Appearance: Normal appearance  HENT:      Head: Normocephalic  Pulmonary:      Effort: Pulmonary effort is normal    Musculoskeletal:      Cervical back: Normal range of motion  Skin:     General: Skin is warm and dry  Neurological:      General: No focal deficit present  Mental Status: She is alert and oriented to person, place, and time  Psychiatric:         Mood and Affect: Mood normal          Behavior: Behavior normal          Thought Content: Thought content normal          Judgment: Judgment normal            Past History  Past Medical History:   Diagnosis Date    Acute cystitis without hematuria 12/14/2020    Arthritis 7/2018    PT 7/2018    Carpal tunnel syndrome     Unspecified laterality  Resolved: 12/21/2016    Cataract     Chronic kidney disease     Complex endometrial hyperplasia with atypia     Resolved: 11/03/17    Diabetes mellitus ([facility] [address] )     of other type with circulatory complication, without long-term current use of insulin    Hyperlipidemia     Hypertension     Normal delivery     1971 and 1973 sons    Obesity     NICKY (obstructive sleep apnea)     Post-menopausal bleeding     Resolved: 2011    Seasonal allergies     UTI (urinary tract infection)      Social History     Socioeconomic History    Marital status: /Civil Union     Spouse name: None    Number of children: None    Years of education: None    Highest education level: None   Occupational History    Occupation: High School Adminstrator      Employer: [employer]   Tobacco Use    Smoking status: Never Smoker    Smokeless tobacco: Never Used   Vaping Use    Vaping Use: Never used   Substance and Sexual Activity    Alcohol use:  Yes     Alcohol/week: 2 0 standard drinks     Types: 2 Glasses of wine per week     Comment: less than one glass of wine per week    Drug use: Never    Sexual activity: Not Currently     Partners: Male     Birth control/protection: Post-menopausal   Other Topics Concern    None   Social History Narrative    Daily coffee consumption (2 cups/day)     Social Determinants of Health     Financial Resource Strain: Not on file   Food Insecurity: Not on file   Transportation Needs: Not on file   Physical Activity: Not on file   Stress: Not on file   Social Connections: Not on file   Intimate Partner Violence: Not on file   Housing Stability: Not on file     Social History     Tobacco Use   Smoking Status Never Smoker   Smokeless Tobacco Never Used     Family History   Problem Relation Age of Onset    No Known Problems Child     Arthritis Family     Cancer Family         bladder    Other Family         Cardiac disorder    Diabetes Family     Hypertension Family     Valvular heart disease Family     Hypertension Mother     Heart disease Mother     Heart attack Mother     Hypertension Father     Cancer Father              Prostate cancer Father 80    Arthritis Father              Hypertension Brother     Heart disease Brother     Prostate cancer Brother 61    Diabetes Maternal Grandmother     Stroke Maternal Grandmother     Diabetes Paternal Grandfather     Breast cancer Neg Hx     Colon cancer Neg Hx     Ovarian cancer Neg Hx     Uterine cancer Neg Hx     Cervical cancer Neg Hx     Thyroid disease Neg Hx        The following portions of the patient's history were reviewed and updated as appropriate: allergies, current medications, past medical history, past social history, past surgical history and problem list     Results  Recent Results (from the past 1 hour(s))   POCT urine dip    Collection Time: 22 11:06 AM   Result Value Ref Range    LEUKOCYTE ESTERASE,UA -     NITRITE,UA -     SL AMB POCT UROBILINOGEN 0 2     POCT URINE PROTEIN -      PH,UA 6 0     BLOOD,UA -     SPECIFIC GRAVITY,UA 1 020     KETONES,UA - BILIRUBIN,UA -     GLUCOSE, UA -      [id number]    ]  No results found for: PSA  Lab Results   Component Value Date    GLUCOSE 107 10/16/2015    CALCIUM 9 8 12/22/2020     10/16/2015    K 4 7 12/22/2020    CO2 30 12/22/2020     12/22/2020    BUN 13 12/22/2020    CREATININE 0 80 12/22/2020     Lab Results   Component Value Date    WBC 7 90 12/10/2020    HGB 11 8 12/10/2020    HCT 34 1 (L) 12/10/2020    MCV 87 12/10/2020     12/10/2020       Angely Velarde PA-C

## 2022-05-26 ENCOUNTER — OFFICE VISIT (OUTPATIENT)
Dept: INTERNAL MEDICINE CLINIC | Age: 72
End: 2022-05-26
Payer: MEDICARE

## 2022-05-26 VITALS
OXYGEN SATURATION: 93 % | HEIGHT: 65 IN | HEART RATE: 88 BPM | DIASTOLIC BLOOD PRESSURE: 66 MMHG | BODY MASS INDEX: 31.59 KG/M2 | WEIGHT: 189.6 LBS | TEMPERATURE: 97.7 F | SYSTOLIC BLOOD PRESSURE: 130 MMHG

## 2022-05-26 DIAGNOSIS — J01.90 ACUTE SINUSITIS, RECURRENCE NOT SPECIFIED, UNSPECIFIED LOCATION: Primary | ICD-10-CM

## 2022-05-26 PROCEDURE — 99213 OFFICE O/P EST LOW 20 MIN: CPT | Performed by: INTERNAL MEDICINE

## 2022-05-26 RX ORDER — AMOXICILLIN AND CLAVULANATE POTASSIUM 875; 125 MG/1; MG/1
1 TABLET, FILM COATED ORAL EVERY 12 HOURS SCHEDULED
Qty: 20 TABLET | Refills: 0 | Status: SHIPPED | OUTPATIENT
Start: 2022-05-26 | End: 2022-06-05

## 2022-05-26 NOTE — PATIENT INSTRUCTIONS
Cold Symptoms   AMBULATORY CARE:   Cold symptoms  include sneezing, dry throat, a stuffy nose, headache, watery eyes, and a cough  Your cough may be dry, or you may cough up mucus  You may also have muscle aches, joint pain, and tiredness  Rarely, you may have a fever  Cold symptoms occur from inflammation in your upper respiratory system caused by a virus  Most colds go away without treatment  Seek care immediately if:   You have increased tiredness and weakness  You are unable to eat  Your heart is beating much faster than usual for you  You see white spots in the back of your throat and your neck is swollen and sore to the touch  You see pinpoint or larger reddish-purple dots on your skin  Contact your healthcare provider if:   You have a fever higher than 102°F (38 9°C)  You have new or worsening shortness of breath  You have thick nasal drainage for more than 2 days  Your symptoms do not improve or get worse within 5 days  You have questions or concerns about your condition or care  Treatment for cold symptoms  may include NSAIDS to decrease muscle aches and fever  Cold medicines may also be given to decrease coughing, nasal stuffiness, sneezing, and a runny nose  Manage your cold symptoms: The following may help relieve cold symptoms, such as a dry throat and congestion:  Gargle with mouthwash or warm salt water as directed  Suck on throat lozenges or hard candy  Use a cold or warm vaporizer or humidifier to ease your breathing  Rest for at least 2 days and then as needed to decrease tiredness and weakness  Use petroleum based jelly around your nostrils to decrease irritation from blowing your nose  Drink plenty of liquids  Liquids will help thin and loosen thick mucus so you can cough it up  Liquids will also keep you hydrated  Ask your healthcare provider which liquids are best for you and how much to drink each day      Prevent the spread of germs by washing your hands often  You can spread your cold germs to others for at least 3 days after your symptoms start  Do not share items, such as eating utensils  Cover your nose and mouth when you cough or sneeze using the crook of your elbow instead of your hands  Throw used tissues in the garbage  Do not smoke:  Smoking may worsen your symptoms and increase the length of time you feel sick  Talk with your healthcare provider if you need help to stop smoking  Follow up with your doctor as directed:  Write down your questions so you remember to ask them during your visits  © Copyright [employer] 2022 Information is for End User's use only and may not be sold, redistributed or otherwise used for commercial purposes  All illustrations and images included in CareNotes® are the copyrighted property of [employer]  or Yenny Mckay  The above information is an  only  It is not intended as medical advice for individual conditions or treatments  Talk to your doctor, nurse or pharmacist before following any medical regimen to see if it is safe and effective for you

## 2022-05-26 NOTE — ASSESSMENT & PLAN NOTE
Patient has symptoms totally suggestive of a URI/sinusitis and will treated with Augmentin she did test negative for COVID this morning but since her symptoms are somewhat suggestive that to his that she is to let me know if she turns positive

## 2022-06-20 ENCOUNTER — POST-OP CHECK (OUTPATIENT)
Dept: URBAN - METROPOLITAN AREA CLINIC 6 | Facility: CLINIC | Age: 72
End: 2022-06-20

## 2022-06-20 DIAGNOSIS — Z96.1: ICD-10-CM

## 2022-06-20 DIAGNOSIS — H52.211: ICD-10-CM

## 2022-06-20 PROCEDURE — 99024 POSTOP FOLLOW-UP VISIT: CPT

## 2022-06-20 ASSESSMENT — TONOMETRY
OS_IOP_MMHG: 19
OD_IOP_MMHG: 19

## 2022-06-20 ASSESSMENT — KERATOMETRY
OD_AXISANGLE_DEGREES: 102
OD_K2POWER_DIOPTERS: 43.50
OD_AXISANGLE2_DEGREES: 12
OD_K1POWER_DIOPTERS: 42.00

## 2022-06-20 ASSESSMENT — VISUAL ACUITY
OD_SC: 20/80
OS_SC: 20/30
OD_PH: 20/50

## 2022-06-28 ENCOUNTER — OFFICE VISIT (OUTPATIENT)
Dept: INTERNAL MEDICINE CLINIC | Age: 72
End: 2022-06-28
Payer: MEDICARE

## 2022-06-28 VITALS
DIASTOLIC BLOOD PRESSURE: 80 MMHG | WEIGHT: 187.6 LBS | HEART RATE: 84 BPM | TEMPERATURE: 97.6 F | BODY MASS INDEX: 31.25 KG/M2 | OXYGEN SATURATION: 97 % | HEIGHT: 65 IN | SYSTOLIC BLOOD PRESSURE: 140 MMHG

## 2022-06-28 DIAGNOSIS — E11.40 CONTROLLED TYPE 2 DIABETES MELLITUS WITH DIABETIC NEUROPATHY, WITHOUT LONG-TERM CURRENT USE OF INSULIN (HCC): ICD-10-CM

## 2022-06-28 DIAGNOSIS — Z78.0 POST-MENOPAUSAL: Primary | ICD-10-CM

## 2022-06-28 DIAGNOSIS — Z13.820 ENCOUNTER FOR SCREENING FOR OSTEOPOROSIS: ICD-10-CM

## 2022-06-28 PROBLEM — J01.90 ACUTE SINUSITIS: Status: RESOLVED | Noted: 2022-05-26 | Resolved: 2022-06-28

## 2022-06-28 PROCEDURE — 99214 OFFICE O/P EST MOD 30 MIN: CPT | Performed by: INTERNAL MEDICINE

## 2022-06-28 NOTE — ASSESSMENT & PLAN NOTE
Lab Results   Component Value Date    HGBA1C 5 9 02/24/2022   Patient follows a diabetic diet and takes metformin with the son that and is doing well she will be due for an A1c in summer

## 2022-06-28 NOTE — PROGRESS NOTES
Assessment/Plan:    Controlled diabetes mellitus ([facility] [address] )    Lab Results   Component Value Date    HGBA1C 5 9 02/24/2022   Patient follows a diabetic diet and takes metformin with the son that and is doing well she will be due for an A1c in summer    NICKY on CPAP  She remains on CPAP    Essential hypertension  Her blood pressure continues to remain under good control but right now she is under stress because he recently moved    Hyperlipidemia  She remains on atorvastatin but is overdue for blood work       Diagnoses and all orders for this visit:    Post-menopausal  -     DXA bone density spine hip and pelvis; Future    Encounter for screening for osteoporosis  -     DXA bone density spine hip and pelvis; Future    Controlled type 2 diabetes mellitus with diabetic neuropathy, without long-term current use of insulin ([facility])  -     Lipid panel; Future  -     Comprehensive metabolic panel; Future  -     Hemoglobin A1C; Future          Subjective:      Patient ID: Love Pham is a 67 y o  female  Patient actually has no complaints but she needs a form filled out from her AVM evans because been moving into 1 of their homes and has to have a extensive medical history form filled out  Otherwise she feels fine  Form was copied and placed in chart          Review of Systems   Constitutional: Negative for chills, fatigue, fever and unexpected weight change  HENT: Negative for congestion, ear pain, hearing loss, postnasal drip, sinus pressure, sore throat, trouble swallowing and voice change  Eyes: Negative for visual disturbance  Respiratory: Negative for cough, chest tightness, shortness of breath and wheezing  Cardiovascular: Negative for chest pain, palpitations and leg swelling  Gastrointestinal: Negative for abdominal distention, abdominal pain, anal bleeding, blood in stool, constipation, diarrhea and nausea  Endocrine: Negative for cold intolerance, polydipsia, polyphagia and polyuria  Genitourinary: Negative for dysuria, flank pain, frequency, hematuria and urgency  Musculoskeletal: Positive for arthralgias  Negative for back pain, gait problem, joint swelling, myalgias and neck pain  Skin: Negative for rash  Allergic/Immunologic: Negative for immunocompromised state  Neurological: Negative for dizziness, syncope, facial asymmetry, weakness, light-headedness, numbness and headaches  Hematological: Negative for adenopathy  Psychiatric/Behavioral: Negative for confusion, sleep disturbance and suicidal ideas  Objective:      /80 (BP Location: Left arm, Patient Position: Sitting, Cuff Size: Standard)   Pulse 84   Temp 97 6 °F (36 4 °C)   Ht 5' 5" (1 651 m)   Wt 85 1 kg (187 lb 9 6 oz)   SpO2 97%   BMI 31 22 kg/m²          Physical Exam  Constitutional:       General: She is not in acute distress  Appearance: She is well-developed  She is obese  HENT:      Right Ear: External ear normal       Left Ear: External ear normal       Nose: Nose normal       Mouth/Throat:      Pharynx: No oropharyngeal exudate  Eyes:      Pupils: Pupils are equal, round, and reactive to light  Neck:      Thyroid: No thyromegaly  Vascular: No JVD  Cardiovascular:      Rate and Rhythm: Normal rate and regular rhythm  Heart sounds: Normal heart sounds  No murmur heard  No gallop  Pulmonary:      Effort: Pulmonary effort is normal  No respiratory distress  Breath sounds: Normal breath sounds  No wheezing or rales  Abdominal:      General: Bowel sounds are normal  There is no distension  Palpations: Abdomen is soft  There is no mass  Tenderness: There is no abdominal tenderness  Musculoskeletal:         General: No tenderness  Normal range of motion  Cervical back: Normal range of motion and neck supple  Right lower leg: No edema  Left lower leg: No edema  Lymphadenopathy:      Cervical: No cervical adenopathy     Skin:     Findings: No rash  Neurological:      Mental Status: She is alert and oriented to person, place, and time  Cranial Nerves: No cranial nerve deficit  Coordination: Coordination normal       Gait: Gait normal    Psychiatric:         Behavior: Behavior normal          Thought Content:  Thought content normal          Judgment: Judgment normal

## 2022-06-28 NOTE — ASSESSMENT & PLAN NOTE
Her blood pressure continues to remain under good control but right now she is under stress because he recently moved

## 2022-06-28 NOTE — PATIENT INSTRUCTIONS
Obesity   AMBULATORY CARE:   Obesity  means your body mass index (BMI) is greater than 30  Your healthcare provider will use your height and weight to measure your BMI  The risks of obesity include  many health problems, including injuries or physical disability  Diabetes (high blood sugar level)    High blood pressure or high cholesterol    Heart disease    Stroke    Gallbladder or liver disease    Cancer of the colon, breast, prostate, liver, or kidney    Sleep apnea    Arthritis or gout    Screening  is done to check for health conditions before you have signs or symptoms  If you are 28to 79years old, your blood sugar level may be checked every 3 years for signs of prediabetes or diabetes  Your healthcare provider will check your blood pressure at each visit  High blood pressure can lead to a stroke or other problems  Your provider may check for signs of heart disease, cancer, or other health problems  Seek care immediately if:   You have a severe headache, confusion, or difficulty speaking  You have weakness on one side of your body  You have chest pain, sweating, or shortness of breath  Call your doctor if:   You have symptoms of gallbladder or liver disease, such as pain in your upper abdomen  You have knee or hip pain and discomfort while walking  You have symptoms of diabetes, such as intense hunger and thirst, and frequent urination  You have symptoms of sleep apnea, such as snoring or daytime sleepiness  You have questions or concerns about your condition or care  Treatment for obesity  focuses on helping you lose weight to improve your health  Even a small decrease in BMI can reduce the risk for many health problems  Your healthcare provider will help you set a weight-loss goal   Lifestyle changes  are the first step in treating obesity  These include making healthy food choices and getting regular physical activity   Your healthcare provider may suggest a weight-loss program that involves coaching, education, and therapy  Medicine  may help you lose weight when it is used with a healthy foods and physical activity  Surgery  can help you lose weight if you are very obese and have other health problems  There are several types of weight-loss surgery  Ask your healthcare provider for more information  Tips for safe weight loss:   Set small, realistic goals  An example of a small goal is to walk for 20 minutes 5 days a week  Anther goal is to lose 5% of your body weight  Tell friends, family members, and coworkers about your goals  and ask for their support  Ask a friend to lose weight with you, or join a weight-loss support group  Identify foods or triggers that may cause you to overeat , and find ways to avoid them  Remove tempting high-calorie foods from your home and workplace  Place a bowl of fresh fruit on your kitchen counter  If stress causes you to eat, then find other ways to cope with stress  A counselor or therapist may be able to help you  Keep a diary to track what you eat and drink  Also write down how many minutes of physical activity you do each day  Weigh yourself once a week and record it in your diary  Eating changes: You will need to eat 500 to 1,000 fewer calories each day than you currently eat to lose 1 to 2 pounds a week  The following changes will help you cut calories:  Eat smaller portions  Use small plates, no larger than 9 inches in diameter  Fill your plate half full of fruits and vegetables  Measure your food using measuring cups until you know what a serving size looks like  Eat 3 meals and 1 or 2 snacks each day  Plan your meals in advance  Annette Marin and eat at home most of the time  Eat slowly  Do not skip meals  Skipping meals can lead to overeating later in the day  This can make it harder for you to lose weight  Talk with a dietitian to help you make a meal plan and schedule that is right for you      Eat fruits and vegetables at every meal   They are low in calories and high in fiber, which makes you feel full  Do not add butter, margarine, or cream sauce to vegetables  Use herbs to season steamed vegetables  Eat less fat and fewer fried foods  Eat more baked or grilled chicken and fish  These protein sources are lower in calories and fat than red meat  Limit fast food  Dress your salads with olive oil and vinegar instead of bottled dressing  Limit the amount of sugar you eat  Do not drink sugary beverages  Limit alcohol  Activity changes:  Physical activity is good for your body in many ways  It helps you burn calories and build strong muscles  It decreases stress and depression, and improves your mood  It can also help you sleep better  Talk to your healthcare provider before you begin an exercise program   Exercise for at least 30 minutes 5 days a week  Start slowly  Set aside time each day for physical activity that you enjoy and that is convenient for you  It is best to do both weight training and an activity that increases your heart rate, such as walking, bicycling, or swimming  Find ways to be more active  Do yard work and housecleaning  Walk up the stairs instead of using elevators  Spend your leisure time going to events that require walking, such as outdoor festivals or fairs  This extra physical activity can help you lose weight and keep it off  Follow up with your doctor as directed: You may need to meet with a dietitian  Write down your questions so you remember to ask them during your visits  © Copyright [employer] 2022 Information is for End User's use only and may not be sold, redistributed or otherwise used for commercial purposes  All illustrations and images included in CareNotes® are the copyrighted property of [employer]  or Yenny Mckay  The above information is an  only   It is not intended as medical advice for individual conditions or treatments  Talk to your doctor, nurse or pharmacist before following any medical regimen to see if it is safe and effective for you

## 2022-08-01 DIAGNOSIS — I10 ESSENTIAL HYPERTENSION: ICD-10-CM

## 2022-08-01 RX ORDER — LOSARTAN POTASSIUM 100 MG/1
TABLET ORAL
Qty: 90 TABLET | Refills: 1 | Status: SHIPPED | OUTPATIENT
Start: 2022-08-01

## 2022-08-03 ENCOUNTER — TELEPHONE (OUTPATIENT)
Dept: INTERNAL MEDICINE CLINIC | Age: 72
End: 2022-08-03

## 2022-08-03 ENCOUNTER — APPOINTMENT (OUTPATIENT)
Dept: LAB | Facility: MEDICAL CENTER | Age: 72
End: 2022-08-03
Payer: MEDICARE

## 2022-08-03 DIAGNOSIS — N39.0 URINARY TRACT INFECTION WITHOUT HEMATURIA, SITE UNSPECIFIED: Primary | ICD-10-CM

## 2022-08-03 RX ORDER — NITROFURANTOIN 25; 75 MG/1; MG/1
100 CAPSULE ORAL 2 TIMES DAILY
Qty: 10 CAPSULE | Refills: 0 | Status: SHIPPED | OUTPATIENT
Start: 2022-08-03 | End: 2022-08-08

## 2022-08-03 NOTE — TELEPHONE ENCOUNTER
Patient called with UTI SXS  Started today with burning with urinating  She states that she has a history of recurrent UTI's and she use to go for urine culture every 6 months  She has not had one for about a year so she has not gone for a culture in a while  Would you be able to send an antibiotic to pharmacy without an appt? (FYI there are several urine culture orders in chart if you want her to get one done)  FYI patient did go for culture today as per your instructions

## 2022-08-09 ENCOUNTER — TELEPHONE (OUTPATIENT)
Dept: INTERNAL MEDICINE CLINIC | Age: 72
End: 2022-08-09

## 2022-08-09 NOTE — TELEPHONE ENCOUNTER
Pt was treated for UTI last week  She did urine culture on 08/3  She finished med and is starting to feel it coming back on again  Can something else be done for her?

## 2022-08-10 DIAGNOSIS — N30.00 ACUTE CYSTITIS WITHOUT HEMATURIA: Primary | ICD-10-CM

## 2022-08-10 RX ORDER — CIPROFLOXACIN 500 MG/1
500 TABLET, FILM COATED ORAL EVERY 12 HOURS SCHEDULED
Qty: 14 TABLET | Refills: 0 | Status: SHIPPED | OUTPATIENT
Start: 2022-08-10 | End: 2022-08-17

## 2022-08-23 ENCOUNTER — FOLLOW UP (OUTPATIENT)
Dept: URBAN - METROPOLITAN AREA CLINIC 6 | Facility: CLINIC | Age: 72
End: 2022-08-23

## 2022-08-23 DIAGNOSIS — E11.3293: ICD-10-CM

## 2022-08-23 DIAGNOSIS — H04.123: ICD-10-CM

## 2022-08-23 DIAGNOSIS — H52.211: ICD-10-CM

## 2022-08-23 PROCEDURE — 92014 COMPRE OPH EXAM EST PT 1/>: CPT

## 2022-08-23 PROCEDURE — 92134 CPTRZ OPH DX IMG PST SGM RTA: CPT

## 2022-08-23 ASSESSMENT — KERATOMETRY
OD_K1POWER_DIOPTERS: 42.00
OD_AXISANGLE_DEGREES: 102
OD_K2POWER_DIOPTERS: 43.50
OD_AXISANGLE2_DEGREES: 12

## 2022-08-23 ASSESSMENT — VISUAL ACUITY
OD_SC: 20/60
OS_SC: 20/30
OD_PH: 20/30

## 2022-08-23 ASSESSMENT — TONOMETRY
OD_IOP_MMHG: 22
OS_IOP_MMHG: 22

## 2022-09-14 DIAGNOSIS — E11.40 CONTROLLED TYPE 2 DIABETES MELLITUS WITH DIABETIC NEUROPATHY, WITHOUT LONG-TERM CURRENT USE OF INSULIN (HCC): ICD-10-CM

## 2022-09-14 RX ORDER — BLOOD SUGAR DIAGNOSTIC
STRIP MISCELLANEOUS
Qty: 50 STRIP | Refills: 3 | Status: SHIPPED | OUTPATIENT
Start: 2022-09-14

## 2022-09-19 ENCOUNTER — HOSPITAL ENCOUNTER (OUTPATIENT)
Dept: BONE DENSITY | Facility: CLINIC | Age: 72
Discharge: HOME | End: 2022-09-19
Payer: MEDICARE

## 2022-09-19 DIAGNOSIS — Z78.0 POST-MENOPAUSAL: ICD-10-CM

## 2022-09-19 DIAGNOSIS — Z13.820 ENCOUNTER FOR SCREENING FOR OSTEOPOROSIS: ICD-10-CM

## 2022-09-19 PROCEDURE — 77080 DXA BONE DENSITY AXIAL: CPT

## 2022-09-28 DIAGNOSIS — E11.40 CONTROLLED TYPE 2 DIABETES MELLITUS WITH DIABETIC NEUROPATHY, WITHOUT LONG-TERM CURRENT USE OF INSULIN (HCC): ICD-10-CM

## 2022-09-28 RX ORDER — SEMAGLUTIDE 1.34 MG/ML
INJECTION, SOLUTION SUBCUTANEOUS
Qty: 9 ML | Refills: 2 | Status: SHIPPED | OUTPATIENT
Start: 2022-09-28

## 2022-10-12 PROBLEM — L50.1 CHRONIC IDIOPATHIC URTICARIA: Status: ACTIVE | Noted: 2022-10-12

## 2022-10-17 ENCOUNTER — APPOINTMENT (OUTPATIENT)
Dept: LAB | Facility: MEDICAL CENTER | Age: 72
End: 2022-10-17
Payer: MEDICARE

## 2022-10-17 DIAGNOSIS — E11.40 CONTROLLED TYPE 2 DIABETES MELLITUS WITH DIABETIC NEUROPATHY, WITHOUT LONG-TERM CURRENT USE OF INSULIN (HCC): ICD-10-CM

## 2022-10-17 LAB
ALBUMIN SERPL BCP-MCNC: 3.5 G/DL (ref 3.5–5)
ALP SERPL-CCNC: 114 U/L (ref 46–116)
ALT SERPL W P-5'-P-CCNC: 28 U/L (ref 12–78)
ANION GAP SERPL CALCULATED.3IONS-SCNC: 5 MMOL/L (ref 4–13)
AST SERPL W P-5'-P-CCNC: 17 U/L (ref 5–45)
BILIRUB SERPL-MCNC: 0.54 MG/DL (ref 0.2–1)
BUN SERPL-MCNC: 16 MG/DL (ref 5–25)
CALCIUM SERPL-MCNC: 9.6 MG/DL (ref 8.3–10.1)
CHLORIDE SERPL-SCNC: 103 MMOL/L (ref 96–108)
CHOLEST SERPL-MCNC: 145 MG/DL
CO2 SERPL-SCNC: 29 MMOL/L (ref 21–32)
CREAT SERPL-MCNC: 0.89 MG/DL (ref 0.6–1.3)
EST. AVERAGE GLUCOSE BLD GHB EST-MCNC: 120 MG/DL
GFR SERPL CREATININE-BSD FRML MDRD: 64 ML/MIN/1.73SQ M
GLUCOSE P FAST SERPL-MCNC: 101 MG/DL (ref 65–99)
HBA1C MFR BLD: 5.8 %
HDLC SERPL-MCNC: 38 MG/DL
LDLC SERPL CALC-MCNC: 76 MG/DL (ref 0–100)
NONHDLC SERPL-MCNC: 107 MG/DL
POTASSIUM SERPL-SCNC: 4.4 MMOL/L (ref 3.5–5.3)
PROT SERPL-MCNC: 7.2 G/DL (ref 6.4–8.4)
SODIUM SERPL-SCNC: 137 MMOL/L (ref 135–147)
TRIGL SERPL-MCNC: 154 MG/DL

## 2022-10-17 PROCEDURE — 80053 COMPREHEN METABOLIC PANEL: CPT

## 2022-10-17 PROCEDURE — 36415 COLL VENOUS BLD VENIPUNCTURE: CPT

## 2022-10-17 PROCEDURE — 83036 HEMOGLOBIN GLYCOSYLATED A1C: CPT

## 2022-10-17 PROCEDURE — 80061 LIPID PANEL: CPT

## 2022-10-28 ENCOUNTER — OFFICE VISIT (OUTPATIENT)
Dept: INTERNAL MEDICINE CLINIC | Age: 72
End: 2022-10-28

## 2022-10-28 VITALS
SYSTOLIC BLOOD PRESSURE: 130 MMHG | WEIGHT: 186 LBS | HEIGHT: 65 IN | OXYGEN SATURATION: 97 % | DIASTOLIC BLOOD PRESSURE: 82 MMHG | TEMPERATURE: 97.8 F | BODY MASS INDEX: 30.99 KG/M2 | HEART RATE: 78 BPM

## 2022-10-28 DIAGNOSIS — L50.1 CHRONIC IDIOPATHIC URTICARIA: Primary | ICD-10-CM

## 2022-10-28 DIAGNOSIS — Z00.00 MEDICARE ANNUAL WELLNESS VISIT, SUBSEQUENT: ICD-10-CM

## 2022-10-28 DIAGNOSIS — E11.40 CONTROLLED TYPE 2 DIABETES MELLITUS WITH DIABETIC NEUROPATHY, WITHOUT LONG-TERM CURRENT USE OF INSULIN (HCC): ICD-10-CM

## 2022-10-28 DIAGNOSIS — R32 INCONTINENCE IN FEMALE: ICD-10-CM

## 2022-10-28 RX ORDER — COVID-19 MOLECULAR TEST ASSAY
KIT MISCELLANEOUS
COMMUNITY
Start: 2022-10-24

## 2022-10-28 NOTE — PROGRESS NOTES
Diabetic Foot Exam       Assessment and Plan:     Problem List Items Addressed This Visit        Endocrine    Controlled diabetes mellitus ([facility] [address] )       Lab Results   Component Value Date    HGBA1C 5 8 (H) 10/17/2022   Continues to have great control of dm with  Metformin and ozempic            Musculoskeletal and Integument    Chronic idiopathic urticaria - Primary     She recently had a reoccurrence of her idiopathic hives currently taking high-dose antihistamines her the allergist            Other    BMI 31 0-31 9,adult     She has minimal fluctuation weight and again was counseled on diet and exercise         Incontinence in female     Patient occasionally has trouble holding her urine and was counseled on Kegel exercises           Other Visit Diagnoses     Medicare annual wellness visit, subsequent        BMI 30 0-30 9,adult            BMI Counseling: Body mass index is 30 95 kg/m²  The BMI is above normal  Nutrition recommendations include decreasing portion sizes and moderation in carbohydrate intake  Exercise recommendations include exercising 3-5 times per week  Rationale for BMI follow-up plan is due to patient being overweight or obese  Depression Screening and Follow-up Plan: Patient was screened for depression during today's encounter  They screened negative with a PHQ-2 score of 0  Urinary Incontinence Plan of Care: counseling topics discussed: practice Kegel (pelvic floor strengthening) exercises, limiting fluid intake 3-4 hours before bed, weight loss and bladder retraining  Preventive health issues were discussed with patient, and age appropriate screening tests were ordered as noted in patient's After Visit Summary  Personalized health advice and appropriate referrals for health education or preventive services given if needed, as noted in patient's After Visit Summary       History of Present Illness:     Patient presents for a Medicare Wellness Visit    Diabetes  She presents for her follow-up diabetic visit  She has type 2 diabetes mellitus  Her disease course has been stable  There are no hypoglycemic associated symptoms  Pertinent negatives for hypoglycemia include no seizures  Associated symptoms include weight loss  Pertinent negatives for diabetes include no blurred vision, no chest pain, no foot paresthesias and no foot ulcerations  Symptoms are stable  There are no diabetic complications  Risk factors for coronary artery disease include diabetes mellitus, dyslipidemia, hypertension, obesity and post-menopausal  Current diabetic treatment includes diet and oral agent (monotherapy) (Ozempic)  She is compliant with treatment most of the time  Her weight is fluctuating minimally  She is following a diabetic, low fat/cholesterol and low salt diet  Meal planning includes avoidance of concentrated sweets and carbohydrate counting  She has had a previous visit with a dietitian  She participates in exercise intermittently  Her home blood glucose trend is fluctuating minimally  An ACE inhibitor/angiotensin II receptor blocker is being taken  She does not see a podiatrist Eye exam is current  Patient Care Team:  Mariah Flores MD as PCP - General (Internal Medicine)     Review of Systems:     Review of Systems   Constitutional: Positive for weight loss  Negative for chills and fever  HENT: Negative for ear pain and sore throat  Eyes: Negative for blurred vision, pain and visual disturbance  Respiratory: Negative for cough and shortness of breath  Cardiovascular: Negative for chest pain and palpitations  Gastrointestinal: Negative for abdominal pain and vomiting  Genitourinary: Negative for dysuria and hematuria  Musculoskeletal: Negative for arthralgias and back pain  Skin: Negative for color change and rash  Neurological: Negative  Negative for seizures, syncope and numbness  Hematological: Negative  Psychiatric/Behavioral: Negative      All other systems reviewed and are negative  Problem List:     Patient Active Problem List   Diagnosis   • Age related osteoporosis   • Controlled diabetes mellitus ([facility] )   • Hyperlipidemia   • Essential hypertension   • BMI 31 0-31 9,adult   • Encounter for gynecological examination without abnormal finding   • Asymptomatic postmenopausal status   • NICKY on CPAP   • PLMD (periodic limb movement disorder)   • Primary osteoarthritis of right knee   • Primary osteoarthritis of one hip, left   • Trochanteric bursitis of left hip   • Back pain with left-sided sciatica   • Encounter for annual wellness visit (AWV) in Medicare patient   • Sacroiliitis ([facility] )   • Chronic idiopathic urticaria   • Incontinence in female      Past Medical and Surgical History:     Past Medical History:   Diagnosis Date   • Acute cystitis without hematuria 12/14/2020   • Arthritis 07/2018    PT 7/2018   • Carpal tunnel syndrome     Unspecified laterality   Resolved: 12/21/2016   • Cataract    • Chronic kidney disease    • Complex endometrial hyperplasia with atypia     Resolved: 11/03/17   • Diabetes mellitus ([facility] )     of other type with circulatory complication, without long-term current use of insulin   • History of chickenpox    • History of measles    • History of mumps    • Hyperlipidemia    • Hypertension    • Normal delivery     1971 and 1973 sons   • Obesity    • NICKY (obstructive sleep apnea)    • Post-menopausal bleeding     Resolved: 2011   • Seasonal allergies    • UTI (urinary tract infection)      Past Surgical History:   Procedure Laterality Date   • CARPAL TUNNEL RELEASE     • CARPAL TUNNEL RELEASE Left    • CATARACT EXTRACTION     • CHOLECYSTECTOMY     • COLONOSCOPY      Complete   • DILATION AND CURETTAGE OF UTERUS      twice   • ENDOMETRIAL BIOPSY      without cervical dilation   • HYSTEROSCOPY     • TUBAL LIGATION  1974   • WISDOM TOOTH EXTRACTION      x 2      Family History:     Family History   Problem Relation Age of Onset   • Hypertension Mother • Heart disease Mother    • Heart attack Mother    • Hypertension Father    • Cancer Father             • Prostate cancer Father 80   • Arthritis Father             • Hypertension Brother    • Heart disease Brother    • Prostate cancer Brother 61   • No Known Problems Brother    • Diabetes Maternal Grandmother    • Stroke Maternal Grandmother    • Diabetes Paternal Grandfather    • No Known Problems Child    • Arthritis Family    • Cancer Family         bladder   • Other Family         Cardiac disorder   • Diabetes Family    • Hypertension Family    • Valvular heart disease Family    • No Known Problems Son    • No Known Problems Son    • Breast cancer Neg Hx    • Colon cancer Neg Hx    • Ovarian cancer Neg Hx    • Uterine cancer Neg Hx    • Cervical cancer Neg Hx    • Thyroid disease Neg Hx       Social History:     Social History     Socioeconomic History   • Marital status: /Civil Union     Spouse name: Owen Kim   • Number of children: 2   • Years of education: None   • Highest education level: None   Occupational History   • Occupation: High School Adminstrator      Employer: [employer]   Tobacco Use   • Smoking status: Never Smoker   • Smokeless tobacco: Never Used   Vaping Use   • Vaping Use: Never used   Substance and Sexual Activity   • Alcohol use:  Yes     Alcohol/week: 2 0 standard drinks     Types: 2 Glasses of wine per week     Comment: less than one glass of wine per week   • Drug use: Never   • Sexual activity: Not Currently     Partners: Male     Birth control/protection: Post-menopausal   Other Topics Concern   • None   Social History Narrative    Daily coffee consumption (2 cups/day)        Who lives in your home:     What type of home do you live in: Single house    Age of your home: Built     How long have you been living there:     Type of heat: Forced hot air    Type of fuel: Gas and Electric    What type of keny is in your bedroom: Area rugs and Hardwood floor    Do you have the following in or near your home:    Air products: Central air    Pests: None    Pets: Dog    Are pets allowed in bedroom: Yes    Open fields, wooded areas nearby: Open fields and Wooded areas    Basement: None    Exposure to second hand smoke: No        Habits:    Caffeine: coffee 1  cup daily-soda rarely-ice tea 1 glass daily-hot tea 1 x a week     Chocolate: daily     Other:              Social Determinants of Health     Financial Resource Strain: Unknown   • Difficulty of Paying Living Expenses: Patient refused   Food Insecurity: Not on file   Transportation Needs: No Transportation Needs   • Lack of Transportation (Medical): No   • Lack of Transportation (Non-Medical):  No   Physical Activity: Sufficiently Active   • Days of Exercise per Week: 7 days   • Minutes of Exercise per Session: 50 min   Stress: Not on file   Social Connections: Not on file   Intimate Partner Violence: Not on file   Housing Stability: Not on file      Medications and Allergies:     Current Outpatient Medications   Medication Sig Dispense Refill   • amLODIPine (NORVASC) 5 mg tablet Take 1 tablet (5 mg total) by mouth daily 90 tablet 3   • atorvastatin (LIPITOR) 10 mg tablet Take 1 tablet (10 mg total) by mouth daily 90 tablet 3   • BinaxNOW COVID-19 Ag Home Test KIT TEST AS DIRECTED TODAY     • cetirizine (ZyrTEC) 10 mg tablet Take 2 tablets (20 mg total) by mouth daily 180 tablet 3   • Cranberry 600 MG TABS Take by mouth     • famotidine (PEPCID) 20 mg tablet TAKE 1 TABLET BY MOUTH  TWICE DAILY 180 tablet 3   • fexofenadine (ALLEGRA) 180 MG tablet Take 2 tablets (360 mg total) by mouth daily 90 tablet 3   • losartan (COZAAR) 100 MG tablet TAKE 1 TABLET BY MOUTH  DAILY 90 tablet 1   • metFORMIN (GLUCOPHAGE) 1000 MG tablet Take 1 tablet (1,000 mg total) by mouth 2 (two) times a day with meals 180 tablet 3   • metoprolol succinate (TOPROL-XL) 50 mg 24 hr tablet Take 1 tablet (50 mg total) by mouth daily 90 tablet 3   • omalizumab (XOLAIR) subcutaneous injection Inject 1 mL (150 mg total) under the skin every 28 days     • OneTouch Ultra test strip TEST BLOOD SUGAR DAILY 50 strip 3   • Ozempic, 1 MG/DOSE, 4 MG/3ML SOPN injection pen INJECT SUBCUTANEOUSLY 1 MG  ONCE WEEKLY 9 mL 2     No current facility-administered medications for this visit  Allergies   Allergen Reactions   • Other Sneezing     SEASONAL ALLERGIES       Immunizations:     Immunization History   Administered Date(s) Administered   • COVID-19 PFIZER VACCINE 0 3 ML IM 03/02/2021, 03/22/2021, 11/04/2021   • COVID-19 Pfizer vac (Oni-sucrose, gray cap) 12 yr+ IM 05/24/2022   • INFLUENZA 11/01/2011, 10/20/2015, 10/26/2016, 11/03/2017, 09/19/2018   • Influenza Split High Dose Preservative Free IM 10/01/2016, 11/03/2017   • Influenza, high dose seasonal 0 7 mL 10/18/2019, 09/22/2020, 10/15/2021   • Pneumococcal Conjugate 13-Valent 02/15/2017   • Pneumococcal Conjugate Vaccine 20-valent (Pcv20), Polysace 03/31/2022   • Pneumococcal Polysaccharide PPV23 01/01/2013   • Zoster 01/02/2012      Health Maintenance:         Topic Date Due   • DXA SCAN  09/19/2024   • Colorectal Cancer Screening  01/07/2031   • Hepatitis C Screening  Completed         Topic Date Due   • Influenza Vaccine (1) 09/01/2022      Medicare Screening Tests and Risk Assessments:     Last Medicare Wellness visit information reviewed, patient interviewed and updates made to the record today  Health Risk Assessment:   Patient rates overall health as good  Patient feels that their physical health rating is same  Patient is satisfied with their life  Eyesight was rated as slightly worse  Hearing was rated as same  Patient feels that their emotional and mental health rating is same  Patients states they are never, rarely angry  Patient states they are never, rarely unusually tired/fatigued  Pain experienced in the last 7 days has been some  Patient's pain rating has been 4/10  Patient states that she has experienced no weight loss or gain in last 6 months  Depression Screening:   PHQ-2 Score: 0      Fall Risk Screening: In the past year, patient has experienced: no history of falling in past year      Urinary Incontinence Screening:   Patient has leaked urine accidently in the last six months  Home Safety:  Patient does not have trouble with stairs inside or outside of their home  Patient has working smoke alarms and has working carbon monoxide detector  Home safety hazards include: none  Nutrition:   Current diet is Regular and Limited junk food  Medications:   Patient is currently taking over-the-counter supplements  OTC medications include: cranberry, Benadryl, cetirizine, fexofenidine, famotidine  Patient is able to manage medications  Activities of Daily Living (ADLs)/Instrumental Activities of Daily Living (IADLs):   Walk and transfer into and out of bed and chair?: Yes  Dress and groom yourself?: Yes    Bathe or shower yourself?: Yes    Feed yourself? Yes  Do your laundry/housekeeping?: Yes  Manage your money, pay your bills and track your expenses?: Yes  Make your own meals?: Yes    Do your own shopping?: Yes    Durable Medical Equipment Suppliers  None    Previous Hospitalizations:   Any hospitalizations or ED visits within the last 12 months?: No      Advance Care Planning:   Living will: Yes    Durable POA for healthcare:  Yes    Advanced directive: Yes    End of Life Decisions reviewed with patient: Yes    Provider agrees with end of life decisions: Yes      Cognitive Screening:   Provider or family/friend/caregiver concerned regarding cognition?: No    PREVENTIVE SCREENINGS      Cardiovascular Screening:    General: Screening Not Indicated and History Lipid Disorder      Diabetes Screening:     General: Screening Not Indicated and History Diabetes      Colorectal Cancer Screening:     General: Screening Current      Breast Cancer Screening:       Due for: Mammogram        Cervical Cancer Screening:    General: Screening Not Indicated      Osteoporosis Screening:    General: Screening Not Indicated and History Osteoporosis      Abdominal Aortic Aneurysm (AAA) Screening:        General: Screening Not Indicated      Lung Cancer Screening:     General: Screening Not Indicated      Hepatitis C Screening:    General: Screening Current    Screening, Brief Intervention, and Referral to Treatment (SBIRT)    Screening  Typical number of drinks in a day: 0  Typical number of drinks in a week: 0  Interpretation: Low risk drinking behavior  AUDIT-C Screenin) How often did you have a drink containing alcohol in the past year? monthly or less  2) How many drinks did you have on a typical day when you were drinking in the past year? 0  3) How often did you have 6 or more drinks on one occasion in the past year? never    AUDIT-C Score: 1  Interpretation: Score 0-2 (female): Negative screen for alcohol misuse    Single Item Drug Screening:  How often have you used an illegal drug (including marijuana) or a prescription medication for non-medical reasons in the past year? never    Single Item Drug Screen Score: 0  Interpretation: Negative screen for possible drug use disorder    Other Counseling Topics:   Car/seat belt/driving safety and calcium and vitamin D intake and regular weightbearing exercise  No exam data present     Physical Exam:     /82 (BP Location: Left arm, Patient Position: Sitting)   Pulse 78   Temp 97 8 °F (36 6 °C) (Tympanic)   Ht 5' 5" (1 651 m)   Wt 84 4 kg (186 lb)   SpO2 97%   BMI 30 95 kg/m²     Physical Exam  Vitals and nursing note reviewed  Constitutional:       General: She is not in acute distress  Appearance: She is well-developed  HENT:      Head: Normocephalic and atraumatic  Nose: Nose normal       Mouth/Throat:      Pharynx: Oropharynx is clear     Eyes:      Conjunctiva/sclera: Conjunctivae normal    Cardiovascular: Rate and Rhythm: Normal rate and regular rhythm  Pulses: no weak pulses          Dorsalis pedis pulses are 1+ on the left side  Posterior tibial pulses are 1+ on the left side  Heart sounds: No murmur heard  Pulmonary:      Effort: Pulmonary effort is normal  No respiratory distress  Breath sounds: Normal breath sounds  Abdominal:      Palpations: Abdomen is soft  Tenderness: There is no abdominal tenderness  Musculoskeletal:         General: Normal range of motion  Cervical back: Neck supple  Right lower leg: No edema  Feet:      Right foot:      Skin integrity: No ulcer, skin breakdown, erythema, warmth, callus or dry skin  Left foot:      Skin integrity: No ulcer, skin breakdown, erythema, warmth, callus or dry skin  Skin:     General: Skin is warm and dry  Neurological:      General: No focal deficit present  Mental Status: She is alert and oriented to person, place, and time  Mental status is at baseline  Cranial Nerves: No cranial nerve deficit  Sensory: No sensory deficit  Motor: No weakness  Gait: Gait normal    Psychiatric:         Mood and Affect: Mood normal          Behavior: Behavior normal          Thought Content: Thought content normal          Judgment: Judgment normal      Patient's shoes and socks removed  Right Foot/Ankle   Right Foot Inspection  Skin Exam: skin normal and skin intact  No dry skin, no warmth, no callus, no erythema, no maceration, no abnormal color, no pre-ulcer, no ulcer and no callus  Toe Exam: ROM and strength within normal limits  Sensory   Vibration: intact  Proprioception: intact  Monofilament testing: intact    Vascular  Capillary refills: < 3 seconds          Left Foot/Ankle  Left Foot Inspection  Skin Exam: skin normal and skin intact  No dry skin, no warmth, no erythema, no maceration, normal color, no pre-ulcer, no ulcer and no callus       Toe Exam: ROM and strength within normal limits  Sensory   Vibration: intact  Proprioception: intact  Monofilament testing: intact    Vascular  Capillary refills: < 3 seconds  The left DP pulse is 1+  The left PT pulse is 1+  Assign Risk Category  No deformity present  No loss of protective sensation  No weak pulses  Risk: 0      Jc Barreto MD  BMI Counseling: Body mass index is 30 95 kg/m²  The BMI is above normal  Exercise recommendations include exercising 3-5 times per week

## 2022-10-28 NOTE — ASSESSMENT & PLAN NOTE
She recently had a reoccurrence of her idiopathic hives currently taking high-dose antihistamines her the allergist

## 2022-10-28 NOTE — PATIENT INSTRUCTIONS
Medicare Preventive Visit Patient Instructions  Thank you for completing your Welcome to Medicare Visit or Medicare Annual Wellness Visit today  Your next wellness visit will be due in one year (10/29/2023)  The screening/preventive services that you may require over the next 5-10 years are detailed below  Some tests may not apply to you based off risk factors and/or age  Screening tests ordered at today's visit but not completed yet may show as past due  Also, please note that scanned in results may not display below  Preventive Screenings:  Service Recommendations Previous Testing/Comments   Colorectal Cancer Screening  * Colonoscopy    * Fecal Occult Blood Test (FOBT)/Fecal Immunochemical Test (FIT)  * Fecal DNA/Cologuard Test  * Flexible Sigmoidoscopy Age: 39-70 years old   Colonoscopy: every 10 years (may be performed more frequently if at higher risk)  OR  FOBT/FIT: every 1 year  OR  Cologuard: every 3 years  OR  Sigmoidoscopy: every 5 years  Screening may be recommended earlier than age 39 if at higher risk for colorectal cancer  Also, an individualized decision between you and your healthcare provider will decide whether screening between the ages of 74-80 would be appropriate  Colonoscopy: 01/07/2021  FOBT/FIT: Not on file  Cologuard: 11/12/2020  Sigmoidoscopy: Not on file          Breast Cancer Screening Age: 36 years old  Frequency: every 1-2 years  Not required if history of left and right mastectomy Mammogram: 08/12/2019        Cervical Cancer Screening Between the ages of 21-29, pap smear recommended once every 3 years  Between the ages of 33-67, can perform pap smear with HPV co-testing every 5 years     Recommendations may differ for women with a history of total hysterectomy, cervical cancer, or abnormal pap smears in past  Pap Smear: 11/12/2018        Hepatitis C Screening Once for adults born between 1945 and 1965  More frequently in patients at high risk for Hepatitis C Hep C Antibody: 10/08/2018        Diabetes Screening 1-2 times per year if you're at risk for diabetes or have pre-diabetes Fasting glucose: 101 mg/dL (10/17/2022)  A1C: 5 8 % (10/17/2022)      Cholesterol Screening Once every 5 years if you don't have a lipid disorder  May order more often based on risk factors  Lipid panel: 10/17/2022          Other Preventive Screenings Covered by Medicare:  1  Abdominal Aortic Aneurysm (AAA) Screening: covered once if your at risk  You're considered to be at risk if you have a family history of AAA  2  Lung Cancer Screening: covers low dose CT scan once per year if you meet all of the following conditions: (1) Age 50-69; (2) No signs or symptoms of lung cancer; (3) Current smoker or have quit smoking within the last 15 years; (4) You have a tobacco smoking history of at least 20 pack years (packs per day multiplied by number of years you smoked); (5) You get a written order from a healthcare provider  3  Glaucoma Screening: covered annually if you're considered high risk: (1) You have diabetes OR (2) Family history of glaucoma OR (3)  aged 48 and older OR (3)  American aged 72 and older  3  Osteoporosis Screening: covered every 2 years if you meet one of the following conditions: (1) You're estrogen deficient and at risk for osteoporosis based off medical history and other findings; (2) Have a vertebral abnormality; (3) On glucocorticoid therapy for more than 3 months; (4) Have primary hyperparathyroidism; (5) On osteoporosis medications and need to assess response to drug therapy  · Last bone density test (DXA Scan): 09/19/2022   5  HIV Screening: covered annually if you're between the age of 15-65  Also covered annually if you are younger than 13 and older than 72 with risk factors for HIV infection  For pregnant patients, it is covered up to 3 times per pregnancy      Immunizations:  Immunization Recommendations   Influenza Vaccine Annual influenza vaccination during flu season is recommended for all persons aged >= 6 months who do not have contraindications   Pneumococcal Vaccine   * Pneumococcal conjugate vaccine = PCV13 (Prevnar 13), PCV15 (Vaxneuvance), PCV20 (Prevnar 20)  * Pneumococcal polysaccharide vaccine = PPSV23 (Pneumovax) Adults 25-60 years old: 1-3 doses may be recommended based on certain risk factors  Adults 72 years old: 1-2 doses may be recommended based off what pneumonia vaccine you previously received   Hepatitis B Vaccine 3 dose series if at intermediate or high risk (ex: diabetes, end stage renal disease, liver disease)   Tetanus (Td) Vaccine - COST NOT COVERED BY MEDICARE PART B Following completion of primary series, a booster dose should be given every 10 years to maintain immunity against tetanus  Td may also be given as tetanus wound prophylaxis  Tdap Vaccine - COST NOT COVERED BY MEDICARE PART B Recommended at least once for all adults  For pregnant patients, recommended with each pregnancy  Shingles Vaccine (Shingrix) - COST NOT COVERED BY MEDICARE PART B  2 shot series recommended in those aged 48 and above     Health Maintenance Due:      Topic Date Due   • DXA SCAN  09/19/2024   • Colorectal Cancer Screening  01/07/2031   • Hepatitis C Screening  Completed     Immunizations Due:      Topic Date Due   • Influenza Vaccine (1) 09/01/2022     Advance Directives   What are advance directives? Advance directives are legal documents that state your wishes and plans for medical care  These plans are made ahead of time in case you lose your ability to make decisions for yourself  Advance directives can apply to any medical decision, such as the treatments you want, and if you want to donate organs  What are the types of advance directives? There are many types of advance directives, and each state has rules about how to use them  You may choose a combination of any of the following:  · Living will:   This is a written record of the treatment you want  You can also choose which treatments you do not want, which to limit, and which to stop at a certain time  This includes surgery, medicine, IV fluid, and tube feedings  · Durable power of  for healthcare [facility]): This is a written record that states who you want to make healthcare choices for you when you are unable to make them for yourself  This person, called a proxy, is usually a family member or a friend  You may choose more than 1 proxy  · Do not resuscitate (DNR) order:  A DNR order is used in case your heart stops beating or you stop breathing  It is a request not to have certain forms of treatment, such as CPR  A DNR order may be included in other types of advance directives  · Medical directive: This covers the care that you want if you are in a coma, near death, or unable to make decisions for yourself  You can list the treatments you want for each condition  Treatment may include pain medicine, surgery, blood transfusions, dialysis, IV or tube feedings, and a ventilator (breathing machine)  · Values history: This document has questions about your views, beliefs, and how you feel and think about life  This information can help others choose the care that you would choose  Why are advance directives important? An advance directive helps you control your care  Although spoken wishes may be used, it is better to have your wishes written down  Spoken wishes can be misunderstood, or not followed  Treatments may be given even if you do not want them  An advance directive may make it easier for your family to make difficult choices about your care  Weight Management   Why it is important to manage your weight:  Being overweight increases your risk of health conditions such as heart disease, high blood pressure, type 2 diabetes, and certain types of cancer  It can also increase your risk for osteoarthritis, sleep apnea, and other respiratory problems   Aim for a slow, steady weight loss  Even a small amount of weight loss can lower your risk of health problems  How to lose weight safely:  A safe and healthy way to lose weight is to eat fewer calories and get regular exercise  You can lose up about 1 pound a week by decreasing the number of calories you eat by 500 calories each day  Healthy meal plan for weight management:  A healthy meal plan includes a variety of foods, contains fewer calories, and helps you stay healthy  A healthy meal plan includes the following:  · Eat whole-grain foods more often  A healthy meal plan should contain fiber  Fiber is the part of grains, fruits, and vegetables that is not broken down by your body  Whole-grain foods are healthy and provide extra fiber in your diet  Some examples of whole-grain foods are whole-wheat breads and pastas, oatmeal, brown rice, and bulgur  · Eat a variety of vegetables every day  Include dark, leafy greens such as spinach, kale, prem greens, and mustard greens  Eat yellow and orange vegetables such as carrots, sweet potatoes, and winter squash  · Eat a variety of fruits every day  Choose fresh or canned fruit (canned in its own juice or light syrup) instead of juice  Fruit juice has very little or no fiber  · Eat low-fat dairy foods  Drink fat-free (skim) milk or 1% milk  Eat fat-free yogurt and low-fat cottage cheese  Try low-fat cheeses such as mozzarella and other reduced-fat cheeses  · Choose meat and other protein foods that are low in fat  Choose beans or other legumes such as split peas or lentils  Choose fish, skinless poultry (chicken or turkey), or lean cuts of red meat (beef or pork)  Before you cook meat or poultry, cut off any visible fat  · Use less fat and oil  Try baking foods instead of frying them  Add less fat, such as margarine, sour cream, regular salad dressing and mayonnaise to foods  Eat fewer high-fat foods   Some examples of high-fat foods include french fries, doughnuts, ice cream, and cakes  · Eat fewer sweets  Limit foods and drinks that are high in sugar  This includes candy, cookies, regular soda, and sweetened drinks  Exercise:  Exercise at least 30 minutes per day on most days of the week  Some examples of exercise include walking, biking, dancing, and swimming  You can also fit in more physical activity by taking the stairs instead of the elevator or parking farther away from stores  Ask your healthcare provider about the best exercise plan for you  © Copyright [employer] 2018 Information is for End User's use only and may not be sold, redistributed or otherwise used for commercial purposes  All illustrations and images included in CareNotes® are the copyrighted property of A D A M , Inc  or [address] [place] [address] [employer]     Obesity   AMBULATORY CARE:   Obesity  means your body mass index (BMI) is greater than 30  Your healthcare provider will use your height and weight to measure your BMI  The risks of obesity include  many health problems, including injuries or physical disability  · Diabetes (high blood sugar level)    · High blood pressure or high cholesterol    · Heart disease    · Stroke    · Gallbladder or liver disease    · Cancer of the colon, breast, prostate, liver, or kidney    · Sleep apnea    · Arthritis or gout    Screening  is done to check for health conditions before you have signs or symptoms  If you are 28to 79years old, your blood sugar level may be checked every 3 years for signs of prediabetes or diabetes  Your healthcare provider will check your blood pressure at each visit  High blood pressure can lead to a stroke or other problems  Your provider may check for signs of heart disease, cancer, or other health problems  Seek care immediately if:   · You have a severe headache, confusion, or difficulty speaking  · You have weakness on one side of your body  · You have chest pain, sweating, or shortness of breath      Call your doctor if:   · You have symptoms of gallbladder or liver disease, such as pain in your upper abdomen  · You have knee or hip pain and discomfort while walking  · You have symptoms of diabetes, such as intense hunger and thirst, and frequent urination  · You have symptoms of sleep apnea, such as snoring or daytime sleepiness  · You have questions or concerns about your condition or care  Treatment for obesity  focuses on helping you lose weight to improve your health  Even a small decrease in BMI can reduce the risk for many health problems  Your healthcare provider will help you set a weight-loss goal   · Lifestyle changes  are the first step in treating obesity  These include making healthy food choices and getting regular physical activity  Your healthcare provider may suggest a weight-loss program that involves coaching, education, and therapy  · Medicine  may help you lose weight when it is used with a healthy foods and physical activity  · Surgery  can help you lose weight if you are very obese and have other health problems  There are several types of weight-loss surgery  Ask your healthcare provider for more information  Tips for safe weight loss:   · Set small, realistic goals  An example of a small goal is to walk for 20 minutes 5 days a week  Anther goal is to lose 5% of your body weight  · Tell friends, family members, and coworkers about your goals  and ask for their support  Ask a friend to lose weight with you, or join a weight-loss support group  · Identify foods or triggers that may cause you to overeat , and find ways to avoid them  Remove tempting high-calorie foods from your home and workplace  Place a bowl of fresh fruit on your kitchen counter  If stress causes you to eat, then find other ways to cope with stress  A counselor or therapist may be able to help you  · Keep a diary to track what you eat and drink  Also write down how many minutes of physical activity you do each day  Weigh yourself once a week and record it in your diary  Eating changes: You will need to eat 500 to 1,000 fewer calories each day than you currently eat to lose 1 to 2 pounds a week  The following changes will help you cut calories:  · Eat smaller portions  Use small plates, no larger than 9 inches in diameter  Fill your plate half full of fruits and vegetables  Measure your food using measuring cups until you know what a serving size looks like  · Eat 3 meals and 1 or 2 snacks each day  Plan your meals in advance  [place] and eat at home most of the time  Eat slowly  Do not skip meals  Skipping meals can lead to overeating later in the day  This can make it harder for you to lose weight  Talk with a dietitian to help you make a meal plan and schedule that is right for you  · Eat fruits and vegetables at every meal   They are low in calories and high in fiber, which makes you feel full  Do not add butter, margarine, or cream sauce to vegetables  Use herbs to season steamed vegetables  · Eat less fat and fewer fried foods  Eat more baked or grilled chicken and fish  These protein sources are lower in calories and fat than red meat  Limit fast food  Dress your salads with olive oil and vinegar instead of bottled dressing  · Limit the amount of sugar you eat  Do not drink sugary beverages  Limit alcohol  Activity changes:  Physical activity is good for your body in many ways  It helps you burn calories and build strong muscles  It decreases stress and depression, and improves your mood  It can also help you sleep better  Talk to your healthcare provider before you begin an exercise program   · Exercise for at least 30 minutes 5 days a week  Start slowly  Set aside time each day for physical activity that you enjoy and that is convenient for you  It is best to do both weight training and an activity that increases your heart rate, such as walking, bicycling, or swimming              · Find ways to be more active  Do yard work and housecleaning  Walk up the stairs instead of using elevators  Spend your leisure time going to events that require walking, such as outdoor festivals or fairs  This extra physical activity can help you lose weight and keep it off  Follow up with your doctor as directed: You may need to meet with a dietitian  Write down your questions so you remember to ask them during your visits  © Copyright [employer] 2022 Information is for End User's use only and may not be sold, redistributed or otherwise used for commercial purposes  All illustrations and images included in CareNotes® are the copyrighted property of A D A M , Inc  or [facility]   The above information is an  only  It is not intended as medical advice for individual conditions or treatments  Talk to your doctor, nurse or pharmacist before following any medical regimen to see if it is safe and effective for you  Low Fat Diet   AMBULATORY CARE:   A low-fat diet  is an eating plan that is low in total fat, unhealthy fat, and cholesterol  You may need to follow a low-fat diet if you have trouble digesting or absorbing fat  You may also need to follow this diet if you have high cholesterol  You can also lower your cholesterol by increasing the amount of fiber in your diet  Soluble fiber is a type of fiber that helps to decrease cholesterol levels  Different types of fat in food:   · Limit unhealthy fats  A diet that is high in cholesterol, saturated fat, and trans fat may cause unhealthy cholesterol levels  Unhealthy cholesterol levels increase your risk of heart disease  ? Cholesterol:  Limit intake of cholesterol to less than 200 mg per day  Cholesterol is found in meat, eggs, and dairy  ? Saturated fat:  Limit saturated fat to less than 7% of your total daily calories  Ask your dietitian how many calories you need each day   Saturated fat is found in butter, cheese, ice cream, whole milk, and palm oil  Saturated fat is also found in meat, such as beef, pork, chicken skin, and processed meats  Processed meats include sausage, hot dogs, and bologna  ? Trans fat:  Avoid trans fat as much as possible  Trans fat is used in fried and baked foods  Foods that say trans fat free on the label may still have up to 0 5 grams of trans fat per serving  · Include healthy fats  Replace foods that are high in saturated and trans fat with foods high in healthy fats  This may help to decrease high cholesterol levels  ? Monounsaturated fats: These are found in avocados, nuts, and vegetable oils, such as olive, canola, and sunflower oil  ? Polyunsaturated fats: These can be found in vegetable oils, such as soybean or corn oil  Omega-3 fats can help to decrease the risk of heart disease  Omega-3 fats are found in fish, such as salmon, herring, trout, and tuna  Omega-3 fats can also be found in plant foods, such as walnuts, flaxseed, soybeans, and canola oil  Foods to limit or avoid:   · Grains:      ? Snacks that are made with partially hydrogenated oils, such as chips, regular crackers, and butter-flavored popcorn    ? High-fat baked goods, such as biscuits, croissants, doughnuts, pies, cookies, and pastries    · Dairy:      ? Whole milk, 2% milk, and yogurt and ice cream made with whole milk    ? Half and half creamer, heavy cream, and whipping cream    ? Cheese, cream cheese, and sour cream    · Meats and proteins:      ? High-fat cuts of meat (T-bone steak, regular hamburger, and ribs)    ? Fried meat, poultry (turkey and chicken), and fish    ? Poultry (chicken and turkey) with skin    ? Cold cuts (salami or bologna), hot dogs, riggins, and sausage    ? Whole eggs and egg yolks    · Vegetables and fruits with added fat:      ? Fried vegetables or vegetables in butter or high-fat sauces, such as cream or cheese sauces    ?  Fried fruit or fruit served with butter or cream    · Fats: ? Butter, stick margarine, and shortening    ? Coconut, palm oil, and palm kernel oil    Foods to include:   · Grains:      ? Whole-grain breads, cereals, pasta, and brown rice    ? Low-fat crackers and pretzels    · Vegetables and fruits:      ? Fresh, frozen, or canned vegetables (no salt or low-sodium)    ? Fresh, frozen, dried, or canned fruit (canned in light syrup or fruit juice)    ? Avocado    · Low-fat dairy products:      ? Nonfat (skim) or 1% milk    ? Nonfat or low-fat cheese, yogurt, and cottage cheese    · Meats and proteins:      ? Chicken or turkey with no skin    ? Baked or broiled fish    ? Lean beef and pork (loin, round, extra lean hamburger)    ? Beans and peas, unsalted nuts, soy products    ? Egg whites and substitutes    ? Seeds and nuts    · Fats:      ? Unsaturated oil, such as canola, olive, peanut, soybean, or sunflower oil    ? Soft or liquid margarine and vegetable oil spread    ? Low-fat salad dressing    Other ways to decrease fat:   · Read food labels before you buy foods  Choose foods that have less than 30% of calories from fat  Choose low-fat or fat-free dairy products  Remember that fat free does not mean calorie free  These foods still contain calories, and too many calories can lead to weight gain  · Trim fat from meat and avoid fried food  Trim all visible fat from meat before you cook it  Remove the skin from poultry  Do not miller meat, fish, or poultry  Bake, roast, boil, or broil these foods instead  Avoid fried foods  Eat a baked potato instead of [place] fries  Steam vegetables instead of sautéing them in butter  · Add less fat to foods  Use imitation riggins bits on salads and baked potatoes instead of regular riggins bits  Use fat-free or low-fat salad dressings instead of regular dressings  Use low-fat or nonfat butter-flavored topping instead of regular butter or margarine on popcorn and other foods      Ways to decrease fat in recipes:  Replace high-fat ingredients with low-fat or nonfat ones  This may cause baked goods to be drier than usual  You may need to use nonfat cooking spray on pans to prevent food from sticking  You also may need to change the amount of other ingredients, such as water, in the recipe  Try the following:  · Use low-fat or light margarine instead of regular margarine or shortening  · Use lean ground turkey breast or chicken, or lean ground beef (less than 5% fat) instead of hamburger  · Add 1 teaspoon of canola oil to 8 ounces of skim milk instead of using cream or half and half  · Use grated zucchini, carrots, or apples in breads instead of coconut  · Use blenderized, low-fat cottage cheese, plain tofu, or low-fat ricotta cheese instead of cream cheese  · Use 1 egg white and 1 teaspoon of canola oil, or use ¼ cup (2 ounces) of fat-free egg substitute instead of a whole egg  · Replace half of the oil that is called for in a recipe with applesauce when you bake  Use 3 tablespoons of cocoa powder and 1 tablespoon of canola oil instead of a square of baking chocolate  How to increase fiber:  Eat enough high-fiber foods to get 20 to 30 grams of fiber every day  Slowly increase your fiber intake to avoid stomach cramps, gas, and other problems  · Eat 3 ounces of whole-grain foods each day  An ounce is about 1 slice of bread  Eat whole-grain breads, such as whole-wheat bread  Whole wheat, whole-wheat flour, or other whole grains should be listed as the first ingredient on the food label  Replace white flour with whole-grain flour or use half of each in recipes  Whole-grain flour is heavier than white flour, so you may have to add more yeast or baking powder  · Eat a high-fiber cereal for breakfast   Oatmeal is a good source of soluble fiber  Look for cereals that have bran or fiber in the name  Choose whole-grain products, such as brown rice, barley, and whole-wheat pasta  · Eat more beans, peas, and lentils  For example, add beans to soups or salads  Eat at least 5 cups of fruits and vegetables each day  Eat fruits and vegetables with the peel because the peel is high in fiber  © Copyright [employer] 2022 Information is for End User's use only and may not be sold, redistributed or otherwise used for commercial purposes  All illustrations and images included in CareNotes® are the copyrighted property of [employer]  or [employer]   The above information is an  only  It is not intended as medical advice for individual conditions or treatments  Talk to your doctor, nurse or pharmacist before following any medical regimen to see if it is safe and effective for you  Heart Healthy Diet   AMBULATORY CARE:   A heart healthy diet  is an eating plan low in unhealthy fats and sodium (salt)  The plan is high in healthy fats and fiber  A heart healthy diet helps improve your cholesterol levels and lowers your risk for heart disease and stroke  A dietitian will teach you how to read and understand food labels  Heart healthy diet guidelines to follow:   · Choose foods that contain healthy fats  ? Unsaturated fats  include monounsaturated and polyunsaturated fats  Unsaturated fat is found in foods such as soybean, canola, olive, corn, and safflower oils  It is also found in soft tub margarine that is made with liquid vegetable oil  ? Omega-3 fat  is found in certain fish, such as salmon, tuna, and trout, and in walnuts and flaxseed  Eat fish high in omega-3 fats at least 2 times a week  · Get 20 to 30 grams of fiber each day  Fruits, vegetables, whole-grain foods, and legumes (cooked beans) are good sources of fiber  · Limit or do not have unhealthy fats  ? Cholesterol  is found in animal foods, such as eggs and lobster, and in dairy products made from whole milk  Limit cholesterol to less than 200 mg each day      ? Saturated fat  is found in meats, such as riggins and hamburger  It is also found in chicken or turkey skin, whole milk, and butter  Limit saturated fat to less than 7% of your total daily calories  ? Trans fat  is found in packaged foods, such as potato chips and cookies  It is also in hard margarine, some fried foods, and shortening  Do not eat foods that contain trans fats  · Limit sodium as directed  You may be told to limit sodium to 2,000 to 2,300 mg each day  Choose low-sodium or no-salt-added foods  Add little or no salt to food you prepare  Use herbs and spices in place of salt  Include the following in your heart healthy plan:  Ask your dietitian or healthcare provider how many servings to have from each of the following food groups:  · Grains:      ? Whole-wheat breads, cereals, and pastas, and brown rice    ? Low-fat, low-sodium crackers and chips    · Vegetables:      ? Broccoli, green beans, green peas, and spinach    ? Collards, kale, and lima beans    ? Carrots, sweet potatoes, tomatoes, and peppers    ? Canned vegetables with no salt added    · Fruits:      ? Bananas, peaches, pears, and pineapple    ? Grapes, raisins, and dates    ? Oranges, tangerines, grapefruit, orange juice, and grapefruit juice    ? Apricots, mangoes, melons, and papaya    ? Raspberries and strawberries    ? Canned fruit with no added sugar    · Low-fat dairy:      ? Nonfat (skim) milk, 1% milk, and low-fat almond, cashew, or soy milks fortified with calcium    ? Low-fat cheese, regular or frozen yogurt, and cottage cheese    · Meats and proteins:      ? Lean cuts of beef and pork (loin, leg, round), skinless chicken and turkey    ? Legumes, soy products, egg whites, or nuts    Limit or do not include the following in your heart healthy plan:   · Unhealthy fats and oils:      ? Whole or 2% milk, cream cheese, sour cream, or cheese    ? High-fat cuts of beef (T-bone steaks, ribs), chicken or turkey with skin, and organ meats such as liver    ?  Butter, stick margarine, shortening, and cooking oils such as coconut or palm oil    · Foods and liquids high in sodium:      ? Packaged foods, such as frozen dinners, cookies, macaroni and cheese, and cereals with more than 300 mg of sodium per serving    ? Vegetables with added sodium, such as instant potatoes, vegetables with added sauces, or regular canned vegetables    ? Cured or smoked meats, such as hot dogs, riggins, and sausage    ? High-sodium ketchup, barbecue sauce, salad dressing, pickles, olives, soy sauce, or miso    · Foods and liquids high in sugar:      ? Candy, cake, cookies, pies, or doughnuts    ? Soft drinks (soda), sports drinks, or sweetened tea    ? Canned or dry mixes for cakes, soups, sauces, or gravies    Other healthy heart guidelines:   · Do not smoke  Nicotine and other chemicals in cigarettes and cigars can cause lung and heart damage  Ask your healthcare provider for information if you currently smoke and need help to quit  E-cigarettes or smokeless tobacco still contain nicotine  Talk to your healthcare provider before you use these products  · Limit or do not drink alcohol as directed  Alcohol can damage your heart and raise your blood pressure  Your healthcare provider may give you specific daily and weekly limits  The general recommended limit is 1 drink a day for women 21 or older and for men 72 or older  Do not have more than 3 drinks in a day or 7 in a week  The recommended limit is 2 drinks a day for men 24to 59years of age  Do not have more than 4 drinks in a day or 14 in a week  A drink of alcohol is 12 ounces of beer, 5 ounces of wine, or 1½ ounces of liquor  · Exercise regularly  Exercise can help you maintain a healthy weight and improve your blood pressure and cholesterol levels  Regular exercise can also decrease your risk for heart problems  Ask your healthcare provider about the best exercise plan for you  Do not start an exercise program without asking your healthcare provider  Follow up with your doctor or cardiologist as directed:  Write down your questions so you remember to ask them during your visits  © Copyright [employer] 2022 Information is for End User's use only and may not be sold, redistributed or otherwise used for commercial purposes  All illustrations and images included in CareNotes® are the copyrighted property of A D A M [employer] Inc  or Yenny Whiet   The above information is an  only  It is not intended as medical advice for individual conditions or treatments  Talk to your doctor, nurse or pharmacist before following any medical regimen to see if it is safe and effective for you

## 2022-10-28 NOTE — ASSESSMENT & PLAN NOTE
Lab Results   Component Value Date    HGBA1C 5 8 (H) 10/17/2022   Continues to have great control of dm with  Metformin and ozempic

## 2022-11-27 NOTE — PROGRESS NOTES
Assessment    1  Encounter for preventive health examination (V70 0) (Z00 00)    Plan  Controlled diabetes mellitus    · BD Pen Needle Mini U/F 31G X 5 MM Miscellaneous; USE AS DIRECTED   · OneTouch Ultra Blue In Vitro Strip; TEST TWICE DAILY  Health Maintenance    · *VB - Foot Exam; Status:Complete;   Done: 49JML6076 12:00AM   · *VB - Urinary Incontinence Screen (Dx Z13 89 Screen for UI); Status:Complete;   Done:  02YQZ1428 12:00AM  Need for pneumococcal vaccination    · Prevnar 13 Intramuscular Suspension    Discussion/Summary  Impression: Initial Annual Wellness Visit  Cardiovascular screening and counseling: screening is current  Diabetes screening and counseling: screening is current  Colorectal cancer screening and counseling: screening is current  Breast cancer screening and counseling: due for a screening mammogram    Cervical cancer screening and counseling: screening is current  Osteoporosis screening and counseling: due for DXA axial skeleton  Abdominal aortic aneurysm screening and counseling: screening not indicated  Glaucoma screening and counseling: screening is current  HIV screening and counseling: screening not indicated  Immunizations: influenza vaccine is up to date this year, influenza vaccination is recommended annually, needs Prevnar, hepatitis B vaccination series is not indicated at this time due to the patient's low risk of radha the disease, Zostavax vaccination up to date and the risks and benefits of the Tdap vaccine were discussed with the patient  Advance Directive Planning: not complete, she was encouraged to follow-up with me to discuss her questions and/or decisions  Advice and education were given regarding increasing physical activity and weight loss  Chief Complaint  medicare well exam      History of Present Illness  Welcome to Medicare and Wellness Visits: The patient is being seen for the initial annual wellness visit     Medicare Screening and Risk Factors   Hospitalizations: she has been previously hospitalizied  Medicare Screening Tests Risk Questions   Abdominal aortic aneurysm risk assessment: none indicated  Osteoporosis risk assessment: , female gender and over 48years of age  HIV risk assessment: none indicated  Drug and Alcohol Use: The patient has never smoked cigarettes  The patient reports rare alcohol use  She has never used illicit drugs  Diet and Physical Activity: Current diet includes low carbohydrate food choices  She exercises 3 times per week  Exercise: walking  Mood Disorder and Cognitive Impairment Screening: She denies feeling down, depressed, or hopeless over the past two weeks  She denies feeling little interest or pleasure in doing things over the past two weeks  Cognitive impairment screening: denies difficulty learning/retaining new information, denies difficulty handling complex tasks, denies difficulty with spatial ability and orientation, denies difficulty with language and denies difficulty with behavior  Functional Ability/Level of Safety: Hearing is normal bilaterally  The patient is currently able to do activities of daily living without limitations, able to do instrumental activities of daily living without limitations, able to participate in social activities without limitations and able to drive without limitations  Activities of daily living details: does not need help using the phone, no transportation help needed, does not need help shopping, no meal preparation help needed, does not need help doing housework, does not need help doing laundry, does not need help managing medications and does not need help managing money  Fall risk factors: The patient fell 1 times in the past 12 months  slipped on ice  Advance Directives: Advance directives: no living will, no durable power of  for health care directives and no advance directives  I disagree with the patient's decisions  Co-Managers and Medical Equipment/Suppliers: See Patient Care Team   Preventive Quality Program 65 and Older: Falls Risk: The patient fell 1 times in the past 12 months  slip on ice  The patient currently has no urinary incontinence symptoms  Date of last glaucoma screen was 9-16      Review of Systems    Constitutional: negative  Head and Face: negative  Eyes: negative  ENT: negative  Cardiovascular: negative  Respiratory: negative  Gastrointestinal: negative  Genitourinary: negative  Musculoskeletal: negative  Integumentary and Breasts: negative  Neurological: mild foot numbness  Psychiatric: negative  Endocrine: controlled DM  Active Problems    1  Age related osteoporosis (733 01) (M81 0)   2  Complex endometrial hyperplasia without atypia (621 32) (N85 01)   3  Controlled diabetes mellitus (250 00) (E11 9)   4  Diabetes mellitus type 2 with neurological manifestations (250 60) (E11 49)   5  Encounter for screening mammogram for breast cancer (V76 12) (Z12 31)   6  Hyperlipidemia (272 4) (E78 5)   7  Hypertension (401 9) (I10)   8  Indigestion (536 8) (K30)   9  Myalgia (729 1) (M79 1)   10  Obesity (278 00) (E66 9)   11  Post-menopausal bleeding (627 1) (N95 0)   12  Post-menopausal osteoporosis (733 01) (M81 0)    Past Medical History    · History of Carpal tunnel syndrome, unspecified laterality (354 0) (G56 00)   · History of Diabetes mellitus of other type with circulatory complication, without long-term  current use of insulin (250 70) (E13 59)   · History of acute sinusitis (V12 69) (Z87 09)   · Normal delivery (650) (O80,Z37  9)   · History of Postmenopausal bleeding (627 1) (N95 0)    The active problems and past medical history were reviewed and updated today        Surgical History    · History of Biopsy Endometrial, Without Cervical Dilation   · History of Biopsy Endometrial, Without Cervical Dilation   · History of Cholecystectomy   · History of Complete Colonoscopy   · History of Dilation And Curettage   · History of Hysteroscopy   · History of Neuroplasty Decompression Median Nerve At Carpal Tunnel   · History of Neuroplasty Decompression Median Nerve At Carpal Tunnel   · History of Tubal Ligation    The surgical history was reviewed and updated today  Family History  Child    · Family history of Living and Healthy  Family History    · Family history of Arthritis (V17 7)   · Family history of Bladder Cancer (V16 52)   · Family history of cardiac disorder (V17 49) (Z82 49)   · Family history of diabetes mellitus (V18 0) (Z83 3)   · Family history of hypertension (V17 49) (Z82 49)   · Family history of valvular heart disease (V17 49) (Z82 49)    The family history was reviewed and updated today  Social History    · Being A Social Drinker   · Daily Coffee Consumption (2  Cups/Day)   ·    · Never A Smoker   · Occupation:   · high  for [employer]  The social history was reviewed and updated today  The social history was reviewed and is unchanged  Current Meds   1  Accu-Chek Soft Touch Lancets Miscellaneous; Therapy: 27DCT7269 to (Last Rx:53Wwv4986)  Requested for: 52Cug7755 Ordered   2  Allegra Allergy 180 MG Oral Tablet; Therapy: (Recorded:46Pfl7581) to Recorded   3  AmLODIPine Besylate 5 MG Oral Tablet; TAKE 1 TABLET DAILY; Therapy: 52WCJ2134 to (Evaluate:43Hxm4203)  Requested for: 02Kyk3017; Last   Rx:95Dek6495 Ordered   4  Aspirin 81 MG TABS; Therapy: (Manny Pagana) to Recorded   5  Atorvastatin Calcium 10 MG Oral Tablet; Take 1 tablet daily; Therapy: 46Vqn0822 to (Evaluate:46Heh6160); Last Rx:07Htj7148 Ordered   6  Cranberry TABS; Therapy: (Recorded:05Mfx9146) to Recorded   7  Famotidine 20 MG Oral Tablet; TAKE 1 TABLET DAILY AS DIRECTED; Last   Rx:67Jys9632 Ordered   8  HydroCHLOROthiazide 25 MG Oral Tablet; TAKE 1 TABLET DAILY; Therapy: 86Dck1637 to (Evaluate:28Tms8607);  Last Rx:36Edy3430 Ordered   9  MetFORMIN HCl - 500 MG Oral Tablet; take 1 tablet by mouth twice a day; Therapy: 76CBP5209 to (Evaluate:87Lhr9779)  Requested for: 52Mwz5058; Last   Rx:83Kny5124 Ordered   10  Metoprolol Succinate ER 50 MG Oral Tablet Extended Release 24 Hour; TAKE 1 TABLET    DAILY; Therapy: 69PZS1214 to (Evaluate:17Rze7267); Last MK:93SUE2569 Ordered   11  OneTouch Ultra Blue In [employer]; Therapy: 53XEE5275 to (Last Rx:37Btl2586)  Requested for: 35Bhz8278 Ordered   12  Victoza 18 MG/3ML Subcutaneous Solution Pen-injector; INJECT 1 2 MCG Daily; Therapy: 72Fvh6357 to (Last Rx:64Din2958) Ordered    The medication list was reviewed and updated today  Allergies    1  ACE Inhibitors   2  Codeine Sulfate TABS    Immunizations   1    Influenza  After 01Oct2016    PPSV  After 52LBE1796    Zoster  02-Jan-2012     Vitals  Signs    Temperature: 98 5 F, Tympanic  Heart Rate: 76  Systolic: 689  Diastolic: 84  Height: 5 ft 6 in  Weight: 198 lb 9 6 oz  BMI Calculated: 32 06  BSA Calculated: 1 99    Physical Exam    Constitutional   General appearance: No acute distress, well appearing and well nourished  appears healthy and overweight  Head and Face   Head and face: Normal     Palpation of the face and sinuses: No sinus tenderness  Eyes   Conjunctiva and lids: No swelling, erythema or discharge  Pupils and irises: Equal, round, reactive to light  Ears, Nose, Mouth, and Throat   External inspection of ears and nose: Normal     Hearing: Normal     Nasal mucosa, septum, and turbinates: Normal without edema or erythema  Lips, teeth, and gums: Normal, good dentition  Oropharynx: Normal with no erythema, edema, exudate or lesions  Neck   Neck: Supple, symmetric, trachea midline, no masses  Thyroid: Normal, no thyromegaly  Pulmonary   Respiratory effort: No increased work of breathing or signs of respiratory distress  Auscultation of lungs: Clear to auscultation  Cardiovascular   Auscultation of heart: Normal rate and rhythm, normal S1 and S2, no murmurs  Carotid pulses: 2+ bilaterally  Abdominal aorta: Normal     Examination of extremities for edema and/or varicosities: Normal     Chest   Breasts: Normal, no dimpling or skin changes appreciated  Chest: Normal     Abdomen   Abdomen: Non-tender, no masses  Liver and spleen: No hepatomegaly or splenomegaly  Examination for hernias: No hernia appreciated  recent colo  Lymphatic   Palpation of lymph nodes in neck: No lymphadenopathy  Musculoskeletal   Gait and station: Normal     Digits and nails: Normal without clubbing or cyanosis  Joints, bones, and muscles: Normal     Skin   Skin and subcutaneous tissue: Normal without rashes or lesions  Neurologic   Cranial nerves: Cranial nerves II-XII intact  Cortical function: Normal mental status  Coordination: Normal finger to nose and heel to shin  Psychiatric   Judgment and insight: Normal     Orientation to person, place, and time: Normal     Recent and remote memory: Intact  Mood and affect: Normal       Socks and shoes removed, the Right Foot: the foot was normal, no swelling, no erythema  The sensory exam showed diminished vibratory sensation at the level of the toes  Socks and shoes removed, Left Foot: the foot was normal, no swelling, no erythema  The sensory exam showed diminished vibratory sensation at the level of the toes  Pulses:   1+ in the dorsalis pedis on the right  Pulses:   1+ in the dorsalis pedis on the left  Assign Risk Category: 2: Loss of protective sensation with or without weakness, deformity, callus, pre-ulcer, or history of ulceration  High risk  Procedure    Procedure: Visual Acuity Test    Indication: routine screening  Inforrmation supplied by a Snellen chart     Results: 20/20 in both eyes with corrective device, 20/20 in the right eye with corrective device, 20/20 in the left eye with corrective device      Future Appointments    Date/Time Provider Specialty Site   06/21/2017 10:00 AM TRICE Lara   Internal Medicine [facility]     Signatures   Electronically signed by : TRICE Khanna ; Feb 15 2017  8:08PM EST                       (Author) [employer]

## 2022-12-27 PROBLEM — Z00.00 ENCOUNTER FOR ANNUAL WELLNESS VISIT (AWV) IN MEDICARE PATIENT: Status: RESOLVED | Noted: 2021-06-11 | Resolved: 2022-12-27

## 2023-03-29 ENCOUNTER — OFFICE VISIT (OUTPATIENT)
Dept: INTERNAL MEDICINE CLINIC | Facility: CLINIC | Age: 73
End: 2023-03-29

## 2023-03-29 VITALS
SYSTOLIC BLOOD PRESSURE: 132 MMHG | OXYGEN SATURATION: 98 % | DIASTOLIC BLOOD PRESSURE: 78 MMHG | HEIGHT: 65 IN | WEIGHT: 189.8 LBS | HEART RATE: 65 BPM | BODY MASS INDEX: 31.62 KG/M2

## 2023-03-29 DIAGNOSIS — G47.61 PLMD (PERIODIC LIMB MOVEMENT DISORDER): ICD-10-CM

## 2023-03-29 DIAGNOSIS — L50.1 CHRONIC IDIOPATHIC URTICARIA: ICD-10-CM

## 2023-03-29 DIAGNOSIS — Z99.89 OSA ON CPAP: ICD-10-CM

## 2023-03-29 DIAGNOSIS — E78.5 HYPERLIPIDEMIA, UNSPECIFIED HYPERLIPIDEMIA TYPE: ICD-10-CM

## 2023-03-29 DIAGNOSIS — G47.33 OSA ON CPAP: ICD-10-CM

## 2023-03-29 DIAGNOSIS — E11.40 CONTROLLED TYPE 2 DIABETES MELLITUS WITH DIABETIC NEUROPATHY, WITHOUT LONG-TERM CURRENT USE OF INSULIN (HCC): Primary | ICD-10-CM

## 2023-03-29 DIAGNOSIS — I10 ESSENTIAL HYPERTENSION: ICD-10-CM

## 2023-03-29 PROBLEM — M85.80 OSTEOPENIA: Status: ACTIVE | Noted: 2017-02-14

## 2023-03-29 PROBLEM — Z01.419 ENCOUNTER FOR GYNECOLOGICAL EXAMINATION WITHOUT ABNORMAL FINDING: Status: RESOLVED | Noted: 2018-11-12 | Resolved: 2023-03-29

## 2023-03-29 NOTE — ASSESSMENT & PLAN NOTE
Lab Results   Component Value Date    HGBA1C 5 8 (H) 10/17/2022   Well-controlled on metformin and Ozempic

## 2023-03-29 NOTE — ASSESSMENT & PLAN NOTE
Patient does notice stiffness reported in her sleep study  Is not bothering her at this point and patient not interested in treatment currently

## 2023-03-29 NOTE — ASSESSMENT & PLAN NOTE
She follows up with allergist   Started on [address] which works well  Also on and regimen of Aligour is Zyrtec and famotidine

## 2023-03-29 NOTE — PROGRESS NOTES
Name: Jorge L Adame      : 1950      MRN: [id number]  Encounter Provider: Dannette Lennox, MD  Encounter Date: 3/29/2023   [facility]    Assessment & Plan     1  Controlled type 2 diabetes mellitus with diabetic neuropathy, without long-term current use of insulin ([facility])  Assessment & Plan:    Lab Results   Component Value Date    HGBA1C 5 8 (H) 10/17/2022   Well-controlled on metformin and Ozempic  2  NICKY on CPAP  Assessment & Plan: On chart review she had moderate NICKY and was recommended to stay to start CPAP  Patient reports she is never on CPAP  She tried some kind of mouthguard but could not tolerated  Currently she is not getting any treatment  I think her NICKY might have improved with her weight loss over the past years  She is not interested in treatment at this point  May consider repeating sleep study in the future  3  Chronic idiopathic urticaria  Assessment & Plan:  She follows up with allergist   Started on  [address] which works well  Also on and regimen of Aligour is Zyrtec and famotidine  4  Hyperlipidemia, unspecified hyperlipidemia type    5  Essential hypertension  Assessment & Plan:  Well-controlled on amlodipine and the losartan metoprolol  6  BMI 31 0-31 9,adult  Assessment & Plan:  Patient is interested in increasing Ozempic for weight loss  I will confirm with pharmacist about dosing and get back to patient  7  PLMD (periodic limb movement disorder)  Assessment & Plan:  Patient does notice stiffness reported in her sleep study  Is not bothering her at this point and patient not interested in treatment currently  Subjective      HPI   New pt establish care  Previous pcp relocated    Seeing allergy for chronic idiopathic urticaria, on xolair  Works well  Lives with  in a retired community  Doing well in general   No major complaint      Review of Systems    Current Outpatient Medications on File "Prior to Visit   Medication Sig   • amLODIPine (NORVASC) 5 mg tablet TAKE 1 TABLET BY MOUTH  DAILY   • atorvastatin (LIPITOR) 10 mg tablet TAKE 1 TABLET BY MOUTH  DAILY   • BinaxNOW COVID-19 Ag Home Test KIT TEST AS DIRECTED TODAY   • cetirizine (ZyrTEC) 10 mg tablet Take 2 tablets (20 mg total) by mouth daily   • Cranberry 600 MG TABS Take by mouth   • famotidine (PEPCID) 20 mg tablet TAKE 1 TABLET BY MOUTH  TWICE DAILY   • fexofenadine (ALLEGRA) 180 MG tablet Take 2 tablets (360 mg total) by mouth daily   • losartan (COZAAR) 100 MG tablet TAKE 1 TABLET BY MOUTH  DAILY   • metFORMIN (GLUCOPHAGE) 1000 MG tablet TAKE 1 TABLET BY MOUTH  TWICE DAILY WITH MEALS   • metoprolol succinate (TOPROL-XL) 50 mg 24 hr tablet TAKE 1 TABLET BY MOUTH  DAILY   • omalizumab (XOLAIR) subcutaneous injection Inject 1 mL (150 mg total) under the skin every 28 days   • OneTouch Ultra test strip TEST BLOOD SUGAR DAILY   • Ozempic, 1 MG/DOSE, 4 MG/3ML SOPN injection pen INJECT SUBCUTANEOUSLY 1 MG  ONCE WEEKLY       Objective     /78   Pulse 65   Ht 5' 5\" (1 651 m)   Wt 86 1 kg (189 lb 12 8 oz)   SpO2 98%   BMI 31 58 kg/m²     Physical Exam  Yessica Stehpen MD  "

## 2023-03-29 NOTE — ASSESSMENT & PLAN NOTE
On chart review she had moderate NICKY and was recommended to stay to start CPAP  Patient reports she is never on CPAP  She tried some kind of mouthguard but could not tolerated  Currently she is not getting any treatment  I think her NICKY might have improved with her weight loss over the past years  She is not interested in treatment at this point  May consider repeating sleep study in the future

## 2023-04-06 NOTE — PROGRESS NOTES
Assessment/Plan:    Controlled diabetes mellitus ([facility] )  Lab Results   Component Value Date    HGBA1C 6 0 02/19/2018       No results for input(s): POCGLU in the last 72 hours  Blood Sugar Average: Last 72 hrs:   patient brought her blood sugar log in appears to be extremely well controlled she will go for an A1c prior to her next visit    Diabetes mellitus type 2 with neurological manifestations ([facility] )  Lab Results   Component Value Date    HGBA1C 6 0 02/19/2018       No results for input(s): POCGLU in the last 72 hours  Blood Sugar Average: Last 72 hrs:   patient has not seen a podiatrist recently but has had no ulcerations or significant numbness    Hypertension  Again her blood pressure is well controlled on current medications    Sciatic nerve pain, left  She does complain of left-sided sciatic pain over the last several weeks  She has had this in the past and was seen by physical therapy with significant relief  Will reorder and gave prescription for an anti-inflammatory and muscle relaxer       Diagnoses and all orders for this visit:    Controlled type 2 diabetes mellitus with diabetic neuropathy, without long-term current use of insulin ([place] [id number] )  -     Hemoglobin A1C; Future  -     Basic metabolic panel; Future    Essential hypertension    Sciatic nerve pain, left  -     naproxen (EC NAPROSYN) 500 MG EC tablet; Take 1 tablet (500 mg total) by mouth 2 (two) times a day with meals  -     baclofen 10 mg tablet; Take 1 tablet (10 mg total) by mouth 3 (three) times a day as needed for muscle spasms  -     Ambulatory referral to Physical Therapy;  Future    Diabetes 1 5, managed as type 2 (HCC)  -     ONE TOUCH ULTRA TEST test strip; 1 each by Other route 2 (two) times a day Test    Class 1 obesity due to excess calories with body mass index (BMI) of 31 0 to 31 9 in adult, unspecified whether serious comorbidity present    Other orders  -     nystatin-triamcinolone (MYCOLOG-II) ointment; APPLY SPARINGLY TO THE AFFECTED AREAS BID FOR 7 TO 14 DAYS          Subjective:      Patient ID: Raul Skinner is a 70 y o  female  Patient feels well with the exception of a left-sided sciatica recurrence      Diabetes   She presents for her follow-up diabetic visit  She has type 2 diabetes mellitus  Her disease course has been stable  There are no hypoglycemic associated symptoms  Pertinent negatives for hypoglycemia include no confusion, dizziness or headaches  There are no diabetic associated symptoms  Pertinent negatives for diabetes include no chest pain, no fatigue, no polydipsia, no polyphagia, no polyuria and no weakness  Diabetic complications include peripheral neuropathy (Mild)  Risk factors for coronary artery disease include diabetes mellitus, hypertension, obesity and post-menopausal  Current diabetic treatment includes oral agent (monotherapy) and diet (Victoza)  She is compliant with treatment all of the time  Her weight is fluctuating minimally  She is following a diabetic, generally healthy and low salt diet  Meal planning includes avoidance of concentrated sweets and carbohydrate counting  She has had a previous visit with a dietitian  She rarely participates in exercise  There is no change in her home blood glucose trend  Her breakfast blood glucose range is generally 110-130 mg/dl  An ACE inhibitor/angiotensin II receptor blocker is not being taken  She does not see a podiatrist Eye exam is current  Hypertension   This is a chronic problem  The current episode started more than 1 year ago  The problem is controlled  Pertinent negatives include no chest pain, headaches, neck pain, palpitations or shortness of breath  (No associated symptoms) Past treatments include calcium channel blockers and beta blockers  Compliance problems include exercise  Mild peripheral neuropathy  Back Pain   This is a recurrent problem  The current episode started more than 1 year ago  The problem occurs daily   The problem has been waxing and waning since onset  The pain is present in the lumbar spine  The quality of the pain is described as burning  The pain radiates to the left foot  The pain is moderate  The symptoms are aggravated by position  Pertinent negatives include no abdominal pain, chest pain, dysuria, fever, headaches, numbness or weakness  (No associated symptoms) She has tried nothing for the symptoms  Review of Systems   Constitutional: Negative for chills, fatigue, fever and unexpected weight change  HENT: Negative for congestion, ear pain, hearing loss, postnasal drip, sinus pressure, sore throat, trouble swallowing and voice change  Eyes: Negative for visual disturbance  Respiratory: Negative for cough, chest tightness, shortness of breath and wheezing  Cardiovascular: Negative for chest pain, palpitations and leg swelling  Gastrointestinal: Negative for abdominal distention, abdominal pain, anal bleeding, blood in stool, constipation, diarrhea and nausea  Endocrine: Negative for cold intolerance, polydipsia, polyphagia and polyuria  Genitourinary: Negative for dysuria, flank pain, frequency, hematuria and urgency  Musculoskeletal: Positive for back pain  Negative for arthralgias, gait problem, joint swelling, myalgias and neck pain  Sciatica left   Skin: Negative for rash  Allergic/Immunologic: Negative for immunocompromised state  Neurological: Negative for dizziness, syncope, facial asymmetry, weakness, light-headedness, numbness and headaches  Hematological: Negative for adenopathy  Psychiatric/Behavioral: Negative for confusion, sleep disturbance and suicidal ideas  Objective:      /70 (BP Location: Left arm, Patient Position: Sitting)   Pulse 75   Temp 97 6 °F (36 4 °C) (Tympanic)   Ht 5' 6" (1 676 m)   Wt 89 3 kg (196 lb 12 8 oz)   SpO2 95%   BMI 31 76 kg/m²          Physical Exam   Constitutional: She is oriented to person, place, and time   She appears well-developed and well-nourished  No distress  HENT:   Right Ear: External ear normal    Left Ear: External ear normal    Nose: Nose normal    Mouth/Throat: Oropharynx is clear and moist  No oropharyngeal exudate  Eyes: EOM are normal  Pupils are equal, round, and reactive to light  Neck: Normal range of motion  Neck supple  No JVD present  No thyromegaly present  Cardiovascular: Normal rate, regular rhythm, normal heart sounds and intact distal pulses  Exam reveals no gallop  No murmur heard  Pulmonary/Chest: Effort normal and breath sounds normal  No respiratory distress  She has no wheezes  She has no rales  Abdominal: Soft  Bowel sounds are normal  She exhibits no distension and no mass  There is no tenderness  Musculoskeletal: Normal range of motion  She exhibits no tenderness  Lymphadenopathy:     She has no cervical adenopathy  Neurological: She is alert and oriented to person, place, and time  No cranial nerve deficit  Coordination normal    Skin: No rash noted  Psychiatric: She has a normal mood and affect   Her behavior is normal  Judgment and thought content normal  Otezla Pregnancy And Lactation Text: This medication is Pregnancy Category C and it isn't known if it is safe during pregnancy. It is unknown if it is excreted in breast milk.

## 2023-05-31 DIAGNOSIS — E78.5 HYPERLIPIDEMIA, UNSPECIFIED HYPERLIPIDEMIA TYPE: ICD-10-CM

## 2023-05-31 DIAGNOSIS — I10 ESSENTIAL HYPERTENSION: ICD-10-CM

## 2023-05-31 DIAGNOSIS — E11.40 CONTROLLED TYPE 2 DIABETES MELLITUS WITH DIABETIC NEUROPATHY, WITHOUT LONG-TERM CURRENT USE OF INSULIN (HCC): ICD-10-CM

## 2023-05-31 DIAGNOSIS — E11.49 DIABETES MELLITUS TYPE 2 WITH NEUROLOGICAL MANIFESTATIONS (HCC): ICD-10-CM

## 2023-05-31 DIAGNOSIS — R10.13 DYSPEPSIA: ICD-10-CM

## 2023-05-31 RX ORDER — METOPROLOL SUCCINATE 50 MG/1
50 TABLET, EXTENDED RELEASE ORAL DAILY
Qty: 90 TABLET | Refills: 1 | Status: SHIPPED | OUTPATIENT
Start: 2023-05-31

## 2023-05-31 RX ORDER — FAMOTIDINE 20 MG/1
20 TABLET, FILM COATED ORAL 2 TIMES DAILY
Qty: 180 TABLET | Refills: 1 | Status: SHIPPED | OUTPATIENT
Start: 2023-05-31

## 2023-05-31 RX ORDER — BLOOD SUGAR DIAGNOSTIC
STRIP MISCELLANEOUS
Qty: 50 STRIP | Refills: 3 | Status: SHIPPED | OUTPATIENT
Start: 2023-05-31

## 2023-05-31 RX ORDER — LOSARTAN POTASSIUM 100 MG/1
100 TABLET ORAL DAILY
Qty: 90 TABLET | Refills: 1 | Status: SHIPPED | OUTPATIENT
Start: 2023-05-31

## 2023-05-31 RX ORDER — AMLODIPINE BESYLATE 5 MG/1
5 TABLET ORAL DAILY
Qty: 90 TABLET | Refills: 1 | Status: SHIPPED | OUTPATIENT
Start: 2023-05-31

## 2023-05-31 RX ORDER — ATORVASTATIN CALCIUM 10 MG/1
10 TABLET, FILM COATED ORAL DAILY
Qty: 90 TABLET | Refills: 1 | Status: SHIPPED | OUTPATIENT
Start: 2023-05-31

## 2023-05-31 NOTE — TELEPHONE ENCOUNTER
Pt came in and requested refills of all medications to [facility] beside test strips to [employer]

## 2023-06-08 ENCOUNTER — ESTABLISHED COMPREHENSIVE EXAM (OUTPATIENT)
Dept: URBAN - METROPOLITAN AREA CLINIC 6 | Facility: CLINIC | Age: 73
End: 2023-06-08

## 2023-06-08 DIAGNOSIS — H04.123: ICD-10-CM

## 2023-06-08 DIAGNOSIS — H52.211: ICD-10-CM

## 2023-06-08 DIAGNOSIS — E11.3293: ICD-10-CM

## 2023-06-08 LAB
LEFT EYE DIABETIC RETINOPATHY: POSITIVE
RIGHT EYE DIABETIC RETINOPATHY: POSITIVE

## 2023-06-08 PROCEDURE — 92014 COMPRE OPH EXAM EST PT 1/>: CPT

## 2023-06-08 PROCEDURE — 92134 CPTRZ OPH DX IMG PST SGM RTA: CPT

## 2023-06-08 ASSESSMENT — KERATOMETRY
OD_K1POWER_DIOPTERS: 42.00
OD_AXISANGLE2_DEGREES: 12
OD_AXISANGLE_DEGREES: 102
OD_K2POWER_DIOPTERS: 43.50

## 2023-06-08 ASSESSMENT — VISUAL ACUITY
OS_SC: 20/30-1
OD_PH: 20/30
OD_SC: 20/100

## 2023-06-08 ASSESSMENT — TONOMETRY
OS_IOP_MMHG: 20
OD_IOP_MMHG: 20

## 2023-06-23 ENCOUNTER — OFFICE VISIT (OUTPATIENT)
Dept: URGENT CARE | Facility: MEDICAL CENTER | Age: 73
End: 2023-06-23
Payer: MEDICARE

## 2023-06-23 VITALS
SYSTOLIC BLOOD PRESSURE: 140 MMHG | OXYGEN SATURATION: 96 % | TEMPERATURE: 97.1 F | WEIGHT: 189 LBS | BODY MASS INDEX: 31.45 KG/M2 | DIASTOLIC BLOOD PRESSURE: 82 MMHG | HEART RATE: 71 BPM | RESPIRATION RATE: 18 BRPM

## 2023-06-23 DIAGNOSIS — R30.0 DYSURIA: Primary | ICD-10-CM

## 2023-06-23 LAB
SL AMB  POCT GLUCOSE, UA: NEGATIVE
SL AMB LEUKOCYTE ESTERASE,UA: ABNORMAL
SL AMB POCT BILIRUBIN,UA: NEGATIVE
SL AMB POCT BLOOD,UA: ABNORMAL
SL AMB POCT CLARITY,UA: ABNORMAL
SL AMB POCT COLOR,UA: ABNORMAL
SL AMB POCT KETONES,UA: NEGATIVE
SL AMB POCT NITRITE,UA: NEGATIVE
SL AMB POCT PH,UA: 6
SL AMB POCT SPECIFIC GRAVITY,UA: 1.01
SL AMB POCT URINE PROTEIN: NEGATIVE
SL AMB POCT UROBILINOGEN: 0.2

## 2023-06-23 PROCEDURE — 87147 CULTURE TYPE IMMUNOLOGIC: CPT | Performed by: PHYSICIAN ASSISTANT

## 2023-06-23 PROCEDURE — G0463 HOSPITAL OUTPT CLINIC VISIT: HCPCS | Performed by: PHYSICIAN ASSISTANT

## 2023-06-23 PROCEDURE — 81002 URINALYSIS NONAUTO W/O SCOPE: CPT

## 2023-06-23 PROCEDURE — 87077 CULTURE AEROBIC IDENTIFY: CPT | Performed by: PHYSICIAN ASSISTANT

## 2023-06-23 PROCEDURE — 87086 URINE CULTURE/COLONY COUNT: CPT | Performed by: PHYSICIAN ASSISTANT

## 2023-06-23 PROCEDURE — 87186 SC STD MICRODIL/AGAR DIL: CPT | Performed by: PHYSICIAN ASSISTANT

## 2023-06-23 PROCEDURE — 99213 OFFICE O/P EST LOW 20 MIN: CPT | Performed by: PHYSICIAN ASSISTANT

## 2023-06-23 RX ORDER — SULFAMETHOXAZOLE AND TRIMETHOPRIM 800; 160 MG/1; MG/1
1 TABLET ORAL EVERY 12 HOURS SCHEDULED
Qty: 14 TABLET | Refills: 0 | Status: SHIPPED | OUTPATIENT
Start: 2023-06-23 | End: 2023-06-30

## 2023-06-23 NOTE — PROGRESS NOTES
[address] [facility]        NAME: Mirela Torres is a 75 y o  female  : 1950    MRN: [id number]  DATE: 2023  TIME: 3:07 PM    Assessment and Plan   Dysuria [R30 0]  1  Dysuria  POCT urine dip    Urine culture            Patient Instructions     Dysuria  Bactrim as directed  Follow up with PCP in 3-5 days  Proceed to  ER if symptoms worsen  Chief Complaint     Chief Complaint   Patient presents with   • uti symptoms     Patient started with burning sensation this morning  Painful urination  History of Present Illness       65-year-old female who presents complaining of frequency, urgency, dysuria x2 days  Patient states she has had multiple episodes of similar symptoms in the past with UTIs  Denies fevers, chills, abdominal pain  Review of Systems   Review of Systems   Constitutional: Negative  HENT: Negative  Eyes: Negative  Respiratory: Negative  Negative for cough, chest tightness, shortness of breath, wheezing and stridor  Cardiovascular: Negative  Negative for chest pain, palpitations and leg swelling  Gastrointestinal: Negative  Genitourinary: Positive for dysuria, frequency and urgency  Negative for decreased urine volume, difficulty urinating, dyspareunia, enuresis, flank pain, genital sores, hematuria, menstrual problem, pelvic pain, vaginal bleeding, vaginal discharge and vaginal pain           Current Medications       Current Outpatient Medications:   •  amLODIPine (NORVASC) 5 mg tablet, Take 1 tablet (5 mg total) by mouth daily, Disp: 90 tablet, Rfl: 1  •  atorvastatin (LIPITOR) 10 mg tablet, Take 1 tablet (10 mg total) by mouth daily, Disp: 90 tablet, Rfl: 1  •  cetirizine (ZyrTEC) 10 mg tablet, Take 2 tablets (20 mg total) by mouth daily, Disp: 180 tablet, Rfl: 3  •  Cranberry 600 MG TABS, Take by mouth, Disp: , Rfl:   •  famotidine (PEPCID) 20 mg tablet, Take 1 tablet (20 mg total) by mouth 2 (two) times a day, Disp: 180 tablet, Rfl: 1  • fexofenadine (ALLEGRA) 180 MG tablet, Take 2 tablets (360 mg total) by mouth daily, Disp: 90 tablet, Rfl: 3  •  glucose blood (OneTouch Ultra) test strip, Test once daily, Disp: 50 strip, Rfl: 3  •  losartan (COZAAR) 100 MG tablet, Take 1 tablet (100 mg total) by mouth daily, Disp: 90 tablet, Rfl: 1  •  metFORMIN (GLUCOPHAGE) 1000 MG tablet, Take 1 tablet (1,000 mg total) by mouth 2 (two) times a day with meals, Disp: 180 tablet, Rfl: 1  •  metoprolol succinate (TOPROL-XL) 50 mg 24 hr tablet, Take 1 tablet (50 mg total) by mouth daily, Disp: 90 tablet, Rfl: 1  •  omalizumab (XOLAIR) subcutaneous injection, Inject 1 mL (150 mg total) under the skin every 28 days, Disp: , Rfl:   •  semaglutide, 2 mg/dose, (Ozempic) 8 mg/ mL injection pen, Inject 0 75 mL (2 mg total) under the skin every 7 days, Disp: 9 mL, Rfl: 1  •  BinaxNOW COVID-19 Ag Home Test KIT, TEST AS DIRECTED TODAY, Disp: , Rfl:     Current Allergies     Allergies as of 06/23/2023 - Reviewed 06/23/2023   Allergen Reaction Noted   • Other Sneezing 04/28/2022            The following portions of the patient's history were reviewed and updated as appropriate: allergies, current medications, past family history, past medical history, past social history, past surgical history and problem list      Past Medical History:   Diagnosis Date   • Acute cystitis without hematuria 12/14/2020   • Allergic 2011    take meds daily to control   • Arthritis 07/2018    PT 7/2018   • Carpal tunnel syndrome     Unspecified laterality   Resolved: 12/21/2016   • Cataract    • Chronic kidney disease    • Complex endometrial hyperplasia with atypia     Resolved: 11/03/17   • Diabetes mellitus ([address] )     of other type with circulatory complication, without long-term current use of insulin   • History of chickenpox    • History of measles    • History of mumps    • HL (hearing loss)     hearing aids as of 8/17   • Hyperlipidemia    • Hypertension    • Normal delivery     1971 and 1973 sons   • Obesity    • NICKY (obstructive sleep apnea)    • Post-menopausal bleeding     Resolved:    • Seasonal allergies    • Urinary tract infection 10/17    meds cleared it   • UTI (urinary tract infection)        Past Surgical History:   Procedure Laterality Date   • CARPAL TUNNEL RELEASE     • CARPAL TUNNEL RELEASE Left    • CATARACT EXTRACTION     • CHOLECYSTECTOMY     • COLONOSCOPY      Complete   • DILATION AND CURETTAGE OF UTERUS      twice   • ENDOMETRIAL BIOPSY      without cervical dilation   • HYSTEROSCOPY     • TUBAL LIGATION     • WISDOM TOOTH EXTRACTION      x 2       Family History   Problem Relation Age of Onset   • Hypertension Mother    • Heart disease Mother    • Heart attack Mother    • Hypertension Father    • Cancer Father             • Prostate cancer Father    • Arthritis Father             • Hypertension Brother    • Heart disease Brother    • Prostate cancer Brother    • No Known Problems Brother    • Diabetes Maternal Grandmother    • Stroke Maternal Grandmother    • Diabetes Paternal Grandfather    • No Known Problems Child    • Arthritis Family    • Cancer Family         bladder   • Other Family         Cardiac disorder   • Diabetes Family    • Hypertension Family    • Valvular heart disease Family    • No Known Problems Son    • No Known Problems Son    • Breast cancer Neg Hx    • Colon cancer Neg Hx    • Ovarian cancer Neg Hx    • Uterine cancer Neg Hx    • Cervical cancer Neg Hx    • Thyroid disease Neg Hx          Medications have been verified  Objective   /82   Pulse 71   Temp (!) 97 1 °F (36 2 °C)   Resp 18   Wt 85 7 kg (189 lb)   SpO2 96%   BMI 31 45 kg/m²        Physical Exam     Physical Exam  Constitutional:       Appearance: She is well-developed  HENT:      Head: Normocephalic and atraumatic        Right Ear: External ear normal       Left Ear: External ear normal       Nose: Nose normal       Mouth/Throat:      Pharynx: No oropharyngeal exudate  Cardiovascular:      Rate and Rhythm: Normal rate and regular rhythm  Heart sounds: Normal heart sounds  Pulmonary:      Effort: Pulmonary effort is normal  No respiratory distress  Breath sounds: Normal breath sounds  No wheezing or rales  Chest:      Chest wall: No tenderness  Abdominal:      General: Bowel sounds are normal  There is no distension  Palpations: Abdomen is soft  There is no mass  Tenderness: There is no abdominal tenderness  There is no right CVA tenderness, left CVA tenderness, guarding or rebound  Musculoskeletal:      Cervical back: Normal range of motion and neck supple  Lymphadenopathy:      Cervical: No cervical adenopathy

## 2023-06-25 LAB — BACTERIA UR CULT: ABNORMAL

## 2023-08-02 ENCOUNTER — TELEPHONE (OUTPATIENT)
Dept: INTERNAL MEDICINE CLINIC | Facility: CLINIC | Age: 73
End: 2023-08-02

## 2023-08-02 ENCOUNTER — OFFICE VISIT (OUTPATIENT)
Dept: INTERNAL MEDICINE CLINIC | Facility: CLINIC | Age: 73
End: 2023-08-02
Payer: MEDICARE

## 2023-08-02 VITALS
DIASTOLIC BLOOD PRESSURE: 90 MMHG | HEIGHT: 65 IN | BODY MASS INDEX: 31.69 KG/M2 | OXYGEN SATURATION: 99 % | WEIGHT: 190.2 LBS | HEART RATE: 66 BPM | SYSTOLIC BLOOD PRESSURE: 142 MMHG

## 2023-08-02 DIAGNOSIS — Z23 ENCOUNTER FOR IMMUNIZATION: ICD-10-CM

## 2023-08-02 DIAGNOSIS — M46.1 SACROILIITIS (HCC): ICD-10-CM

## 2023-08-02 DIAGNOSIS — E11.40 CONTROLLED TYPE 2 DIABETES MELLITUS WITH DIABETIC NEUROPATHY, WITHOUT LONG-TERM CURRENT USE OF INSULIN (HCC): ICD-10-CM

## 2023-08-02 DIAGNOSIS — E78.5 HYPERLIPIDEMIA, UNSPECIFIED HYPERLIPIDEMIA TYPE: ICD-10-CM

## 2023-08-02 DIAGNOSIS — I10 ESSENTIAL HYPERTENSION: Primary | ICD-10-CM

## 2023-08-02 LAB — SL AMB POCT HEMOGLOBIN AIC: 5.9 (ref ?–6.5)

## 2023-08-02 PROCEDURE — 99214 OFFICE O/P EST MOD 30 MIN: CPT | Performed by: GENERAL ACUTE CARE HOSPITAL

## 2023-08-02 PROCEDURE — 83036 HEMOGLOBIN GLYCOSYLATED A1C: CPT | Performed by: GENERAL ACUTE CARE HOSPITAL

## 2023-08-02 RX ORDER — AMLODIPINE BESYLATE 10 MG/1
10 TABLET ORAL DAILY
Qty: 90 TABLET | Refills: 3 | Status: SHIPPED | OUTPATIENT
Start: 2023-08-02

## 2023-08-02 NOTE — ASSESSMENT & PLAN NOTE
Increased ozempic in last visit, 4 months ago, no change in wt.    Pt said mounjaro is better covered by her insurance, will refer to clinical pharmacist to help switch to [facility]

## 2023-08-02 NOTE — ASSESSMENT & PLAN NOTE
Lab Results   Component Value Date    HGBA1C 5.9 08/02/2023     Controlled   On metformin, ozempic  Recently she found out mounjaro is better covered.  Could switch to [facility], refer to clinical pharmacist

## 2023-08-02 NOTE — PROGRESS NOTES
Name: Vivian Torres      : 1950      MRN: [id number]  Encounter Provider: Brook Porter MD  Encounter Date: 2023   Enc[facility]    Assessment & Plan     1. Essential hypertension  Assessment & Plan:  We discussed that there might be extra benefit to lower her BP further if she could tolerate  Pt is interested to try  Increase amlodipine to 10mg  Send me bp readings in 3 weeks    Orders:  -     amLODIPine (NORVASC) 10 mg tablet; Take 1 tablet (10 mg total) by mouth daily  -     CBC and differential; Future    2. Encounter for immunization    3. Controlled type 2 diabetes mellitus with diabetic neuropathy, without long-term current use of insulin [facility])  Assessment & Plan:    Lab Results   Component Value Date    HGBA1C 5.9 2023     Controlled   On metformin, ozempic  Recently she found out mounjaro is better covered. Could switch to [facility], refer to clinical pharmacist    Orders:  -     Albumin / creatinine urine ratio; Future  -     POCT hemoglobin A1c  -     Basic metabolic panel; Future  -     Ambulatory referral to clinical pharmacy; Future  -     CBC and differential; Future    4. Hyperlipidemia, unspecified hyperlipidemia type  -     Lipid panel; Future    5. Sacroiliitis ([address])    6. BMI 31.0-31.9,adult  Assessment & Plan:  Increased ozempic in last visit, 4 months ago, no change in wt.    Pt said mounjaro is better covered by her insurance, will refer to clinical pharmacist to help switch to m[employer]jar[employer]           Subjective      HPI   Doing well in general  Regular f/u  Review of Systems    Current Outpatient Medications on File Prior to Visit   Medication Sig   • atorvastatin (LIPITOR) 10 mg tablet Take 1 tablet (10 mg total) by mouth daily   • cetirizine (ZyrTEC) 10 mg tablet Take 2 tablets (20 mg total) by mouth daily   • Cranberry 600 MG TABS Take by mouth   • famotidine (PEPCID) 20 mg tablet Take 1 tablet (20 mg total) by mouth 2 (two) times a day   • fexofenadine (ALLEGRA) 180 MG tablet Take 2 tablets (360 mg total) by mouth daily   • glucose blood (OneTouch Ultra) test strip Test once daily   • losartan (COZAAR) 100 MG tablet Take 1 tablet (100 mg total) by mouth daily   • metFORMIN (GLUCOPHAGE) 1000 MG tablet Take 1 tablet (1,000 mg total) by mouth 2 (two) times a day with meals   • metoprolol succinate (TOPROL-XL) 50 mg 24 hr tablet Take 1 tablet (50 mg total) by mouth daily   • omalizumab (XOLAIR) subcutaneous injection Inject 1 mL (150 mg total) under the skin every 28 days   • semaglutide, 2 mg/dose, (Ozempic) 8 mg/ mL injection pen Inject 0.75 mL (2 mg total) under the skin every 7 days   • [DISCONTINUED] amLODIPine (NORVASC) 5 mg tablet Take 1 tablet (5 mg total) by mouth daily   • BinaxNOW COVID-19 Ag Home Test KIT TEST AS DIRECTED TODAY (Patient not taking: Reported on 8/2/2023)       Objective     /90 (BP Location: Left arm, Patient Position: Sitting, Cuff Size: Standard)   Pulse 66   Ht 5' 5" (1.651 m)   Wt 86.3 kg (190 lb 3.2 oz)   SpO2 99%   BMI 31.65 kg/m²     Physical Exam  Jewell Freedman MD

## 2023-08-02 NOTE — ASSESSMENT & PLAN NOTE
We discussed that there might be extra benefit to lower her BP further if she could tolerate  Pt is interested to try  Increase amlodipine to 10mg  Send me bp readings in 3 weeks

## 2023-08-02 NOTE — PATIENT INSTRUCTIONS
Start checking your BP at home at least every 2-3 days for the next 3 week.    Send me the reading through [facility]

## 2023-08-10 ENCOUNTER — CLINICAL SUPPORT (OUTPATIENT)
Dept: INTERNAL MEDICINE CLINIC | Facility: CLINIC | Age: 73
End: 2023-08-10

## 2023-08-10 VITALS
WEIGHT: 190.6 LBS | HEART RATE: 65 BPM | SYSTOLIC BLOOD PRESSURE: 122 MMHG | OXYGEN SATURATION: 98 % | BODY MASS INDEX: 31.72 KG/M2 | DIASTOLIC BLOOD PRESSURE: 74 MMHG

## 2023-08-10 DIAGNOSIS — E11.40 CONTROLLED TYPE 2 DIABETES MELLITUS WITH DIABETIC NEUROPATHY, WITHOUT LONG-TERM CURRENT USE OF INSULIN (HCC): ICD-10-CM

## 2023-08-10 DIAGNOSIS — I10 ESSENTIAL HYPERTENSION: Primary | ICD-10-CM

## 2023-08-10 NOTE — ASSESSMENT & PLAN NOTE
Blood pressure goal less than 140/90 mmHg  In office BP below goal, HR acceptable. Home BP readings avg below goal. HR acceptable  Serum K+ WNL.      • Medications: continue as prescribed  • Home Monitoring: continue with home monitoring

## 2023-08-10 NOTE — ASSESSMENT & PLAN NOTE
Lab Results   Component Value Date    HGBA1C 5.9 08/02/2023   Most recent A1c below goal  Reported FBG average to goal  May benefit from [facility] due to increased potency. May not require metformin long term. Baseline weight when starting Ozempic was 190; -0 pounds lost while on Ozempic. • Medications:  o Ozempic: patient will take #2 - 1mg doses to use up supply then convert to [employer]: as patient did not have tolerability issues when titrating GLP1 previously, would start at 7.5mg weekly. Agrees to contact PharmD or PCP if cost issues. o Metformin: continue for now until supply used up then stop.    • Home Monitoring: daily

## 2023-08-10 NOTE — PROGRESS NOTES
[address] [facility], Pharmacist    ASSESSMENT/PLAN                                                                                     1. Essential hypertension  Assessment & Plan:  Blood pressure goal less than 140/90 mmHg  In office BP below goal, HR acceptable. Home BP readings avg below goal. HR acceptable  Serum K+ WNL. Medications: continue as prescribed  Home Monitoring: continue with home monitoring        2. Controlled type 2 diabetes mellitus with diabetic neuropathy, without long-term current use of insulin [facility])  Assessment & Plan:    Lab Results   Component Value Date    HGBA1C 5.9 08/02/2023   Most recent A1c below goal  Reported FBG average to goal  May benefit from [facility] due to increased potency. May not require metformin long term. Baseline weight when starting Ozempic was 190; -0 pounds lost while on Ozempic. Medications:  Ozempic: patient will take #2 - 1mg doses to use up supply then convert to [facility] ALAN: as patient did not have tolerability issues when titrating GLP1 previously, would start at 7.5mg weekly. Agrees to contact PharmD or PCP if cost issues. Metformin: continue for now until supply used up then stop. Home Monitoring: daily       Orders:  -     Ambulatory referral to clinical pharmacy  -     jeovany Reynolds) 7.5 MG/0.5ML;  Inject 0.5 mL (7.5 mg total) under the skin once a week For diabetes      Eye Exam:    Lab Results   Component Value Date    LEFTDIABRET Positive 06/08/2023    RIGHTDIABRET Positive 06/08/2023     Follow-Up: 6 weeks      SUBJECTIVE                                                                                                              Medication Adherence: Medication list reviewed with patient, reports the following discrepancies/problems:  amLODIPine  atorvastatin  cetirizine  Cranberry Tabs  famotidine  fexofenadine  losartan  metFORMIN: has 90-day supply remaining   metoprolol succinate  omalizumab   tirzepatide: cost issues but resolved now. Reports she was not able to fill script due to order be discontinued  Ozempic: has 1 pen remaining of 1mg dose; takes on Saturdays    2. Medication Efficacy:    Review of Systems   Constitutional: Negative for appetite change and unexpected weight change. Cardiovascular: Negative for leg swelling. Gastrointestinal: Negative for constipation, diarrhea, nausea and vomiting. Neurological: Negative for dizziness, light-headedness and headaches. Home blood sugar readings (no log, per memory): checks once daily, 100-115    3. Lifestyle: does not always limit what she eats, occasionally will go out to breakfast. Has noticed GLP1 has limited appetite but does not always pay attention to limiting portion size. OBJECTIVE                                                                                                      Vitals:    08/10/23 1506   BP: 122/74   Pulse: 65   SpO2: 98%   Weight: 86.5 kg (190 lb 9.6 oz)       Lab Results   Component Value Date    SODIUM 137 10/17/2022    K 4.4 10/17/2022    EGFR 64 10/17/2022    CREATININE 0.89 10/17/2022    GLUF 101 (H) 10/17/2022    MICROALBCRE 8 04/14/2020       Lab Results   Component Value Date    HGBA1C 5.9 08/02/2023    HGBA1C 5.8 (H) 10/17/2022    HGBA1C 5.9 02/24/2022       Pharmacist Tracking Tool  Reason For Outreach: Embedded Pharmacist  Demographics:  Intervention Method:  In Person  Type of Intervention: New  Topics Addressed: Diabetes and Hypertension  Pharmacologic Interventions: Medication Conversion  Non-Pharmacologic Interventions: Care coordination and Disease state education  Time:  Direct Patient Care: 30 mins   Care Coordination: 15 mins  Recommendation Recipient: Patient/Caregiver  Outcome: Accepted

## 2023-08-21 ENCOUNTER — OFFICE VISIT (OUTPATIENT)
Dept: INTERNAL MEDICINE CLINIC | Facility: CLINIC | Age: 73
End: 2023-08-21
Payer: MEDICARE

## 2023-08-21 VITALS
SYSTOLIC BLOOD PRESSURE: 130 MMHG | TEMPERATURE: 99.1 F | BODY MASS INDEX: 31.65 KG/M2 | DIASTOLIC BLOOD PRESSURE: 70 MMHG | WEIGHT: 190 LBS | HEART RATE: 64 BPM | HEIGHT: 65 IN | OXYGEN SATURATION: 99 %

## 2023-08-21 DIAGNOSIS — L30.9 ECZEMA, UNSPECIFIED TYPE: ICD-10-CM

## 2023-08-21 DIAGNOSIS — L03.90 CELLULITIS, UNSPECIFIED CELLULITIS SITE: Primary | ICD-10-CM

## 2023-08-21 PROCEDURE — 99213 OFFICE O/P EST LOW 20 MIN: CPT | Performed by: GENERAL ACUTE CARE HOSPITAL

## 2023-08-21 RX ORDER — TRIAMCINOLONE ACETONIDE 1 MG/G
CREAM TOPICAL 2 TIMES DAILY
Qty: 30 G | Refills: 0 | Status: SHIPPED | OUTPATIENT
Start: 2023-08-21

## 2023-08-21 RX ORDER — SULFAMETHOXAZOLE AND TRIMETHOPRIM 800; 160 MG/1; MG/1
1 TABLET ORAL EVERY 12 HOURS SCHEDULED
Qty: 10 TABLET | Refills: 0 | Status: SHIPPED | OUTPATIENT
Start: 2023-08-21 | End: 2023-08-26

## 2023-08-21 NOTE — PROGRESS NOTES
Name: Dewayne Agrawal      : 1950      MRN: [id number]  Encounter Provider: Gino Vu MD  Encounter Date: 2023   Encounter department: [facility]    Assessment & Plan     1. Cellulitis, unspecified cellulitis site  -     sulfamethoxazole-trimethoprim (BACTRIM DS) 800-160 mg per tablet; Take 1 tablet by mouth every 12 (twelve) hours for 5 days    2. Eczema, unspecified type  Assessment & Plan:  A round shaped papule with pustules   Likely nummular eczema with possible superimposed infection. Will give a course of abx and start topical steroid. Picture uploaded in media.    Could not get an sooner appointment with derm  She will report to me next week if no improvement    Orders:  -     triamcinolone (KENALOG) 0.1 % cream; Apply topically 2 (two) times a day         Subjective      HPI   About a week ago developed a rash on forehead, getting larger and very itchy  Review of Systems    Current Outpatient Medications on File Prior to Visit   Medication Sig   • amLODIPine (NORVASC) 10 mg tablet Take 1 tablet (10 mg total) by mouth daily   • atorvastatin (LIPITOR) 10 mg tablet Take 1 tablet (10 mg total) by mouth daily   • cetirizine (ZyrTEC) 10 mg tablet Take 2 tablets (20 mg total) by mouth daily   • Cranberry 600 MG TABS Take by mouth   • famotidine (PEPCID) 20 mg tablet Take 1 tablet (20 mg total) by mouth 2 (two) times a day   • fexofenadine (ALLEGRA) 180 MG tablet Take 2 tablets (360 mg total) by mouth daily   • glucose blood (OneTouch Ultra) test strip Test once daily   • losartan (COZAAR) 100 MG tablet Take 1 tablet (100 mg total) by mouth daily   • metFORMIN (GLUCOPHAGE) 1000 MG tablet Take 1 tablet (1,000 mg total) by mouth 2 (two) times a day with meals   • metoprolol succinate (TOPROL-XL) 50 mg 24 hr tablet Take 1 tablet (50 mg total) by mouth daily   • omalizumab (XOLAIR) subcutaneous injection Inject 1 mL (150 mg total) under the skin every 28 days   • tirzepatide (Mounjaro) 7.5 MG/0.5ML Inject 0.5 mL (7.5 mg total) under the skin once a week For diabetes       Objective     /70 (BP Location: Left arm, Patient Position: Sitting, Cuff Size: Large)   Pulse 64   Temp 99.1 °F (37.3 °C) (Probe)   Ht 5' 4.57" (1.64 m)   Wt 86.2 kg (190 lb)   SpO2 99%   BMI 32.04 kg/m²     Physical Exam  Roberta Chavez MD

## 2023-08-21 NOTE — ASSESSMENT & PLAN NOTE
A round shaped papule with pustules   Likely nummular eczema with possible superimposed infection. Will give a course of abx and start topical steroid. Picture uploaded in media.    Could not get an sooner appointment with derm  She will report to me next week if no improvement

## 2023-09-05 ENCOUNTER — OFFICE VISIT (OUTPATIENT)
Dept: INTERNAL MEDICINE CLINIC | Facility: CLINIC | Age: 73
End: 2023-09-05
Payer: MEDICARE

## 2023-09-05 VITALS
SYSTOLIC BLOOD PRESSURE: 114 MMHG | HEIGHT: 64 IN | OXYGEN SATURATION: 98 % | BODY MASS INDEX: 32.1 KG/M2 | HEART RATE: 74 BPM | DIASTOLIC BLOOD PRESSURE: 78 MMHG | WEIGHT: 188 LBS

## 2023-09-05 DIAGNOSIS — L30.9 ECZEMA, UNSPECIFIED TYPE: Primary | ICD-10-CM

## 2023-09-05 PROCEDURE — 99213 OFFICE O/P EST LOW 20 MIN: CPT | Performed by: GENERAL ACUTE CARE HOSPITAL

## 2023-09-05 RX ORDER — CLOBETASOL PROPIONATE 0.5 MG/G
CREAM TOPICAL 2 TIMES DAILY
Qty: 15 G | Refills: 0 | Status: SHIPPED | OUTPATIENT
Start: 2023-09-05 | End: 2023-09-12

## 2023-09-05 NOTE — PROGRESS NOTES
Name: Lori Mars      : 1950      MRN: [id number]  Encounter Provider: Cait Vaughn MD  Encounter Date: 2023   [facility]: [facility]     1. Eczema, unspecified type  Assessment & Plan:  · Patient presenting for follow up of her eczema lesion in her central forehead area  · Took a course of oral Bactrim after her last visit  · Has been using medium potency Triamcinolone since her last visit   · Patient reports improvement in redness and itching after antibiotics and  But still has some itching as well as pustule like lesion  · Possibly Flare up of Eczema  · Picture of her lesion in the media  · Redness and the lesion has improved since her last visit  ·  Dermatology appointment available for her only in december    Plan  · Topical mupirocin thrice a day  · Topical Clobetasol(high potency steroid) twice a day on the lesion for 7 days  · Follow up in one week       Orders:  -     clobetasol (TEMOVATE) 0.05 % cream; Apply topically 2 (two) times a day for 7 days  -     mupirocin (BACTROBAN) 2 % ointment; Apply topically 3 (three) times a day         Subjective      HPI   75year old female with h/o HTN, DLP,DM presenting for follow up of her Eczema patch in her central forehead region. She reported it started as a dry , itchy patch in 3 rd week of July in the size of aa quarter which progressively gotten bigger in a months time. She has  been scartching on it since dry patch appeared on her face. A week before  , she started getting pustule like lesions.  , she saw Dr. Dorita Lombard in the clinic , was prescribed medium potency topical Triamcinolone for the underlying Eczema and a course of oral Bactrim after which her sym[ptoms improved. But she reports that the lesion is still bothering her as it is itchy and still has pustule like lesion  Uses hibiclens to wash the area, not using the normal soap or face wash in the area since the pustules started  No fever, myalgia  No other similar lesions in the other parts  of the body  H/o hives with covid shot- take zolair every 28 days . Patient was concerned if Xolair is the cause of the lesion but confirmed with the allergist, it is not. Cold sore last week in the corner of her mouth-uses over the counter cream after which it resolved  Media of her lesion attached  On comparison with her lesion picture from 21 August, redness has improved to a great extent  Dermatology appointment available for her only in december    Review of Systems   Constitutional: Negative. HENT: Negative. Eyes: Negative. Cardiovascular: Positive for leg swelling. Gastrointestinal: Negative. Endocrine: Negative. Genitourinary: Negative. Musculoskeletal: Positive for back pain. Neurological: Negative. Hematological: Negative. Psychiatric/Behavioral: Negative.         Current Outpatient Medications on File Prior to Visit   Medication Sig   • amLODIPine (NORVASC) 10 mg tablet Take 1 tablet (10 mg total) by mouth daily   • atorvastatin (LIPITOR) 10 mg tablet Take 1 tablet (10 mg total) by mouth daily   • cetirizine (ZyrTEC) 10 mg tablet Take 2 tablets (20 mg total) by mouth daily   • Cranberry 600 MG TABS Take by mouth   • famotidine (PEPCID) 20 mg tablet Take 1 tablet (20 mg total) by mouth 2 (two) times a day   • fexofenadine (ALLEGRA) 180 MG tablet Take 2 tablets (360 mg total) by mouth daily   • glucose blood (OneTouch Ultra) test strip Test once daily   • losartan (COZAAR) 100 MG tablet Take 1 tablet (100 mg total) by mouth daily   • metFORMIN (GLUCOPHAGE) 1000 MG tablet Take 1 tablet (1,000 mg total) by mouth 2 (two) times a day with meals   • metoprolol succinate (TOPROL-XL) 50 mg 24 hr tablet Take 1 tablet (50 mg total) by mouth daily   • omalizumab (XOLAIR) subcutaneous injection Inject 1 mL (150 mg total) under the skin every 28 days   • tirzepatide (Mounjaro) 7.5 MG/0.5ML Inject 0.5 mL (7.5 mg total) under the skin once a week For diabetes   • triamcinolone (KENALOG) 0.1 % cream Apply topically 2 (two) times a day       Objective     /78   Pulse 74   Ht 5' 4" (1.626 m)   Wt 85.3 kg (188 lb)   SpO2 98%   BMI 32.27 kg/m²     Physical Exam  Constitutional:       Appearance: Normal appearance. She is normal weight. HENT:      Head: Normocephalic. Nose: Nose normal.      Mouth/Throat:      Mouth: Mucous membranes are moist.      Pharynx: Oropharynx is clear. Eyes:      Extraocular Movements: Extraocular movements intact. Conjunctiva/sclera: Conjunctivae normal.      Pupils: Pupils are equal, round, and reactive to light. Cardiovascular:      Rate and Rhythm: Normal rate and regular rhythm. Pulses: Normal pulses. Heart sounds: Normal heart sounds. Pulmonary:      Effort: Pulmonary effort is normal.      Breath sounds: Normal breath sounds. Abdominal:      General: Abdomen is flat. Bowel sounds are normal.   Musculoskeletal:         General: Normal range of motion. Cervical back: Normal range of motion and neck supple. Right lower leg: Edema present. Skin:     General: Skin is warm. Capillary Refill: Capillary refill takes less than 2 seconds. Comments: Red circular patch measuring 10x10 cms on the forehead with pustule like lesion   On the center of the forehead  Improved since her last appointment   Neurological:      General: No focal deficit present. Mental Status: She is alert and oriented to person, place, and time. Psychiatric:         Mood and Affect: Mood normal.         Behavior: Behavior normal.         Thought Content:  Thought content normal.         Judgment: Judgment normal.       Glen Chau MD

## 2023-09-05 NOTE — ASSESSMENT & PLAN NOTE
· Patient presenting for follow up of her eczema lesion in her central forehead area  · Took a course of oral Bactrim after her last visit  · Has been using medium potency Triamcinolone since her last visit   · Patient reports improvement in redness and itching after antibiotics and  But still has some itching as well as pustule like lesion  · Possibly Flare up of Eczema  · Picture of her lesion in the media  · Redness and the lesion has improved since her last visit  ·  Dermatology appointment available for her only in december    Plan  · Topical mupirocin thrice a day  · Topical Clobetasol(high potency steroid) twice a day on the lesion for 7 days  · Follow up in one week

## 2023-09-11 ENCOUNTER — TELEPHONE (OUTPATIENT)
Dept: INTERNAL MEDICINE CLINIC | Facility: CLINIC | Age: 73
End: 2023-09-11

## 2023-09-11 NOTE — TELEPHONE ENCOUNTER
----- Message from Jayesh Sandoval sent at 9/11/2023  1:59 PM EDT -----  Regarding: FW: Cancel Tuesday Appointment  Contact: [phone]    ----- Message -----  From: Luke Carlos  Sent: 9/11/2023   1:36 PM EDT  To: [facility]  Subject: [facility] Tuesday Appointment                       I've been using the antibacterial ointment and the Clobetasol cream for a week, and the eczema pustules are healing. The itching has subsided also. I'm going to call the office to cancel the appointment I made for  1:45 pm on Tuesday. I think if I continue with the Triancinalone and antibacterial ointment, it will continue to heal.  Should I use Mederma afterwards to minimize the scarring? Thanks so much Dr. Katalina Burns for all of your help with this eczema. I really appreciate the time you took with me. ......  Harlan Isaacs

## 2023-09-12 NOTE — TELEPHONE ENCOUNTER
Ok.   She can continue triamcinolone cream and antibacterial ointment for another 2 weeks or till the rash heals. I am not familiar with Mederma. I would avoid using any new topical skin product to limit any possible skin irritation. But if she has used it in the past without any reaction, ok to try.

## 2023-09-21 ENCOUNTER — CLINICAL SUPPORT (OUTPATIENT)
Dept: INTERNAL MEDICINE CLINIC | Facility: CLINIC | Age: 73
End: 2023-09-21

## 2023-09-21 VITALS
SYSTOLIC BLOOD PRESSURE: 122 MMHG | HEART RATE: 57 BPM | WEIGHT: 192.2 LBS | DIASTOLIC BLOOD PRESSURE: 82 MMHG | OXYGEN SATURATION: 97 % | BODY MASS INDEX: 32.99 KG/M2

## 2023-09-21 DIAGNOSIS — E11.40 CONTROLLED TYPE 2 DIABETES MELLITUS WITH DIABETIC NEUROPATHY, WITHOUT LONG-TERM CURRENT USE OF INSULIN (HCC): Primary | ICD-10-CM

## 2023-09-21 DIAGNOSIS — I10 ESSENTIAL HYPERTENSION: ICD-10-CM

## 2023-09-21 NOTE — ASSESSMENT & PLAN NOTE
Blood pressure goal less than 130/80 mmHg   may also consider less than 140/90 mmHg  In office BP below goal, HR acceptable. Home BP readings avg below goal. HR acceptable  PIEDAD possibly related to amlodipine use  Serum K+ WNL. • Medications: patient will reduce amlodipine to 5mg/day x1 week to see if PIEDAD improves. o If improvement is noted but blood pressure readings increase, will consider either converting losartan to alternate ARB vs risk/benefit of continuing higher amlodipine dose.    • Home Monitoring: daily

## 2023-09-21 NOTE — PROGRESS NOTES
[address] , Pharmacist    ASSESSMENT/PLAN                                                                                     1. Controlled type 2 diabetes mellitus with diabetic neuropathy, without long-term current use of insulin [facility])  Assessment & Plan:    Lab Results   Component Value Date    HGBA1C 5.9 08/02/2023   Most recent A1c below goal   Reported fasting blood sugar average to goal  Potential adverse drug reaction to [facility] which could be inhibiting weight loss. Uncertain if patient would experience weight loss with dose increase or if adverse drug reaction will worsen. Medications: reviewed with patient on converting back to Ozempic however patient would like to give [facility] longer time for weight loss, aware potential for worsening adverse drug reaction. Mounjaro: increase to 10mg/week when supply used up. Patient will increase water intake. Metformin: continue   Home Monitoring: daily       Orders:  -     tirzepatide (Mounjaro) 10 MG/0.5ML; Inject 0.5 mL (10 mg total) under the skin every 7 days    2. Essential hypertension  Assessment & Plan:  Blood pressure goal less than 130/80 mmHg   may also consider less than 140/90 mmHg  In office BP below goal, HR acceptable. Home BP readings avg below goal. HR acceptable  PIEDAD possibly related to amlodipine use  Serum K+ WNL. Medications: patient will reduce amlodipine to 5mg/day x1 week to see if PIEDAD improves. If improvement is noted but blood pressure readings increase, will consider either converting losartan to alternate ARB vs risk/benefit of continuing higher amlodipine dose. Home Monitoring: daily          Eye Exam:    Lab Results   Component Value Date    LEFTDIABRET Positive 06/08/2023    RIGHTDIABRET Positive 06/08/2023     Follow-Up:7  Weeks in person, patient will send [address] as follow-up in 1 week on PIEDAD while on lower amlodipine dose.        SUBJECTIVE Medication Adherence: Medication list reviewed with patient, reports the following discrepancies/problems:  amLODIPine  atorvastatin  cetirizine  Cranberry Tabs  famotidine  fexofenadine  losartan  metFORMIN  metoprolol succinate  mupirocin  omalizumab  OneTouch Ultra Strp  Tirzepatide: has been on 5 weeks, taking on Saturdays  triamcinolone  Vitamin D3: 5000 units once daily    2. Medication Efficacy:    Review of Systems   Constitutional: Positive for appetite change (increased). Negative for unexpected weight change. Cardiovascular: Positive for leg swelling (especially over past 2 weeks; shoes are significantly tighter at night. PIEDAD best in morning and worsens throughout the day). Gastrointestinal: Positive for constipation (every other day). Negative for diarrhea, nausea and vomiting. Bloating   Neurological: Negative for dizziness, light-headedness and headaches. Home blood sugar readings (no log, per memory): feels they are higher since starting on Mounjaro, now running 118, was 104 this morning; denies readings > 130    Home blood pressure readings 506/93-22, uncertain heart rate readings    3. Lifestyle: does not always limit what she eats, occasionally will go out to breakfast. Has noticed GLP1 has limited appetite but does not always pay attention to limiting portion size. Limited water, used to do 2 quarts daily.     Was on vacation x2 weeks, got back a few days     OBJECTIVE                                                                                                      Vitals:    09/21/23 1012   BP: 122/82   Pulse: 57   SpO2: 97%   Weight: 87.2 kg (192 lb 3.2 oz)       Lab Results   Component Value Date    SODIUM 137 10/17/2022    K 4.4 10/17/2022    EGFR 64 10/17/2022    CREATININE 0.89 10/17/2022    GLUF 101 (H) 10/17/2022    MICROALBCRE 8 04/14/2020       Lab Results   Component Value Date HGBA1C 5.9 08/02/2023    HGBA1C 5.8 (H) 10/17/2022    HGBA1C 5.9 02/24/2022       Pharmacist Tracking Tool  Reason For Outreach: Embedded Pharmacist  Demographics:  Intervention Method:  In Person  Type of Intervention: Follow-Up  Topics Addressed: Diabetes and Hypertension  Pharmacologic Interventions: Dose or Frequency Adjusted and Prevent or Manage STEFANY  Non-Pharmacologic Interventions: Care coordination, Disease state education and Home Monitoring  Time:  Direct Patient Care: 20 mins  Care Coordination: 10 mins  Recommendation Recipient: Patient/Caregiver  Outcome: Accepted

## 2023-09-21 NOTE — ASSESSMENT & PLAN NOTE
Lab Results   Component Value Date    HGBA1C 5.9 08/02/2023   Most recent A1c below goal   Reported fasting blood sugar average to goal  Potential adverse drug reaction to [facility] which could be inhibiting weight loss. Uncertain if patient would experience weight loss with dose increase or if adverse drug reaction will worsen. • Medications: reviewed with patient on converting back to Ozempic however patient would like to give [facility] longer time for weight loss, aware potential for worsening adverse drug reaction. o Mounjaro: increase to 10mg/week when supply used up.  Patient will increase water intake.   o Metformin: continue   • Home Monitoring: daily

## 2023-10-04 DIAGNOSIS — E78.5 HYPERLIPIDEMIA, UNSPECIFIED HYPERLIPIDEMIA TYPE: ICD-10-CM

## 2023-10-04 DIAGNOSIS — I10 ESSENTIAL HYPERTENSION: ICD-10-CM

## 2023-10-04 DIAGNOSIS — R10.13 DYSPEPSIA: ICD-10-CM

## 2023-10-04 RX ORDER — METOPROLOL SUCCINATE 50 MG/1
50 TABLET, EXTENDED RELEASE ORAL DAILY
Qty: 90 TABLET | Refills: 1 | Status: SHIPPED | OUTPATIENT
Start: 2023-10-04

## 2023-10-04 RX ORDER — FAMOTIDINE 20 MG/1
20 TABLET, FILM COATED ORAL 2 TIMES DAILY
Qty: 180 TABLET | Refills: 1 | Status: SHIPPED | OUTPATIENT
Start: 2023-10-04

## 2023-10-04 RX ORDER — ATORVASTATIN CALCIUM 10 MG/1
10 TABLET, FILM COATED ORAL DAILY
Qty: 90 TABLET | Refills: 1 | Status: SHIPPED | OUTPATIENT
Start: 2023-10-04

## 2023-10-04 RX ORDER — LOSARTAN POTASSIUM 100 MG/1
100 TABLET ORAL DAILY
Qty: 90 TABLET | Refills: 1 | Status: SHIPPED | OUTPATIENT
Start: 2023-10-04

## 2023-10-05 ENCOUNTER — PATIENT MESSAGE (OUTPATIENT)
Dept: INTERNAL MEDICINE CLINIC | Facility: CLINIC | Age: 73
End: 2023-10-05

## 2023-10-07 DIAGNOSIS — E11.49 DIABETES MELLITUS TYPE 2 WITH NEUROLOGICAL MANIFESTATIONS (HCC): ICD-10-CM

## 2023-10-09 DIAGNOSIS — I10 ESSENTIAL HYPERTENSION: Primary | ICD-10-CM

## 2023-10-09 RX ORDER — OLMESARTAN MEDOXOMIL 40 MG/1
40 TABLET ORAL DAILY
Qty: 90 TABLET | Refills: 3 | Status: SHIPPED | OUTPATIENT
Start: 2023-10-09

## 2023-11-09 ENCOUNTER — CLINICAL SUPPORT (OUTPATIENT)
Dept: INTERNAL MEDICINE CLINIC | Facility: CLINIC | Age: 73
End: 2023-11-09
Payer: MEDICARE

## 2023-11-09 VITALS — WEIGHT: 188.3 LBS | BODY MASS INDEX: 32.32 KG/M2 | DIASTOLIC BLOOD PRESSURE: 72 MMHG | SYSTOLIC BLOOD PRESSURE: 136 MMHG

## 2023-11-09 DIAGNOSIS — I10 ESSENTIAL HYPERTENSION: ICD-10-CM

## 2023-11-09 DIAGNOSIS — E11.40 CONTROLLED TYPE 2 DIABETES MELLITUS WITH DIABETIC NEUROPATHY, WITHOUT LONG-TERM CURRENT USE OF INSULIN (HCC): Primary | ICD-10-CM

## 2023-11-09 LAB — SL AMB POCT HEMOGLOBIN AIC: 5.6 (ref ?–6.5)

## 2023-11-09 PROCEDURE — 83036 HEMOGLOBIN GLYCOSYLATED A1C: CPT | Performed by: PHARMACIST

## 2023-11-09 RX ORDER — AMLODIPINE BESYLATE 5 MG/1
5 TABLET ORAL DAILY
COMMUNITY

## 2023-11-09 NOTE — PROGRESS NOTES
Diabetic Foot Exam    Patient's shoes and socks removed. Right Foot/Ankle   Right Foot Inspection  Skin Exam: skin normal and skin intact. No dry skin, no warmth, no callus, no erythema, no maceration, no abnormal color, no pre-ulcer, no ulcer and no callus. Toe Exam: ROM and strength within normal limits. Sensory   Monofilament testing: intact    Left Foot/Ankle  Left Foot Inspection  Skin Exam: skin normal and skin intact. No dry skin, no warmth, no erythema, no maceration, normal color, no pre-ulcer, no ulcer and no callus. Toe Exam: ROM and strength within normal limits.      Sensory   Monofilament testing: intact    Assign Risk Category  No deformity present  No loss of protective sensation  No weak pulses  Risk: 0

## 2023-11-09 NOTE — PROGRESS NOTES
[address] [facility], Pharmacist    ASSESSMENT/PLAN                                                                                     1. Controlled type 2 diabetes mellitus with diabetic neuropathy, without long-term current use of insulin [facility])  Assessment & Plan:    Lab Results   Component Value Date    HGBA1C 5.6 11/09/2023   Most recent A1c below goal   Reported fasting blood sugar readings average to goal  Tolerating Mounjaro well     Medications:  Mounjaro: increase to 12.5mg x 1 month then increase to 15mg/week. Metformin: use up supply then stop. Home Monitoring: daily, reviewed with patient blood sugar readings my trend slightly higher with stopping metformin. Patient aware to let PharmD or PCP know if fasting blood sugar readings trend > 150    Orders:  -     POCT hemoglobin A1c  -     tirzepatide (Mounjaro) 12.5 MG/0.5ML; Inject 0.5 mL (12.5 mg total) under the skin once a week For diabetes  -     tirzepatide (Mounjaro) 15 MG/0.5ML; Inject 0.5 mL (15 mg total) under the skin once a week For diabetes Do not start before November 25, 2023.    2. Essential hypertension  Assessment & Plan:  Blood pressure goal less than 130/80 mmHg  In office BP above goal, HR acceptable. Did not take medication's this morning yet  Home BP readings avg below goal. HR acceptable  Did not start olmesartan yet  Serum K+ WNL. Medications: patient will continue amlodipine 5mg/day; stop losartan and start taking olmesartan per previous messages. Reviewed with patient that if swelling becomes intolerable, will look at stopping amlodipine however would need to adjust other medication's. Home Monitoring: daily         BMI Counseling: Body mass index is 32.32 kg/m². The BMI is above normal. Pharmacotherapy was ordered for patient to aid in weight loss. Follow-Up:7  Weeks in person, patient will send [address] as follow-up in 1 week on PIEDAD while on lower amlodipine dose. SUBJECTIVE                                                                                                              Medication Adherence: Medication list reviewed with patient, reports the following discrepancies/problems:  amLODIPine: has been taking 5mg/day  Losartan: continues to take, has ~1 month remaining  Olmesartan: has not started taking as she was uncertain what to do with other medications  Tirzepatide: 1 dose remaining, will be taking on 11/11  Vitamin D3: 5000 units once daily    Took blood pressure medications this morning: yes    2. Medication Efficacy:    Review of Systems   Constitutional:  Positive for appetite change (overall decreased, has noticed more suppression since starting higher Mounjaro dose). Negative for unexpected weight change. Cardiovascular:  Positive for leg swelling (somewhat but significantly improved with reducing amlodipine, tolerable). Gastrointestinal:  Positive for constipation (every other day). Negative for diarrhea, nausea and vomiting. Bloating   Neurological:  Negative for dizziness, light-headedness and headaches. Home blood sugar readings (no log, per memory): usually running , checked a reading  2 hours post-breakfast and reading was 140. Yesterday was 115, always < 130    Home blood pressure readings 332/95, uncertain heart rate readings    3. Lifestyle: has noticed more decrease in appetite since starting on this Mounjaro, sometimes can eat over the suppression from [facility].  Has not been increasing water lately but plans to try, doing around 2 quarts/day    OBJECTIVE                                                                                                      Vitals:    11/09/23 1006   BP: 136/72   Weight: 85.4 kg (188 lb 4.8 oz)       Eye Exam:    Lab Results   Component Value Date    LEFTDIABRET Positive 06/08/2023    RIGHTDIABRET Positive 06/08/2023       Lab Results   Component Value Date    SODIUM 137 10/17/2022    K 4.4 10/17/2022    EGFR 64 10/17/2022    CREATININE 0.89 10/17/2022    GLUF 101 (H) 10/17/2022    MICROALBCRE 8 04/14/2020       Lab Results   Component Value Date    HGBA1C 5.6 11/09/2023    HGBA1C 5.9 08/02/2023    HGBA1C 5.8 (H) 10/17/2022       Pharmacist Tracking Tool  Reason For Outreach: Embedded Pharmacist  Demographics:  Intervention Method:  In Person  Type of Intervention: Follow-Up  Topics Addressed: Diabetes and Hypertension  Pharmacologic Interventions: Medication Discontinuation and Dose or Frequency Adjusted  Non-Pharmacologic Interventions: Care coordination, Labs, and Care Gap  Time:  Direct Patient Care:  20  mins  Care Coordination:  15  mins  Recommendation Recipient: Patient/Caregiver  Outcome: Accepted

## 2023-11-09 NOTE — ASSESSMENT & PLAN NOTE
Lab Results   Component Value Date    HGBA1C 5.6 11/09/2023   Most recent A1c below goal   Reported fasting blood sugar readings average to goal  Tolerating Mounjaro well     Medications:  Mounjaro: increase to 12.5mg x 1 month then increase to 15mg/week. Metformin: use up supply then stop. Home Monitoring: daily, reviewed with patient blood sugar readings my trend slightly higher with stopping metformin.  Patient aware to let PharmD or PCP know if fasting blood sugar readings trend > 150

## 2023-11-09 NOTE — ASSESSMENT & PLAN NOTE
Blood pressure goal less than 130/80 mmHg  In office BP above goal, HR acceptable. Did not take medication's this morning yet  Home BP readings avg below goal. HR acceptable  Did not start olmesartan yet  Serum K+ WNL. Medications: patient will continue amlodipine 5mg/day; stop losartan and start taking olmesartan per previous messages. Reviewed with patient that if swelling becomes intolerable, will look at stopping amlodipine however would need to adjust other medication's.    Home Monitoring: daily

## 2023-11-21 ENCOUNTER — APPOINTMENT (OUTPATIENT)
Dept: LAB | Facility: CLINIC | Age: 73
End: 2023-11-21
Payer: MEDICARE

## 2023-11-21 DIAGNOSIS — E11.40 CONTROLLED TYPE 2 DIABETES MELLITUS WITH DIABETIC NEUROPATHY, WITHOUT LONG-TERM CURRENT USE OF INSULIN (HCC): ICD-10-CM

## 2023-11-21 DIAGNOSIS — E78.5 HYPERLIPIDEMIA, UNSPECIFIED HYPERLIPIDEMIA TYPE: ICD-10-CM

## 2023-11-21 DIAGNOSIS — I10 ESSENTIAL HYPERTENSION: ICD-10-CM

## 2023-11-21 LAB
ANION GAP SERPL CALCULATED.3IONS-SCNC: 8 MMOL/L
BASOPHILS # BLD AUTO: 0.07 THOUSANDS/ÂΜL (ref 0–0.1)
BASOPHILS NFR BLD AUTO: 1 % (ref 0–1)
BUN SERPL-MCNC: 17 MG/DL (ref 5–25)
CALCIUM SERPL-MCNC: 9.4 MG/DL (ref 8.4–10.2)
CHLORIDE SERPL-SCNC: 100 MMOL/L (ref 96–108)
CHOLEST SERPL-MCNC: 150 MG/DL
CO2 SERPL-SCNC: 30 MMOL/L (ref 21–32)
CREAT SERPL-MCNC: 0.72 MG/DL (ref 0.6–1.3)
CREAT UR-MCNC: 46.1 MG/DL
EOSINOPHIL # BLD AUTO: 0.25 THOUSAND/ÂΜL (ref 0–0.61)
EOSINOPHIL NFR BLD AUTO: 3 % (ref 0–6)
ERYTHROCYTE [DISTWIDTH] IN BLOOD BY AUTOMATED COUNT: 12.7 % (ref 11.6–15.1)
GFR SERPL CREATININE-BSD FRML MDRD: 82 ML/MIN/1.73SQ M
GLUCOSE P FAST SERPL-MCNC: 88 MG/DL (ref 65–99)
HCT VFR BLD AUTO: 37.9 % (ref 34.8–46.1)
HDLC SERPL-MCNC: 39 MG/DL
HGB BLD-MCNC: 12.8 G/DL (ref 11.5–15.4)
IMM GRANULOCYTES # BLD AUTO: 0.02 THOUSAND/UL (ref 0–0.2)
IMM GRANULOCYTES NFR BLD AUTO: 0 % (ref 0–2)
LDLC SERPL CALC-MCNC: 76 MG/DL (ref 0–100)
LYMPHOCYTES # BLD AUTO: 1.97 THOUSANDS/ÂΜL (ref 0.6–4.47)
LYMPHOCYTES NFR BLD AUTO: 22 % (ref 14–44)
MCH RBC QN AUTO: 30.8 PG (ref 26.8–34.3)
MCHC RBC AUTO-ENTMCNC: 33.8 G/DL (ref 31.4–37.4)
MCV RBC AUTO: 91 FL (ref 82–98)
MICROALBUMIN UR-MCNC: 8.9 MG/L
MICROALBUMIN/CREAT 24H UR: 19 MG/G CREATININE (ref 0–30)
MONOCYTES # BLD AUTO: 0.55 THOUSAND/ÂΜL (ref 0.17–1.22)
MONOCYTES NFR BLD AUTO: 6 % (ref 4–12)
NEUTROPHILS # BLD AUTO: 5.97 THOUSANDS/ÂΜL (ref 1.85–7.62)
NEUTS SEG NFR BLD AUTO: 68 % (ref 43–75)
NONHDLC SERPL-MCNC: 111 MG/DL
NRBC BLD AUTO-RTO: 0 /100 WBCS
PLATELET # BLD AUTO: 281 THOUSANDS/UL (ref 149–390)
PMV BLD AUTO: 10.6 FL (ref 8.9–12.7)
POTASSIUM SERPL-SCNC: 4.5 MMOL/L (ref 3.5–5.3)
RBC # BLD AUTO: 4.15 MILLION/UL (ref 3.81–5.12)
SODIUM SERPL-SCNC: 138 MMOL/L (ref 135–147)
TRIGL SERPL-MCNC: 174 MG/DL
WBC # BLD AUTO: 8.83 THOUSAND/UL (ref 4.31–10.16)

## 2023-11-21 PROCEDURE — 85025 COMPLETE CBC W/AUTO DIFF WBC: CPT

## 2023-11-21 PROCEDURE — 80048 BASIC METABOLIC PNL TOTAL CA: CPT

## 2023-11-21 PROCEDURE — 82570 ASSAY OF URINE CREATININE: CPT

## 2023-11-21 PROCEDURE — 80061 LIPID PANEL: CPT

## 2023-11-21 PROCEDURE — 36415 COLL VENOUS BLD VENIPUNCTURE: CPT

## 2023-11-21 PROCEDURE — 82043 UR ALBUMIN QUANTITATIVE: CPT

## 2023-12-12 ENCOUNTER — 6 MONTH FOLLOW UP (OUTPATIENT)
Dept: URBAN - METROPOLITAN AREA CLINIC 6 | Facility: CLINIC | Age: 73
End: 2023-12-12

## 2023-12-12 DIAGNOSIS — E11.3293: ICD-10-CM

## 2023-12-12 DIAGNOSIS — H04.123: ICD-10-CM

## 2023-12-12 DIAGNOSIS — Z96.1: ICD-10-CM

## 2023-12-12 LAB
LEFT EYE DIABETIC RETINOPATHY: POSITIVE
RIGHT EYE DIABETIC RETINOPATHY: POSITIVE

## 2023-12-12 PROCEDURE — 92014 COMPRE OPH EXAM EST PT 1/>: CPT

## 2023-12-12 PROCEDURE — 92134 CPTRZ OPH DX IMG PST SGM RTA: CPT

## 2023-12-12 ASSESSMENT — TONOMETRY
OD_IOP_MMHG: 19
OS_IOP_MMHG: 20

## 2023-12-12 ASSESSMENT — VISUAL ACUITY
OS_SC: 20/30-2
OD_SC: 20/100-1
OD_PH: 20/50-1

## 2024-01-29 RX ORDER — TACROLIMUS 1 MG/G
OINTMENT TOPICAL
COMMUNITY
Start: 2023-12-29

## 2024-01-29 RX ORDER — DOXYCYCLINE HYCLATE 100 MG/1
100 CAPSULE ORAL 2 TIMES DAILY WITH MEALS
COMMUNITY
Start: 2023-12-27

## 2024-01-30 NOTE — ASSESSMENT & PLAN NOTE
Vaccinations: utd on flu shot this winter; not interested in covid booster; recommend RSV; recommend shingrix  Advance care planning:  Mammogram: has it scheduled this year  Colon cancer screen:colonoscopy 12/2020, repeat in 10y  DEXA: osteoporosis, repeat 2025  Healthy diet and regular exercise

## 2024-01-31 ENCOUNTER — OFFICE VISIT (OUTPATIENT)
Dept: INTERNAL MEDICINE CLINIC | Facility: CLINIC | Age: 74
End: 2024-01-31
Payer: MEDICARE

## 2024-01-31 VITALS
HEIGHT: 65 IN | BODY MASS INDEX: 30.66 KG/M2 | DIASTOLIC BLOOD PRESSURE: 88 MMHG | HEART RATE: 72 BPM | SYSTOLIC BLOOD PRESSURE: 138 MMHG | OXYGEN SATURATION: 100 % | WEIGHT: 184 LBS

## 2024-01-31 DIAGNOSIS — Z00.00 MEDICARE ANNUAL WELLNESS VISIT, SUBSEQUENT: Primary | ICD-10-CM

## 2024-01-31 DIAGNOSIS — L71.9 ROSACEA: ICD-10-CM

## 2024-01-31 DIAGNOSIS — I10 ESSENTIAL HYPERTENSION: ICD-10-CM

## 2024-01-31 DIAGNOSIS — E11.40 CONTROLLED TYPE 2 DIABETES MELLITUS WITH DIABETIC NEUROPATHY, WITHOUT LONG-TERM CURRENT USE OF INSULIN (HCC): ICD-10-CM

## 2024-01-31 DIAGNOSIS — F51.01 PRIMARY INSOMNIA: ICD-10-CM

## 2024-01-31 DIAGNOSIS — E78.5 HYPERLIPIDEMIA, UNSPECIFIED HYPERLIPIDEMIA TYPE: ICD-10-CM

## 2024-01-31 DIAGNOSIS — M46.1 SACROILIITIS (HCC): ICD-10-CM

## 2024-01-31 DIAGNOSIS — R10.13 DYSPEPSIA: ICD-10-CM

## 2024-01-31 PROBLEM — L03.90 CELLULITIS: Status: RESOLVED | Noted: 2023-08-21 | Resolved: 2024-01-31

## 2024-01-31 PROCEDURE — 99214 OFFICE O/P EST MOD 30 MIN: CPT | Performed by: GENERAL ACUTE CARE HOSPITAL

## 2024-01-31 PROCEDURE — G0439 PPPS, SUBSEQ VISIT: HCPCS | Performed by: GENERAL ACUTE CARE HOSPITAL

## 2024-01-31 PROCEDURE — 1123F ACP DISCUSS/DSCN MKR DOCD: CPT | Performed by: GENERAL ACUTE CARE HOSPITAL

## 2024-01-31 RX ORDER — METOPROLOL SUCCINATE 50 MG/1
50 TABLET, EXTENDED RELEASE ORAL DAILY
Qty: 90 TABLET | Refills: 2 | Status: SHIPPED | OUTPATIENT
Start: 2024-01-31

## 2024-01-31 RX ORDER — FAMOTIDINE 20 MG/1
20 TABLET, FILM COATED ORAL 2 TIMES DAILY
Qty: 180 TABLET | Refills: 2 | Status: SHIPPED | OUTPATIENT
Start: 2024-01-31

## 2024-01-31 RX ORDER — AMLODIPINE BESYLATE 5 MG/1
5 TABLET ORAL DAILY
Qty: 90 TABLET | Refills: 2 | Status: SHIPPED | OUTPATIENT
Start: 2024-01-31

## 2024-01-31 RX ORDER — ATORVASTATIN CALCIUM 10 MG/1
10 TABLET, FILM COATED ORAL DAILY
Qty: 90 TABLET | Refills: 2 | Status: SHIPPED | OUTPATIENT
Start: 2024-01-31

## 2024-01-31 RX ORDER — OLMESARTAN MEDOXOMIL 40 MG/1
40 TABLET ORAL DAILY
Qty: 90 TABLET | Refills: 2 | Status: SHIPPED | OUTPATIENT
Start: 2024-01-31

## 2024-01-31 NOTE — PATIENT INSTRUCTIONS
Medicare Preventive Visit Patient Instructions  Thank you for completing your Welcome to Medicare Visit or Medicare Annual Wellness Visit today. Your next wellness visit will be due in one year (1/31/2025).  The screening/preventive services that you may require over the next 5-10 years are detailed below. Some tests may not apply to you based off risk factors and/or age. Screening tests ordered at today's visit but not completed yet may show as past due. Also, please note that scanned in results may not display below.  Preventive Screenings:  Service Recommendations Previous Testing/Comments   Colorectal Cancer Screening  * Colonoscopy    * Fecal Occult Blood Test (FOBT)/Fecal Immunochemical Test (FIT)  * Fecal DNA/Cologuard Test  * Flexible Sigmoidoscopy Age: 45-75 years old   Colonoscopy: every 10 years (may be performed more frequently if at higher risk)  OR  FOBT/FIT: every 1 year  OR  Cologuard: every 3 years  OR  Sigmoidoscopy: every 5 years  Screening may be recommended earlier than age 45 if at higher risk for colorectal cancer. Also, an individualized decision between you and your healthcare provider will decide whether screening between the ages of 76-85 would be appropriate. Colonoscopy: 01/07/2021  FOBT/FIT: Not on file  Cologuard: Not on file  Sigmoidoscopy: 01/07/2021          Breast Cancer Screening Age: 40+ years old  Frequency: every 1-2 years  Not required if history of left and right mastectomy Mammogram: 05/11/2023        Cervical Cancer Screening Between the ages of 21-29, pap smear recommended once every 3 years.   Between the ages of 30-65, can perform pap smear with HPV co-testing every 5 years.   Recommendations may differ for women with a history of total hysterectomy, cervical cancer, or abnormal pap smears in past. Pap Smear: 11/12/2018        Hepatitis C Screening Once for adults born between 1945 and 1965  More frequently in patients at high risk for Hepatitis C Hep C Antibody:  10/08/2018        Diabetes Screening 1-2 times per year if you're at risk for diabetes or have pre-diabetes Fasting glucose: 88 mg/dL (11/21/2023)  A1C: 5.6 (11/9/2023)      Cholesterol Screening Once every 5 years if you don't have a lipid disorder. May order more often based on risk factors. Lipid panel: 11/21/2023          Other Preventive Screenings Covered by Medicare:  Abdominal Aortic Aneurysm (AAA) Screening: covered once if your at risk. You're considered to be at risk if you have a family history of AAA.  Lung Cancer Screening: covers low dose CT scan once per year if you meet all of the following conditions: (1) Age 55-77; (2) No signs or symptoms of lung cancer; (3) Current smoker or have quit smoking within the last 15 years; (4) You have a tobacco smoking history of at least 20 pack years (packs per day multiplied by number of years you smoked); (5) You get a written order from a healthcare provider.  Glaucoma Screening: covered annually if you're considered high risk: (1) You have diabetes OR (2) Family history of glaucoma OR (3)  aged 50 and older OR (4)  American aged 65 and older  Osteoporosis Screening: covered every 2 years if you meet one of the following conditions: (1) You're estrogen deficient and at risk for osteoporosis based off medical history and other findings; (2) Have a vertebral abnormality; (3) On glucocorticoid therapy for more than 3 months; (4) Have primary hyperparathyroidism; (5) On osteoporosis medications and need to assess response to drug therapy.   Last bone density test (DXA Scan): 09/19/2022.  HIV Screening: covered annually if you're between the age of 15-65. Also covered annually if you are younger than 15 and older than 65 with risk factors for HIV infection. For pregnant patients, it is covered up to 3 times per pregnancy.    Immunizations:  Immunization Recommendations   Influenza Vaccine Annual influenza vaccination during flu season is  recommended for all persons aged >= 6 months who do not have contraindications   Pneumococcal Vaccine   * Pneumococcal conjugate vaccine = PCV13 (Prevnar 13), PCV15 (Vaxneuvance), PCV20 (Prevnar 20)  * Pneumococcal polysaccharide vaccine = PPSV23 (Pneumovax) Adults 19-65 yo with certain risk factors or if 65+ yo  If never received any pneumonia vaccine: recommend Prevnar 20 (PCV20)  Give PCV20 if previously received 1 dose of PCV13 or PPSV23   Hepatitis B Vaccine 3 dose series if at intermediate or high risk (ex: diabetes, end stage renal disease, liver disease)   Respiratory syncytial virus (RSV) Vaccine - COVERED BY MEDICARE PART D  * RSVPreF3 (Arexvy) CDC recommends that adults 60 years of age and older may receive a single dose of RSV vaccine using shared clinical decision-making (SCDM)   Tetanus (Td) Vaccine - COST NOT COVERED BY MEDICARE PART B Following completion of primary series, a booster dose should be given every 10 years to maintain immunity against tetanus. Td may also be given as tetanus wound prophylaxis.   Tdap Vaccine - COST NOT COVERED BY MEDICARE PART B Recommended at least once for all adults. For pregnant patients, recommended with each pregnancy.   Shingles Vaccine (Shingrix) - COST NOT COVERED BY MEDICARE PART B  2 shot series recommended in those 19 years and older who have or will have weakened immune systems or those 50 years and older     Health Maintenance Due:      Topic Date Due   • DXA SCAN  09/19/2024   • Colorectal Cancer Screening  01/07/2031   • Hepatitis C Screening  Completed     Immunizations Due:      Topic Date Due   • DTaP,Tdap,and Td Vaccines (1 - Tdap) Never done   • Influenza Vaccine (1) 09/01/2023   • COVID-19 Vaccine (6 - 2023-24 season) 09/01/2023     Advance Directives   What are advance directives?  Advance directives are legal documents that state your wishes and plans for medical care. These plans are made ahead of time in case you lose your ability to make  decisions for yourself. Advance directives can apply to any medical decision, such as the treatments you want, and if you want to donate organs.   What are the types of advance directives?  There are many types of advance directives, and each state has rules about how to use them. You may choose a combination of any of the following:  Living will:  This is a written record of the treatment you want. You can also choose which treatments you do not want, which to limit, and which to stop at a certain time. This includes surgery, medicine, IV fluid, and tube feedings.   Durable power of  for healthcare (DPAHC):  This is a written record that states who you want to make healthcare choices for you when you are unable to make them for yourself. This person, called a proxy, is usually a family member or a friend. You may choose more than 1 proxy.  Do not resuscitate (DNR) order:  A DNR order is used in case your heart stops beating or you stop breathing. It is a request not to have certain forms of treatment, such as CPR. A DNR order may be included in other types of advance directives.  Medical directive:  This covers the care that you want if you are in a coma, near death, or unable to make decisions for yourself. You can list the treatments you want for each condition. Treatment may include pain medicine, surgery, blood transfusions, dialysis, IV or tube feedings, and a ventilator (breathing machine).  Values history:  This document has questions about your views, beliefs, and how you feel and think about life. This information can help others choose the care that you would choose.  Why are advance directives important?  An advance directive helps you control your care. Although spoken wishes may be used, it is better to have your wishes written down. Spoken wishes can be misunderstood, or not followed. Treatments may be given even if you do not want them. An advance directive may make it easier for your family  to make difficult choices about your care.   Weight Management   Why it is important to manage your weight:  Being overweight increases your risk of health conditions such as heart disease, high blood pressure, type 2 diabetes, and certain types of cancer. It can also increase your risk for osteoarthritis, sleep apnea, and other respiratory problems. Aim for a slow, steady weight loss. Even a small amount of weight loss can lower your risk of health problems.  How to lose weight safely:  A safe and healthy way to lose weight is to eat fewer calories and get regular exercise. You can lose up about 1 pound a week by decreasing the number of calories you eat by 500 calories each day.   Healthy meal plan for weight management:  A healthy meal plan includes a variety of foods, contains fewer calories, and helps you stay healthy. A healthy meal plan includes the following:  Eat whole-grain foods more often.  A healthy meal plan should contain fiber. Fiber is the part of grains, fruits, and vegetables that is not broken down by your body. Whole-grain foods are healthy and provide extra fiber in your diet. Some examples of whole-grain foods are whole-wheat breads and pastas, oatmeal, brown rice, and bulgur.  Eat a variety of vegetables every day.  Include dark, leafy greens such as spinach, kale, prem greens, and mustard greens. Eat yellow and orange vegetables such as carrots, sweet potatoes, and winter squash.   Eat a variety of fruits every day.  Choose fresh or canned fruit (canned in its own juice or light syrup) instead of juice. Fruit juice has very little or no fiber.  Eat low-fat dairy foods.  Drink fat-free (skim) milk or 1% milk. Eat fat-free yogurt and low-fat cottage cheese. Try low-fat cheeses such as mozzarella and other reduced-fat cheeses.  Choose meat and other protein foods that are low in fat.  Choose beans or other legumes such as split peas or lentils. Choose fish, skinless poultry (chicken or  turkey), or lean cuts of red meat (beef or pork). Before you cook meat or poultry, cut off any visible fat.   Use less fat and oil.  Try baking foods instead of frying them. Add less fat, such as margarine, sour cream, regular salad dressing and mayonnaise to foods. Eat fewer high-fat foods. Some examples of high-fat foods include french fries, doughnuts, ice cream, and cakes.  Eat fewer sweets.  Limit foods and drinks that are high in sugar. This includes candy, cookies, regular soda, and sweetened drinks.  Exercise:  Exercise at least 30 minutes per day on most days of the week. Some examples of exercise include walking, biking, dancing, and swimming. You can also fit in more physical activity by taking the stairs instead of the elevator or parking farther away from stores. Ask your healthcare provider about the best exercise plan for you.      © Copyright [employer] 2018 Information is for End User's use only and may not be sold, redistributed or otherwise used for commercial purposes. All illustrations and images included in CareNotes® are the copyrighted property of [employer] or [employer]

## 2024-01-31 NOTE — ASSESSMENT & PLAN NOTE
Lab Results   Component Value Date    HGBA1C 5.6 11/09/2023   Well controlled  On metformin and Mounjaro  Can trial of weaning metformin since her DM is so well controlled  Down to once a day for 1-2 weeks then stop

## 2024-01-31 NOTE — PROGRESS NOTES
Assessment and Plan:     Problem List Items Addressed This Visit       Controlled diabetes mellitus (HCC)       Lab Results   Component Value Date    HGBA1C 5.6 11/09/2023   Well controlled  On metformin and Mounjaro  Can trial of weaning metformin since her DM is so well controlled  Down to once a day for 1-2 weeks then stop           Relevant Medications    tirzepatide (Mounjaro) 15 MG/0.5ML    Hyperlipidemia    Relevant Medications    atorvastatin (LIPITOR) 10 mg tablet    Essential hypertension     Under control.  Continue amlodipine and olmesartan.         Relevant Medications    amLODIPine (NORVASC) 5 mg tablet    metoprolol succinate (TOPROL-XL) 50 mg 24 hr tablet    olmesartan (BENICAR) 40 mg tablet    Medicare annual wellness visit, subsequent - Primary     Vaccinations: utd on flu shot this winter; not interested in covid booster; recommend RSV; recommend shingrix  Advance care planning:  Mammogram: has it scheduled this year  Colon cancer screen:colonoscopy 12/2020, repeat in 10y  DEXA: osteoporosis, repeat 2025  Healthy diet and regular exercise           Sacroiliitis (HCC)     Stable  Takes naproxen as needed         Primary insomnia     It is better after she cuts down large Coke  Monitor           Rosacea     Follow-up with dermatology.  On p.o. Doxy and topical tacrolimus.         Relevant Medications    tacrolimus (PROTOPIC) 0.1 % ointment     Other Visit Diagnoses       Dyspepsia        Relevant Medications    famotidine (PEPCID) 20 mg tablet             Preventive health issues were discussed with patient, and age appropriate screening tests were ordered as noted in patient's After Visit Summary.  Personalized health advice and appropriate referrals for health education or preventive services given if needed, as noted in patient's After Visit Summary.     History of Present Illness:     Patient presents for a Medicare Wellness Visit    HPI   Insomnia  Drinks big coke when she is out  After  cutting that sleep was much better    Patient Care Team:  Lea Sanchez MD as PCP - General (Internal Medicine)     Review of Systems:     Review of Systems     Problem List:     Patient Active Problem List   Diagnosis    Osteopenia    Controlled diabetes mellitus (HCC)    Hyperlipidemia    Essential hypertension    BMI 31.0-31.9,adult    Asymptomatic postmenopausal status    NICKY (obstructive sleep apnea)    PLMD (periodic limb movement disorder)    Primary osteoarthritis of right knee    Primary osteoarthritis of one hip, left    Trochanteric bursitis of left hip    Back pain with left-sided sciatica    Medicare annual wellness visit, subsequent    Sacroiliitis (HCC)    Chronic idiopathic urticaria    Incontinence in female    Eczema    Primary insomnia    Rosacea      Past Medical and Surgical History:     Past Medical History:   Diagnosis Date    Acute cystitis without hematuria 12/14/2020    Allergic 2011    take meds daily to control    Arthritis 07/2018    PT 7/2018    Carpal tunnel syndrome     Unspecified laterality. Resolved: 12/21/2016    Cataract     Chronic kidney disease     Complex endometrial hyperplasia with atypia     Resolved: 11/03/17    Diabetes mellitus (HCC)     of other type with circulatory complication, without long-term current use of insulin    History of chickenpox     History of measles     History of mumps     HL (hearing loss)     hearing aids as of 8/17    Hyperlipidemia     Hypertension     Normal delivery     1971 and 1973 sons    Obesity     NICKY (obstructive sleep apnea)     Post-menopausal bleeding     Resolved: 2011    Seasonal allergies     Urinary tract infection 10/17    meds cleared it    UTI (urinary tract infection)     07/2023, med cleared it     Past Surgical History:   Procedure Laterality Date    CARPAL TUNNEL RELEASE      CARPAL TUNNEL RELEASE Left     CATARACT EXTRACTION      CHOLECYSTECTOMY      COLONOSCOPY      Complete    DILATION AND CURETTAGE OF UTERUS      twice     ENDOMETRIAL BIOPSY      without cervical dilation    HYSTEROSCOPY      TUBAL LIGATION      WISDOM TOOTH EXTRACTION      x 2      Family History:     Family History   Problem Relation Age of Onset    Hypertension Mother     Heart disease Mother     Heart attack Mother     Hypertension Father     Cancer Father              Prostate cancer Father 84    Arthritis Father              Hypertension Brother     Heart disease Brother     Prostate cancer Brother 63    No Known Problems Brother     Diabetes Maternal Grandmother     Stroke Maternal Grandmother     Diabetes Paternal Grandfather     No Known Problems Child     Arthritis Family     Cancer Family         bladder    Other Family         Cardiac disorder    Diabetes Family     Hypertension Family     Valvular heart disease Family     No Known Problems Son     No Known Problems Son     Breast cancer Neg Hx     Colon cancer Neg Hx     Ovarian cancer Neg Hx     Uterine cancer Neg Hx     Cervical cancer Neg Hx     Thyroid disease Neg Hx       Social History:     Social History     Socioeconomic History    Marital status: /Civil Union     Spouse name: Se    Number of children: 2    Years of education: None    Highest education level: None   Occupational History    Occupation: High School Adminstrator      Employer: [employer]   Tobacco Use    Smoking status: Never    Smokeless tobacco: Never   Vaping Use    Vaping status: Never Used   Substance and Sexual Activity    Alcohol use: Not Currently     Comment: less than one glass of wine per month    Drug use: No    Sexual activity: Not Currently     Partners: Male     Birth control/protection: Post-menopausal, Female Sterilization, None   Other Topics Concern    None   Social History Narrative    Daily coffee consumption (2 cups/day)        Who lives in your home:     What type of home do you live in: Single house    Age of your home: Built     How long have you  been living there: 2022    Type of heat: Forced hot air    Type of fuel: Gas and Electric    What type of keny is in your bedroom: Area rugs and Hardwood floor    Do you have the following in or near your home:    Air products: Central air    Pests: None    Pets: Dog    Are pets allowed in bedroom: Yes    Open fields, wooded areas nearby: Open fields and Wooded areas    Basement: None    Exposure to second hand smoke: No        Habits:    Caffeine: coffee 1  cup daily-soda rarely-ice tea 1 glass daily-hot tea 1 x a week     Chocolate: daily     Other:              Social Determinants of Health     Financial Resource Strain: Low Risk  (1/25/2024)    Overall Financial Resource Strain (CARDIA)     Difficulty of Paying Living Expenses: Not very hard   Food Insecurity: Not on file   Transportation Needs: No Transportation Needs (1/25/2024)    PRAPARE - Transportation     Lack of Transportation (Medical): No     Lack of Transportation (Non-Medical): No   Physical Activity: Sufficiently Active (10/12/2022)    Exercise Vital Sign     Days of Exercise per Week: 7 days     Minutes of Exercise per Session: 50 min   Stress: Not on file   Social Connections: Not on file   Intimate Partner Violence: Not on file   Housing Stability: Not on file      Medications and Allergies:     Current Outpatient Medications   Medication Sig Dispense Refill    amLODIPine (NORVASC) 5 mg tablet Take 1 tablet (5 mg total) by mouth daily *hold until patient requests, has supply at home* 90 tablet 2    atorvastatin (LIPITOR) 10 mg tablet Take 1 tablet (10 mg total) by mouth daily 90 tablet 2    Cranberry 600 MG TABS Take by mouth      doxycycline hyclate (VIBRAMYCIN) 100 mg capsule Take 100 mg by mouth 2 (two) times a day with meals      famotidine (PEPCID) 20 mg tablet Take 1 tablet (20 mg total) by mouth 2 (two) times a day 180 tablet 2    glucose blood (OneTouch Ultra) test strip Test once daily 50 strip 3    metoprolol succinate (TOPROL-XL)  50 mg 24 hr tablet Take 1 tablet (50 mg total) by mouth daily 90 tablet 2    olmesartan (BENICAR) 40 mg tablet Take 1 tablet (40 mg total) by mouth daily 90 tablet 2    omalizumab (XOLAIR) subcutaneous injection Inject 1 mL (150 mg total) under the skin every 28 days      tacrolimus (PROTOPIC) 0.1 % ointment apply TO SKIN twice a day if needed      tirzepatide (Mounjaro) 15 MG/0.5ML Inject 0.5 mL (15 mg total) under the skin once a week For diabetes 6 mL 1    cetirizine (ZyrTEC) 10 mg tablet Take 2 tablets (20 mg total) by mouth daily 180 tablet 3    fexofenadine (ALLEGRA) 180 MG tablet Take 2 tablets (360 mg total) by mouth daily 90 tablet 3     No current facility-administered medications for this visit.     Allergies   Allergen Reactions    Other Sneezing     SEASONAL ALLERGIES       Immunizations:     Immunization History   Administered Date(s) Administered    COVID-19 PFIZER VACCINE 0.3 ML IM 03/02/2021, 03/22/2021, 11/04/2021    COVID-19 Pfizer Vac BIVALENT Oni-sucrose 12 Yr+ IM 09/30/2022    COVID-19 Pfizer vac (Oni-sucrose, gray cap) 12 yr+ IM 05/24/2022    INFLUENZA 11/01/2011, 10/20/2015, 10/26/2016, 11/03/2017, 09/19/2018    Influenza Split High Dose Preservative Free IM 10/01/2016, 11/03/2017    Influenza, high dose seasonal 0.7 mL 10/18/2019, 09/22/2020, 10/15/2021    Pneumococcal Conjugate 13-Valent 02/15/2017    Pneumococcal Conjugate Vaccine 20-valent (Pcv20), Polysace 03/31/2022    Pneumococcal Polysaccharide PPV23 01/01/2013    Zoster 01/02/2012      Health Maintenance:         Topic Date Due    DXA SCAN  09/19/2024    Colorectal Cancer Screening  01/07/2031    Hepatitis C Screening  Completed         Topic Date Due    DTaP,Tdap,and Td Vaccines (1 - Tdap) Never done    Influenza Vaccine (1) 09/01/2023    COVID-19 Vaccine (6 - 2023-24 season) 09/01/2023      Medicare Screening Tests and Risk Assessments:     Ro is here for her Subsequent Wellness visit.     Health Risk Assessment:   Patient  rates overall health as very good. Patient feels that their physical health rating is same. Patient is satisfied with their life. Eyesight was rated as same. Hearing was rated as same. Patient feels that their emotional and mental health rating is slightly better. Patients states they are never, rarely angry. Patient states they are never, rarely unusually tired/fatigued. Pain experienced in the last 7 days has been some. Patient's pain rating has been 3/10. Patient states that she has experienced no weight loss or gain in last 6 months.     Depression Screening:   PHQ-2 Score: 0      Fall Risk Screening:   In the past year, patient has experienced: no history of falling in past year      Urinary Incontinence Screening:   Patient has leaked urine accidently in the last six months.     Home Safety:  Patient does not have trouble with stairs inside or outside of their home. Patient has working smoke alarms and has working carbon monoxide detector. Home safety hazards include: none.     Nutrition:   Current diet is Diabetic and Limited junk food.     Medications:   Patient is currently taking over-the-counter supplements. OTC medications include: see medication list. Patient is able to manage medications.     Activities of Daily Living (ADLs)/Instrumental Activities of Daily Living (IADLs):   Walk and transfer into and out of bed and chair?: Yes  Dress and groom yourself?: Yes    Bathe or shower yourself?: Yes    Feed yourself? Yes  Do your laundry/housekeeping?: Yes  Manage your money, pay your bills and track your expenses?: Yes  Make your own meals?: Yes    Do your own shopping?: Yes    Durable Medical Equipment Suppliers  N/A    Previous Hospitalizations:   Any hospitalizations or ED visits within the last 12 months?: No      Advance Care Planning:   Living will: Yes    Durable POA for healthcare: Yes    Advanced directive: Yes      PREVENTIVE SCREENINGS      Cardiovascular Screening:    General: Screening Not  "Indicated and History Lipid Disorder      Diabetes Screening:     General: Screening Not Indicated and History Diabetes      Colorectal Cancer Screening:     General: Screening Current      Breast Cancer Screening:     General: Screening Current      Cervical Cancer Screening:    General: Screening Not Indicated      Osteoporosis Screening:    General: Screening Current      Lung Cancer Screening:     General: Screening Not Indicated      Hepatitis C Screening:    General: Screening Current    Screening, Brief Intervention, and Referral to Treatment (SBIRT)    Screening  Typical number of drinks in a day: 0  Typical number of drinks in a week: 0  Interpretation: Low risk drinking behavior.    AUDIT-C Screenin) How often did you have a drink containing alcohol in the past year? monthly or less  2) How many drinks did you have on a typical day when you were drinking in the past year? 0  3) How often did you have 6 or more drinks on one occasion in the past year? never    AUDIT-C Score: 1  Interpretation: Score 0-2 (female): Negative screen for alcohol misuse    Single Item Drug Screening:  How often have you used an illegal drug (including marijuana) or a prescription medication for non-medical reasons in the past year? never    Single Item Drug Screen Score: 0  Interpretation: Negative screen for possible drug use disorder    No results found.     Physical Exam:     /88   Pulse 72   Ht 5' 4.76\" (1.645 m)   Wt 83.5 kg (184 lb)   SpO2 100%   BMI 30.84 kg/m²     Physical Exam     Lea Sanchez MD  "

## 2024-02-10 DIAGNOSIS — Z00.6 ENCOUNTER FOR EXAMINATION FOR NORMAL COMPARISON OR CONTROL IN CLINICAL RESEARCH PROGRAM: ICD-10-CM

## 2024-02-16 ENCOUNTER — APPOINTMENT (OUTPATIENT)
Dept: LAB | Facility: CLINIC | Age: 74
End: 2024-02-16

## 2024-02-16 DIAGNOSIS — Z00.6 ENCOUNTER FOR EXAMINATION FOR NORMAL COMPARISON OR CONTROL IN CLINICAL RESEARCH PROGRAM: ICD-10-CM

## 2024-02-16 PROCEDURE — 36415 COLL VENOUS BLD VENIPUNCTURE: CPT

## 2024-02-21 PROBLEM — Z00.00 MEDICARE ANNUAL WELLNESS VISIT, SUBSEQUENT: Status: RESOLVED | Noted: 2021-06-11 | Resolved: 2024-02-21

## 2024-02-28 ENCOUNTER — OFFICE VISIT (OUTPATIENT)
Dept: INTERNAL MEDICINE CLINIC | Facility: CLINIC | Age: 74
End: 2024-02-28
Payer: MEDICARE

## 2024-02-28 VITALS
BODY MASS INDEX: 30.59 KG/M2 | WEIGHT: 183.6 LBS | HEIGHT: 65 IN | DIASTOLIC BLOOD PRESSURE: 90 MMHG | OXYGEN SATURATION: 96 % | HEART RATE: 62 BPM | SYSTOLIC BLOOD PRESSURE: 140 MMHG | TEMPERATURE: 98.2 F

## 2024-02-28 DIAGNOSIS — J06.9 UPPER RESPIRATORY TRACT INFECTION, UNSPECIFIED TYPE: ICD-10-CM

## 2024-02-28 DIAGNOSIS — J40 BRONCHITIS: Primary | ICD-10-CM

## 2024-02-28 PROCEDURE — 99213 OFFICE O/P EST LOW 20 MIN: CPT | Performed by: GENERAL ACUTE CARE HOSPITAL

## 2024-02-28 RX ORDER — BENZONATATE 200 MG/1
200 CAPSULE ORAL 3 TIMES DAILY PRN
Qty: 20 CAPSULE | Refills: 0 | Status: SHIPPED | OUTPATIENT
Start: 2024-02-28

## 2024-02-28 RX ORDER — ALBUTEROL SULFATE 90 UG/1
2 AEROSOL, METERED RESPIRATORY (INHALATION) EVERY 6 HOURS PRN
Qty: 8.5 G | Refills: 0 | Status: SHIPPED | OUTPATIENT
Start: 2024-02-28

## 2024-02-28 RX ORDER — ALBUTEROL SULFATE 90 UG/1
2 AEROSOL, METERED RESPIRATORY (INHALATION) EVERY 6 HOURS PRN
Qty: 8.5 G | Refills: 0 | Status: SHIPPED | OUTPATIENT
Start: 2024-02-28 | End: 2024-02-28

## 2024-02-28 NOTE — PROGRESS NOTES
Name: Ro Grey      : 1950      MRN: [id number]  Encounter Provider: Lea Sanchez MD  Encounter Date: 2024   Encounter department: [facility]    Assessment & Plan     1. Bronchitis  Assessment & Plan:  Lung sounds congested with diffuse ronchi  Likely bronchitis  Send albuterol, benzonatate  Will get CXR    Orders:  -     XR chest pa & lateral; Future; Expected date: 2024  -     albuterol (ProAir HFA) 90 mcg/act inhaler; Inhale 2 puffs every 6 (six) hours as needed for wheezing  -     benzonatate (TESSALON) 200 MG capsule; Take 1 capsule (200 mg total) by mouth 3 (three) times a day as needed for cough    2. Upper respiratory tract infection, unspecified type  Assessment & Plan:  For 2 weeks overall slowly improving still most likely viral with bronchitis symptoms  Cxr and inhaler             Subjective      HPI  Cold Like Symptoms (Started about 2 weeks ago. Slight fever, coughing, wheezing, very fatigued, dizzy, yellow colored mucous. Feels pressure in chest and short of breath. No longer having body aches)  Denies pain in chest  Wheezing  Congestion  Uses afrin, mucinex  Now fever is gone.   Bodyache is gone.       Review of Systems    Current Outpatient Medications on File Prior to Visit   Medication Sig    amLODIPine (NORVASC) 5 mg tablet Take 1 tablet (5 mg total) by mouth daily *hold until patient requests, has supply at home*    atorvastatin (LIPITOR) 10 mg tablet Take 1 tablet (10 mg total) by mouth daily    Cranberry 600 MG TABS Take by mouth    doxycycline hyclate (VIBRAMYCIN) 100 mg capsule Take 100 mg by mouth 2 (two) times a day with meals    famotidine (PEPCID) 20 mg tablet Take 1 tablet (20 mg total) by mouth 2 (two) times a day    glucose blood (OneTouch Ultra) test strip Test once daily    metoprolol succinate (TOPROL-XL) 50 mg 24 hr tablet Take 1 tablet (50 mg total) by mouth daily    olmesartan (BENICAR) 40 mg tablet Take 1 tablet (40 mg total) by  "mouth daily    omalizumab (XOLAIR) subcutaneous injection Inject 1 mL (150 mg total) under the skin every 28 days    tacrolimus (PROTOPIC) 0.1 % ointment apply TO SKIN twice a day if needed    tirzepatide (Mounjaro) 15 MG/0.5ML Inject 0.5 mL (15 mg total) under the skin once a week For diabetes    cetirizine (ZyrTEC) 10 mg tablet Take 2 tablets (20 mg total) by mouth daily    fexofenadine (ALLEGRA) 180 MG tablet Take 2 tablets (360 mg total) by mouth daily       Objective     /90 (BP Location: Left arm, Patient Position: Sitting, Cuff Size: Standard)   Pulse 62   Temp 98.2 °F (36.8 °C) (Tympanic)   Ht 5' 4.76\" (1.645 m)   Wt 83.3 kg (183 lb 9.6 oz)   SpO2 96%   BMI 30.78 kg/m²     Physical Exam  Lea Sanchez MD    "

## 2024-02-28 NOTE — ASSESSMENT & PLAN NOTE
Lung sounds congested with diffuse ronchi  Likely bronchitis  Send albuterol, benzonatate  Will get CXR

## 2024-02-29 ENCOUNTER — APPOINTMENT (OUTPATIENT)
Dept: RADIOLOGY | Facility: MEDICAL CENTER | Age: 74
End: 2024-02-29
Payer: MEDICARE

## 2024-02-29 DIAGNOSIS — J40 BRONCHITIS: ICD-10-CM

## 2024-02-29 PROCEDURE — 71046 X-RAY EXAM CHEST 2 VIEWS: CPT

## 2024-03-10 LAB
APOB+LDLR+PCSK9 GENE MUT ANL BLD/T: NOT DETECTED
BRCA1+BRCA2 DEL+DUP + FULL MUT ANL BLD/T: NOT DETECTED
MLH1+MSH2+MSH6+PMS2 GN DEL+DUP+FUL M: NOT DETECTED

## 2024-04-08 ENCOUNTER — CLINICAL SUPPORT (OUTPATIENT)
Dept: INTERNAL MEDICINE CLINIC | Facility: CLINIC | Age: 74
End: 2024-04-08

## 2024-04-08 DIAGNOSIS — E11.9 TYPE 2 DIABETES MELLITUS WITHOUT COMPLICATION, WITHOUT LONG-TERM CURRENT USE OF INSULIN (HCC): ICD-10-CM

## 2024-04-08 DIAGNOSIS — E11.40 CONTROLLED TYPE 2 DIABETES MELLITUS WITH DIABETIC NEUROPATHY, WITHOUT LONG-TERM CURRENT USE OF INSULIN (HCC): Primary | Chronic | ICD-10-CM

## 2024-04-08 RX ORDER — METFORMIN HYDROCHLORIDE 500 MG/1
1000 TABLET, EXTENDED RELEASE ORAL 2 TIMES DAILY WITH MEALS
Qty: 360 TABLET | Refills: 3 | Status: SHIPPED | OUTPATIENT
Start: 2024-04-08 | End: 2025-04-03

## 2024-04-08 NOTE — ASSESSMENT & PLAN NOTE
Lab Results   Component Value Date    HGBA1C 5.6 11/09/2023   Increase metformin to 1000mg BID  Continue Mounjaro 15mg once weekly  A1C before PCP in July

## 2024-04-08 NOTE — PROGRESS NOTES
[facility]  Carrie Curran, Pharmacist    Assessment/ Plan     1. Controlled type 2 diabetes mellitus with diabetic neuropathy, without long-term current use of insulin ([facility])  Assessment & Plan:    Lab Results   Component Value Date    HGBA1C 5.6 11/09/2023   Increase metformin to 1000mg BID  Continue Mounjaro 15mg once weekly  A1C before PCP in July    Orders:  -     metFORMIN (GLUCOPHAGE-XR) 500 mg 24 hr tablet; Take 2 tablets (1,000 mg total) by mouth 2 (two) times a day with meals    2. Type 2 diabetes mellitus without complication, without long-term current use of insulin ([facility])  Assessment & Plan:    Lab Results   Component Value Date    HGBA1C 5.6 11/09/2023   Increase metformin to 1000mg BID  Continue Mounjaro 15mg once weekly  A1C before PCP in July          Follow-up: 6 months, in between PCP appts    Subjective   Patient decreased metformin as directed by PCP in January but noticed her FBG increased from approx. 100mg/dL to 130mg/dL so she kept taking 1000mg once daily. She also noticed she gained 11lbs since then as well. She would like to resume twice daily metformin dosing and maintain weight loss and low A1C.         Medication Adherence/ Tolerability/ Cost:  Patient denies side effects, no issues with cost, 0 missed doses in last two week  albuterol  amLODIPine  atorvastatin  benzonatate  Cranberry Tabs  doxycycline hyclate  famotidine  metFORMIN  metoprolol succinate  metroNIDAZOLE  olmesartan  omalizumab  OneTouch Ultra Strp  tacrolimus  tirzepatide      2. Lifestyle:   Last visit with dietician: unknown  Previously attended Living Well with Diabetes Class: yes  Diet Recall:   Breakfast:   Lunch:   Dinner:   Snacks:   Beverages:   Physical Activity: master     3. Home monitoring devices  Glucometer: Yes, Brand: unknown  Continuous Glucose Monitor: No, Brand: n/a  Blood Pressure monitor: Yes, Brand: unknown    Hypoglycemia: The patient had 0 episodes/symptoms of  hypoglycemia.   Hyperglycemia: The patient had 0 symptoms of hyperglycemia.     Objective       Blood Glucose Readings  The patient is currently checking blood glucose 1 times per day. Patient does not report with SMBG logs.    ASCVD Risk:  The 10-year ASCVD risk score (Dez MCGOVERN, et al., 2019) is: 41.2%    Values used to calculate the score:      Age: 74 years      Sex: Female      Is Non- : No      Diabetic: Yes      Tobacco smoker: No      Systolic Blood Pressure: 140 mmHg      Is BP treated: Yes      HDL Cholesterol: 39 mg/dL      Total Cholesterol: 150 mg/dL     Vitals:  There were no vitals filed for this visit.    Labs:    Lab Results   Component Value Date    SODIUM 138 11/21/2023    K 4.5 11/21/2023    EGFR 82 11/21/2023    CREATININE 0.72 11/21/2023    GLUF 88 11/21/2023    MICROALBCRE 19 11/21/2023         Pharmacist Tracking Tool     Pharmacist Tracking Tool  Reason For Outreach: Embedded Pharmacist  Demographics:  Intervention Method: In Person  Type of Intervention: New  Topics Addressed: Diabetes  Pharmacologic Interventions: Dose or Frequency Adjusted, Prevent or Manage STEFANY, and Med Rec  Non-Pharmacologic Interventions: Adherence addressed, Care coordination, Cost, Disease state education, Home Monitoring, and Medication/Device education  Time:  Direct Patient Care:  25  mins  Care Coordination:  20  mins  Recommendation Recipient: Patient/Caregiver  Outcome: Accepted

## 2024-04-17 ENCOUNTER — TELEPHONE (OUTPATIENT)
Dept: INTERNAL MEDICINE CLINIC | Facility: CLINIC | Age: 74
End: 2024-04-17

## 2024-08-19 ENCOUNTER — OFFICE VISIT (OUTPATIENT)
Dept: INTERNAL MEDICINE CLINIC | Facility: CLINIC | Age: 74
End: 2024-08-19
Payer: MEDICARE

## 2024-08-19 VITALS
HEIGHT: 65 IN | DIASTOLIC BLOOD PRESSURE: 72 MMHG | OXYGEN SATURATION: 96 % | WEIGHT: 192 LBS | HEART RATE: 74 BPM | BODY MASS INDEX: 31.99 KG/M2 | SYSTOLIC BLOOD PRESSURE: 138 MMHG

## 2024-08-19 DIAGNOSIS — M85.9 LOW BONE DENSITY: ICD-10-CM

## 2024-08-19 DIAGNOSIS — M81.0 OSTEOPOROSIS, UNSPECIFIED OSTEOPOROSIS TYPE, UNSPECIFIED PATHOLOGICAL FRACTURE PRESENCE: ICD-10-CM

## 2024-08-19 DIAGNOSIS — E55.9 VITAMIN D DEFICIENCY: ICD-10-CM

## 2024-08-19 DIAGNOSIS — I10 ESSENTIAL HYPERTENSION: ICD-10-CM

## 2024-08-19 DIAGNOSIS — E11.40 CONTROLLED TYPE 2 DIABETES MELLITUS WITH DIABETIC NEUROPATHY, WITHOUT LONG-TERM CURRENT USE OF INSULIN (HCC): ICD-10-CM

## 2024-08-19 DIAGNOSIS — E11.9 TYPE 2 DIABETES MELLITUS WITHOUT COMPLICATION, WITHOUT LONG-TERM CURRENT USE OF INSULIN (HCC): Primary | ICD-10-CM

## 2024-08-19 PROCEDURE — 99214 OFFICE O/P EST MOD 30 MIN: CPT | Performed by: GENERAL ACUTE CARE HOSPITAL

## 2024-08-19 PROCEDURE — G2211 COMPLEX E/M VISIT ADD ON: HCPCS | Performed by: GENERAL ACUTE CARE HOSPITAL

## 2024-08-19 NOTE — PROGRESS NOTES
"Ambulatory Visit  Name: Ro Grey      : 1950      MRN: [id number]  Encounter Provider: Lea Sanchez MD  Encounter Date: 2024   Enc[facility] department: [facility]    Assessment & Plan   1. Type 2 diabetes mellitus without complication, without long-term current use of insulin ([facility])  Assessment & Plan:    Lab Results   Component Value Date    HGBA1C 5.6 2023   On metformin and mounjaro  Tolerates well  Due to recheck labs ordered  Orders:  -     Hemoglobin A1C; Future  -     Albumin / creatinine urine ratio; Future  -     Basic metabolic panel; Future  -     Lipid Panel With Direct LDL; Future  2. Controlled type 2 diabetes mellitus with diabetic neuropathy, without long-term current use of insulin (HCC)  -     tirzepatide (Mounjaro) 15 MG/0.5ML; Inject 0.5 mL (15 mg total) under the skin once a week For diabetes  3. Low bone density  Assessment & Plan:  Reviewed DEXA  Discussed vit D supplement, ca diet and wt bearing exercise  Due to repeat dexa  Orders:  -     DXA bone density spine hip and pelvis; Future; Expected date: 2024  4. Osteoporosis, unspecified osteoporosis type, unspecified pathological fracture presence  -     DXA bone density spine hip and pelvis; Future; Expected date: 2024  5. Vitamin D deficiency  -     Vitamin D 25 hydroxy; Future  6. Essential hypertension  Assessment & Plan:  Under control  Continue current med       History of Present Illness     HPI  Insomnia    Review of Systems    Objective     /72 (BP Location: Left arm, Patient Position: Sitting, Cuff Size: Large)   Pulse 74   Ht 5' 4.76\" (1.645 m)   Wt 87.1 kg (192 lb)   SpO2 96%   BMI 32.19 kg/m²     Physical Exam  Cardiovascular:      Pulses: no weak pulses.           Dorsalis pedis pulses are 2+ on the right side and 2+ on the left side.        Posterior tibial pulses are 2+ on the right side and 2+ on the left side.   Feet:      Right foot:      Skin integrity: No " ulcer, skin breakdown, erythema, warmth, callus or dry skin.      Left foot:      Skin integrity: No ulcer, skin breakdown, erythema, warmth, callus or dry skin.       Patient's shoes and socks removed.    Right Foot/Ankle   Right Foot Inspection  Skin Exam: skin normal and skin intact. No dry skin, no warmth, no callus, no erythema, no maceration, no abnormal color, no pre-ulcer, no ulcer and no callus.     Toe Exam: ROM and strength within normal limits.     Sensory   Vibration: intact  Proprioception: intact  Monofilament testing: intact    Vascular  The right DP pulse is 2+. The right PT pulse is 2+.     Left Foot/Ankle  Left Foot Inspection  Skin Exam: skin normal and skin intact. No dry skin, no warmth, no erythema, no maceration, normal color, no pre-ulcer, no ulcer and no callus.     Toe Exam: ROM and strength within normal limits.     Sensory   Vibration: intact  Proprioception: intact  Monofilament testing: intact    Vascular  The left DP pulse is 2+. The left PT pulse is 2+.     Assign Risk Category  No deformity present  No loss of protective sensation  No weak pulses  Risk: 0      Administrative Statements

## 2024-08-19 NOTE — ASSESSMENT & PLAN NOTE
Lab Results   Component Value Date    HGBA1C 5.6 11/09/2023   On metformin and mounjaro  Tolerates well  Due to recheck labs ordered

## 2024-08-20 PROBLEM — M85.9 LOW BONE DENSITY: Status: ACTIVE | Noted: 2024-08-20

## 2024-08-20 PROBLEM — J06.9 UPPER RESPIRATORY TRACT INFECTION: Status: RESOLVED | Noted: 2024-02-28 | Resolved: 2024-08-20

## 2024-09-22 DIAGNOSIS — I10 ESSENTIAL HYPERTENSION: ICD-10-CM

## 2024-09-22 DIAGNOSIS — E78.5 HYPERLIPIDEMIA, UNSPECIFIED HYPERLIPIDEMIA TYPE: ICD-10-CM

## 2024-09-22 DIAGNOSIS — R10.13 DYSPEPSIA: ICD-10-CM

## 2024-09-23 RX ORDER — METOPROLOL SUCCINATE 50 MG/1
50 TABLET, EXTENDED RELEASE ORAL DAILY
Qty: 90 TABLET | Refills: 1 | Status: SHIPPED | OUTPATIENT
Start: 2024-09-23

## 2024-09-23 RX ORDER — OLMESARTAN MEDOXOMIL 40 MG/1
40 TABLET ORAL DAILY
Qty: 90 TABLET | Refills: 1 | Status: SHIPPED | OUTPATIENT
Start: 2024-09-23

## 2024-09-23 RX ORDER — FAMOTIDINE 20 MG/1
20 TABLET, FILM COATED ORAL 2 TIMES DAILY
Qty: 180 TABLET | Refills: 1 | Status: SHIPPED | OUTPATIENT
Start: 2024-09-23

## 2024-09-23 RX ORDER — ATORVASTATIN CALCIUM 10 MG/1
10 TABLET, FILM COATED ORAL DAILY
Qty: 90 TABLET | Refills: 1 | Status: SHIPPED | OUTPATIENT
Start: 2024-09-23

## 2024-09-23 RX ORDER — AMLODIPINE BESYLATE 5 MG/1
5 TABLET ORAL DAILY
Qty: 90 TABLET | Refills: 1 | Status: SHIPPED | OUTPATIENT
Start: 2024-09-23

## 2024-09-30 ENCOUNTER — APPOINTMENT (OUTPATIENT)
Dept: LAB | Facility: CLINIC | Age: 74
End: 2024-09-30
Payer: MEDICARE

## 2024-09-30 DIAGNOSIS — E11.9 TYPE 2 DIABETES MELLITUS WITHOUT COMPLICATION, WITHOUT LONG-TERM CURRENT USE OF INSULIN (HCC): ICD-10-CM

## 2024-09-30 DIAGNOSIS — E55.9 VITAMIN D DEFICIENCY: ICD-10-CM

## 2024-09-30 LAB
25(OH)D3 SERPL-MCNC: 43.5 NG/ML (ref 30–100)
ANION GAP SERPL CALCULATED.3IONS-SCNC: 10 MMOL/L (ref 4–13)
BUN SERPL-MCNC: 19 MG/DL (ref 5–25)
CALCIUM SERPL-MCNC: 9.2 MG/DL (ref 8.4–10.2)
CHLORIDE SERPL-SCNC: 99 MMOL/L (ref 96–108)
CHOLEST SERPL-MCNC: 148 MG/DL
CO2 SERPL-SCNC: 29 MMOL/L (ref 21–32)
CREAT SERPL-MCNC: 0.85 MG/DL (ref 0.6–1.3)
CREAT UR-MCNC: 236.6 MG/DL
EST. AVERAGE GLUCOSE BLD GHB EST-MCNC: 120 MG/DL
GFR SERPL CREATININE-BSD FRML MDRD: 67 ML/MIN/1.73SQ M
GLUCOSE P FAST SERPL-MCNC: 98 MG/DL (ref 65–99)
HBA1C MFR BLD: 5.8 %
HDLC SERPL-MCNC: 36 MG/DL
LDLC SERPL CALC-MCNC: 73 MG/DL (ref 0–100)
LDLC SERPL DIRECT ASSAY-MCNC: 93 MG/DL (ref 0–100)
MICROALBUMIN UR-MCNC: 86.2 MG/L
MICROALBUMIN/CREAT 24H UR: 36 MG/G CREATININE (ref 0–30)
NONHDLC SERPL-MCNC: 112 MG/DL
POTASSIUM SERPL-SCNC: 4.5 MMOL/L (ref 3.5–5.3)
SODIUM SERPL-SCNC: 138 MMOL/L (ref 135–147)
TRIGL SERPL-MCNC: 197 MG/DL

## 2024-09-30 PROCEDURE — 36415 COLL VENOUS BLD VENIPUNCTURE: CPT

## 2024-09-30 PROCEDURE — 82306 VITAMIN D 25 HYDROXY: CPT

## 2024-09-30 PROCEDURE — 82043 UR ALBUMIN QUANTITATIVE: CPT

## 2024-09-30 PROCEDURE — 83036 HEMOGLOBIN GLYCOSYLATED A1C: CPT

## 2024-09-30 PROCEDURE — 83721 ASSAY OF BLOOD LIPOPROTEIN: CPT

## 2024-09-30 PROCEDURE — 80061 LIPID PANEL: CPT

## 2024-09-30 PROCEDURE — 82570 ASSAY OF URINE CREATININE: CPT

## 2024-09-30 PROCEDURE — 80048 BASIC METABOLIC PNL TOTAL CA: CPT

## 2024-10-01 ENCOUNTER — HOSPITAL ENCOUNTER (OUTPATIENT)
Dept: RADIOLOGY | Facility: MEDICAL CENTER | Age: 74
Discharge: HOME | End: 2024-10-01
Payer: MEDICARE

## 2024-10-01 DIAGNOSIS — M85.9 LOW BONE DENSITY: ICD-10-CM

## 2024-10-01 DIAGNOSIS — M81.0 OSTEOPOROSIS, UNSPECIFIED OSTEOPOROSIS TYPE, UNSPECIFIED PATHOLOGICAL FRACTURE PRESENCE: ICD-10-CM

## 2024-10-01 PROCEDURE — 77080 DXA BONE DENSITY AXIAL: CPT

## 2024-10-07 ENCOUNTER — CLINICAL SUPPORT (OUTPATIENT)
Dept: INTERNAL MEDICINE CLINIC | Facility: CLINIC | Age: 74
End: 2024-10-07

## 2024-10-07 VITALS — HEART RATE: 69 BPM | DIASTOLIC BLOOD PRESSURE: 76 MMHG | SYSTOLIC BLOOD PRESSURE: 163 MMHG

## 2024-10-07 DIAGNOSIS — E11.9 TYPE 2 DIABETES MELLITUS WITHOUT COMPLICATION, WITHOUT LONG-TERM CURRENT USE OF INSULIN (HCC): Primary | ICD-10-CM

## 2024-10-07 NOTE — PROGRESS NOTES
[facility]  Carrie Curran, Pharmacist    Assessment/ Plan     1. Type 2 diabetes mellitus without complication, without long-term current use of insulin ([facility])  Assessment & Plan:    Lab Results   Component Value Date    HGBA1C 5.8 (H) 09/30/2024   Continue current medications for diabetes  Mounjaro very expensive, may be slightly lower next year with OOP costs capped for Medicare  Bp slightly elevated today, patient will check at home when she is relaxed and follow up with PCP as joselyn      Follow-up: 6 months, in between PCP appts    Subjective     Medication Adherence/ Tolerability/ Cost:  Patient denies side effects, no issues with cost, 0 missed doses in last two week  amLODIPine  atorvastatin  Cranberry Tabs  doxycycline hyclate  famotidine  metFORMIN  metoprolol succinate  metroNIDAZOLE  olmesartan  omalizumab  OneTouch Ultra Strp  tacrolimus  tirzepatide      2. Lifestyle:   Last visit with dietician: unknown  Previously attended Living Well with Diabetes Class: yes  Diet Recall:   Breakfast:   Lunch:   Dinner:   Snacks:   Beverages:   Physical Activity: master     3. Home monitoring devices  Glucometer: Yes, Brand: unknown  Continuous Glucose Monitor: No, Brand: n/a  Blood Pressure monitor: Yes, Brand: unknown    Hypoglycemia: The patient had 0 episodes/symptoms of hypoglycemia.   Hyperglycemia: The patient had 0 symptoms of hyperglycemia.     Objective       Blood Glucose Readings  The patient is currently checking blood glucose 1 times per day. Patient does not report with SMBG logs.    ASCVD Risk:  The 10-year ASCVD risk score (Dez MCGOVERN, et al., 2019) is: 51.3%    Values used to calculate the score:      Age: 74 years      Sex: Female      Is Non- : No      Diabetic: Yes      Tobacco smoker: No      Systolic Blood Pressure: 163 mmHg      Is BP treated: Yes      HDL Cholesterol: 36 mg/dL      Total Cholesterol: 148 mg/dL     Vitals:  Vitals:     10/07/24 0946   BP: 163/76   Pulse: 69       Labs:    Lab Results   Component Value Date    SODIUM 138 09/30/2024    K 4.5 09/30/2024    EGFR 67 09/30/2024    CREATININE 0.85 09/30/2024    GLUF 98 09/30/2024    MICROALBCRE 36 (H) 09/30/2024         Pharmacist Tracking Tool     Pharmacist Tracking Tool  Reason For Outreach: Embedded Pharmacist  Demographics:  Intervention Method: In Person  Type of Intervention: Follow-Up  Topics Addressed: Diabetes  Pharmacologic Interventions: Prevent or Manage STEFANY and Med Rec  Non-Pharmacologic Interventions: Adherence addressed, Care coordination, Cost, Disease state education, Home Monitoring, and Medication/Device education  Time:  Direct Patient Care:  25  mins  Care Coordination:  20  mins  Recommendation Recipient: Patient/Caregiver  Outcome: Accepted

## 2024-10-07 NOTE — ASSESSMENT & PLAN NOTE
Lab Results   Component Value Date    HGBA1C 5.8 (H) 09/30/2024   Continue current medications for diabetes  Mounjaro very expensive, may be slightly lower next year with OOP costs capped for Medicare  Bp slightly elevated today, patient will check at home when she is relaxed and follow up with PCP as wel

## 2024-10-07 NOTE — PROGRESS NOTES
Can we reach out to patient to offer an appointment with me to discuss her heart racing.  Carrie told me about patient's recent episodes of heart racing and a rapid heart rate.  We need to talk about getting some tests done.  She could see the resident tomorrow or with me this Wednesday  I am away the next 2 weeks she could see any available providers if she could not make it the next 2 days.

## 2024-10-08 ENCOUNTER — TELEPHONE (OUTPATIENT)
Age: 74
End: 2024-10-08

## 2024-10-08 NOTE — TELEPHONE ENCOUNTER
Patient calling stating she met with Carrie and she advised her to follow up with Dr. Sanchez for tachycardia (she stated HR beats fast and strong).  Pt is scheduled for tomorrow - JOSEPH

## 2024-10-09 ENCOUNTER — OFFICE VISIT (OUTPATIENT)
Dept: INTERNAL MEDICINE CLINIC | Facility: CLINIC | Age: 74
End: 2024-10-09
Payer: MEDICARE

## 2024-10-09 VITALS
HEART RATE: 72 BPM | WEIGHT: 193.2 LBS | DIASTOLIC BLOOD PRESSURE: 64 MMHG | HEIGHT: 65 IN | OXYGEN SATURATION: 97 % | SYSTOLIC BLOOD PRESSURE: 126 MMHG | BODY MASS INDEX: 32.19 KG/M2

## 2024-10-09 DIAGNOSIS — R00.2 PALPITATION: Primary | ICD-10-CM

## 2024-10-09 DIAGNOSIS — R00.0 TACHYCARDIA: ICD-10-CM

## 2024-10-09 PROCEDURE — 99213 OFFICE O/P EST LOW 20 MIN: CPT | Performed by: GENERAL ACUTE CARE HOSPITAL

## 2024-10-09 PROCEDURE — 93000 ELECTROCARDIOGRAM COMPLETE: CPT | Performed by: GENERAL ACUTE CARE HOSPITAL

## 2024-10-09 NOTE — PROGRESS NOTES
"Ambulatory Visit  Name: Ro Grey      : 1950      MRN: [id number]  Encounter Provider: Lea Sanchez MD  Encounter Date: 10/9/2024   Encounter department: [facility]    Assessment & Plan  Palpitation  Intermittent palpitation with tachycardia up to 140s shown on her Fitbit.  This is concerning for arrhythmia.  Point-of-care EKG is unremarkable.  Order Holter monitor.  If it does not catch any event will refer to cardiology to consider Zio patch.  We discussed that if she develops palpitation with tachycardia that last longer than 15 to 20 minutes or if she has associated symptoms including chest pain shortness of breath lightheadedness she should go to ER.    Orders:    POCT ECG    TSH, 3rd generation with Free T4 reflex; Future    Holter monitor; Future    Tachycardia    Orders:    POCT ECG    TSH, 3rd generation with Free T4 reflex; Future    Holter monitor; Future       History of Present Illness     HPI    Palpitation started several months ago  Getting more frequent  Inermittent, may have none over 2 weeks then has it every day for several days.   When she feels it, her fitbit shows HR of 140s. Can last up to 10min.   Denies chest pain, lightheaded, SOB  Feels pressure on her neck.   Denies syncope  Brother has afib.   Last episode was 3 days ago   It woke her up at night      Review of Systems        Objective     /64 (BP Location: Right arm, Patient Position: Sitting, Cuff Size: Large)   Pulse 72   Ht 5' 4.76\" (1.645 m)   Wt 87.6 kg (193 lb 3.2 oz)   SpO2 97%   BMI 32.39 kg/m²     Physical Exam    "

## 2024-10-10 NOTE — ASSESSMENT & PLAN NOTE
Intermittent palpitation with tachycardia up to 140s shown on her Fitbit.  This is concerning for arrhythmia.  Point-of-care EKG is unremarkable.  Order Holter monitor.  If it does not catch any event will refer to cardiology to consider Zio patch.  We discussed that if she develops palpitation with tachycardia that last longer than 15 to 20 minutes or if she has associated symptoms including chest pain shortness of breath lightheadedness she should go to ER.    Orders:    POCT ECG    TSH, 3rd generation with Free T4 reflex; Future    Holter monitor; Future

## 2024-10-16 ENCOUNTER — HOSPITAL ENCOUNTER (OUTPATIENT)
Dept: NON INVASIVE DIAGNOSTICS | Facility: CLINIC | Age: 74
Discharge: HOME | End: 2024-10-16
Payer: MEDICARE

## 2024-10-16 DIAGNOSIS — R00.0 TACHYCARDIA: ICD-10-CM

## 2024-10-16 DIAGNOSIS — R00.2 PALPITATION: ICD-10-CM

## 2024-10-16 PROCEDURE — 93225 XTRNL ECG REC<48 HRS REC: CPT

## 2024-10-16 PROCEDURE — 93226 XTRNL ECG REC<48 HR SCAN A/R: CPT

## 2024-10-23 PROCEDURE — 93227 XTRNL ECG REC<48 HR R&I: CPT | Performed by: STUDENT IN AN ORGANIZED HEALTH CARE EDUCATION/TRAINING PROGRAM

## 2024-11-01 ENCOUNTER — APPOINTMENT (OUTPATIENT)
Dept: LAB | Facility: CLINIC | Age: 74
End: 2024-11-01
Payer: MEDICARE

## 2024-11-01 DIAGNOSIS — R00.0 TACHYCARDIA: ICD-10-CM

## 2024-11-01 DIAGNOSIS — R00.2 PALPITATION: ICD-10-CM

## 2024-11-01 LAB — TSH SERPL DL<=0.05 MIU/L-ACNC: 2.5 UIU/ML (ref 0.45–4.5)

## 2024-11-01 PROCEDURE — 84443 ASSAY THYROID STIM HORMONE: CPT

## 2024-11-01 PROCEDURE — 36415 COLL VENOUS BLD VENIPUNCTURE: CPT

## 2024-11-18 ENCOUNTER — OFFICE VISIT (OUTPATIENT)
Dept: INTERNAL MEDICINE CLINIC | Facility: CLINIC | Age: 74
End: 2024-11-18
Payer: MEDICARE

## 2024-11-18 VITALS
SYSTOLIC BLOOD PRESSURE: 170 MMHG | HEART RATE: 76 BPM | WEIGHT: 198 LBS | OXYGEN SATURATION: 98 % | DIASTOLIC BLOOD PRESSURE: 80 MMHG | BODY MASS INDEX: 33.19 KG/M2

## 2024-11-18 DIAGNOSIS — R00.0 TACHYCARDIA: Primary | ICD-10-CM

## 2024-11-18 DIAGNOSIS — E11.9 TYPE 2 DIABETES MELLITUS WITHOUT COMPLICATION, WITHOUT LONG-TERM CURRENT USE OF INSULIN (HCC): ICD-10-CM

## 2024-11-18 PROCEDURE — 99213 OFFICE O/P EST LOW 20 MIN: CPT | Performed by: GENERAL ACUTE CARE HOSPITAL

## 2024-11-18 PROCEDURE — G2211 COMPLEX E/M VISIT ADD ON: HCPCS | Performed by: GENERAL ACUTE CARE HOSPITAL

## 2024-11-18 NOTE — PROGRESS NOTES
Name: Ro Grey      : 1950      MRN: [id number]  Encounter Provider: Lea Sanchez MD  Encounter Date: 2024   Encounter department: [facility]  :  Assessment & Plan  Type 2 diabetes mellitus without complication, without long-term current use of insulin ([facility])    Lab Results   Component Value Date    HGBA1C 5.8 (H) 2024       Orders:    Albumin / creatinine urine ratio; Future    Tachycardia  Continue to have intermittent tachycardia.   EKG and holter did not catch any event.   Last episodes was early Nov with HR of 160s on and off on her fitbit for 1.5 hr.   Lytes and TSH unremarkable.   It could happen at rest.   Still think arrhythmia needs to be ruled out.   Refer to card for further eval.     Orders:    Ambulatory Referral to Cardiology; Future           History of Present Illness     HPI  F/u palpitation  Last episode was early Nov. HR 160s on and off for 1.5hr.   Holter done. Unremarkable. But she did not have any episodes during the 48-hours    Review of Systems       Objective   /80 (BP Location: Left arm, Patient Position: Sitting, Cuff Size: Standard)   Pulse 76   Wt 89.8 kg (198 lb)   SpO2 98%   BMI 33.19 kg/m²      Physical Exam

## 2024-11-18 NOTE — ASSESSMENT & PLAN NOTE
Lab Results   Component Value Date    HGBA1C 5.8 (H) 09/30/2024       Orders:    Albumin / creatinine urine ratio; Future

## 2024-11-19 ENCOUNTER — TELEPHONE (OUTPATIENT)
Dept: ADMINISTRATIVE | Facility: OTHER | Age: 74
End: 2024-11-19

## 2024-11-19 PROBLEM — R00.0 TACHYCARDIA: Status: ACTIVE | Noted: 2024-11-19

## 2024-11-19 NOTE — TELEPHONE ENCOUNTER
----- Message from Luz Marina GARCIA sent at 11/18/2024  2:34 PM EST -----  Regarding: care gap request/eye exam  11/18/24 2:34 PM    Hello, our patient attached above has had Diabetic Eye Exam completed/performed. Please assist in updating the patient chart by making an External outreach to Dr. Oscar Putnam, [place] [facility] facility located in [place]. The date of service is last eye exam.    Thank you,  Luz Marina Arrieta  [facility]

## 2024-11-19 NOTE — LETTER
Diabetic Eye Exam Form    Date Requested: 24  Patient: Ro Grey      Please complete form  Patient : 1950   Referring Provider: Lea Sanchez MD      DIABETIC Eye Exam Date _______________________________      Type of Exam MUST be documented for Diabetic Eye Exams. Please CHECK ONE.     Retinal Exam       Dilated Retinal Exam       OCT       Optomap-Iris Exam      Fundus Photography       Left Eye - Please check Retinopathy or No Retinopathy        Exam did show retinopathy    Exam did not show retinopathy       Right Eye - Please check Retinopathy or No Retinopathy       Exam did show retinopathy    Exam did not show retinopathy       Comments __________________________________________________________    Practice Providing Exam ______________________________________________    Exam Performed By (print name) _______________________________________      Provider Signature ___________________________________________________      These reports are needed for  compliance.  Please fax this completed form and a copy of the Diabetic Eye Exam report to our office located at [address] [place] [address] as soon as possible via Fax [phone] attention Conner: Phone [phone]  We thank you for your assistance in treating our mutual patient.

## 2024-11-19 NOTE — TELEPHONE ENCOUNTER
Upon review of the In Basket request and the patient's chart, initial outreach has been made via fax to facility. Please see Contacts section for details.     Thank you  Conner Mcgee MA

## 2024-11-19 NOTE — ASSESSMENT & PLAN NOTE
Continue to have intermittent tachycardia.   EKG and holter did not catch any event.   Last episodes was early Nov with HR of 160s on and off on her fitbit for 1.5 hr.   Lytes and TSH unremarkable.   It could happen at rest.   Still think arrhythmia needs to be ruled out.   Refer to card for further eval.     Orders:    Ambulatory Referral to Cardiology; Future

## 2024-11-21 NOTE — TELEPHONE ENCOUNTER
Upon review of the In Basket request we have found as a result of outreach that patient did not have the requested item(s) completed. Appt on 12/9/2024    Any additional questions or concerns should be emailed to the Practice Liaisons via the appropriate education email address, please do not reply via In Basket.    Thank you  Conner Mcgee MA   [facility]

## 2024-12-09 ENCOUNTER — ESTABLISHED COMPREHENSIVE EXAM (OUTPATIENT)
Dept: URBAN - METROPOLITAN AREA CLINIC 6 | Facility: CLINIC | Age: 74
End: 2024-12-09

## 2024-12-09 DIAGNOSIS — E11.9: ICD-10-CM

## 2024-12-09 DIAGNOSIS — Z96.1: ICD-10-CM

## 2024-12-09 DIAGNOSIS — H04.123: ICD-10-CM

## 2024-12-09 DIAGNOSIS — H52.211: ICD-10-CM

## 2024-12-09 LAB
LEFT EYE DIABETIC RETINOPATHY: NORMAL
RIGHT EYE DIABETIC RETINOPATHY: NORMAL

## 2024-12-09 PROCEDURE — 92015 DETERMINE REFRACTIVE STATE: CPT

## 2024-12-09 PROCEDURE — 92014 COMPRE OPH EXAM EST PT 1/>: CPT

## 2024-12-09 ASSESSMENT — VISUAL ACUITY
OS_SC: 20/25-2
OD_PH: 20/40+2
OD_SC: 20/60

## 2024-12-09 ASSESSMENT — TONOMETRY
OS_IOP_MMHG: 20
OD_IOP_MMHG: 20

## 2025-01-15 DIAGNOSIS — E11.40 CONTROLLED TYPE 2 DIABETES MELLITUS WITH DIABETIC NEUROPATHY, WITHOUT LONG-TERM CURRENT USE OF INSULIN (HCC): ICD-10-CM

## 2025-01-15 RX ORDER — TIRZEPATIDE 15 MG/.5ML
INJECTION, SOLUTION SUBCUTANEOUS
Qty: 6 ML | Refills: 1 | Status: SHIPPED | OUTPATIENT
Start: 2025-01-15

## 2025-01-19 DIAGNOSIS — E11.40 CONTROLLED TYPE 2 DIABETES MELLITUS WITH DIABETIC NEUROPATHY, WITHOUT LONG-TERM CURRENT USE OF INSULIN (HCC): Chronic | ICD-10-CM

## 2025-01-20 RX ORDER — METFORMIN HYDROCHLORIDE 500 MG/1
1000 TABLET, EXTENDED RELEASE ORAL 2 TIMES DAILY WITH MEALS
Qty: 360 TABLET | Refills: 1 | Status: SHIPPED | OUTPATIENT
Start: 2025-01-20

## 2025-01-31 ENCOUNTER — APPOINTMENT (OUTPATIENT)
Dept: LAB | Facility: CLINIC | Age: 75
End: 2025-01-31
Payer: MEDICARE

## 2025-01-31 DIAGNOSIS — E11.9 TYPE 2 DIABETES MELLITUS WITHOUT COMPLICATION, WITHOUT LONG-TERM CURRENT USE OF INSULIN (HCC): ICD-10-CM

## 2025-01-31 LAB
CREAT UR-MCNC: 168.8 MG/DL
MICROALBUMIN UR-MCNC: 30.9 MG/L
MICROALBUMIN/CREAT 24H UR: 18 MG/G CREATININE (ref 0–30)

## 2025-01-31 PROCEDURE — 82570 ASSAY OF URINE CREATININE: CPT

## 2025-01-31 PROCEDURE — 82043 UR ALBUMIN QUANTITATIVE: CPT

## 2025-02-03 ENCOUNTER — RESULTS FOLLOW-UP (OUTPATIENT)
Dept: INTERNAL MEDICINE CLINIC | Facility: CLINIC | Age: 75
End: 2025-02-03

## 2025-02-06 ENCOUNTER — OFFICE VISIT (OUTPATIENT)
Dept: CARDIOLOGY CLINIC | Facility: CLINIC | Age: 75
End: 2025-02-06
Payer: MEDICARE

## 2025-02-06 VITALS
SYSTOLIC BLOOD PRESSURE: 128 MMHG | HEIGHT: 65 IN | DIASTOLIC BLOOD PRESSURE: 70 MMHG | HEART RATE: 68 BPM | OXYGEN SATURATION: 98 % | WEIGHT: 198.2 LBS | BODY MASS INDEX: 33.02 KG/M2

## 2025-02-06 DIAGNOSIS — E11.40 CONTROLLED TYPE 2 DIABETES MELLITUS WITH DIABETIC NEUROPATHY, WITHOUT LONG-TERM CURRENT USE OF INSULIN (HCC): ICD-10-CM

## 2025-02-06 DIAGNOSIS — R00.0 TACHYCARDIA: ICD-10-CM

## 2025-02-06 DIAGNOSIS — I10 ESSENTIAL HYPERTENSION: Primary | ICD-10-CM

## 2025-02-06 PROCEDURE — 99204 OFFICE O/P NEW MOD 45 MIN: CPT | Performed by: INTERNAL MEDICINE

## 2025-02-10 NOTE — PROGRESS NOTES
Consultation - Cardiology   Ro Grey 75 y.o. female MRN: [id number]  Unit/Bed#:  Encounter: [id number]  Physician Requesting Consult: No att. providers found  Reason for Consult / Principal Problem: Tachycardia    Assessment:  Tachycardia  HTN    Plan:   Her symptoms and Fitbit recording is c/w a tachy arrhythmia, possibly a PSVT or PAF. This will need to be documented.  I will order a 14 day ZIO.  I did discuss the possible need for an ILR.    I have reviewed her medications and made no changes.  RTO 2 months.    History of Present Illness     HPI: Ro Grey is a 75 y.o. year old female who denies any significant past cardiac history.  She recalls having a sensation of skipped beats when she was younger.    For over the past year, she describes symptoms of abrupt tachycardia with her FitBit recording a HR of 160 BPM at rest.  The episodes occur about once every couple of weeks and last anywhere from 15 minutes to several hours.  She is able to function when she has an episode. She denies CP, SOB, LE edema.      She is active and walks 1 1/2 - 2 miles regularly.    She has HTN, DM, hypercholesterolemia.    ECG 10/9/2024 - NSR, non specific ST and T wave abnl.    ECHO is scheduled    Holter 10/16/2024 - ( no symptoms )  SR everage HR 72 BPM ( range 52 - 103 BPM ) No SVT.    /70    She is on Toprol XL 50 mg daily          Review of Systems:    Alert awake oriented, comfortable, denies any complaints  No fevers chills nausea vomiting  No weakness, dizziness, seizures  no cough, shortness of breath, or wheezing  Denies any palpitations, chest pain, diaphoresis  Denies leg edema, pain or paresthesias  Denies any skin rashes  Denies abdominal pain, bloody stools, masses  Denies any depression or suicidal ideations      Historical Information   Past Medical History:   Diagnosis Date    Acute cystitis without hematuria 12/14/2020    Allergic 2011    take meds daily to control    Arthritis 07/2018    PT  "7/2018    Carpal tunnel syndrome     Unspecified laterality. Resolved: 12/21/2016    Cataract     Chronic kidney disease     Complex endometrial hyperplasia with atypia     Resolved: 11/03/17    Diabetes mellitus (HCC)     of other type with circulatory complication, without long-term current use of insulin    History of chickenpox     History of measles     History of mumps     HL (hearing loss)     hearing aids as of 8/17    Hyperlipidemia     Hypertension     Normal delivery     1971 and 1973 sons    Obesity     NICKY (obstructive sleep apnea)     Post-menopausal bleeding     Resolved: 2011    Seasonal allergies     Urinary tract infection 10/17    meds cleared it    UTI (urinary tract infection)     07/2023, med cleared it     Past Surgical History:   Procedure Laterality Date    CARPAL TUNNEL RELEASE      CARPAL TUNNEL RELEASE Left     CATARACT EXTRACTION      CHOLECYSTECTOMY      COLONOSCOPY      Complete    DILATION AND CURETTAGE OF UTERUS      twice    ENDOMETRIAL BIOPSY      without cervical dilation    HYSTEROSCOPY      TUBAL LIGATION  1974    WISDOM TOOTH EXTRACTION      x 2     Social History     Substance and Sexual Activity   Alcohol Use Not Currently    Comment: less than one glass of wine per month     Social History     Substance and Sexual Activity   Drug Use No     Social History     Tobacco Use   Smoking Status Never   Smokeless Tobacco Never     Family History: Family history non-contributory    Meds/Allergies   all current active meds have been reviewed  Allergies   Allergen Reactions    Other Sneezing     SEASONAL ALLERGIES        Objective   Vitals: Blood pressure 128/70, pulse 68, height 5' 4.76\" (1.645 m), weight 89.9 kg (198 lb 3.2 oz), SpO2 98%, not currently breastfeeding., Body mass index is 33.23 kg/m².,   Weight (last 2 days)       None                      Physical Exam:  GEN: Ro Grey appears well, alert and oriented x 3, pleasant and cooperative   HEENT: pupils equal, round, " and reactive to light; extraocular muscles intact  NECK: supple, no carotid bruits   HEART: regular rhythm, normal S1 and S2, no murmurs, clicks, gallops or rubs   LUNGS: clear to auscultation bilaterally; no wheezes, rales, or rhonchi   ABDOMEN: normal bowel sounds, soft, no tenderness, no distention  EXTREMITIES: peripheral pulses normal; no clubbing, cyanosis, or edema  NEURO: no focal findings   SKIN: normal without suspicious lesions on exposed skin    Lab Results:   No visits with results within 1 Day(s) from this visit.   Latest known visit with results is:   Appointment on 01/31/2025   Component Date Value Ref Range Status    Creatinine, Ur 01/31/2025 168.8  Reference range not established. mg/dL Final    Albumin,U,Random 01/31/2025 30.9 (H)  <20.0 mg/L Final    Albumin Creat Ratio 01/31/2025 18  0 - 30 mg/g creatinine Final                     Imaging: Results Review Statement: No pertinent imaging studies reviewed.

## 2025-02-20 ENCOUNTER — HOSPITAL ENCOUNTER (OUTPATIENT)
Dept: NON INVASIVE DIAGNOSTICS | Facility: CLINIC | Age: 75
Discharge: HOME | End: 2025-02-20
Payer: MEDICARE

## 2025-02-20 ENCOUNTER — RA CDI HCC (OUTPATIENT)
Dept: OTHER | Facility: HOSPITAL | Age: 75
End: 2025-02-20

## 2025-02-20 VITALS
HEIGHT: 65 IN | DIASTOLIC BLOOD PRESSURE: 70 MMHG | WEIGHT: 198.19 LBS | SYSTOLIC BLOOD PRESSURE: 128 MMHG | HEART RATE: 68 BPM | BODY MASS INDEX: 33.02 KG/M2

## 2025-02-20 DIAGNOSIS — R00.0 TACHYCARDIA: ICD-10-CM

## 2025-02-20 LAB
AORTIC ROOT: 3 CM
AORTIC VALVE MEAN VELOCITY: 8.1 M/S
ASCENDING AORTA: 3.5 CM
AV AREA BY CONTINUOUS VTI: 2.3 CM2
AV AREA PEAK VELOCITY: 2.3 CM2
AV LVOT MEAN GRADIENT: 1 MMHG
AV LVOT PEAK GRADIENT: 3 MMHG
AV MEAN PRESS GRAD SYS DOP V1V2: 3 MMHG
AV ORIFICE AREA US: 2.27 CM2
AV PEAK GRADIENT: 6 MMHG
AV VELOCITY RATIO: 0.72
AV VMAX SYS DOP: 1.23 M/S
BSA FOR ECHO PROCEDURE: 1.97 M2
DOP CALC AO VTI: 25.9 CM
DOP CALC LVOT AREA: 3.14 CM2
DOP CALC LVOT CARDIAC INDEX: 2.01 L/MIN/M2
DOP CALC LVOT CARDIAC OUTPUT: 3.95 L/MIN
DOP CALC LVOT DIAMETER: 2 CM
DOP CALC LVOT PEAK VEL VTI: 18.74 CM
DOP CALC LVOT PEAK VEL: 0.9 M/S
DOP CALC LVOT STROKE INDEX: 31 ML/M2
DOP CALC LVOT STROKE VOLUME: 58.84
E WAVE DECELERATION TIME: 226 MS
E/A RATIO: 0.79
FRACTIONAL SHORTENING: 37 (ref 28–44)
INTERVENTRICULAR SEPTUM IN DIASTOLE (PARASTERNAL SHORT AXIS VIEW): 1.2 CM
INTERVENTRICULAR SEPTUM: 1.2 CM (ref 0.6–1.1)
LAAS-AP2: 18.3 CM2
LAAS-AP4: 14.1 CM2
LEFT ATRIUM SIZE: 3.5 CM
LEFT ATRIUM VOLUME (MOD BIPLANE): 43 ML
LEFT ATRIUM VOLUME INDEX (MOD BIPLANE): 21.8 ML/M2
LEFT INTERNAL DIMENSION IN SYSTOLE: 2.9 CM (ref 2.1–4)
LEFT VENTRICULAR INTERNAL DIMENSION IN DIASTOLE: 4.6 CM (ref 3.5–6)
LEFT VENTRICULAR POSTERIOR WALL IN END DIASTOLE: 1.2 CM
LEFT VENTRICULAR STROKE VOLUME: 63 ML
LV EF US.2D.A4C+ESTIMATED: 60 %
LVSV (TEICH): 63 ML
MV E'TISSUE VEL-LAT: 5 CM/S
MV E'TISSUE VEL-SEP: 5 CM/S
MV PEAK A VEL: 0.76 M/S
MV PEAK E VEL: 60 CM/S
RIGHT ATRIUM AREA SYSTOLE A4C: 12.2 CM2
RIGHT VENTRICLE ID DIMENSION: 2.7 CM
SINOTUBULAR JUNCTION: 2.7 CM
SL CV LEFT ATRIUM LENGTH A2C: 5.6 CM
SL CV LV EF: 65
SL CV PED ECHO LEFT VENTRICLE DIASTOLIC VOLUME (MOD BIPLANE) 2D: 96 ML
SL CV PED ECHO LEFT VENTRICLE SYSTOLIC VOLUME (MOD BIPLANE) 2D: 33 ML
SL CV SINUS OF VALSALVA 2D: 3.3 CM
STJ: 2.7 CM
TR MAX PG: 24 MMHG
TR PEAK VELOCITY: 2.5 M/S
TRICUSPID ANNULAR PLANE SYSTOLIC EXCURSION: 2.5 CM
TRICUSPID VALVE PEAK REGURGITATION VELOCITY: 2.45 M/S

## 2025-02-20 PROCEDURE — 93306 TTE W/DOPPLER COMPLETE: CPT | Performed by: INTERNAL MEDICINE

## 2025-02-20 PROCEDURE — 93306 TTE W/DOPPLER COMPLETE: CPT

## 2025-02-25 ENCOUNTER — OFFICE VISIT (OUTPATIENT)
Dept: CARDIOLOGY CLINIC | Facility: CLINIC | Age: 75
End: 2025-02-25
Payer: MEDICARE

## 2025-02-25 VITALS
HEIGHT: 65 IN | SYSTOLIC BLOOD PRESSURE: 154 MMHG | HEART RATE: 74 BPM | BODY MASS INDEX: 32.65 KG/M2 | DIASTOLIC BLOOD PRESSURE: 74 MMHG | OXYGEN SATURATION: 98 % | WEIGHT: 196 LBS

## 2025-02-25 DIAGNOSIS — R00.0 TACHYCARDIA: ICD-10-CM

## 2025-02-25 DIAGNOSIS — I10 ESSENTIAL HYPERTENSION: Primary | ICD-10-CM

## 2025-02-25 PROCEDURE — 99213 OFFICE O/P EST LOW 20 MIN: CPT | Performed by: INTERNAL MEDICINE

## 2025-02-25 NOTE — PROGRESS NOTES
Cardiology Follow Up    Ro Grey  1950  [id number]  [facility]  [address] [facility] [address]    [facility] [address] [phone]    1. Essential hypertension        2. Tachycardia              Discussion/Summary: It sounds like she had PSVT / possible PAF. Await ZIO report.  I will then call her for further recommendations. Continue present medications.      Interval History: Recent ECHO 2/20/2025 - EF 65%    ZIO - just returned 2 days ago    She had episodes of abrupt tachycardia on 2/20 and 2/22 ( at 4 am and 1 am ) ( one for 2 1/2 hours, one for 30 minutes )    She is otherwise active and denies CP, SOB, dizziness, syncope.        Patient Active Problem List   Diagnosis    Osteopenia    Type 2 diabetes mellitus without complication, without long-term current use of insulin (HCC)    Hyperlipidemia    Essential hypertension    BMI 31.0-31.9,adult    Bronchitis    Asymptomatic postmenopausal status    NICKY (obstructive sleep apnea)    PLMD (periodic limb movement disorder)    Primary osteoarthritis of right knee    Primary osteoarthritis of one hip, left    Trochanteric bursitis of left hip    Back pain with left-sided sciatica    Sacroiliitis (HCC)    Chronic idiopathic urticaria    Incontinence in female    Eczema    Primary insomnia    Rosacea    Low bone density    Palpitation    Tachycardia    Controlled type 2 diabetes mellitus with diabetic neuropathy, without long-term current use of insulin ([facility])     Past Medical History:   Diagnosis Date    Acute cystitis without hematuria 12/14/2020    Allergic 2011    take meds daily to control    Arthritis 07/2018    PT 7/2018    Carpal tunnel syndrome     Unspecified laterality. Resolved: 12/21/2016    Cataract     Chronic kidney disease     Complex endometrial hyperplasia with atypia     Resolved: 11/03/17    Diabetes mellitus (HCC)     of other type with circulatory  complication, without long-term current use of insulin    History of chickenpox     History of measles     History of mumps     HL (hearing loss)     hearing aids as of 8/17    Hyperlipidemia     Hypertension     Normal delivery     1971 and 1973 sons    Obesity     NICKY (obstructive sleep apnea)     Post-menopausal bleeding     Resolved: 2011    Seasonal allergies     Urinary tract infection 10/17    meds cleared it    UTI (urinary tract infection)     07/2023, med cleared it     Social History     Socioeconomic History    Marital status: /Civil Union     Spouse name: Se    Number of children: 2    Years of education: Not on file    Highest education level: Not on file   Occupational History    Occupation: High School Adminstrator      Employer: [employer]   Tobacco Use    Smoking status: Never    Smokeless tobacco: Never   Vaping Use    Vaping status: Never Used   Substance and Sexual Activity    Alcohol use: Not Currently     Comment: less than one glass of wine per month    Drug use: No    Sexual activity: Not Currently     Partners: Male     Birth control/protection: Post-menopausal, Female Sterilization, None   Other Topics Concern    Not on file   Social History Narrative    Daily coffee consumption (2 cups/day)        Who lives in your home:     What type of home do you live in: Single house    Age of your home: Built 2020    How long have you been living there: 2022    Type of heat: Forced hot air    Type of fuel: Gas and Electric    What type of keny is in your bedroom: Area rugs and Hardwood floor    Do you have the following in or near your home:    Air products: Central air    Pests: None    Pets: Dog    Are pets allowed in bedroom: Yes    Open fields, wooded areas nearby: Open fields and Wooded areas    Basement: None    Exposure to second hand smoke: No        Habits:    Caffeine: coffee 1  cup daily-soda rarely-ice tea 1 glass daily-hot tea 1 x a week     Chocolate:  daily     Other:              Social Drivers of Health     Financial Resource Strain: Low Risk  (2024)    Overall Financial Resource Strain (CARDIA)     Difficulty of Paying Living Expenses: Not very hard   Food Insecurity: No Food Insecurity (2025)    Hunger Vital Sign     Worried About Running Out of Food in the Last Year: Never true     Ran Out of Food in the Last Year: Never true   Transportation Needs: No Transportation Needs (2025)    PRAPARE - Transportation     Lack of Transportation (Medical): No     Lack of Transportation (Non-Medical): No   Physical Activity: Sufficiently Active (10/12/2022)    Exercise Vital Sign     Days of Exercise per Week: 7 days     Minutes of Exercise per Session: 50 min   Stress: Not on file   Social Connections: Not on file   Intimate Partner Violence: Not on file   Housing Stability: Low Risk  (2025)    Housing Stability Vital Sign     Unable to Pay for Housing in the Last Year: No     Number of Times Moved in the Last Year: 0     Homeless in the Last Year: No      Family History   Problem Relation Age of Onset    Hypertension Mother     Heart disease Mother     Heart attack Mother     Hypertension Father     Cancer Father              Prostate cancer Father 84    Arthritis Father              Hypertension Brother     Heart disease Brother     Prostate cancer Brother 63    No Known Problems Brother     Diabetes Maternal Grandmother     Stroke Maternal Grandmother     Diabetes Paternal Grandfather     No Known Problems Child     Arthritis Family     Cancer Family         bladder    Other Family         Cardiac disorder    Diabetes Family     Hypertension Family     Valvular heart disease Family     No Known Problems Son     No Known Problems Son     Breast cancer Neg Hx     Colon cancer Neg Hx     Ovarian cancer Neg Hx     Uterine cancer Neg Hx     Cervical cancer Neg Hx     Thyroid disease Neg Hx      Past Surgical History:   Procedure  Laterality Date    CARPAL TUNNEL RELEASE      CARPAL TUNNEL RELEASE Left     CATARACT EXTRACTION      CHOLECYSTECTOMY      COLONOSCOPY      Complete    DILATION AND CURETTAGE OF UTERUS      twice    ENDOMETRIAL BIOPSY      without cervical dilation    HYSTEROSCOPY      TUBAL LIGATION  1974    WISDOM TOOTH EXTRACTION      x 2       Current Outpatient Medications:     amLODIPine (NORVASC) 5 mg tablet, TAKE 1 TABLET BY MOUTH DAILY, Disp: 90 tablet, Rfl: 1    atorvastatin (LIPITOR) 10 mg tablet, TAKE 1 TABLET BY MOUTH DAILY, Disp: 90 tablet, Rfl: 1    cetirizine (ZyrTEC) 10 mg tablet, Take 2 tablets (20 mg total) by mouth daily, Disp: 180 tablet, Rfl: 3    Cranberry 600 MG TABS, Take by mouth, Disp: , Rfl:     famotidine (PEPCID) 20 mg tablet, TAKE 1 TABLET BY MOUTH TWICE  DAILY, Disp: 180 tablet, Rfl: 1    fexofenadine (ALLEGRA) 180 MG tablet, Take 2 tablets (360 mg total) by mouth daily, Disp: 90 tablet, Rfl: 3    glucose blood (OneTouch Ultra) test strip, Test once daily, Disp: 50 strip, Rfl: 3    melatonin 3 mg, Take 5 mg by mouth daily at bedtime, Disp: , Rfl:     metFORMIN (GLUCOPHAGE-XR) 500 mg 24 hr tablet, TAKE 2 TABLETS BY MOUTH TWICE  DAILY WITH MEALS, Disp: 360 tablet, Rfl: 1    metoprolol succinate (TOPROL-XL) 50 mg 24 hr tablet, TAKE 1 TABLET BY MOUTH DAILY, Disp: 90 tablet, Rfl: 1    metroNIDAZOLE (METROCREAM) 0.75 % cream, Apply topically 2 (two) times a day, Disp: , Rfl:     olmesartan (BENICAR) 40 mg tablet, TAKE 1 TABLET BY MOUTH DAILY, Disp: 90 tablet, Rfl: 1    omalizumab (XOLAIR) subcutaneous injection, Inject 1 mL (150 mg total) under the skin every 28 days, Disp: , Rfl:     tacrolimus (PROTOPIC) 0.1 % ointment, apply TO SKIN twice a day if needed, Disp: , Rfl:     Tirzepatide (Mounjaro) 15 MG/0.5ML SOAJ, INJECT THE CONTENTS OF ONE PEN  SUBCUTANEOUSLY WEEKLY AS  DIRECTED FOR DIABETES, Disp: 6 mL, Rfl: 1    doxycycline hyclate (VIBRAMYCIN) 100 mg capsule, Take 100 mg by mouth 2 (two) times a day  "with meals (Patient not taking: Reported on 2/25/2025), Disp: , Rfl:   Allergies   Allergen Reactions    Other Sneezing     SEASONAL ALLERGIES      Vitals:    02/25/25 1052   BP: 154/74   BP Location: Left arm   Patient Position: Sitting   Cuff Size: Large   Pulse: 74   SpO2: 98%   Weight: 88.9 kg (196 lb)   Height: 5' 4.75\" (1.645 m)     Weight (last 2 days)       Date/Time Weight    02/25/25 1052 88.9 (196)           Blood pressure 154/74, pulse 74, height 5' 4.75\" (1.645 m), weight 88.9 kg (196 lb), SpO2 98%, not currently breastfeeding., Body mass index is 32.87 kg/m².    Labs:  Hospital Outpatient Visit on 02/20/2025   Component Date Value    Triscuspid Valve Regurgi* 02/20/2025 24.0     Sinus of Valsalva, 2D 02/20/2025 3.3     RAA A4C 02/20/2025 12.2     LA Volume Index (BP) 02/20/2025 21.8     MV Peak A Klaus 02/20/2025 0.76     MV Peak E Klaus 02/20/2025 60     AV peak gradient 02/20/2025 6     LVOT stroke volume 02/20/2025 58.84     Ao VTI 02/20/2025 25.9     Aortic valve peak veloci* 02/20/2025 1.23     LVOT peak VTI 02/20/2025 18.74     LVOT peak klaus 02/20/2025 0.9     LVOT diameter 02/20/2025 2.0     E wave deceleration time 02/20/2025 226     E/A ratio 02/20/2025 0.79     AV LVOT peak gradient 02/20/2025 3     AV mean gradient 02/20/2025 3     TR Peak Klaus 02/20/2025 2.5     AV area peak klaus 02/20/2025 2.3     AV area by cont VTI 02/20/2025 2.3     LVOT mn grad 02/20/2025 1.0     RVID d 02/20/2025 2.7     A4C EF 02/20/2025 60     Aortic valve mean veloci* 02/20/2025 8.10     Tricuspid valve peak reg* 02/20/2025 2.45     Left ventricular stroke * 02/20/2025 63.00     IVSd 02/20/2025 1.20     Tricuspid annular plane * 02/20/2025 2.50     Ao root 02/20/2025 3.00     Ao STJ 02/20/2025 2.70     LVPWd 02/20/2025 1.20     LA size 02/20/2025 3.5     Asc Ao 02/20/2025 3.5     STJ 02/20/2025 2.7     LA volume (BP) 02/20/2025 43     FS 02/20/2025 37     LVIDS 02/20/2025 2.90     IVS 02/20/2025 1.2     LVIDd 02/20/2025 " 4.60     LA length (A2C) 02/20/2025 5.60     LEFT VENTRICLE SYSTOLIC * 02/20/2025 33     LV DIASTOLIC VOLUME (MOD* 02/20/2025 96     LVOT Cardiac Index 02/20/2025 2.01     LVOT stroke volume index 02/20/2025 31.00     LVOT Cardiac Output 02/20/2025 3.95     Left Atrium Area-systoli* 02/20/2025 14.1     Left Atrium Area-systoli* 02/20/2025 18.3     MV E' Tissue Velocity La* 02/20/2025 5     MV E' Tissue Velocity Se* 02/20/2025 5     LVSV, 2D 02/20/2025 63     BSA 02/20/2025 1.97     LVOT area 02/20/2025 3.14     DVI 02/20/2025 0.72     AV valve area 02/20/2025 2.27     LV EF 02/20/2025 65    Appointment on 01/31/2025   Component Date Value    Creatinine, Ur 01/31/2025 168.8     Albumin,U,Random 01/31/2025 30.9 (H)     Albumin Creat Ratio 01/31/2025 18    Appointment on 11/01/2024   Component Date Value    TSH 3RD GENERATON 11/01/2024 2.497    Appointment on 09/30/2024   Component Date Value    Hemoglobin A1C 09/30/2024 5.8 (H)     EAG 09/30/2024 120     Creatinine, Ur 09/30/2024 236.6     Albumin,U,Random 09/30/2024 86.2 (H)     Albumin Creat Ratio 09/30/2024 36 (H)     Sodium 09/30/2024 138     Potassium 09/30/2024 4.5     Chloride 09/30/2024 99     CO2 09/30/2024 29     ANION GAP 09/30/2024 10     BUN 09/30/2024 19     Creatinine 09/30/2024 0.85     Glucose, Fasting 09/30/2024 98     Calcium 09/30/2024 9.2     eGFR 09/30/2024 67     Vit D, 25-Hydroxy 09/30/2024 43.5     Cholesterol 09/30/2024 148     Triglycerides 09/30/2024 197 (H)     HDL, Direct 09/30/2024 36 (L)     LDL Calculated 09/30/2024 73     Non-HDL-Chol (CHOL-HDL) 09/30/2024 112     LDL Direct 09/30/2024 93      Imaging: Echo complete w/ contrast if indicated  Result Date: 2/20/2025  Narrative:   Left Ventricle: Left ventricular cavity size is normal. Wall thickness is normal. The left ventricular ejection fraction is 65%. Systolic function is normal. Wall motion is normal. Diastolic function is mildly abnormal, consistent with grade I (abnormal)  relaxation.       Review of Systems:  Review of Systems   Constitutional:  Negative for diaphoresis, fatigue, fever and unexpected weight change.   HENT: Negative.     Respiratory:  Negative for cough, shortness of breath and wheezing.    Cardiovascular:  Negative for chest pain, palpitations and leg swelling.   Gastrointestinal:  Negative for abdominal pain, diarrhea and nausea.   Musculoskeletal:  Negative for gait problem and myalgias.   Skin:  Negative for rash.   Neurological:  Negative for dizziness and numbness.   Psychiatric/Behavioral: Negative.         Physical Exam:  Physical Exam  Constitutional:       Appearance: She is well-developed.   HENT:      Head: Normocephalic and atraumatic.   Eyes:      Pupils: Pupils are equal, round, and reactive to light.   Neck:      Vascular: No JVD.   Cardiovascular:      Rate and Rhythm: Regular rhythm.      Pulses: Normal pulses.           Carotid pulses are 2+ on the right side and 2+ on the left side.     Heart sounds: S1 normal and S2 normal.   Pulmonary:      Effort: Pulmonary effort is normal.      Breath sounds: Normal breath sounds. No wheezing or rales.   Abdominal:      General: Bowel sounds are normal.      Palpations: Abdomen is soft.   Musculoskeletal:         General: No tenderness. Normal range of motion.      Cervical back: Normal range of motion and neck supple.   Skin:     General: Skin is warm.   Neurological:      Mental Status: She is alert and oriented to person, place, and time.      Cranial Nerves: No cranial nerve deficit.      Deep Tendon Reflexes: Reflexes are normal and symmetric.

## 2025-02-26 ENCOUNTER — TELEPHONE (OUTPATIENT)
Dept: ADMINISTRATIVE | Facility: OTHER | Age: 75
End: 2025-02-26

## 2025-02-26 ENCOUNTER — OFFICE VISIT (OUTPATIENT)
Dept: INTERNAL MEDICINE CLINIC | Facility: CLINIC | Age: 75
End: 2025-02-26
Payer: MEDICARE

## 2025-02-26 VITALS
BODY MASS INDEX: 32.53 KG/M2 | DIASTOLIC BLOOD PRESSURE: 80 MMHG | HEART RATE: 71 BPM | OXYGEN SATURATION: 97 % | SYSTOLIC BLOOD PRESSURE: 148 MMHG | WEIGHT: 194 LBS

## 2025-02-26 DIAGNOSIS — E78.5 HYPERLIPIDEMIA, UNSPECIFIED HYPERLIPIDEMIA TYPE: ICD-10-CM

## 2025-02-26 DIAGNOSIS — M85.80 OSTEOPENIA, UNSPECIFIED LOCATION: ICD-10-CM

## 2025-02-26 DIAGNOSIS — E11.40 CONTROLLED TYPE 2 DIABETES MELLITUS WITH DIABETIC NEUROPATHY, WITHOUT LONG-TERM CURRENT USE OF INSULIN (HCC): Chronic | ICD-10-CM

## 2025-02-26 DIAGNOSIS — Z00.00 MEDICARE ANNUAL WELLNESS VISIT, SUBSEQUENT: ICD-10-CM

## 2025-02-26 DIAGNOSIS — G47.61 PLMD (PERIODIC LIMB MOVEMENT DISORDER): ICD-10-CM

## 2025-02-26 DIAGNOSIS — E11.9 TYPE 2 DIABETES MELLITUS WITHOUT COMPLICATION, WITHOUT LONG-TERM CURRENT USE OF INSULIN (HCC): ICD-10-CM

## 2025-02-26 DIAGNOSIS — R00.2 PALPITATION: ICD-10-CM

## 2025-02-26 DIAGNOSIS — I10 ESSENTIAL HYPERTENSION: Primary | ICD-10-CM

## 2025-02-26 PROCEDURE — 99214 OFFICE O/P EST MOD 30 MIN: CPT | Performed by: GENERAL ACUTE CARE HOSPITAL

## 2025-02-26 PROCEDURE — G2211 COMPLEX E/M VISIT ADD ON: HCPCS | Performed by: GENERAL ACUTE CARE HOSPITAL

## 2025-02-26 PROCEDURE — G0439 PPPS, SUBSEQ VISIT: HCPCS | Performed by: GENERAL ACUTE CARE HOSPITAL

## 2025-02-26 RX ORDER — METOPROLOL SUCCINATE 50 MG/1
50 TABLET, EXTENDED RELEASE ORAL DAILY
Qty: 90 TABLET | Refills: 1 | Status: SHIPPED | OUTPATIENT
Start: 2025-02-26

## 2025-02-26 RX ORDER — OLMESARTAN MEDOXOMIL 40 MG/1
40 TABLET ORAL DAILY
Qty: 90 TABLET | Refills: 1 | Status: SHIPPED | OUTPATIENT
Start: 2025-02-26

## 2025-02-26 RX ORDER — AMLODIPINE BESYLATE 5 MG/1
5 TABLET ORAL DAILY
Qty: 90 TABLET | Refills: 1 | Status: SHIPPED | OUTPATIENT
Start: 2025-02-26

## 2025-02-26 RX ORDER — ATORVASTATIN CALCIUM 10 MG/1
10 TABLET, FILM COATED ORAL DAILY
Qty: 90 TABLET | Refills: 1 | Status: SHIPPED | OUTPATIENT
Start: 2025-02-26

## 2025-02-26 NOTE — PROGRESS NOTES
Name: Ro Grey      : 1950      MRN: [id number]  Encounter Provider: Lea Sanchez MD  Encounter Date: 2025   Encounter department: [facility]    Assessment & Plan  Essential hypertension  BP elevated on and off in recent several office visit.   Does not check BP at home.   Currently on amlodipine 5mg (leg swelling on 10mg), olmesartan 40mg, toprolol XL 50mg (started by previous pcp for HTN).   Advice pt to start checking home BP morning and evening, send me readings in 2-3 weeks.   If still elevated will need to adjust med. Consider switching metoprolol to carvedilol. Will check with card.   Orders:    amLODIPine (NORVASC) 5 mg tablet; Take 1 tablet (5 mg total) by mouth daily    olmesartan (BENICAR) 40 mg tablet; Take 1 tablet (40 mg total) by mouth daily    metoprolol succinate (TOPROL-XL) 50 mg 24 hr tablet; Take 1 tablet (50 mg total) by mouth daily    Medicare annual wellness visit, subsequent  Vaccinations: utd on flu shot this winter; not interested in covid booster(had hives from the last covid vaccine); recommend RSV; recommend shingrix  Advance care planning:has living will. HCP s . Son Kiran in alternative HCP.   Mammogram: has it scheduled this year. Discussed whether to continue screening at her age. With shared decision making, we decided to continue for now.   Colon cancer screen:colonoscopy 2020, repeat in 10y  DEXA: osteoporosis, repeat   Healthy diet and regular exercise         Osteopenia, unspecified location  Vit D level good. Continue wt bearing exercise, Ca rich diet.        Hyperlipidemia, unspecified hyperlipidemia type    Orders:    atorvastatin (LIPITOR) 10 mg tablet; Take 1 tablet (10 mg total) by mouth daily    Controlled type 2 diabetes mellitus with diabetic neuropathy, without long-term current use of insulin (HCC)    Lab Results   Component Value Date    HGBA1C 5.8 (H) 2024       Orders:    metFORMIN (GLUCOPHAGE) 1000 MG  tablet; Take 1 tablet (1,000 mg total) by mouth 2 (two) times a day with meals    Hemoglobin A1C; Future    Basic metabolic panel; Future    Albumin / creatinine urine ratio; Future    Type 2 diabetes mellitus without complication, without long-term current use of insulin ([facility])    Lab Results   Component Value Date    HGBA1C 5.8 (H) 09/30/2024   Tried reducing metformin in the past but fasting glucose increased to around 130s. Will keep at same dose for now.   Interested in trying regular metformin 1000mg tab to reduce pill burden.   Continue mounjaro.          PLMD (periodic limb movement disorder)  Under control. Takes melatonin which helps.        Palpitation  F/u with card. Awating Zio result.           Preventive health issues were discussed with patient, and age appropriate screening tests were ordered as noted in patient's After Visit Summary. Personalized health advice and appropriate referrals for health education or preventive services given if needed, as noted in patient's After Visit Summary.    History of Present Illness     HPI   Patient Care Team:  Lea Sanchez MD as PCP - General (Internal Medicine)    Review of Systems  Medical History Reviewed by provider this encounter:       Annual Wellness Visit Questionnaire   Ro is here for her Subsequent Wellness visit.     Health Risk Assessment:   Patient rates overall health as good. Patient feels that their physical health rating is same. Patient is very satisfied with their life. Eyesight was rated as same. Hearing was rated as same. Patient feels that their emotional and mental health rating is same. Patients states they are never, rarely angry. Patient states they are never, rarely unusually tired/fatigued. Pain experienced in the last 7 days has been none. Patient states that she has experienced no weight loss or gain in last 6 months.     Depression Screening:   PHQ-2 Score: 0      Fall Risk Screening:   In the past year, patient has experienced:  history of falling in past year      Urinary Incontinence Screening:   Patient has leaked urine accidently in the last six months.     Home Safety:  Patient does not have trouble with stairs inside or outside of their home. Patient has working smoke alarms and has working carbon monoxide detector. Home safety hazards include: none.     Nutrition:   Current diet is Regular.     Medications:   Patient is currently taking over-the-counter supplements. OTC medications include: see medication list. Patient is able to manage medications.     Activities of Daily Living (ADLs)/Instrumental Activities of Daily Living (IADLs):   Walk and transfer into and out of bed and chair?: Yes  Dress and groom yourself?: Yes    Bathe or shower yourself?: Yes    Feed yourself? Yes  Do your laundry/housekeeping?: Yes  Manage your money, pay your bills and track your expenses?: Yes  Make your own meals?: Yes    Do your own shopping?: Yes    Previous Hospitalizations:   Any hospitalizations or ED visits within the last 12 months?: No      Advance Care Planning:   Living will: Yes    Durable POA for healthcare: Yes    Advanced directive: Yes      PREVENTIVE SCREENINGS      Cardiovascular Screening:    General: Screening Not Indicated and History Lipid Disorder      Diabetes Screening:     General: Screening Not Indicated and History Diabetes      Breast Cancer Screening:     General: Screening Current      Cervical Cancer Screening:    General: Screening Not Indicated      Osteoporosis Screening:    General: Screening Not Indicated and History Osteoporosis      Lung Cancer Screening:     General: Screening Not Indicated      Hepatitis C Screening:    General: Screening Current    Screening, Brief Intervention, and Referral to Treatment (SBIRT)     Screening  Typical number of drinks in a day: 0  Typical number of drinks in a week: 0  Interpretation: Low risk drinking behavior.    AUDIT-C Screenin) How often did you have a drink  containing alcohol in the past year? monthly or less  2) How many drinks did you have on a typical day when you were drinking in the past year? 0  3) How often did you have 6 or more drinks on one occasion in the past year? never    AUDIT-C Score: 1  Interpretation: Score 0-2 (female): Negative screen for alcohol misuse    Single Item Drug Screening:  How often have you used an illegal drug (including marijuana) or a prescription medication for non-medical reasons in the past year? never    Single Item Drug Screen Score: 0  Interpretation: Negative screen for possible drug use disorder    Social Drivers of Health     Financial Resource Strain: Low Risk  (1/25/2024)    Overall Financial Resource Strain (CARDIA)     Difficulty of Paying Living Expenses: Not very hard   Food Insecurity: No Food Insecurity (2/26/2025)    Hunger Vital Sign     Worried About Running Out of Food in the Last Year: Never true     Ran Out of Food in the Last Year: Never true   Transportation Needs: No Transportation Needs (2/26/2025)    PRAPARE - Transportation     Lack of Transportation (Medical): No     Lack of Transportation (Non-Medical): No   Housing Stability: Low Risk  (2/26/2025)    Housing Stability Vital Sign     Unable to Pay for Housing in the Last Year: No     Number of Times Moved in the Last Year: 1     Homeless in the Last Year: No   Utilities: Not At Risk (2/26/2025)    [facility] Utilities     Threatened with loss of utilities: No     No results found.    Objective   /80 (BP Location: Left arm, Patient Position: Sitting)   Pulse 71   Wt 88 kg (194 lb)   SpO2 97%   BMI 32.53 kg/m²     Physical Exam

## 2025-02-26 NOTE — ASSESSMENT & PLAN NOTE
BP elevated on and off in recent several office visit.   Does not check BP at home.   Currently on amlodipine 5mg (leg swelling on 10mg), olmesartan 40mg, toprolol XL 50mg (started by previous pcp for HTN).   Advice pt to start checking home BP morning and evening, send me readings in 2-3 weeks.   If still elevated will need to adjust med. Consider switching metoprolol to carvedilol. Will check with card.   Orders:    amLODIPine (NORVASC) 5 mg tablet; Take 1 tablet (5 mg total) by mouth daily    olmesartan (BENICAR) 40 mg tablet; Take 1 tablet (40 mg total) by mouth daily    metoprolol succinate (TOPROL-XL) 50 mg 24 hr tablet; Take 1 tablet (50 mg total) by mouth daily

## 2025-02-26 NOTE — ASSESSMENT & PLAN NOTE
Lab Results   Component Value Date    HGBA1C 5.8 (H) 09/30/2024   Tried reducing metformin in the past but fasting glucose increased to around 130s. Will keep at same dose for now.   Interested in trying regular metformin 1000mg tab to reduce pill burden.   Continue mounjaro.

## 2025-02-26 NOTE — ASSESSMENT & PLAN NOTE
Vaccinations: utd on flu shot this winter; not interested in covid booster(had hives from the last covid vaccine); recommend RSV; recommend shingrix  Advance care planning:has living will. HCP s . Son Kiran in alternative HCP.   Mammogram: has it scheduled this year. Discussed whether to continue screening at her age. With shared decision making, we decided to continue for now.   Colon cancer screen:colonoscopy 12/2020, repeat in 10y  DEXA: osteoporosis, repeat 2026  Healthy diet and regular exercise

## 2025-02-26 NOTE — TELEPHONE ENCOUNTER
----- Message from Denilson LOPEZ sent at 2/26/2025  9:15 AM EST -----  02/26/25 9:15 AM    Hello, our patient No patient name on file. has had Diabetic Eye Exam completed/performed. Please assist in updating the patient chart by making an External outreach to [facility] facility located in [place]. The date of service is August/Sept. 2024.    Thank you,  Denilson Shaw MA  [facility]

## 2025-02-26 NOTE — ASSESSMENT & PLAN NOTE
Lab Results   Component Value Date    HGBA1C 5.8 (H) 09/30/2024       Orders:    metFORMIN (GLUCOPHAGE) 1000 MG tablet; Take 1 tablet (1,000 mg total) by mouth 2 (two) times a day with meals    Hemoglobin A1C; Future    Basic metabolic panel; Future    Albumin / creatinine urine ratio; Future

## 2025-02-26 NOTE — PATIENT INSTRUCTIONS
Blood pressure has been elevated on and off in the office.     Please start checking your home BP, morning and evening, write them down, over the next 2-3 weeks. Send me readings through [employer]ulous.     Please consider getting shingles vaccine and RSV vaccine at local pharmacy    Get labs done   Medicare Preventive Visit Patient Instructions  Thank you for completing your Welcome to Medicare Visit or Medicare Annual Wellness Visit today. Your next wellness visit will be due in one year (2/27/2026).  The screening/preventive services that you may require over the next 5-10 years are detailed below. Some tests may not apply to you based off risk factors and/or age. Screening tests ordered at today's visit but not completed yet may show as past due. Also, please note that scanned in results may not display below.  Preventive Screenings:  Service Recommendations Previous Testing/Comments   Colorectal Cancer Screening  * Colonoscopy    * Fecal Occult Blood Test (FOBT)/Fecal Immunochemical Test (FIT)  * Fecal DNA/Cologuard Test  * Flexible Sigmoidoscopy Age: 45-75 years old   Colonoscopy: every 10 years (may be performed more frequently if at higher risk)  OR  FOBT/FIT: every 1 year  OR  Cologuard: every 3 years  OR  Sigmoidoscopy: every 5 years  Screening may be recommended earlier than age 45 if at higher risk for colorectal cancer. Also, an individualized decision between you and your healthcare provider will decide whether screening between the ages of 76-85 would be appropriate. Colonoscopy: 01/07/2021  FOBT/FIT: Not on file  Cologuard: Not on file  Sigmoidoscopy: Not on file          Breast Cancer Screening Age: 40+ years old  Frequency: every 1-2 years  Not required if history of left and right mastectomy Mammogram: 06/06/2024    Screening Current   Cervical Cancer Screening Between the ages of 21-29, pap smear recommended once every 3 years.   Between the ages of 30-65, can perform pap smear with HPV co-testing every  5 years.   Recommendations may differ for women with a history of total hysterectomy, cervical cancer, or abnormal pap smears in past. Pap Smear: 11/12/2018    Screening Not Indicated   Hepatitis C Screening Once for adults born between 1945 and 1965  More frequently in patients at high risk for Hepatitis C Hep C Antibody: 10/08/2018    Screening Current   Diabetes Screening 1-2 times per year if you're at risk for diabetes or have pre-diabetes Fasting glucose: 98 mg/dL (9/30/2024)  A1C: 5.8 % (9/30/2024)  Screening Not Indicated  History Diabetes   Cholesterol Screening Once every 5 years if you don't have a lipid disorder. May order more often based on risk factors. Lipid panel: 09/30/2024    Screening Not Indicated  History Lipid Disorder     Other Preventive Screenings Covered by Medicare:  Abdominal Aortic Aneurysm (AAA) Screening: covered once if your at risk. You're considered to be at risk if you have a family history of AAA.  Lung Cancer Screening: covers low dose CT scan once per year if you meet all of the following conditions: (1) Age 55-77; (2) No signs or symptoms of lung cancer; (3) Current smoker or have quit smoking within the last 15 years; (4) You have a tobacco smoking history of at least 20 pack years (packs per day multiplied by number of years you smoked); (5) You get a written order from a healthcare provider.  Glaucoma Screening: covered annually if you're considered high risk: (1) You have diabetes OR (2) Family history of glaucoma OR (3)  aged 50 and older OR (4)  American aged 65 and older  Osteoporosis Screening: covered every 2 years if you meet one of the following conditions: (1) You're estrogen deficient and at risk for osteoporosis based off medical history and other findings; (2) Have a vertebral abnormality; (3) On glucocorticoid therapy for more than 3 months; (4) Have primary hyperparathyroidism; (5) On osteoporosis medications and need to assess response  to drug therapy.   Last bone density test (DXA Scan): 10/01/2024.  HIV Screening: covered annually if you're between the age of 15-65. Also covered annually if you are younger than 15 and older than 65 with risk factors for HIV infection. For pregnant patients, it is covered up to 3 times per pregnancy.    Immunizations:  Immunization Recommendations   Influenza Vaccine Annual influenza vaccination during flu season is recommended for all persons aged >= 6 months who do not have contraindications   Pneumococcal Vaccine   * Pneumococcal conjugate vaccine = PCV13 (Prevnar 13), PCV15 (Vaxneuvance), PCV20 (Prevnar 20)  * Pneumococcal polysaccharide vaccine = PPSV23 (Pneumovax) Adults 19-63 yo with certain risk factors or if 65+ yo  If never received any pneumonia vaccine: recommend Prevnar 20 (PCV20)  Give PCV20 if previously received 1 dose of PCV13 or PPSV23   Hepatitis B Vaccine 3 dose series if at intermediate or high risk (ex: diabetes, end stage renal disease, liver disease)   Respiratory syncytial virus (RSV) Vaccine - COVERED BY MEDICARE PART D  * RSVPreF3 (Arexvy) CDC recommends that adults 60 years of age and older may receive a single dose of RSV vaccine using shared clinical decision-making (SCDM)   Tetanus (Td) Vaccine - COST NOT COVERED BY MEDICARE PART B Following completion of primary series, a booster dose should be given every 10 years to maintain immunity against tetanus. Td may also be given as tetanus wound prophylaxis.   Tdap Vaccine - COST NOT COVERED BY MEDICARE PART B Recommended at least once for all adults. For pregnant patients, recommended with each pregnancy.   Shingles Vaccine (Shingrix) - COST NOT COVERED BY MEDICARE PART B  2 shot series recommended in those 19 years and older who have or will have weakened immune systems or those 50 years and older     Health Maintenance Due:      Topic Date Due   • Breast Cancer Screening: Mammogram  06/06/2025   • DXA SCAN  10/01/2026   • Hepatitis C  Screening  Completed   • Colorectal Cancer Screening  Discontinued     Immunizations Due:  There are no preventive care reminders to display for this patient.  Advance Directives   What are advance directives?  Advance directives are legal documents that state your wishes and plans for medical care. These plans are made ahead of time in case you lose your ability to make decisions for yourself. Advance directives can apply to any medical decision, such as the treatments you want, and if you want to donate organs.   What are the types of advance directives?  There are many types of advance directives, and each state has rules about how to use them. You may choose a combination of any of the following:  Living will:  This is a written record of the treatment you want. You can also choose which treatments you do not want, which to limit, and which to stop at a certain time. This includes surgery, medicine, IV fluid, and tube feedings.   Durable power of  for healthcare (DPAHC):  This is a written record that states who you want to make healthcare choices for you when you are unable to make them for yourself. This person, called a proxy, is usually a family member or a friend. You may choose more than 1 proxy.  Do not resuscitate (DNR) order:  A DNR order is used in case your heart stops beating or you stop breathing. It is a request not to have certain forms of treatment, such as CPR. A DNR order may be included in other types of advance directives.  Medical directive:  This covers the care that you want if you are in a coma, near death, or unable to make decisions for yourself. You can list the treatments you want for each condition. Treatment may include pain medicine, surgery, blood transfusions, dialysis, IV or tube feedings, and a ventilator (breathing machine).  Values history:  This document has questions about your views, beliefs, and how you feel and think about life. This information can help others  choose the care that you would choose.  Why are advance directives important?  An advance directive helps you control your care. Although spoken wishes may be used, it is better to have your wishes written down. Spoken wishes can be misunderstood, or not followed. Treatments may be given even if you do not want them. An advance directive may make it easier for your family to make difficult choices about your care.   Fall Prevention    Fall prevention  includes ways to make your home and other areas safer. It also includes ways you can move more carefully to prevent a fall. Health conditions that cause changes in your blood pressure, vision, or muscle strength and coordination may increase your risk for falls. Medicines may also increase your risk for falls if they make you dizzy, weak, or sleepy.   Fall prevention tips:   Stand or sit up slowly.    Use assistive devices as directed.    Wear shoes that fit well and have soles that .    Wear a personal alarm.    Stay active.    Manage your medical conditions.    Home Safety Tips:  Add items to prevent falls in the bathroom.    Keep paths clear.    Install bright lights in your home.    Keep items you use often on shelves within reach.    Paint or place reflective tape on the edges of your stairs.    Urinary Incontinence   Urinary incontinence (UI)  is when you lose control of your bladder. UI develops because your bladder cannot store or empty urine properly. The 3 most common types of UI are stress incontinence, urge incontinence, or both.  Medicines:   May be given to help strengthen your bladder control. Report any side effects of medication to your healthcare provider.  Do pelvic muscle exercises often:  Your pelvic muscles help you stop urinating. Squeeze these muscles tight for 5 seconds, then relax for 5 seconds. Gradually work up to squeezing for 10 seconds. Do 3 sets of 15 repetitions a day, or as directed. This will help strengthen your pelvic muscles  and improve bladder control.  Train your bladder:  Go to the bathroom at set times, such as every 2 hours, even if you do not feel the urge to go. You can also try to hold your urine when you feel the urge to go. For example, hold your urine for 5 minutes when you feel the urge to go. As that becomes easier, hold your urine for 10 minutes.   Self-care:   Keep a UI record.  Write down how often you leak urine and how much you leak. Make a note of what you were doing when you leaked urine.  Drink liquids as directed. You may need to limit the amount of liquid you drink to help control your urine leakage. Do not drink any liquid right before you go to bed. Limit or do not have drinks that contain caffeine or alcohol.   Prevent constipation.  Eat a variety of high-fiber foods. Good examples are high-fiber cereals, beans, vegetables, and whole-grain breads. Walking is the best way to trigger your intestines to have a bowel movement.  Exercise regularly and maintain a healthy weight.  Weight loss and exercise will decrease pressure on your bladder and help you control your leakage.   Use a catheter as directed  to help empty your bladder. A catheter is a tiny, plastic tube that is put into your bladder to drain your urine.   Go to behavior therapy as directed.  Behavior therapy may be used to help you learn to control your urge to urinate.    Weight Management   Why it is important to manage your weight:  Being overweight increases your risk of health conditions such as heart disease, high blood pressure, type 2 diabetes, and certain types of cancer. It can also increase your risk for osteoarthritis, sleep apnea, and other respiratory problems. Aim for a slow, steady weight loss. Even a small amount of weight loss can lower your risk of health problems.  How to lose weight safely:  A safe and healthy way to lose weight is to eat fewer calories and get regular exercise. You can lose up about 1 pound a week by decreasing  the number of calories you eat by 500 calories each day.   Healthy meal plan for weight management:  A healthy meal plan includes a variety of foods, contains fewer calories, and helps you stay healthy. A healthy meal plan includes the following:  Eat whole-grain foods more often.  A healthy meal plan should contain fiber. Fiber is the part of grains, fruits, and vegetables that is not broken down by your body. Whole-grain foods are healthy and provide extra fiber in your diet. Some examples of whole-grain foods are whole-wheat breads and pastas, oatmeal, brown rice, and bulgur.  Eat a variety of vegetables every day.  Include dark, leafy greens such as spinach, kale, prem greens, and mustard greens. Eat yellow and orange vegetables such as carrots, sweet potatoes, and winter squash.   Eat a variety of fruits every day.  Choose fresh or canned fruit (canned in its own juice or light syrup) instead of juice. Fruit juice has very little or no fiber.  Eat low-fat dairy foods.  Drink fat-free (skim) milk or 1% milk. Eat fat-free yogurt and low-fat cottage cheese. Try low-fat cheeses such as mozzarella and other reduced-fat cheeses.  Choose meat and other protein foods that are low in fat.  Choose beans or other legumes such as split peas or lentils. Choose fish, skinless poultry (chicken or turkey), or lean cuts of red meat (beef or pork). Before you cook meat or poultry, cut off any visible fat.   Use less fat and oil.  Try baking foods instead of frying them. Add less fat, such as margarine, sour cream, regular salad dressing and mayonnaise to foods. Eat fewer high-fat foods. Some examples of high-fat foods include french fries, doughnuts, ice cream, and cakes.  Eat fewer sweets.  Limit foods and drinks that are high in sugar. This includes candy, cookies, regular soda, and sweetened drinks.  Exercise:  Exercise at least 30 minutes per day on most days of the week. Some examples of exercise include walking, biking,  dancing, and swimming. You can also fit in more physical activity by taking the stairs instead of the elevator or parking farther away from stores. Ask your healthcare provider about the best exercise plan for you.      © Copyright [employer] 2018 Information is for End User's use only and may not be sold, redistributed or otherwise used for commercial purposes. All illustrations and images included in CareNotes® are the copyrighted property of [employer] Inc. or [employer]

## 2025-02-26 NOTE — LETTER
Diabetic Eye Exam Form    Date Requested: 25  Patient: Ro Grey  Patient : 1950   Referring Provider: Lea Sanchez MD      DIABETIC Eye Exam Date _______________________________      Type of Exam MUST be documented for Diabetic Eye Exams. Please CHECK ONE.     Retinal Exam       Dilated Retinal Exam       OCT       Optomap-Iris Exam      Fundus Photography       Left Eye - Please check Retinopathy or No Retinopathy        Exam did show retinopathy    Exam did not show retinopathy       Right Eye - Please check Retinopathy or No Retinopathy       Exam did show retinopathy    Exam did not show retinopathy       Comments __________________________________________________________    Practice Providing Exam ______________________________________________    Exam Performed By (print name) _______________________________________      Provider Signature ___________________________________________________      These reports are needed for  compliance.  Please fax this completed form and a copy of the Diabetic Eye Exam report to our office located at [address] [place] [address] as soon as possible via Fax [phone] kamilla Urbina: Phone [phone]  We thank you for your assistance in treating our mutual patient.

## 2025-02-27 NOTE — TELEPHONE ENCOUNTER
Upon review of the In Basket request and the patient's chart, initial outreach has been made via fax to facility. Please see Contacts section for details.     Thank you  Ciara Garcia MA

## 2025-03-04 NOTE — TELEPHONE ENCOUNTER
Upon review of the In Basket request we were able to locate, review, and update the patient chart as requested for Diabetic Eye Exam.    Any additional questions or concerns should be emailed to the Practice Liaisons via the appropriate education email address, please do not reply via In Basket.    Thank you  Ciara Garcia MA   [facility]

## 2025-03-06 LAB
CV ZIO AF AVG BPM: 153 BPM
CV ZIO AF BPM HIGH: 204 BPM
CV ZIO AF BPM LOW: 92 BPM
CV ZIO AF F EPI AVG BPM: 164 BPM
CV ZIO AF F EPI BPM HIGH: 204 BPM
CV ZIO AF F EPI BPM LOW: 124 BPM
CV ZIO AF F EPI DT: NORMAL
CV ZIO AF L EPI AVG BPM: 156 BPM
CV ZIO AF L EPI BPM HIGH: 191 BPM
CV ZIO AF L EPI BPM LOW: 120 BPM
CV ZIO AF L EPI DUR: 1314.8 SEC
CV ZIO AF L EPI END: NORMAL
CV ZIO AF L EPI START: NORMAL
CV ZIO AF PERCENT: <1 %
CV ZIO AF S EPI AVG BPM: 120 BPM
CV ZIO AF S EPI BPM HIGH: 141 BPM
CV ZIO AF S EPI BPM LOW: 92 BPM
CV ZIO AF S EPI DT: NORMAL
CV ZIO AF SYMPT IN PT: NORMAL
CV ZIO BASELINE AVG BPM: 74 BPM
CV ZIO BASELINE BPM HIGH: 204 BPM
CV ZIO BASELINE BPM LOW: 51 BPM
CV ZIO DEVICE ANALYSIS TIME: NORMAL
CV ZIO ECT SVE COUNT: 341 EPISODES
CV ZIO ECT SVE CPLT COUNT: 32 EPISODES
CV ZIO ECT SVE CPLT FREQ: NORMAL
CV ZIO ECT SVE FREQ: NORMAL
CV ZIO ECT SVE TPLT COUNT: 7 EPISODES
CV ZIO ECT SVE TPLT FREQ: NORMAL
CV ZIO ECT VE COUNT: 254 EPISODES
CV ZIO ECT VE CPLT COUNT: 8 EPISODES
CV ZIO ECT VE CPLT FREQ: NORMAL
CV ZIO ECT VE FREQ: NORMAL
CV ZIO ECT VE TPLT COUNT: 0 EPISODES
CV ZIO ECT VE TPLT FREQ: 0
CV ZIO ECTOPIC SVE COUPLET RAW PERCENT: 0 %
CV ZIO ECTOPIC SVE ISOLATED PERCENT: 0.02 %
CV ZIO ECTOPIC SVE TRIPLET RAW PERCENT: 0 %
CV ZIO ECTOPIC VE COUPLET RAW PERCENT: 0 %
CV ZIO ECTOPIC VE ISOLATED PERCENT: 0.02 %
CV ZIO ECTOPIC VE TRIPLET RAW PERCENT: 0 %
CV ZIO ENROLLMENT END: NORMAL
CV ZIO ENROLLMENT START: NORMAL
CV ZIO IRREG TYPE: NORMAL
CV ZIO PATIENT EVENTS DIARIES: 2
CV ZIO PATIENT EVENTS TRIGGERS: 2
CV ZIO PAUSE COUNT: 0
CV ZIO PRESCRIPTION STATUS: NORMAL
CV ZIO SVT AVG BPM: 167 BPM
CV ZIO SVT BPM HIGH: 197 BPM
CV ZIO SVT BPM LOW: 77 BPM
CV ZIO SVT COUNT: 2
CV ZIO SVT F EPI AVG BPM: 187 BPM
CV ZIO SVT F EPI BEATS: 4 BEATS
CV ZIO SVT F EPI BPM HIGH: 197 BPM
CV ZIO SVT F EPI BPM LOW: 176 BPM
CV ZIO SVT F EPI DUR: 1.2 SEC
CV ZIO SVT F EPI END: NORMAL
CV ZIO SVT F EPI START: NORMAL
CV ZIO SVT L EPI AVG BPM: 147 BPM
CV ZIO SVT L EPI BEATS: 9 BEATS
CV ZIO SVT L EPI BPM HIGH: 179 BPM
CV ZIO SVT L EPI BPM LOW: 77 BPM
CV ZIO SVT L EPI DUR: 4 SEC
CV ZIO SVT L EPI END: NORMAL
CV ZIO SVT L EPI START: NORMAL
CV ZIO TOTAL  ENROLLMENT PERIOD: NORMAL
CV ZIO VT AVG BPM: 132 BPM
CV ZIO VT BPM HIGH: 185 BPM
CV ZIO VT BPM LOW: 96 BPM
CV ZIO VT COUNT: 1
CV ZIO VT F EPI AVG BPM: 132
CV ZIO VT F EPI BEATS: 8 BEATS
CV ZIO VT F EPI BPM HIGH: 185
CV ZIO VT F EPI BPM LOW: 96
CV ZIO VT F EPI DUR: 3.8 SEC
CV ZIO VT F EPI END: NORMAL
CV ZIO VT F EPI START: NORMAL
CV ZIO VT L EPI AVG BPM: 132
CV ZIO VT L EPI BEATS: 8 BEATS
CV ZIO VT L EPI BPM HIGH: 185 BPM
CV ZIO VT L EPI BPM LOW: 96 BPM
CV ZIO VT L EPI DUR: 3.8
CV ZIO VT L EPI END: NORMAL
CV ZIO VT L EPI START: NORMAL

## 2025-03-11 DIAGNOSIS — I10 ESSENTIAL HYPERTENSION: Primary | ICD-10-CM

## 2025-03-11 DIAGNOSIS — R00.0 TACHYCARDIA: ICD-10-CM

## 2025-03-11 RX ORDER — CARVEDILOL 12.5 MG/1
12.5 TABLET ORAL 2 TIMES DAILY WITH MEALS
Qty: 200 TABLET | Refills: 3 | Status: SHIPPED | OUTPATIENT
Start: 2025-03-11

## 2025-03-19 ENCOUNTER — TELEPHONE (OUTPATIENT)
Age: 75
End: 2025-03-19

## 2025-03-19 DIAGNOSIS — I48.0 PAROXYSMAL ATRIAL FIBRILLATION (HCC): Primary | ICD-10-CM

## 2025-03-19 NOTE — TELEPHONE ENCOUNTER
Caller: Ro Grey    Doctor: Dr. Winter    Reason for call: She is calling regarding the results of her monitor. Please call her.    Thank you    Call back#: [phone]

## 2025-03-19 NOTE — TELEPHONE ENCOUNTER
Called the patient letting her know as per  the Zio report showes A fid and he wants to start her on Eliquis 5 mg BID and schedule a follow up visit, patient is aware and understood and will schedule the visit

## 2025-03-25 ENCOUNTER — RESULTS FOLLOW-UP (OUTPATIENT)
Dept: INTERNAL MEDICINE CLINIC | Facility: CLINIC | Age: 75
End: 2025-03-25

## 2025-03-25 ENCOUNTER — APPOINTMENT (OUTPATIENT)
Dept: LAB | Facility: CLINIC | Age: 75
End: 2025-03-25
Payer: MEDICARE

## 2025-03-25 DIAGNOSIS — E11.40 CONTROLLED TYPE 2 DIABETES MELLITUS WITH DIABETIC NEUROPATHY, WITHOUT LONG-TERM CURRENT USE OF INSULIN (HCC): Chronic | ICD-10-CM

## 2025-03-25 LAB
ANION GAP SERPL CALCULATED.3IONS-SCNC: 8 MMOL/L (ref 4–13)
BUN SERPL-MCNC: 15 MG/DL (ref 5–25)
CALCIUM SERPL-MCNC: 9.4 MG/DL (ref 8.4–10.2)
CHLORIDE SERPL-SCNC: 101 MMOL/L (ref 96–108)
CO2 SERPL-SCNC: 30 MMOL/L (ref 21–32)
CREAT SERPL-MCNC: 0.68 MG/DL (ref 0.6–1.3)
CREAT UR-MCNC: 85 MG/DL
EST. AVERAGE GLUCOSE BLD GHB EST-MCNC: 131 MG/DL
GFR SERPL CREATININE-BSD FRML MDRD: 85 ML/MIN/1.73SQ M
GLUCOSE P FAST SERPL-MCNC: 100 MG/DL (ref 65–99)
HBA1C MFR BLD: 6.2 %
MICROALBUMIN UR-MCNC: 28.6 MG/L
MICROALBUMIN/CREAT 24H UR: 34 MG/G CREATININE (ref 0–30)
POTASSIUM SERPL-SCNC: 4.3 MMOL/L (ref 3.5–5.3)
SODIUM SERPL-SCNC: 139 MMOL/L (ref 135–147)

## 2025-03-25 PROCEDURE — 36415 COLL VENOUS BLD VENIPUNCTURE: CPT

## 2025-03-25 PROCEDURE — 82570 ASSAY OF URINE CREATININE: CPT

## 2025-03-25 PROCEDURE — 82043 UR ALBUMIN QUANTITATIVE: CPT

## 2025-03-25 PROCEDURE — 80048 BASIC METABOLIC PNL TOTAL CA: CPT

## 2025-03-25 PROCEDURE — 83036 HEMOGLOBIN GLYCOSYLATED A1C: CPT

## 2025-03-28 PROBLEM — Z00.00 MEDICARE ANNUAL WELLNESS VISIT, SUBSEQUENT: Status: RESOLVED | Noted: 2021-06-11 | Resolved: 2025-03-28

## 2025-04-07 ENCOUNTER — OFFICE VISIT (OUTPATIENT)
Dept: INTERNAL MEDICINE CLINIC | Facility: CLINIC | Age: 75
End: 2025-04-07

## 2025-04-07 VITALS — HEART RATE: 66 BPM | DIASTOLIC BLOOD PRESSURE: 71 MMHG | SYSTOLIC BLOOD PRESSURE: 150 MMHG

## 2025-04-07 DIAGNOSIS — E11.9 TYPE 2 DIABETES MELLITUS WITHOUT COMPLICATION, WITHOUT LONG-TERM CURRENT USE OF INSULIN (HCC): Primary | ICD-10-CM

## 2025-04-07 DIAGNOSIS — I10 ESSENTIAL HYPERTENSION: ICD-10-CM

## 2025-04-07 PROCEDURE — PBNCHG PB NO CHARGE PLACEHOLDER: Performed by: PHARMACIST

## 2025-04-07 NOTE — ASSESSMENT & PLAN NOTE
Patient brings blood pressure logs today  Increased carvedilol to 25mg BID per PCP message on 3/11, without much change at all  Taking amlodipine 5mg, 10 mg gave her ankle swelling  Taking olmesartan as well  Recommend starting HCTZ - to be discussed with PCP

## 2025-04-07 NOTE — ASSESSMENT & PLAN NOTE
Lab Results   Component Value Date    HGBA1C 6.2 (H) 03/25/2025   Patient doing well, A1C slightly up from last time - patient has definite recall of why it was elevated, stating she had been getting daily xl icee from [facility] she has since stopped that  Mediations going well, religiously takes them  Cost okay - she has almost reached the $2k cap for Medicare  Recently started Eliquis for Afib, follow up with cardiology in approx. 2 weeks

## 2025-04-07 NOTE — PROGRESS NOTES
[facility]  Carrie Curran, Pharmacist    Assessment/ Plan     1. Type 2 diabetes mellitus without complication, without long-term current use of insulin ([facility])  Assessment & Plan:    Lab Results   Component Value Date    HGBA1C 6.2 (H) 03/25/2025   Patient doing well, A1C slightly up from last time - patient has definite recall of why it was elevated, stating she had been getting daily xl icee from [facility] she has since stopped that  Mediations going well, religiously takes them  Cost okay - she has almost reached the $2k cap for Medicare  Recently started Eliquis for Afib, follow up with cardiology in approx. 2 weeks    2. Essential hypertension  Assessment & Plan:  Patient brings blood pressure logs today  Increased carvedilol to 25mg BID per PCP message on 3/11, without much change at all  Taking amlodipine 5mg, 10 mg gave her ankle swelling  Taking olmesartan as well  Recommend starting HCTZ - to be discussed with PCP      Follow-up: 6 months, in between PCP appts    Subjective     Medication Adherence/ Tolerability/ Cost:  Patient denies side effects, no issues with cost, 0 missed doses in last two week  amLODIPine  apixaban  atorvastatin  carvedilol  Cranberry Tabs  famotidine  melatonin  metFORMIN  metroNIDAZOLE  Mounjaro Soaj  olmesartan  omalizumab  OneTouch Ultra Strp      2. Lifestyle:   Last visit with dietician: unknown  Previously attended Living Well with Diabetes Class: yes  Diet Recall:   Breakfast:   Lunch:   Dinner:   Snacks:   Beverages:   Physical Activity: master     3. Home monitoring devices  Glucometer: Yes, Brand: unknown  Continuous Glucose Monitor: No, Brand: n/a  Blood Pressure monitor: Yes, Brand: unknown    Hypoglycemia: The patient had 0 episodes/symptoms of hypoglycemia.   Hyperglycemia: The patient had 0 symptoms of hyperglycemia.     Objective       Blood Glucose Readings  The patient is currently checking blood glucose 1 times per day. Patient  does not report with SMBG logs.    ASCVD Risk:  The 10-year ASCVD risk score (Dez MCGOVERN, et al., 2019) is: 49.2%    Values used to calculate the score:      Age: 75 years      Sex: Female      Is Non- : No      Diabetic: Yes      Tobacco smoker: No      Systolic Blood Pressure: 150 mmHg      Is BP treated: Yes      HDL Cholesterol: 36 mg/dL      Total Cholesterol: 148 mg/dL     Vitals:  Vitals:    04/07/25 0953   BP: 150/71   BP Location: Left arm   Patient Position: Sitting   Cuff Size: Standard   Pulse: 66       Labs:    Lab Results   Component Value Date    SODIUM 139 03/25/2025    K 4.3 03/25/2025    EGFR 85 03/25/2025    CREATININE 0.68 03/25/2025    GLUF 100 (H) 03/25/2025    MICROALBCRE 34 (H) 03/25/2025         Pharmacist Tracking Tool     Pharmacist Tracking Tool  Reason For Outreach: Embedded Pharmacist  Demographics:  Intervention Method: In Person  Type of Intervention: Follow-Up  Topics Addressed: Diabetes and Hypertension  Pharmacologic Interventions: Medication Initiation, Prevent or Manage STEFANY, and Med Rec  Non-Pharmacologic Interventions: Adherence addressed, Care coordination, Cost, Disease state education, Home Monitoring, and Medication/Device education  Time:  Direct Patient Care:  25  mins  Care Coordination:  20  mins  Recommendation Recipient: Patient/Caregiver  Outcome: Accepted

## 2025-04-17 ENCOUNTER — OFFICE VISIT (OUTPATIENT)
Dept: CARDIOLOGY CLINIC | Facility: CLINIC | Age: 75
End: 2025-04-17
Payer: MEDICARE

## 2025-04-17 VITALS
HEART RATE: 56 BPM | OXYGEN SATURATION: 97 % | BODY MASS INDEX: 32.72 KG/M2 | HEIGHT: 65 IN | DIASTOLIC BLOOD PRESSURE: 74 MMHG | SYSTOLIC BLOOD PRESSURE: 150 MMHG | WEIGHT: 196.4 LBS

## 2025-04-17 DIAGNOSIS — R06.83 SNORING: ICD-10-CM

## 2025-04-17 DIAGNOSIS — I48.0 PAROXYSMAL ATRIAL FIBRILLATION (HCC): ICD-10-CM

## 2025-04-17 DIAGNOSIS — I10 ESSENTIAL HYPERTENSION: Primary | ICD-10-CM

## 2025-04-17 PROCEDURE — 99214 OFFICE O/P EST MOD 30 MIN: CPT | Performed by: INTERNAL MEDICINE

## 2025-04-17 RX ORDER — HYDROCHLOROTHIAZIDE 12.5 MG/1
12.5 TABLET ORAL DAILY
Qty: 90 TABLET | Refills: 4 | Status: SHIPPED | OUTPATIENT
Start: 2025-04-17

## 2025-04-17 NOTE — PROGRESS NOTES
Cardiology Follow Up    Ro Grey  1950  [id number]  [facility] [place]  [address] [facility]  [place] [address] [phone]    1. Essential hypertension  hydroCHLOROthiazide 12.5 mg tablet      2. Paroxysmal atrial fibrillation (HCC)  Stress test only, exercise    Ambulatory Referral to Sleep Medicine      3. Snoring  Ambulatory Referral to Sleep Medicine            Discussion/Summary:    We discussed her PAF and treatment options including anti arrhythmic drug therapy and AF ablation.    Plan:    Continue Eliquis 5 mg BID  Continue Benicar 40 mg daily, Coreg 25 mg BID, Norvasc 5 mg daily  Add HCTZ 12.5 mg daily for better BP control  EST   RTO 2 months - consider anti arrhythmic drug therapy if stress test is ok.  Sleep study was ordered        Interval History: Zio showed PAF with episodes up to 2 hours. She is now on Eliquis.  Most of her episodes occur at night and she does feel them. They can last from 1 hour to 2 - 3 hours. The episodes occur about every 3 weeks.  She is active during the day.    She walks regularly , up to 6 miles.  She denies exertional CP, SOB.    She is on Norvasc 5 mg daily and was getting some LE edema when she was on 10 mg daily.    SBP runs 140 - 160s.    She says that her  says that she snores.    Patient Active Problem List   Diagnosis    Osteopenia    Type 2 diabetes mellitus without complication, without long-term current use of insulin (HCC)    Hyperlipidemia    Essential hypertension    BMI 31.0-31.9,adult    Bronchitis    Asymptomatic postmenopausal status    NICKY (obstructive sleep apnea)    PLMD (periodic limb movement disorder)    Primary osteoarthritis of right knee    Primary osteoarthritis of one hip, left    Trochanteric bursitis of left hip    Back pain with left-sided sciatica    Sacroiliitis (HCC)    Chronic idiopathic urticaria    Incontinence in female    Eczema    Primary  insomnia    Rosacea    Low bone density    Palpitation    Tachycardia     Past Medical History:   Diagnosis Date    Acute cystitis without hematuria 12/14/2020    Allergic 2011    take meds daily to control    Arthritis 07/2018    PT 7/2018    Carpal tunnel syndrome     Unspecified laterality. Resolved: 12/21/2016    Cataract     Chronic kidney disease     Complex endometrial hyperplasia with atypia     Resolved: 11/03/17    Diabetes mellitus (HCC)     of other type with circulatory complication, without long-term current use of insulin    History of chickenpox     History of measles     History of mumps     HL (hearing loss)     hearing aids as of 8/17    Hyperlipidemia     Hypertension     Normal delivery     1971 and 1973 sons    Obesity     NICKY (obstructive sleep apnea)     Post-menopausal bleeding     Resolved: 2011    Seasonal allergies     Urinary tract infection 10/17    meds cleared it    UTI (urinary tract infection)     07/2023, med cleared it     Social History     Socioeconomic History    Marital status: /Civil Union     Spouse name: Se    Number of children: 2    Years of education: Not on file    Highest education level: Not on file   Occupational History    Occupation: High School Adminstrator      Employer: [employer]   Tobacco Use    Smoking status: Never    Smokeless tobacco: Never   Vaping Use    Vaping status: Never Used   Substance and Sexual Activity    Alcohol use: Not Currently     Comment: less than one glass of wine per month    Drug use: No    Sexual activity: Not Currently     Partners: Male     Birth control/protection: Post-menopausal, Female Sterilization, None   Other Topics Concern    Not on file   Social History Narrative    Daily coffee consumption (2 cups/day)        Who lives in your home:     What type of home do you live in: Single house    Age of your home: Built 2020    How long have you been living there: 2022    Type of heat: Forced hot air     Type of fuel: Gas and Electric    What type of keny is in your bedroom: Area rugs and Hardwood floor    Do you have the following in or near your home:    Air products: Central air    Pests: None    Pets: Dog    Are pets allowed in bedroom: Yes    Open fields, wooded areas nearby: Open fields and Wooded areas    Basement: None    Exposure to second hand smoke: No        Habits:    Caffeine: coffee 1  cup daily-soda rarely-ice tea 1 glass daily-hot tea 1 x a week     Chocolate: daily     Other:              Social Drivers of Health     Financial Resource Strain: Low Risk  (2024)    Overall Financial Resource Strain (CARDIA)     Difficulty of Paying Living Expenses: Not very hard   Food Insecurity: No Food Insecurity (2025)    Hunger Vital Sign     Worried About Running Out of Food in the Last Year: Never true     Ran Out of Food in the Last Year: Never true   Transportation Needs: No Transportation Needs (2025)    PRAPARE - Transportation     Lack of Transportation (Medical): No     Lack of Transportation (Non-Medical): No   Physical Activity: Sufficiently Active (10/12/2022)    Exercise Vital Sign     Days of Exercise per Week: 7 days     Minutes of Exercise per Session: 50 min   Stress: Not on file   Social Connections: Not on file   Intimate Partner Violence: Not on file   Housing Stability: Low Risk  (2025)    Housing Stability Vital Sign     Unable to Pay for Housing in the Last Year: No     Number of Times Moved in the Last Year: 1     Homeless in the Last Year: No      Family History   Problem Relation Age of Onset    Hypertension Mother     Heart disease Mother     Heart attack Mother     Hypertension Father     Cancer Father              Prostate cancer Father 84    Arthritis Father              Hypertension Brother     Heart disease Brother     Prostate cancer Brother 63    No Known Problems Brother     Diabetes Maternal Grandmother     Stroke Maternal  Grandmother     Diabetes Paternal Grandfather     No Known Problems Child     Arthritis Family     Cancer Family         bladder    Other Family         Cardiac disorder    Diabetes Family     Hypertension Family     Valvular heart disease Family     No Known Problems Son     No Known Problems Son     Breast cancer Neg Hx     Colon cancer Neg Hx     Ovarian cancer Neg Hx     Uterine cancer Neg Hx     Cervical cancer Neg Hx     Thyroid disease Neg Hx      Past Surgical History:   Procedure Laterality Date    CARPAL TUNNEL RELEASE      CARPAL TUNNEL RELEASE Left     CATARACT EXTRACTION      CHOLECYSTECTOMY      COLONOSCOPY      Complete    DILATION AND CURETTAGE OF UTERUS      twice    ENDOMETRIAL BIOPSY      without cervical dilation    HYSTEROSCOPY      TUBAL LIGATION  1974    WISDOM TOOTH EXTRACTION      x 2       Current Outpatient Medications:     amLODIPine (NORVASC) 5 mg tablet, Take 1 tablet (5 mg total) by mouth daily, Disp: 90 tablet, Rfl: 1    apixaban (Eliquis) 5 mg, Take 1 tablet (5 mg total) by mouth 2 (two) times a day, Disp: 180 tablet, Rfl: 4    atorvastatin (LIPITOR) 10 mg tablet, Take 1 tablet (10 mg total) by mouth daily, Disp: 90 tablet, Rfl: 1    carvedilol (COREG) 12.5 mg tablet, Take 1 tablet (12.5 mg total) by mouth 2 (two) times a day with meals (Patient taking differently: Take 25 mg by mouth 2 (two) times a day with meals), Disp: 200 tablet, Rfl: 3    Cranberry 600 MG TABS, Take by mouth, Disp: , Rfl:     famotidine (PEPCID) 20 mg tablet, TAKE 1 TABLET BY MOUTH TWICE  DAILY, Disp: 180 tablet, Rfl: 1    fexofenadine (ALLEGRA) 180 MG tablet, Take 2 tablets (360 mg total) by mouth daily, Disp: 90 tablet, Rfl: 3    hydroCHLOROthiazide 12.5 mg tablet, Take 1 tablet (12.5 mg total) by mouth daily, Disp: 90 tablet, Rfl: 4    melatonin 3 mg, Take 5 mg by mouth daily at bedtime, Disp: , Rfl:     metFORMIN (GLUCOPHAGE) 1000 MG tablet, Take 1 tablet (1,000 mg total) by mouth 2 (two) times a day with  "meals, Disp: 180 tablet, Rfl: 1    olmesartan (BENICAR) 40 mg tablet, Take 1 tablet (40 mg total) by mouth daily, Disp: 90 tablet, Rfl: 1    omalizumab (XOLAIR) subcutaneous injection, Inject 1 mL (150 mg total) under the skin every 28 days, Disp: , Rfl:     Tirzepatide (Mounjaro) 15 MG/0.5ML SOAJ, INJECT THE CONTENTS OF ONE PEN  SUBCUTANEOUSLY WEEKLY AS  DIRECTED FOR DIABETES, Disp: 6 mL, Rfl: 1    cetirizine (ZyrTEC) 10 mg tablet, Take 2 tablets (20 mg total) by mouth daily, Disp: 180 tablet, Rfl: 3    glucose blood (OneTouch Ultra) test strip, Test once daily, Disp: 50 strip, Rfl: 3    metroNIDAZOLE (METROCREAM) 0.75 % cream, Apply topically 2 (two) times a day, Disp: , Rfl:   Allergies   Allergen Reactions    Other Sneezing     SEASONAL ALLERGIES      Vitals:    04/17/25 0756   BP: 150/74   BP Location: Left arm   Patient Position: Sitting   Cuff Size: Large   Pulse: 56   SpO2: 97%   Weight: 89.1 kg (196 lb 6.4 oz)   Height: 5' 4.75\" (1.645 m)     Weight (last 2 days)       Date/Time Weight    04/17/25 0756 89.1 (196.4)           Blood pressure 150/74, pulse 56, height 5' 4.75\" (1.645 m), weight 89.1 kg (196 lb 6.4 oz), SpO2 97%, not currently breastfeeding., Body mass index is 32.94 kg/m².    Labs:  Appointment on 03/25/2025   Component Date Value    Hemoglobin A1C 03/25/2025 6.2 (H)     EAG 03/25/2025 131     Sodium 03/25/2025 139     Potassium 03/25/2025 4.3     Chloride 03/25/2025 101     CO2 03/25/2025 30     ANION GAP 03/25/2025 8     BUN 03/25/2025 15     Creatinine 03/25/2025 0.68     Glucose, Fasting 03/25/2025 100 (H)     Calcium 03/25/2025 9.4     eGFR 03/25/2025 85     Creatinine, Ur 03/25/2025 85.0     Albumin,U,Random 03/25/2025 28.6 (H)     Albumin Creat Ratio 03/25/2025 34 (H)    Telephone on 02/26/2025   Component Date Value    Right Eye Diabetic Retin* 12/09/2024 None     Left Eye Diabetic Retino* 12/09/2024 None    Hospital Outpatient Visit on 02/20/2025   Component Date Value    Triscuspid " Valve Regurgi* 02/20/2025 24.0     Sinus of Valsalva, 2D 02/20/2025 3.3     RAA A4C 02/20/2025 12.2     LA Volume Index (BP) 02/20/2025 21.8     MV Peak A Klaus 02/20/2025 0.76     MV Peak E Klaus 02/20/2025 60     AV peak gradient 02/20/2025 6     LVOT stroke volume 02/20/2025 58.84     Ao VTI 02/20/2025 25.9     Aortic valve peak veloci* 02/20/2025 1.23     LVOT peak VTI 02/20/2025 18.74     LVOT peak klaus 02/20/2025 0.9     LVOT diameter 02/20/2025 2.0     E wave deceleration time 02/20/2025 226     E/A ratio 02/20/2025 0.79     AV LVOT peak gradient 02/20/2025 3     AV mean gradient 02/20/2025 3     TR Peak Klaus 02/20/2025 2.5     AV area peak klaus 02/20/2025 2.3     AV area by cont VTI 02/20/2025 2.3     LVOT mn grad 02/20/2025 1.0     RVID d 02/20/2025 2.7     A4C EF 02/20/2025 60     Aortic valve mean veloci* 02/20/2025 8.10     Tricuspid valve peak reg* 02/20/2025 2.45     Left ventricular stroke * 02/20/2025 63.00     IVSd 02/20/2025 1.20     Tricuspid annular plane * 02/20/2025 2.50     Ao root 02/20/2025 3.00     Ao STJ 02/20/2025 2.70     LVPWd 02/20/2025 1.20     LA size 02/20/2025 3.5     Asc Ao 02/20/2025 3.5     STJ 02/20/2025 2.7     LA volume (BP) 02/20/2025 43     FS 02/20/2025 37     LVIDS 02/20/2025 2.90     IVS 02/20/2025 1.2     LVIDd 02/20/2025 4.60     LA length (A2C) 02/20/2025 5.60     LEFT VENTRICLE SYSTOLIC * 02/20/2025 33     LV DIASTOLIC VOLUME (MOD* 02/20/2025 96     LVOT Cardiac Index 02/20/2025 2.01     LVOT stroke volume index 02/20/2025 31.00     LVOT Cardiac Output 02/20/2025 3.95     Left Atrium Area-systoli* 02/20/2025 14.1     Left Atrium Area-systoli* 02/20/2025 18.3     MV E' Tissue Velocity La* 02/20/2025 5     MV E' Tissue Velocity Se* 02/20/2025 5     LVSV, 2D 02/20/2025 63     BSA 02/20/2025 1.97     LVOT area 02/20/2025 3.14     DVI 02/20/2025 0.72     AV valve area 02/20/2025 2.27     LV EF 02/20/2025 65    Office Visit on 02/06/2025   Component Date Value    Heart rate  minimum 02/06/2025 51     Heart rate maximum 02/06/2025 204     Heart rate (average) 02/06/2025 74     Type of atrial fibrillat* 02/06/2025 AF     Atrial fibrillation susana* 02/06/2025 <1     Atrial fibrillation - sy* 02/06/2025 true     Atrial fibrillation - he* 02/06/2025 92     Atrial fibrillation - he* 02/06/2025 204     Atrial fibrillation - he* 02/06/2025 153     Longest atrial fibrillat* 02/06/2025 02/20/2025  6:10:10 AM EST     Longest atrial fibrillat* 02/06/2025 02/20/2025  6:32:04 AM EST     Longest atrial fibrillat* 02/06/2025 1,314.8     Longest atrial fibrillat* 02/06/2025 120     Longest atrial fibrillat* 02/06/2025 191     Longest atrial fibrillat* 02/06/2025 156     Atrial fibrillation with* 02/06/2025 02/22/2025  12:29:59 AM EST     Atrial fibrillation with* 02/06/2025 92     Atrial fibrillation with* 02/06/2025 141     Atrial fibrillation with* 02/06/2025 120     Atrial fibrillation with* 02/06/2025 02/20/2025  4:21:40 AM EST     Atrial fibrillation with* 02/06/2025 124     Atrial fibrillation with* 02/06/2025 204     Atrial fibrillation with* 02/06/2025 164     Ventricular tachycardia * 02/06/2025 1     Ventricular tachycardia * 02/06/2025 96     Ventricular tachycardia * 02/06/2025 185     Ventricular tachycardia * 02/06/2025 132     Longest ventricular tach* 02/06/2025 02/11/2025  8:30:19 PM EST     Longest ventricular tach* 02/06/2025 02/11/2025  8:30:23 PM EST     Longest ventricular tach* 02/06/2025 3.8     Longest ventricular tach* 02/06/2025 8     Longest ventricular tach* 02/06/2025 96     Longest ventricular tach* 02/06/2025 185     Longest ventricular tach* 02/06/2025 132     Ventricular tachycardia * 02/06/2025 02/11/2025  8:30:19 PM EST     Ventricular tachycardia * 02/06/2025 02/11/2025  8:30:23 PM EST     Ventricular tachycardia * 02/06/2025 3.8     Ventricular tachycardia * 02/06/2025 8     Ventricular tachycardia * 02/06/2025 96     Ventricular tachycardia * 02/06/2025 185      Ventricular tachycardia * 02/06/2025 132     Supraventricular tachyca* 02/06/2025 2     Supraventricular tachyca* 02/06/2025 77     Supraventricular tachyca* 02/06/2025 197     Supraventricular tachyca* 02/06/2025 167     Longest supraventricular* 02/06/2025 02/11/2025  9:42:12 PM EST     Longest supraventricular* 02/06/2025 02/11/2025  9:42:16 PM EST     Longest supraventricular* 02/06/2025 4     Longest supraventricular* 02/06/2025 9     Longest supraventricular* 02/06/2025 77     Longest supraventricular* 02/06/2025 179     Longest supraventricular* 02/06/2025 147     Supraventricular tachyca* 02/06/2025 02/22/2025  1:17:40 AM EST     Supraventricular tachyca* 02/06/2025 02/22/2025  1:17:41 AM EST     Supraventricular tachyca* 02/06/2025 1.2     Supraventricular tachyca* 02/06/2025 4     Supraventricular tachyca* 02/06/2025 176     Supraventricular tachyca* 02/06/2025 197     Supraventricular tachyca* 02/06/2025 187     Pause - number of episod* 02/06/2025 0     Isolated SVE frequency 02/06/2025 Rare     Isolated SVE count 02/06/2025 341     Ectopic SVE isolated per* 02/06/2025 0.02     SVE couplets frequency 02/06/2025 Rare     SVE couplets counts 02/06/2025 32     Ecptopic SVE couplet per* 02/06/2025 0     SVE triplets frequency 02/06/2025 Rare     SVE triplets counts 02/06/2025 7     Ectopic SVE triplet perc* 02/06/2025 0     Isolated VE frequency 02/06/2025 Rare     Isolated VE counts 02/06/2025 254     Ectopic VE isolated perc* 02/06/2025 0.02     VE couplets frequency 02/06/2025 Rare     VE couplets counts 02/06/2025 8     Ecptopic VE couplet perc* 02/06/2025 0     VE triplets frequency 02/06/2025 0     VE triplets counts 02/06/2025 0     Ecptopic VE triplet perc* 02/06/2025 0     Enrollment period start 02/06/2025 02/09/2025  8:49:07 AM EST     Enrollment period end 02/06/2025 02/23/2025  8:49:07 AM EST     Total enrollment period 02/06/2025 14 days 0 hours     Device analysis time 02/06/2025 13 days 22  hours     Total patient event diar* 02/06/2025 2     Total patient event trig* 02/06/2025 2     Combined report prescrip* 02/06/2025 COMPLETE    Appointment on 01/31/2025   Component Date Value    Creatinine, Ur 01/31/2025 168.8     Albumin,U,Random 01/31/2025 30.9 (H)     Albumin Creat Ratio 01/31/2025 18    Appointment on 11/01/2024   Component Date Value    TSH 3RD GENERATON 11/01/2024 2.497      Imaging: No results found.    Review of Systems:  Review of Systems   Constitutional:  Negative for diaphoresis, fatigue, fever and unexpected weight change.   HENT: Negative.     Respiratory:  Negative for cough, shortness of breath and wheezing.    Cardiovascular:  Negative for chest pain, palpitations and leg swelling.   Gastrointestinal:  Negative for abdominal pain, diarrhea and nausea.   Musculoskeletal:  Negative for gait problem and myalgias.   Skin:  Negative for rash.   Neurological:  Negative for dizziness and numbness.   Psychiatric/Behavioral: Negative.         Physical Exam:  Physical Exam  Constitutional:       Appearance: She is well-developed.   HENT:      Head: Normocephalic and atraumatic.   Eyes:      Pupils: Pupils are equal, round, and reactive to light.   Neck:      Vascular: No JVD.   Cardiovascular:      Rate and Rhythm: Regular rhythm.      Pulses: Normal pulses.           Carotid pulses are 2+ on the right side and 2+ on the left side.     Heart sounds: S1 normal and S2 normal.   Pulmonary:      Effort: Pulmonary effort is normal.      Breath sounds: Normal breath sounds. No wheezing or rales.   Abdominal:      General: Bowel sounds are normal.      Palpations: Abdomen is soft.   Musculoskeletal:         General: No tenderness. Normal range of motion.      Cervical back: Normal range of motion and neck supple.   Skin:     General: Skin is warm.   Neurological:      Mental Status: She is alert and oriented to person, place, and time.      Cranial Nerves: No cranial nerve deficit.      Deep  Tendon Reflexes: Reflexes are normal and symmetric.

## 2025-04-20 DIAGNOSIS — R10.13 DYSPEPSIA: ICD-10-CM

## 2025-04-20 RX ORDER — FAMOTIDINE 20 MG/1
20 TABLET, FILM COATED ORAL 2 TIMES DAILY
Qty: 180 TABLET | Refills: 1 | Status: SHIPPED | OUTPATIENT
Start: 2025-04-20

## 2025-04-22 DIAGNOSIS — I10 ESSENTIAL HYPERTENSION: Primary | ICD-10-CM

## 2025-04-22 RX ORDER — CARVEDILOL 25 MG/1
25 TABLET ORAL 2 TIMES DAILY WITH MEALS
Qty: 180 TABLET | Refills: 3 | Status: SHIPPED | OUTPATIENT
Start: 2025-04-22

## 2025-04-24 ENCOUNTER — TRANSCRIBE ORDERS (OUTPATIENT)
Dept: SLEEP CENTER | Facility: CLINIC | Age: 75
End: 2025-04-24

## 2025-04-24 DIAGNOSIS — G47.8 OTHER SLEEP DISORDERS: Primary | ICD-10-CM

## 2025-04-24 DIAGNOSIS — I48.0 PAROXYSMAL ATRIAL FIBRILLATION (HCC): ICD-10-CM

## 2025-04-24 DIAGNOSIS — R06.83 SNORING: ICD-10-CM

## 2025-04-25 ENCOUNTER — HOSPITAL ENCOUNTER (OUTPATIENT)
Dept: NON INVASIVE DIAGNOSTICS | Facility: CLINIC | Age: 75
Discharge: HOME | End: 2025-04-25
Attending: INTERNAL MEDICINE
Payer: MEDICARE

## 2025-04-25 DIAGNOSIS — I48.0 PAROXYSMAL ATRIAL FIBRILLATION (HCC): ICD-10-CM

## 2025-04-25 LAB
MAX HR PERCENT: 74 %
MAX HR: 107 BPM
RATE PRESSURE PRODUCT: NORMAL
SL CV STRESS RECOVERY BP: NORMAL MMHG
SL CV STRESS RECOVERY HR: 66 BPM
SL CV STRESS RECOVERY O2 SAT: 98 %
SL CV STRESS STAGE REACHED: 2
STRESS ANGINA INDEX: 0
STRESS BASELINE BP: NORMAL MMHG
STRESS BASELINE HR: 66 BPM
STRESS O2 SAT REST: 99 %
STRESS PEAK HR: 107 BPM
STRESS POST ESTIMATED WORKLOAD: 7 METS
STRESS POST EXERCISE DUR MIN: 4 MIN
STRESS POST EXERCISE DUR SEC: 0 SEC
STRESS POST O2 SAT PEAK: 96 %
STRESS POST PEAK BP: 168 MMHG

## 2025-04-25 PROCEDURE — 93016 CV STRESS TEST SUPVJ ONLY: CPT | Performed by: INTERNAL MEDICINE

## 2025-04-25 PROCEDURE — 93017 CV STRESS TEST TRACING ONLY: CPT

## 2025-04-25 PROCEDURE — 93018 CV STRESS TEST I&R ONLY: CPT | Performed by: INTERNAL MEDICINE

## 2025-04-27 LAB
CHEST PAIN STATEMENT: NORMAL
MAX DIASTOLIC BP: 88 MMHG
MAX PREDICTED HEART RATE: 144 BPM
PROTOCOL NAME: NORMAL
REASON FOR TERMINATION: NORMAL
STRESS POST EXERCISE DUR MIN: 4 MIN
STRESS POST EXERCISE DUR SEC: 0 SEC
STRESS POST PEAK HR: 107 BPM
STRESS POST PEAK SYSTOLIC BP: 168 MMHG
TARGET HR FORMULA: NORMAL
TEST INDICATION: NORMAL

## 2025-06-02 ENCOUNTER — HOSPITAL ENCOUNTER (OUTPATIENT)
Dept: SLEEP CENTER | Facility: CLINIC | Age: 75
Discharge: HOME | End: 2025-06-02
Payer: MEDICARE

## 2025-06-02 DIAGNOSIS — G47.8 OTHER SLEEP DISORDERS: ICD-10-CM

## 2025-06-02 DIAGNOSIS — I48.0 PAROXYSMAL ATRIAL FIBRILLATION (HCC): ICD-10-CM

## 2025-06-02 DIAGNOSIS — R06.83 SNORING: ICD-10-CM

## 2025-06-02 PROCEDURE — G0399 HOME SLEEP TEST/TYPE 3 PORTA: HCPCS

## 2025-06-02 NOTE — PROGRESS NOTES
Home Sleep Study Documentation    HOME STUDY DEVICE: Noxturnal no                                           Lupis G3 yes      Pre-Sleep Home Study:    Set-up and instructions performed by: Caio Mackay    Technician performed demonstration for Patient: yes    Return demonstration performed by Patient: yes    Written instructions provided to Patient: yes    Patient signed consent form: yes          Post-Sleep Home Study:    Post Test Questionnaire Data: On the post-study questionnaire, the patient estimated 5.5 hours of sleep during this test, with 2 awakenings. Please refer to scanned form for additional comments on alcohol use on day of test, medications, and/or activities during awakenings    Additional comments by Patient: None    Home Sleep Study Failed:no:    Failure reason: N/A    Reported or Detected: N/A    Scored by: TAISHA Infante

## 2025-06-04 PROCEDURE — 95806 SLEEP STUDY UNATT&RESP EFFT: CPT | Performed by: INTERNAL MEDICINE

## 2025-06-13 ENCOUNTER — TELEPHONE (OUTPATIENT)
Dept: SLEEP CENTER | Facility: CLINIC | Age: 75
End: 2025-06-13

## 2025-06-17 ENCOUNTER — TELEMEDICINE (OUTPATIENT)
Dept: INTERNAL MEDICINE CLINIC | Facility: CLINIC | Age: 75
End: 2025-06-17
Payer: MEDICARE

## 2025-06-17 ENCOUNTER — NURSE TRIAGE (OUTPATIENT)
Age: 75
End: 2025-06-17

## 2025-06-17 DIAGNOSIS — R39.9 UTI SYMPTOMS: Primary | ICD-10-CM

## 2025-06-17 PROCEDURE — G2211 COMPLEX E/M VISIT ADD ON: HCPCS

## 2025-06-17 PROCEDURE — 99213 OFFICE O/P EST LOW 20 MIN: CPT

## 2025-06-17 RX ORDER — NITROFURANTOIN 25; 75 MG/1; MG/1
100 CAPSULE ORAL 2 TIMES DAILY
Qty: 10 CAPSULE | Refills: 0 | Status: SHIPPED | OUTPATIENT
Start: 2025-06-17 | End: 2025-06-20 | Stop reason: ALTCHOICE

## 2025-06-17 RX ORDER — DOXYCYCLINE 100 MG/1
100 TABLET ORAL 2 TIMES DAILY
COMMUNITY
Start: 2025-06-12 | End: 2025-06-19

## 2025-06-17 NOTE — TELEPHONE ENCOUNTER
"Reason for Disposition   Age > 50 years   Painful urination AND EITHER frequency or urgency    Answer Assessment - Initial Assessment Questions  1. SYMPTOM: \"What's the main symptom you're concerned about?\" (e.g., frequency, incontinence)          Calls thinks has UTI   Pressure intermittent burning when urinates , feels like has to go but doesn't     On doxy for skin issue       2. ONSET: \"When did the    start?\"        4 days ago       3. PAIN: \"Is there any pain?\" If Yes, ask: \"How bad is it?\" (Scale: 1-10; mild, moderate, severe)        Mild     4. CAUSE: \"What do you think is causing the symptoms?\"        UTI         5. OTHER SYMPTOMS: \"Do you have any other symptoms?\" (e.g., blood in urine, fever, flank pain, pain with urination)        Denies blood, fever, N/V, chills , flank pain    Women probiotic    Answer Assessment - Initial Assessment Questions  1. SEVERITY: \"How bad is the pain?\"  (e.g., Scale 1-10; mild, moderate, or severe)          Mild      2. FREQUENCY: \"How many times have you had painful urination today?\"           Yes      3. PATTERN: \"Is pain present every time you urinate or just sometimes?\"         Every time     4. ONSET: \"When did the painful urination start?\"           4 days ago     5. FEVER: \"Do you have a fever?\" If Yes, ask: \"What is your temperature, how was it measured, and when did it start?\"        Denies      6. PAST UTI: \"Have you had a urine infection before?\" If Yes, ask: \"When was the last time?\" and \"What happened that time?\"           Yes there was a time I kept getting UTI's was under care of urologist but it has been awhile since I have had a UTI      7. CAUSE: \"What do you think is causing the painful urination?\"  (e.g., UTI, scratch, Herpes sore)         UTI possibly      8. OTHER SYMPTOMS: \"Do you have any other symptoms?\" (e.g., blood in urine, flank pain, genital sores, urgency, vaginal discharge)         Denies    Protocols used: Urinary Symptoms-Adult-[place], Urination " Pain - Female-Adult-[place]      REASON FOR CONVERSATION: Urinary Symptoms    SYMPTOMS: frequency , pressure, feels like has to go all the time.    OTHER HEALTH INFORMATION:   Called stating 4 days ago started with UTI symptoms.  Symptoms include burning, pressure, frequency, feeling like bladder is always full. Denies fever, N/V, chills, flank pain, blood in urine.    Can the doctor send an order to the lab for urine culture and possibly send antibiotics to start once patient gives specimen for culture? Also patient is on doxycycline for skin issue now. Will this impact culture results?    Please call the patient when order is entered or if there is a problem entering the order.      PROTOCOL DISPOSITION: No disposition on file.    CARE ADVICE PROVIDED:     See above home care advice given.    Watch for office call back.  Explained doctor may want patient seen in office and either way we would call the patient back.    PRACTICE FOLLOW-UP:     Yes

## 2025-06-17 NOTE — PROGRESS NOTES
Virtual Regular Visit  Name: Ro Grey      : 1950      MRN: [id number]  Encounter Provider: Veronique Coe MD  Encounter Date: 2025   Encounter department: [facility]  :  Assessment & Plan  UTI symptoms  Having complaints of urgency, frequency, dysuria last 3 days  Last urine culture in 2023 grew 70,000-79,000 Enterococcus faecalis which was only resistant to tetracycline and treated with Bactrim by urgent care    Plan:  Ordered UA with reflex to culture  Send a prescription for Macrobid 100 mg 2 times daily for 5 days  Instructed to not start antibiotic until she gives urine sample  We will follow-up on urine culture result to guide antibiotic  Instructed to drink plenty of water  Instructed to take yogurt to have sufficient probiotics for gut as she will be on doxycycline along with Macrobid next 2 days which she has been taking for her rosacea    Orders:    UA w Reflex to Microscopic w Reflex to Culture; Future    nitrofurantoin (MACROBID) 100 mg capsule; Take 1 capsule (100 mg total) by mouth 2 (two) times a day for 5 days        History of Present Illness     Ro Grey is 75-year-old female with PMH of cystitis, rosacea, CKD, diabetes mellitus, hypertension, hyperlipidemia, UTI, who presents today at virtual visit with a complaint of frequency, urgency, burning during urination for last 3 days.  Denies any abdominal pain, fever, chills, nausea, vomiting.  She mentioned that she has not been drinking enough fluid for last few days.    Urinary Tract Infection   This is a recurrent problem. The current episode started in the past 7 days. The problem occurs every urination. The problem has been unchanged. The quality of the pain is described as burning. Associated symptoms include frequency and urgency. Pertinent negatives include no chills, hematuria or vomiting.     Review of Systems   Constitutional:  Negative for chills and fever.   HENT:  Negative for ear  pain and sore throat.    Eyes:  Negative for pain and visual disturbance.   Respiratory:  Negative for cough and shortness of breath.    Cardiovascular:  Negative for chest pain and palpitations.   Gastrointestinal:  Negative for abdominal pain and vomiting.   Genitourinary:  Positive for dysuria, frequency and urgency. Negative for hematuria.   Musculoskeletal:  Negative for arthralgias and back pain.   Skin:  Negative for color change and rash.   Neurological:  Negative for seizures and syncope.   All other systems reviewed and are negative.      Objective   There were no vitals taken for this visit.    Physical Exam  Constitutional:       General: She is not in acute distress.     Appearance: Normal appearance. She is not ill-appearing, toxic-appearing or diaphoretic.   Pulmonary:      Effort: No respiratory distress.     Neurological:      Mental Status: She is alert and oriented to person, place, and time. Mental status is at baseline.     Psychiatric:         Mood and Affect: Mood normal.         Behavior: Behavior normal.         Administrative Statements   Encounter provider Veronique Coe MD    The Patient is located at Home and in the following state in which I hold an active license [place].    The patient was identified by name and date of birth. Ro Grey was informed that this is a telemedicine visit and that the visit is being conducted through the Epic Embedded platform. She agrees to proceed..  My office door was closed. No one else was in the room.  She acknowledged consent and understanding of privacy and security of the video platform. The patient has agreed to participate and understands they can discontinue the visit at any time.    I have spent a total time of 20 minutes in caring for this patient on the day of the visit/encounter including Diagnostic results, Prognosis, Risks and benefits of tx options, Instructions for management, Patient and family education, Importance of tx compliance,  Risk factor reductions, Impressions, Counseling / Coordination of care, Documenting in the medical record, Reviewing/placing orders in the medical record (including tests, medications, and/or procedures), Obtaining or reviewing history  , and Communicating with other healthcare professionals , not including the time spent for establishing the audio/video connection.

## 2025-06-18 ENCOUNTER — APPOINTMENT (OUTPATIENT)
Dept: LAB | Facility: CLINIC | Age: 75
End: 2025-06-18
Payer: MEDICARE

## 2025-06-18 DIAGNOSIS — R39.9 UTI SYMPTOMS: ICD-10-CM

## 2025-06-18 LAB
BACTERIA UR QL AUTO: ABNORMAL /HPF
BILIRUB UR QL STRIP: NEGATIVE
CLARITY UR: ABNORMAL
COLOR UR: YELLOW
GLUCOSE UR STRIP-MCNC: NEGATIVE MG/DL
HGB UR QL STRIP.AUTO: NEGATIVE
KETONES UR STRIP-MCNC: NEGATIVE MG/DL
LEUKOCYTE ESTERASE UR QL STRIP: ABNORMAL
MUCOUS THREADS UR QL AUTO: ABNORMAL
NITRITE UR QL STRIP: POSITIVE
NON-SQ EPI CELLS URNS QL MICRO: ABNORMAL /HPF
PH UR STRIP.AUTO: 7.5 [PH]
PROT UR STRIP-MCNC: ABNORMAL MG/DL
RBC #/AREA URNS AUTO: ABNORMAL /HPF
SP GR UR STRIP.AUTO: 1.02 (ref 1–1.03)
UROBILINOGEN UR STRIP-ACNC: <2 MG/DL
WBC #/AREA URNS AUTO: ABNORMAL /HPF
WBC CLUMPS # UR AUTO: PRESENT /UL

## 2025-06-18 PROCEDURE — 81001 URINALYSIS AUTO W/SCOPE: CPT

## 2025-06-18 PROCEDURE — 87186 SC STD MICRODIL/AGAR DIL: CPT

## 2025-06-18 PROCEDURE — 87077 CULTURE AEROBIC IDENTIFY: CPT

## 2025-06-18 PROCEDURE — 87086 URINE CULTURE/COLONY COUNT: CPT

## 2025-06-20 LAB — BACTERIA UR CULT: ABNORMAL

## 2025-06-20 RX ORDER — SULFAMETHOXAZOLE AND TRIMETHOPRIM 800; 160 MG/1; MG/1
1 TABLET ORAL 2 TIMES DAILY
Qty: 14 TABLET | Refills: 0 | Status: SHIPPED | OUTPATIENT
Start: 2025-06-20 | End: 2025-06-27

## 2025-06-23 DIAGNOSIS — E11.40 CONTROLLED TYPE 2 DIABETES MELLITUS WITH DIABETIC NEUROPATHY, WITHOUT LONG-TERM CURRENT USE OF INSULIN (HCC): Chronic | ICD-10-CM

## 2025-06-25 ENCOUNTER — TELEPHONE (OUTPATIENT)
Dept: INTERNAL MEDICINE CLINIC | Facility: CLINIC | Age: 75
End: 2025-06-25

## 2025-06-25 NOTE — TELEPHONE ENCOUNTER
LM to CB to advise pt that our old pharmacist is no longer in this office and to give her information of new provider and give option of in person visit in [facility] or a virtual. When/if patient calls back she can just be put through to the office and ask to speak to me. Thanks

## 2025-06-30 DIAGNOSIS — E11.40 CONTROLLED TYPE 2 DIABETES MELLITUS WITH DIABETIC NEUROPATHY, WITHOUT LONG-TERM CURRENT USE OF INSULIN (HCC): ICD-10-CM

## 2025-07-01 ENCOUNTER — OFFICE VISIT (OUTPATIENT)
Dept: CARDIOLOGY CLINIC | Facility: CLINIC | Age: 75
End: 2025-07-01
Payer: MEDICARE

## 2025-07-01 VITALS
HEART RATE: 60 BPM | SYSTOLIC BLOOD PRESSURE: 138 MMHG | DIASTOLIC BLOOD PRESSURE: 60 MMHG | BODY MASS INDEX: 32.22 KG/M2 | HEIGHT: 65 IN | OXYGEN SATURATION: 97 % | WEIGHT: 193.4 LBS

## 2025-07-01 DIAGNOSIS — I10 ESSENTIAL HYPERTENSION: ICD-10-CM

## 2025-07-01 DIAGNOSIS — G47.33 OSA (OBSTRUCTIVE SLEEP APNEA): Primary | ICD-10-CM

## 2025-07-01 DIAGNOSIS — I48.0 PAROXYSMAL ATRIAL FIBRILLATION (HCC): ICD-10-CM

## 2025-07-01 DIAGNOSIS — R00.2 PALPITATION: ICD-10-CM

## 2025-07-01 PROCEDURE — 99213 OFFICE O/P EST LOW 20 MIN: CPT | Performed by: INTERNAL MEDICINE

## 2025-07-02 PROBLEM — I48.0 PAROXYSMAL ATRIAL FIBRILLATION (HCC): Status: ACTIVE | Noted: 2025-07-02

## 2025-07-02 RX ORDER — TIRZEPATIDE 15 MG/.5ML
INJECTION, SOLUTION SUBCUTANEOUS
Qty: 6 ML | Refills: 1 | Status: SHIPPED | OUTPATIENT
Start: 2025-07-02

## 2025-07-02 NOTE — PROGRESS NOTES
Cardiology Follow Up    Ro Grey  1950  [id number]  [facility]  [address] [place] [address] [phone]    1. NICKY (obstructive sleep apnea)  Ambulatory Referral to Sleep Medicine      2. Essential hypertension        3. Palpitation        4. Paroxysmal atrial fibrillation (HCC)              Discussion/Summary: She is doing well and we will continue on the same medications and see how her PAF responds when she is on treatment for her NICKY.  No cardiac testing is ordered.  RTO 4 months.      Interval History: She has had done well since her last OV 4/17/25. She did have 3 nights in a row of PAF lasting about 15 minutes each.  She is otherwise active and denies CP, SOB, fatigue.  She did have a sleep study which showed moderate sleep apnea.  She still needs to see a sleep doctor to discuss treatment.    /60  Her weight is down from 196 to 193 lbs.            Problem List[1]  Past Medical History[2]  Social History     Socioeconomic History    Marital status: /Civil Union     Spouse name: Se    Number of children: 2    Years of education: Not on file    Highest education level: Not on file   Occupational History    Occupation: High School Adminstrator      Employer: [employer]   Tobacco Use    Smoking status: Never    Smokeless tobacco: Never   Vaping Use    Vaping status: Never Used   Substance and Sexual Activity    Alcohol use: Not Currently     Comment: less than one glass of wine per month    Drug use: No    Sexual activity: Not Currently     Partners: Male     Birth control/protection: Post-menopausal, Female Sterilization, None   Other Topics Concern    Not on file   Social History Narrative    Daily coffee consumption (2 cups/day)        Who lives in your home:     What type of home do you live in: Single house    Age of your home: Built 2020    How long have you been  living there: 2022    Type of heat: Forced hot air    Type of fuel: Gas and Electric    What type of keny is in your bedroom: Area rugs and Hardwood floor    Do you have the following in or near your home:    Air products: Central air    Pests: None    Pets: Dog    Are pets allowed in bedroom: Yes    Open fields, wooded areas nearby: Open fields and Wooded areas    Basement: None    Exposure to second hand smoke: No        Habits:    Caffeine: coffee 1  cup daily-soda rarely-ice tea 1 glass daily-hot tea 1 x a week     Chocolate: daily     Other:              Social Drivers of Health     Financial Resource Strain: Low Risk  (1/25/2024)    Overall Financial Resource Strain (CARDIA)     Difficulty of Paying Living Expenses: Not very hard   Food Insecurity: No Food Insecurity (2/26/2025)    Nursing - Inadequate Food Risk Classification     Worried About Running Out of Food in the Last Year: Never true     Ran Out of Food in the Last Year: Never true     Ran Out of Food in the Last Year: Not on file   Transportation Needs: No Transportation Needs (2/26/2025)    PRAPARE - Transportation     Lack of Transportation (Medical): No     Lack of Transportation (Non-Medical): No   Physical Activity: Sufficiently Active (10/12/2022)    Exercise Vital Sign     Days of Exercise per Week: 7 days     Minutes of Exercise per Session: 50 min   Stress: Not on file   Social Connections: Not on file   Intimate Partner Violence: Not on file   Housing Stability: Low Risk  (2/26/2025)    Housing Stability Vital Sign     Unable to Pay for Housing in the Last Year: No     Number of Times Moved in the Last Year: 1     Homeless in the Last Year: No      Family History[3]  Past Surgical History[4]  Current Medications[5]  Allergies   Allergen Reactions    Other Sneezing     SEASONAL ALLERGIES      Vitals:    07/01/25 1552   BP: 138/60   BP Location: Left arm   Patient Position: Sitting   Cuff Size: Standard   Pulse: 60   SpO2: 97%   Weight:  "87.7 kg (193 lb 6.4 oz)   Height: 5' 4.75\" (1.645 m)     Weight (last 2 days)       Date/Time Weight    07/01/25 1552 87.7 (193.4)           Blood pressure 138/60, pulse 60, height 5' 4.75\" (1.645 m), weight 87.7 kg (193 lb 6.4 oz), SpO2 97%, not currently breastfeeding., Body mass index is 32.43 kg/m².    Labs:  Appointment on 06/18/2025   Component Date Value    Color, UA 06/18/2025 Yellow     Clarity, UA 06/18/2025 Turbid     Specific Gravity, UA 06/18/2025 1.016     pH, UA 06/18/2025 7.5     Leukocytes, UA 06/18/2025 Large (A)     Nitrite, UA 06/18/2025 Positive (A)     Protein, UA 06/18/2025 30 (1+) (A)     Glucose, UA 06/18/2025 Negative     Ketones, UA 06/18/2025 Negative     Urobilinogen, UA 06/18/2025 <2.0     Bilirubin, UA 06/18/2025 Negative     Occult Blood, UA 06/18/2025 Negative     RBC, UA 06/18/2025 2-4 (A)     WBC, UA 06/18/2025 Innumerable (A)     Epithelial Cells 06/18/2025 Occasional     Bacteria, UA 06/18/2025 Moderate (A)     MUCUS THREADS 06/18/2025 Occasional (A)     WBC Clumps 06/18/2025 Present     Urine Culture 06/18/2025 >100,000 cfu/ml Proteus mirabilis (A)    Hospital Outpatient Visit on 04/25/2025   Component Date Value    Baseline HR 04/25/2025 66     Baseline BP 04/25/2025 158/78     O2 sat rest 04/25/2025 99     Stress peak HR 04/25/2025 107     Post peak BP 04/25/2025 168     O2 sat peak 04/25/2025 96     Recovery HR 04/25/2025 66     Recovery BP 04/25/2025 146/76     O2 sat recovery 04/25/2025 98     Max HR 04/25/2025 107     Max HR Percent 04/25/2025 74     Exercise duration (min) 04/25/2025 4     Exercise duration (sec) 04/25/2025 0     Estimated workload 04/25/2025 7.0     Rate Pressure Product 04/25/2025 17,976.0     Angina Index 04/25/2025 0     Stress Stage Reached 04/25/2025 2.0     Protocol Name 04/25/2025 MCKENNA     Exercise duration (min) 04/25/2025 4     Exercise duration (sec) 04/25/2025 0     Post Peak Systolic BP 04/25/2025 168     Max Diastolic Bp 04/25/2025 88     " Peak HR 04/25/2025 107     Max Predicted Heart Rate 04/25/2025 144     Reason for Termination 04/25/2025 Dyspnea     Test Indication 04/25/2025 PAF     Target Hr Formular 04/25/2025 (220 - Age)*100%     Chest Pain Statement 04/25/2025 none    Appointment on 03/25/2025   Component Date Value    Hemoglobin A1C 03/25/2025 6.2 (H)     EAG 03/25/2025 131     Sodium 03/25/2025 139     Potassium 03/25/2025 4.3     Chloride 03/25/2025 101     CO2 03/25/2025 30     ANION GAP 03/25/2025 8     BUN 03/25/2025 15     Creatinine 03/25/2025 0.68     Glucose, Fasting 03/25/2025 100 (H)     Calcium 03/25/2025 9.4     eGFR 03/25/2025 85     Creatinine, Ur 03/25/2025 85.0     Albumin,U,Random 03/25/2025 28.6 (H)     Albumin Creat Ratio 03/25/2025 34 (H)    Telephone on 02/26/2025   Component Date Value    Right Eye Diabetic Retin* 12/09/2024 None     Left Eye Diabetic Retino* 12/09/2024 None    Hospital Outpatient Visit on 02/20/2025   Component Date Value    Triscuspid Valve Regurgi* 02/20/2025 24.0     Sinus of Valsalva, 2D 02/20/2025 3.3     RAA A4C 02/20/2025 12.2     LA Volume Index (BP) 02/20/2025 21.8     MV Peak A Klaus 02/20/2025 0.76     MV Peak E Klaus 02/20/2025 60     AV peak gradient 02/20/2025 6     LVOT stroke volume 02/20/2025 58.84     Ao VTI 02/20/2025 25.9     Aortic valve peak veloci* 02/20/2025 1.23     LVOT peak VTI 02/20/2025 18.74     LVOT peak klaus 02/20/2025 0.9     LVOT diameter 02/20/2025 2.0     E wave deceleration time 02/20/2025 226     E/A ratio 02/20/2025 0.79     AV LVOT peak gradient 02/20/2025 3     AV mean gradient 02/20/2025 3     TR Peak Klaus 02/20/2025 2.5     AV area peak klaus 02/20/2025 2.3     AV area by cont VTI 02/20/2025 2.3     LVOT mn grad 02/20/2025 1.0     RVID d 02/20/2025 2.7     A4C EF 02/20/2025 60     Aortic valve mean veloci* 02/20/2025 8.10     Tricuspid valve peak reg* 02/20/2025 2.45     Left ventricular stroke * 02/20/2025 63.00     IVSd 02/20/2025 1.20     Tricuspid annular plane  * 02/20/2025 2.50     Ao root 02/20/2025 3.00     Ao STJ 02/20/2025 2.70     LVPWd 02/20/2025 1.20     LA size 02/20/2025 3.5     Asc Ao 02/20/2025 3.5     STJ 02/20/2025 2.7     LA volume (BP) 02/20/2025 43     FS 02/20/2025 37     LVIDS 02/20/2025 2.90     IVS 02/20/2025 1.2     LVIDd 02/20/2025 4.60     LA length (A2C) 02/20/2025 5.60     LEFT VENTRICLE SYSTOLIC * 02/20/2025 33     LV DIASTOLIC VOLUME (MOD* 02/20/2025 96     LVOT Cardiac Index 02/20/2025 2.01     LVOT stroke volume index 02/20/2025 31.00     LVOT Cardiac Output 02/20/2025 3.95     Left Atrium Area-systoli* 02/20/2025 14.1     Left Atrium Area-systoli* 02/20/2025 18.3     MV E' Tissue Velocity La* 02/20/2025 5     MV E' Tissue Velocity Se* 02/20/2025 5     LVSV, 2D 02/20/2025 63     BSA 02/20/2025 1.97     LVOT area 02/20/2025 3.14     DVI 02/20/2025 0.72     AV valve area 02/20/2025 2.27     LV EF 02/20/2025 65    Office Visit on 02/06/2025   Component Date Value    Heart rate minimum 02/06/2025 51     Heart rate maximum 02/06/2025 204     Heart rate (average) 02/06/2025 74     Type of atrial fibrillat* 02/06/2025 AF     Atrial fibrillation susana* 02/06/2025 <1     Atrial fibrillation - sy* 02/06/2025 true     Atrial fibrillation - he* 02/06/2025 92     Atrial fibrillation - he* 02/06/2025 204     Atrial fibrillation - he* 02/06/2025 153     Longest atrial fibrillat* 02/06/2025 02/20/2025  6:10:10 AM EST     Longest atrial fibrillat* 02/06/2025 02/20/2025  6:32:04 AM EST     Longest atrial fibrillat* 02/06/2025 1,314.8     Longest atrial fibrillat* 02/06/2025 120     Longest atrial fibrillat* 02/06/2025 191     Longest atrial fibrillat* 02/06/2025 156     Atrial fibrillation with* 02/06/2025 02/22/2025  12:29:59 AM EST     Atrial fibrillation with* 02/06/2025 92     Atrial fibrillation with* 02/06/2025 141     Atrial fibrillation with* 02/06/2025 120     Atrial fibrillation with* 02/06/2025 02/20/2025  4:21:40 AM EST     Atrial fibrillation with*  02/06/2025 124     Atrial fibrillation with* 02/06/2025 204     Atrial fibrillation with* 02/06/2025 164     Ventricular tachycardia * 02/06/2025 1     Ventricular tachycardia * 02/06/2025 96     Ventricular tachycardia * 02/06/2025 185     Ventricular tachycardia * 02/06/2025 132     Longest ventricular tach* 02/06/2025 02/11/2025  8:30:19 PM EST     Longest ventricular tach* 02/06/2025 02/11/2025  8:30:23 PM EST     Longest ventricular tach* 02/06/2025 3.8     Longest ventricular tach* 02/06/2025 8     Longest ventricular tach* 02/06/2025 96     Longest ventricular tach* 02/06/2025 185     Longest ventricular tach* 02/06/2025 132     Ventricular tachycardia * 02/06/2025 02/11/2025  8:30:19 PM EST     Ventricular tachycardia * 02/06/2025 02/11/2025  8:30:23 PM EST     Ventricular tachycardia * 02/06/2025 3.8     Ventricular tachycardia * 02/06/2025 8     Ventricular tachycardia * 02/06/2025 96     Ventricular tachycardia * 02/06/2025 185     Ventricular tachycardia * 02/06/2025 132     Supraventricular tachyca* 02/06/2025 2     Supraventricular tachyca* 02/06/2025 77     Supraventricular tachyca* 02/06/2025 197     Supraventricular tachyca* 02/06/2025 167     Longest supraventricular* 02/06/2025 02/11/2025  9:42:12 PM EST     Longest supraventricular* 02/06/2025 02/11/2025  9:42:16 PM EST     Longest supraventricular* 02/06/2025 4     Longest supraventricular* 02/06/2025 9     Longest supraventricular* 02/06/2025 77     Longest supraventricular* 02/06/2025 179     Longest supraventricular* 02/06/2025 147     Supraventricular tachyca* 02/06/2025 02/22/2025  1:17:40 AM EST     Supraventricular tachyca* 02/06/2025 02/22/2025  1:17:41 AM EST     Supraventricular tachyca* 02/06/2025 1.2     Supraventricular tachyca* 02/06/2025 4     Supraventricular tachyca* 02/06/2025 176     Supraventricular tachyca* 02/06/2025 197     Supraventricular tachyca* 02/06/2025 187     Pause - number of episod* 02/06/2025 0     Isolated SVE  frequency 02/06/2025 Rare     Isolated SVE count 02/06/2025 341     Ectopic SVE isolated per* 02/06/2025 0.02     SVE couplets frequency 02/06/2025 Rare     SVE couplets counts 02/06/2025 32     Ecptopic SVE couplet per* 02/06/2025 0     SVE triplets frequency 02/06/2025 Rare     SVE triplets counts 02/06/2025 7     Ectopic SVE triplet perc* 02/06/2025 0     Isolated VE frequency 02/06/2025 Rare     Isolated VE counts 02/06/2025 254     Ectopic VE isolated perc* 02/06/2025 0.02     VE couplets frequency 02/06/2025 Rare     VE couplets counts 02/06/2025 8     Ecptopic VE couplet perc* 02/06/2025 0     VE triplets frequency 02/06/2025 0     VE triplets counts 02/06/2025 0     Ecptopic VE triplet perc* 02/06/2025 0     Enrollment period start 02/06/2025 02/09/2025  8:49:07 AM EST     Enrollment period end 02/06/2025 02/23/2025  8:49:07 AM EST     Total enrollment period 02/06/2025 14 days 0 hours     Device analysis time 02/06/2025 13 days 22 hours     Total patient event diar* 02/06/2025 2     Total patient event trig* 02/06/2025 2     Combined report prescrip* 02/06/2025 COMPLETE    Appointment on 01/31/2025   Component Date Value    Creatinine, Ur 01/31/2025 168.8     Albumin,U,Random 01/31/2025 30.9 (H)     Albumin Creat Ratio 01/31/2025 18      Imaging: Mammo screening bilateral w 3d and cad  Result Date: 6/26/2025  Narrative: HISTORY: Patient is 75 years old and is seen for a screening mammogram of both breasts, performed on 6/24/25. No relevant medical history has been documented for this patient. No relevant surgical history has been documented for this patient. No relevant family history has been documented for this patient. Hormone history includes estrogen replacement therapy. FILMS COMPARED: The present examination has been compared to prior imaging studies. MAMMOGRAM FINDINGS: A minimum of two views were obtained. 3D tomosynthesis was performed. Computer-aided detection was utilized by the radiologist in  the interpretation of this examination. There are scattered areas of fibroglandular density. No suspicious masses, calcifications or other abnormalities are seen.    Impression: Impression: There is no mammographic evidence of malignancy. RECOMMENDATION: Return to yearly screening is recommended. BI-RADS Category:  2 - Benign Lifetime MontrellerJovita 8: 1.7% In patients with a documented history of breast cancer, male patients, patients legal sex is unknown or with an age less than 19 or 85 and greater a risk score will not be calculated. Based on the information provided by the patient, risk scores for breast cancer for 5 years, 10 years and lifetime was calculated using both breast density and family history.  Patients should consult with their referring providers regarding the significance of the score, potential need for additional risk assessment and supplemental screening including whole breast ultrasound and MRI MRN: [id number]   Patient Name: Ro Grey This exam was performed at: [email or web address] [address] [address] [phone] Workstation: [id number]    Home Study  Result Date: 2025  Narrative: Table formatting from the original result was not included. Images from the original result were not included. Home Study Report Patient Name: Ro Grey Patient Number: [id number] Date of Home Study: 2025 Referring Physician: Richard Winter Interpreting Physician: Davi Odom YOB: 1950 STUDY FORMAT: The patient was admitted to the sleep laboratory at the [facility] and oriented to the home sleep study testing procedure and equipment.  The home sleep testing study was performed using the [employer] Neurostim Night One device.  A return demonstration by the patient helped to assure correct usage.  The following parameters were monitored: p-flow via nasal cannula, body position, oximetry, pulse rate and respiratory  effort via thoracic belt. The sleep study was scored following the rules established by the American Academy of Sleep Medicine (AASM).  Hypopneas are defined as a >=30% drop in flow for >=10 seconds that are associated with >=4% desaturations.  ZAK is the Respiratory Event Index (number of respiratory events per hour) and is a surrogate for the apnea/hypopnea index (AHI). PATIENT HISTORY: 75 year old female undergoing home sleep testing for suspected sleep apnea, paroxysmal atrial fibrillation TESTING RESULTS: The test results are from the night of 25.  The total time in bed (analysis time) was 433.5 minutes.  The patient had a total of 172 respiratory events made up of 70 obstructive apneas, 0 central apneas, 0 mixed apneas and 102 hypopneas resulting in a respiratory event index (ZAK) of 24.3.  The lowest SpO2 recorded is 68%.     Impression: Moderate obstructive sleep apnea Positional aspect: no specific positional component Normal baseline oxygenation with mild to moderate oxygen desaturations due to sleep apnea Total time SpO2<89% - 70 minutes RECOMMENDATIONS: For treatment of moderate obstructive sleep apnea, I recommend a trial on auto titrating CPAP.  If CPAP intolerance is encountered in the future, oral appliance therapy or hypoglossal nerve stimulation can be considered.  Referral to sleep medicine can be considered to discuss treatment options. Davi Odom MD Board Certified Sleep Physician  Study Date: 2025 Patient Name: Ro Grey Recording Device: The Influence Sex: F Height: 64.8 in. : 1950 Weight: 196.0 lbs. Age: 75 years B.M.I: 32.9 lb/in2 Times and Durations Lights off clock time:  10:12:34 PM Total Recording Time (TRT): 439.5 minutes Lights on clock time: 5:26:04 AM Time In Bed (TIB): 433.5 minutes   Monitoring Time (MT): 425.0 minutes Device and Sensor Details The study was recorded on a [employer] device using 1 RIP effort belt and a pressure  based flow sensor. The heart rate is derived from the oximeter sensor and the snore signal is derived from the pressure sensor. The device also records body position and uses it to determine the monitoring time (sleep/wake periods). Summary ZAK 24.3 OAI 9.9 PANCHO 0.0 Lowest Desat 68 ZAK is the number of respiratory events per hour. OAI is the number of obstructive apneas per hour. PANCHO is the number of central apneas per hour. Lowest Desat is the lowest blood oxygen level that lasted at least 2 seconds. RESPIRATORY EVENTS  Index (#/hour) Total # of Events Mean duration  (sec) Max duration  (sec) # of Events by Position      Supine Prone Left Right Up Central Apneas 0.0 0 0.0 0.0 0    0 0 0 Obstructive Apneas 9.9 70 15.6 35.5 1    0 69 0 Mixed Apneas 0.0 0 0.0 0.0 0    0 0 0 Hypopneas 14.4 102 23.7 93.5 8    2 92 0 Apneas + Hypopneas 24.3 172 20.4 93.5 9    2 161 0 RERAs 0.0 0 0.0 0.0 0    0 0 0 Total 24.3 172 20.4 93.5 9    2 161 0 Time in Position 16.3    2.2 406.3 0.2 ZAK in Position 33.1    54.5 23.8 0.0 Positional Summary  Supine Non-Supine Total # of Events 9 163.00 Total Duration (minutes) 16.3 408.70 Sleep Duration (minutes) 16.3 408.70 ZAK in Position 33.1 23.93 REISupine / REINon-Supine Ratio 1.38  Positional Sleep Apnea is indicated if ZAK (supine) >= 2 x ZAK (non-supine) per [place], Sleep. 1984;7(2).  Oximetry Summary  Dur. (min) % TIB <90 % 87.4 20.2 <85 % 35.0 8.1 <80 % 7.4 1.7 <70 % 0.0 0.0 Total Dur (min) < 89 70.9 min Average (%) 91 Total # of Desats 160 Desat Index (#/hour) 22.7 Desat Max (%) 23 Desat Max dur (sec) 104.0 Lowest SpO2 % during sleep 68 Duration of Min SpO2 (sec) 2 Highest SpO2 % during sleep 98 Duration of Max SpO2(sec) 43  Heart Rate Stats Mean HR during sleep 67.7 (BPM) Highest HR during sleep 93  (BPM) Highest HR during TIB  93 (BPM) Lowest HR during sleep 51  (BPM) Lowest HR during TIB 51 (BPM) Snoring Summary Total Snoring Episodes 748 Total Duration with Snoring 195.9 minutes  Mean Duration of Snoring 15.7 seconds Percentage of Snoring 46.1 %      Review of Systems:  Review of Systems   Constitutional:  Negative for diaphoresis, fatigue, fever and unexpected weight change.   HENT: Negative.     Respiratory:  Negative for cough, shortness of breath and wheezing.    Cardiovascular:  Negative for chest pain, palpitations and leg swelling.   Gastrointestinal:  Negative for abdominal pain, diarrhea and nausea.   Musculoskeletal:  Negative for gait problem and myalgias.   Skin:  Negative for rash.   Neurological:  Negative for dizziness and numbness.   Psychiatric/Behavioral: Negative.         Physical Exam:  Physical Exam  Constitutional:       Appearance: She is well-developed.   HENT:      Head: Normocephalic and atraumatic.     Eyes:      Pupils: Pupils are equal, round, and reactive to light.     Neck:      Vascular: No JVD.     Cardiovascular:      Rate and Rhythm: Regular rhythm.      Pulses: Normal pulses.           Carotid pulses are 2+ on the right side and 2+ on the left side.     Heart sounds: S1 normal and S2 normal.   Pulmonary:      Effort: Pulmonary effort is normal.      Breath sounds: Normal breath sounds. No wheezing or rales.   Abdominal:      General: Bowel sounds are normal.      Palpations: Abdomen is soft.     Musculoskeletal:         General: No tenderness. Normal range of motion.      Cervical back: Normal range of motion and neck supple.     Skin:     General: Skin is warm.     Neurological:      Mental Status: She is alert and oriented to person, place, and time.      Cranial Nerves: No cranial nerve deficit.      Deep Tendon Reflexes: Reflexes are normal and symmetric.              [1]   Patient Active Problem List  Diagnosis    Osteopenia    Type 2 diabetes mellitus without complication, without long-term current use of insulin (HCC)    Hyperlipidemia    Essential hypertension    BMI 31.0-31.9,adult    Bronchitis    Asymptomatic postmenopausal status    NICKY  (obstructive sleep apnea)    PLMD (periodic limb movement disorder)    Primary osteoarthritis of right knee    Primary osteoarthritis of one hip, left    Trochanteric bursitis of left hip    Back pain with left-sided sciatica    Sacroiliitis (HCC)    Chronic idiopathic urticaria    Incontinence in female    Eczema    Primary insomnia    Rosacea    Low bone density    Palpitation    Tachycardia    Paroxysmal atrial fibrillation (HCC)   [2]   Past Medical History:  Diagnosis Date    Acute cystitis without hematuria 2020    Allergic     take meds daily to control    Allergic rhinitis 2015    spring/fall allergy    Anaphylaxis 2011    Arrhythmia 2024    Arthritis 2018    PT 2018    Atopic dermatitis     Carpal tunnel syndrome     Unspecified laterality. Resolved: 2016    Cataract     Chronic kidney disease     Complex endometrial hyperplasia with atypia     Resolved: 17    Diabetes mellitus (HCC)     of other type with circulatory complication, without long-term current use of insulin    History of chickenpox     History of measles     History of mumps     HL (hearing loss)     hearing aids as of     Hyperlipidemia     Hypertension     taking meds    Normal delivery     1971 and 1973 sons    Obesity     NICKY (obstructive sleep apnea)     Post-menopausal bleeding     Resolved:     Seasonal allergies     Urinary tract infection 10/17    meds cleared it    Urticaria     UTI (urinary tract infection)     2023, med cleared it   [3]   Family History  Problem Relation Name Age of Onset    Hypertension Mother Carrie Pantoja     Heart disease Mother Carrie Pantoja     Heart attack Mother Carrie Pantoja     Urticaria Mother Carrie Pantoja         eggs    Hypertension Father Serge Pantoja     Cancer Father Serge Pantoja              Prostate cancer Father Serge Pantoja 84    Arthritis Father Serge Pantoja              Hypertension Brother Serge Pantoja      Heart disease Brother Serge Pantoja     Prostate cancer Brother Serge Pantoja 63    Allergic rhinitis Brother Serge Pantoja         fall/spring    Eczema Brother Serge Pantoja     Arrhythmia Brother Serge Pantoja         Has Afib with several ablations    No Known Problems Brother Kiran     Diabetes Maternal Grandmother Nessa Gray     Stroke Maternal Grandmother Nessa Gray     Diabetes Paternal Grandfather Serge Pantoja     No Known Problems Child      Arthritis Family      Cancer Family          bladder    Other Family          Cardiac disorder    Diabetes Family      Hypertension Family      Valvular heart disease Family      No Known Problems Son Se     No Known Problems Son Kiran     Breast cancer Neg Hx      Colon cancer Neg Hx      Ovarian cancer Neg Hx      Uterine cancer Neg Hx      Cervical cancer Neg Hx      Thyroid disease Neg Hx     [4]   Past Surgical History:  Procedure Laterality Date    CARPAL TUNNEL RELEASE      CARPAL TUNNEL RELEASE Left     CATARACT EXTRACTION      CHOLECYSTECTOMY      COLONOSCOPY      Complete    DILATION AND CURETTAGE OF UTERUS      twice    ENDOMETRIAL BIOPSY      without cervical dilation    HYSTEROSCOPY      TUBAL LIGATION      WISDOM TOOTH EXTRACTION      x 2   [5]   Current Outpatient Medications:     amLODIPine (NORVASC) 5 mg tablet, Take 1 tablet (5 mg total) by mouth daily, Disp: 90 tablet, Rfl: 1    apixaban (Eliquis) 5 mg, Take 1 tablet (5 mg total) by mouth 2 (two) times a day, Disp: 180 tablet, Rfl: 4    atorvastatin (LIPITOR) 10 mg tablet, Take 1 tablet (10 mg total) by mouth daily, Disp: 90 tablet, Rfl: 1    carvedilol (COREG) 25 mg tablet, Take 1 tablet (25 mg total) by mouth 2 (two) times a day with meals, Disp: 180 tablet, Rfl: 3    Cranberry 600 MG TABS, Take by mouth, Disp: , Rfl:     famotidine (PEPCID) 20 mg tablet, TAKE 1 TABLET BY MOUTH TWICE  DAILY, Disp: 180 tablet, Rfl: 1    glucose blood (OneTouch Ultra) test strip, Test once  daily, Disp: 50 strip, Rfl: 3    hydroCHLOROthiazide 12.5 mg tablet, Take 1 tablet (12.5 mg total) by mouth daily, Disp: 90 tablet, Rfl: 4    melatonin 3 mg, Take 5 mg by mouth daily at bedtime, Disp: , Rfl:     metFORMIN (GLUCOPHAGE) 1000 MG tablet, TAKE 1 TABLET BY MOUTH TWICE  DAILY WITH MEALS, Disp: 180 tablet, Rfl: 1    metroNIDAZOLE (METROCREAM) 0.75 % cream, Apply topically in the morning and in the evening., Disp: , Rfl:     olmesartan (BENICAR) 40 mg tablet, Take 1 tablet (40 mg total) by mouth daily, Disp: 90 tablet, Rfl: 1    omalizumab (XOLAIR) subcutaneous injection, Inject 1 mL (150 mg total) under the skin every 28 days, Disp: , Rfl:     Tirzepatide (Mounjaro) 15 MG/0.5ML SOAJ, INJECT THE CONTENTS OF ONE PEN  SUBCUTANEOUSLY WEEKLY AS  DIRECTED FOR DIABETES, Disp: 6 mL, Rfl: 1    cetirizine (ZyrTEC) 10 mg tablet, Take 2 tablets (20 mg total) by mouth daily (Patient not taking: Reported on 7/1/2025), Disp: 180 tablet, Rfl: 3    fexofenadine (ALLEGRA) 180 MG tablet, Take 2 tablets (360 mg total) by mouth daily (Patient not taking: Reported on 7/1/2025), Disp: 90 tablet, Rfl: 3     Elliptical Excision Additional Text (Leave Blank If You Do Not Want): The margin was drawn around the clinically apparent lesion.  An elliptical shape was then drawn on the skin incorporating the lesion and margins.  Incisions were then made along these lines to the appropriate tissue plane and the lesion was extirpated.

## 2025-07-29 ENCOUNTER — TELEMEDICINE (OUTPATIENT)
Dept: INTERNAL MEDICINE CLINIC | Facility: CLINIC | Age: 75
End: 2025-07-29
Payer: MEDICARE

## 2025-07-29 VITALS — TEMPERATURE: 100.1 F

## 2025-07-29 DIAGNOSIS — U07.1 COVID-19 VIRUS INFECTION: Primary | ICD-10-CM

## 2025-07-29 PROCEDURE — G2211 COMPLEX E/M VISIT ADD ON: HCPCS | Performed by: INTERNAL MEDICINE

## 2025-07-29 PROCEDURE — 99213 OFFICE O/P EST LOW 20 MIN: CPT | Performed by: INTERNAL MEDICINE

## 2025-08-07 ENCOUNTER — NURSE TRIAGE (OUTPATIENT)
Age: 75
End: 2025-08-07

## 2025-08-07 ENCOUNTER — OFFICE VISIT (OUTPATIENT)
Dept: URGENT CARE | Facility: MEDICAL CENTER | Age: 75
End: 2025-08-07
Payer: MEDICARE

## 2025-08-07 VITALS
HEART RATE: 64 BPM | DIASTOLIC BLOOD PRESSURE: 65 MMHG | BODY MASS INDEX: 30.69 KG/M2 | SYSTOLIC BLOOD PRESSURE: 138 MMHG | WEIGHT: 183 LBS | RESPIRATION RATE: 18 BRPM | OXYGEN SATURATION: 100 % | TEMPERATURE: 97.3 F

## 2025-08-07 DIAGNOSIS — R39.9 UTI SYMPTOMS: Primary | ICD-10-CM

## 2025-08-07 LAB
SL AMB  POCT GLUCOSE, UA: ABNORMAL
SL AMB LEUKOCYTE ESTERASE,UA: ABNORMAL
SL AMB POCT BILIRUBIN,UA: ABNORMAL
SL AMB POCT BLOOD,UA: ABNORMAL
SL AMB POCT CLARITY,UA: ABNORMAL
SL AMB POCT COLOR,UA: ABNORMAL
SL AMB POCT KETONES,UA: ABNORMAL
SL AMB POCT NITRITE,UA: ABNORMAL
SL AMB POCT PH,UA: 7
SL AMB POCT SPECIFIC GRAVITY,UA: 1
SL AMB POCT URINE PROTEIN: 300
SL AMB POCT UROBILINOGEN: 0.2

## 2025-08-07 PROCEDURE — 87077 CULTURE AEROBIC IDENTIFY: CPT | Performed by: PHYSICIAN ASSISTANT

## 2025-08-07 PROCEDURE — 99214 OFFICE O/P EST MOD 30 MIN: CPT | Performed by: PHYSICIAN ASSISTANT

## 2025-08-07 PROCEDURE — G0463 HOSPITAL OUTPT CLINIC VISIT: HCPCS | Performed by: PHYSICIAN ASSISTANT

## 2025-08-07 PROCEDURE — 81002 URINALYSIS NONAUTO W/O SCOPE: CPT | Performed by: PHYSICIAN ASSISTANT

## 2025-08-07 PROCEDURE — 87086 URINE CULTURE/COLONY COUNT: CPT | Performed by: PHYSICIAN ASSISTANT

## 2025-08-07 PROCEDURE — 87186 SC STD MICRODIL/AGAR DIL: CPT | Performed by: PHYSICIAN ASSISTANT

## 2025-08-07 RX ORDER — SULFAMETHOXAZOLE AND TRIMETHOPRIM 800; 160 MG/1; MG/1
1 TABLET ORAL EVERY 12 HOURS SCHEDULED
Qty: 14 TABLET | Refills: 0 | Status: SHIPPED | OUTPATIENT
Start: 2025-08-07 | End: 2025-08-12 | Stop reason: ALTCHOICE

## 2025-08-09 ENCOUNTER — TELEPHONE (OUTPATIENT)
Dept: URGENT CARE | Facility: MEDICAL CENTER | Age: 75
End: 2025-08-09

## 2025-08-09 DIAGNOSIS — R31.9 URINARY TRACT INFECTION WITH HEMATURIA, SITE UNSPECIFIED: Primary | ICD-10-CM

## 2025-08-09 DIAGNOSIS — N39.0 URINARY TRACT INFECTION WITH HEMATURIA, SITE UNSPECIFIED: Primary | ICD-10-CM

## 2025-08-09 LAB
BACTERIA UR CULT: ABNORMAL
BACTERIA UR CULT: ABNORMAL

## 2025-08-09 RX ORDER — NITROFURANTOIN 25; 75 MG/1; MG/1
100 CAPSULE ORAL 2 TIMES DAILY
Qty: 10 CAPSULE | Refills: 0 | Status: SHIPPED | OUTPATIENT
Start: 2025-08-09 | End: 2025-08-14

## 2025-08-12 ENCOUNTER — APPOINTMENT (OUTPATIENT)
Dept: RADIOLOGY | Facility: MEDICAL CENTER | Age: 75
End: 2025-08-12
Payer: MEDICARE

## 2025-08-12 ENCOUNTER — APPOINTMENT (OUTPATIENT)
Dept: LAB | Facility: MEDICAL CENTER | Age: 75
End: 2025-08-12
Payer: MEDICARE

## 2025-08-12 ENCOUNTER — OFFICE VISIT (OUTPATIENT)
Dept: INTERNAL MEDICINE CLINIC | Facility: CLINIC | Age: 75
End: 2025-08-12
Payer: MEDICARE

## 2025-08-12 PROBLEM — R11.0 NAUSEA: Status: ACTIVE | Noted: 2025-08-12

## 2025-08-14 ENCOUNTER — TELEPHONE (OUTPATIENT)
Dept: INTERNAL MEDICINE CLINIC | Facility: CLINIC | Age: 75
End: 2025-08-14

## (undated) RX ORDER — HYDROXYZINE HYDROCHLORIDE 25 MG/1
1 TABLET, FILM COATED ORAL
End: 2021-09-01

## (undated) RX ORDER — HYDROXYZINE HYDROCHLORIDE 25 MG/1: 1 TABLET, FILM COATED ORAL

## (undated) RX ORDER — HYDROXYZINE HYDROCHLORIDE 25 MG/1
1 TABLET, FILM COATED ORAL
End: 2021-09-03